# Patient Record
Sex: MALE | Race: WHITE | NOT HISPANIC OR LATINO | Employment: OTHER | URBAN - METROPOLITAN AREA
[De-identification: names, ages, dates, MRNs, and addresses within clinical notes are randomized per-mention and may not be internally consistent; named-entity substitution may affect disease eponyms.]

---

## 2023-03-14 LAB
CHOLESTEROL (MG/DL) IN SER/PLAS: 117 MG/DL (ref 0–199)
CHOLESTEROL IN HDL (MG/DL) IN SER/PLAS: 42.2 MG/DL
CHOLESTEROL/HDL RATIO: 2.8
LDL: 60 MG/DL (ref 0–99)
TRIGLYCERIDE (MG/DL) IN SER/PLAS: 74 MG/DL (ref 0–149)
VLDL: 15 MG/DL (ref 0–40)

## 2023-03-17 LAB
ANION GAP IN SER/PLAS: 15 MMOL/L (ref 10–20)
BASOPHILS (10*3/UL) IN BLOOD BY AUTOMATED COUNT: 0.07 X10E9/L (ref 0–0.1)
BASOPHILS/100 LEUKOCYTES IN BLOOD BY AUTOMATED COUNT: 0.9 % (ref 0–2)
CALCIUM (MG/DL) IN SER/PLAS: 8.9 MG/DL (ref 8.6–10.3)
CARBON DIOXIDE, TOTAL (MMOL/L) IN SER/PLAS: 23 MMOL/L (ref 21–32)
CHLORIDE (MMOL/L) IN SER/PLAS: 106 MMOL/L (ref 98–107)
CREATININE (MG/DL) IN SER/PLAS: 1.17 MG/DL (ref 0.5–1.3)
EOSINOPHILS (10*3/UL) IN BLOOD BY AUTOMATED COUNT: 0.17 X10E9/L (ref 0–0.4)
EOSINOPHILS/100 LEUKOCYTES IN BLOOD BY AUTOMATED COUNT: 2.2 % (ref 0–6)
ERYTHROCYTE DISTRIBUTION WIDTH (RATIO) BY AUTOMATED COUNT: 14.4 % (ref 11.5–14.5)
ERYTHROCYTE MEAN CORPUSCULAR HEMOGLOBIN CONCENTRATION (G/DL) BY AUTOMATED: 28.2 G/DL (ref 32–36)
ERYTHROCYTE MEAN CORPUSCULAR VOLUME (FL) BY AUTOMATED COUNT: 84 FL (ref 80–100)
ERYTHROCYTES (10*6/UL) IN BLOOD BY AUTOMATED COUNT: 2.91 X10E12/L (ref 4.5–5.9)
GFR MALE: 60 ML/MIN/1.73M2
GLUCOSE (MG/DL) IN SER/PLAS: 101 MG/DL (ref 74–99)
HEMATOCRIT (%) IN BLOOD BY AUTOMATED COUNT: 24.5 % (ref 41–52)
HEMOGLOBIN (G/DL) IN BLOOD: 6.9 G/DL (ref 13.5–17.5)
IMMATURE GRANULOCYTES/100 LEUKOCYTES IN BLOOD BY AUTOMATED COUNT: 0.4 % (ref 0–0.9)
LEUKOCYTES (10*3/UL) IN BLOOD BY AUTOMATED COUNT: 7.6 X10E9/L (ref 4.4–11.3)
LYMPHOCYTES (10*3/UL) IN BLOOD BY AUTOMATED COUNT: 1.41 X10E9/L (ref 0.8–3)
LYMPHOCYTES/100 LEUKOCYTES IN BLOOD BY AUTOMATED COUNT: 18.7 % (ref 13–44)
MONOCYTES (10*3/UL) IN BLOOD BY AUTOMATED COUNT: 0.79 X10E9/L (ref 0.05–0.8)
MONOCYTES/100 LEUKOCYTES IN BLOOD BY AUTOMATED COUNT: 10.4 % (ref 2–10)
NEUTROPHILS (10*3/UL) IN BLOOD BY AUTOMATED COUNT: 5.09 X10E9/L (ref 1.6–5.5)
NEUTROPHILS/100 LEUKOCYTES IN BLOOD BY AUTOMATED COUNT: 67.4 % (ref 40–80)
PLATELETS (10*3/UL) IN BLOOD AUTOMATED COUNT: 312 X10E9/L (ref 150–450)
POTASSIUM (MMOL/L) IN SER/PLAS: 4.9 MMOL/L (ref 3.5–5.3)
SODIUM (MMOL/L) IN SER/PLAS: 139 MMOL/L (ref 136–145)
UREA NITROGEN (MG/DL) IN SER/PLAS: 30 MG/DL (ref 6–23)

## 2023-04-18 ENCOUNTER — TELEPHONE (OUTPATIENT)
Dept: PRIMARY CARE | Facility: CLINIC | Age: 88
End: 2023-04-18
Payer: MEDICARE

## 2023-04-18 NOTE — TELEPHONE ENCOUNTER
Eliud from [facility] called stating the they received an order from for home care for pt. He states that the pt is not home bound. He wants to confirm that you knew about this this is a nonadmit

## 2023-04-25 DIAGNOSIS — I10 PRIMARY HYPERTENSION: Primary | ICD-10-CM

## 2023-04-25 RX ORDER — ENALAPRIL MALEATE 2.5 MG/1
TABLET ORAL
Qty: 90 TABLET | Refills: 0 | Status: SHIPPED | OUTPATIENT
Start: 2023-04-25 | End: 2023-05-01 | Stop reason: SDUPTHER

## 2023-05-01 ENCOUNTER — OFFICE VISIT (OUTPATIENT)
Dept: PRIMARY CARE | Facility: CLINIC | Age: 88
End: 2023-05-01
Payer: MEDICARE

## 2023-05-01 VITALS
TEMPERATURE: 97.7 F | OXYGEN SATURATION: 97 % | DIASTOLIC BLOOD PRESSURE: 65 MMHG | BODY MASS INDEX: 25.46 KG/M2 | HEART RATE: 77 BPM | SYSTOLIC BLOOD PRESSURE: 155 MMHG | WEIGHT: 168 LBS | HEIGHT: 68 IN

## 2023-05-01 DIAGNOSIS — E44.0 MODERATE PROTEIN-CALORIE MALNUTRITION (MULTI): Primary | ICD-10-CM

## 2023-05-01 DIAGNOSIS — I10 PRIMARY HYPERTENSION: ICD-10-CM

## 2023-05-01 DIAGNOSIS — R53.81 DEBILITY: ICD-10-CM

## 2023-05-01 DIAGNOSIS — I25.10 CORONARY ARTERY DISEASE INVOLVING NATIVE CORONARY ARTERY OF NATIVE HEART WITHOUT ANGINA PECTORIS: Chronic | ICD-10-CM

## 2023-05-01 PROBLEM — M19.012 ARTHRITIS OF SHOULDER REGION, LEFT: Status: ACTIVE | Noted: 2023-05-01

## 2023-05-01 PROBLEM — M86.171 ACUTE OSTEOMYELITIS OF METATARSAL BONE OF RIGHT FOOT (MULTI): Status: ACTIVE | Noted: 2022-08-16

## 2023-05-01 PROBLEM — R07.9 CHEST PAIN: Status: ACTIVE | Noted: 2023-05-01

## 2023-05-01 PROBLEM — Z95.1 S/P CABG X 3: Status: ACTIVE | Noted: 2023-05-01

## 2023-05-01 PROBLEM — I70.261 ATHEROSCLEROSIS OF NATIVE ARTERY OF RIGHT LOWER EXTREMITY WITH GANGRENE (MULTI): Status: ACTIVE | Noted: 2023-05-01

## 2023-05-01 PROBLEM — I70.229 CRITICAL LOWER LIMB ISCHEMIA (MULTI): Status: ACTIVE | Noted: 2023-05-01

## 2023-05-01 PROBLEM — R94.31 ABNORMAL EKG: Status: ACTIVE | Noted: 2023-05-01

## 2023-05-01 PROBLEM — M25.561 BILATERAL KNEE PAIN: Status: ACTIVE | Noted: 2023-05-01

## 2023-05-01 PROBLEM — L97.515: Status: ACTIVE | Noted: 2023-05-01

## 2023-05-01 PROBLEM — M25.562 BILATERAL KNEE PAIN: Status: ACTIVE | Noted: 2023-05-01

## 2023-05-01 PROBLEM — R30.0 DYSURIA: Status: ACTIVE | Noted: 2023-05-01

## 2023-05-01 PROBLEM — M25.519 SHOULDER PAIN: Status: ACTIVE | Noted: 2023-05-01

## 2023-05-01 PROBLEM — S91.301A OPEN WOUND OF RIGHT FOOT: Status: ACTIVE | Noted: 2023-01-17

## 2023-05-01 PROBLEM — R53.83 FATIGUE: Status: ACTIVE | Noted: 2023-05-01

## 2023-05-01 PROBLEM — R01.1 MURMUR: Status: ACTIVE | Noted: 2023-05-01

## 2023-05-01 PROBLEM — S90.424A BLISTER OF TOE OF RIGHT FOOT WITH INFECTION: Status: ACTIVE | Noted: 2023-04-04

## 2023-05-01 PROBLEM — H90.3 BILATERAL SENSORINEURAL HEARING LOSS: Status: ACTIVE | Noted: 2023-05-01

## 2023-05-01 PROBLEM — R39.9 UTI SYMPTOMS: Status: ACTIVE | Noted: 2023-05-01

## 2023-05-01 PROBLEM — N40.0 BPH (BENIGN PROSTATIC HYPERPLASIA): Status: ACTIVE | Noted: 2023-05-01

## 2023-05-01 PROBLEM — R06.02 SOB (SHORTNESS OF BREATH): Status: ACTIVE | Noted: 2023-05-01

## 2023-05-01 PROBLEM — E11.621: Status: ACTIVE | Noted: 2023-05-01

## 2023-05-01 PROBLEM — S46.011A STRAIN OF RIGHT ROTATOR CUFF CAPSULE: Status: ACTIVE | Noted: 2023-05-01

## 2023-05-01 PROBLEM — N32.89 BLADDER MASS: Status: ACTIVE | Noted: 2023-05-01

## 2023-05-01 PROBLEM — N52.9 ERECTILE DYSFUNCTION: Status: ACTIVE | Noted: 2023-05-01

## 2023-05-01 PROBLEM — R43.0 LOSS OF PERCEPTION FOR SMELL: Status: ACTIVE | Noted: 2023-05-01

## 2023-05-01 PROBLEM — I35.0 AORTIC STENOSIS: Status: ACTIVE | Noted: 2023-05-01

## 2023-05-01 PROBLEM — H93.13 TINNITUS, BILATERAL: Status: ACTIVE | Noted: 2023-05-01

## 2023-05-01 PROBLEM — M75.42 IMPINGEMENT SYNDROME OF LEFT SHOULDER: Status: ACTIVE | Noted: 2023-05-01

## 2023-05-01 PROBLEM — K21.9 GERD WITHOUT ESOPHAGITIS: Status: ACTIVE | Noted: 2023-05-01

## 2023-05-01 PROBLEM — R48.1 LOSS OF PERCEPTION FOR TASTE: Status: ACTIVE | Noted: 2023-05-01

## 2023-05-01 PROBLEM — I73.9 PVD (PERIPHERAL VASCULAR DISEASE) (CMS-HCC): Status: ACTIVE | Noted: 2023-05-01

## 2023-05-01 PROBLEM — T83.410A: Status: ACTIVE | Noted: 2023-05-01

## 2023-05-01 PROBLEM — N39.0 URINARY TRACT INFECTION: Status: ACTIVE | Noted: 2023-05-01

## 2023-05-01 PROBLEM — Z96.0 S/P INSERTION OF PENILE IMPLANT: Status: ACTIVE | Noted: 2023-05-01

## 2023-05-01 PROBLEM — L08.9 BLISTER OF TOE OF RIGHT FOOT WITH INFECTION: Status: ACTIVE | Noted: 2023-04-04

## 2023-05-01 PROBLEM — R01.1 SYSTOLIC EJECTION MURMUR: Status: ACTIVE | Noted: 2023-05-01

## 2023-05-01 PROBLEM — E11.59 CONTROLLED DIABETES MELLITUS WITH CIRCULATORY COMPLICATION (MULTI): Status: ACTIVE | Noted: 2023-05-01

## 2023-05-01 PROBLEM — N31.9 NEUROGENIC BLADDER: Status: ACTIVE | Noted: 2023-05-01

## 2023-05-01 PROBLEM — M62.81 MUSCLE WEAKNESS: Status: ACTIVE | Noted: 2023-05-01

## 2023-05-01 PROBLEM — I65.23 ASYMPTOMATIC BILATERAL CAROTID ARTERY STENOSIS: Status: ACTIVE | Noted: 2023-05-01

## 2023-05-01 PROBLEM — U07.1 COVID-19: Status: ACTIVE | Noted: 2023-05-01

## 2023-05-01 PROCEDURE — 3078F DIAST BP <80 MM HG: CPT | Performed by: INTERNAL MEDICINE

## 2023-05-01 PROCEDURE — 1036F TOBACCO NON-USER: CPT | Performed by: INTERNAL MEDICINE

## 2023-05-01 PROCEDURE — 99213 OFFICE O/P EST LOW 20 MIN: CPT | Performed by: INTERNAL MEDICINE

## 2023-05-01 PROCEDURE — 3077F SYST BP >= 140 MM HG: CPT | Performed by: INTERNAL MEDICINE

## 2023-05-01 RX ORDER — GLUCOSAMINE/D3/BOSWELLIA SERRA 1500MG-400
TABLET ORAL
Status: ON HOLD | COMMUNITY
Start: 2023-02-03 | End: 2024-03-21 | Stop reason: ALTCHOICE

## 2023-05-01 RX ORDER — METOPROLOL TARTRATE 25 MG/1
12.5 TABLET, FILM COATED ORAL 2 TIMES DAILY
Qty: 45 TABLET | Refills: 3 | Status: SHIPPED | OUTPATIENT
Start: 2023-05-01 | End: 2023-05-02

## 2023-05-01 RX ORDER — LATANOPROST 50 UG/ML
SOLUTION/ DROPS OPHTHALMIC
Status: ON HOLD | COMMUNITY

## 2023-05-01 RX ORDER — CLOPIDOGREL BISULFATE 75 MG/1
75 TABLET ORAL DAILY
Qty: 90 TABLET | Refills: 1 | Status: SHIPPED | OUTPATIENT
Start: 2023-05-01 | End: 2023-10-26 | Stop reason: SDUPTHER

## 2023-05-01 RX ORDER — FERROUS SULFATE 325(65) MG
TABLET ORAL
Status: ON HOLD | COMMUNITY
Start: 2023-02-03 | End: 2024-03-21 | Stop reason: ALTCHOICE

## 2023-05-01 RX ORDER — GLUCOSAMINE/CHONDR SU A SOD 167-133 MG
1 CAPSULE ORAL DAILY
Status: ON HOLD | COMMUNITY
Start: 2022-07-12

## 2023-05-01 RX ORDER — PEN NEEDLE, DIABETIC 32GX 5/32"
NEEDLE, DISPOSABLE MISCELLANEOUS
COMMUNITY
Start: 2022-08-26 | End: 2024-03-04 | Stop reason: SDUPTHER

## 2023-05-01 RX ORDER — NAPROXEN SODIUM 220 MG/1
1 TABLET, FILM COATED ORAL DAILY
COMMUNITY
Start: 2022-03-08 | End: 2023-11-20 | Stop reason: WASHOUT

## 2023-05-01 RX ORDER — POTASSIUM CHLORIDE 20 MEQ/1
1 TABLET, EXTENDED RELEASE ORAL DAILY
COMMUNITY
Start: 2022-11-18 | End: 2023-05-01 | Stop reason: ALTCHOICE

## 2023-05-01 RX ORDER — UBIDECARENONE 75 MG
1 CAPSULE ORAL DAILY
COMMUNITY
End: 2023-12-12 | Stop reason: SDUPTHER

## 2023-05-01 RX ORDER — METOPROLOL TARTRATE 25 MG/1
1 TABLET, FILM COATED ORAL 2 TIMES DAILY
COMMUNITY
Start: 2022-03-08 | End: 2023-05-01 | Stop reason: SDUPTHER

## 2023-05-01 RX ORDER — CLOPIDOGREL BISULFATE 75 MG/1
1 TABLET ORAL DAILY
COMMUNITY
Start: 2022-03-08 | End: 2023-05-01 | Stop reason: SDUPTHER

## 2023-05-01 RX ORDER — NEPAFENAC 1 MG/ML
SUSPENSION/ DROPS OPHTHALMIC
COMMUNITY
Start: 2012-02-17 | End: 2024-06-05 | Stop reason: ALTCHOICE

## 2023-05-01 RX ORDER — INSULIN GLARGINE 100 [IU]/ML
INJECTION, SOLUTION SUBCUTANEOUS
COMMUNITY
Start: 2015-12-09 | End: 2023-12-14

## 2023-05-01 RX ORDER — TAMSULOSIN HYDROCHLORIDE 0.4 MG/1
0.4 CAPSULE ORAL
Status: ON HOLD | COMMUNITY
Start: 2022-07-12

## 2023-05-01 RX ORDER — IBUPROFEN 100 MG/5ML
1 SUSPENSION, ORAL (FINAL DOSE FORM) ORAL DAILY
Status: ON HOLD | COMMUNITY
Start: 2022-03-08

## 2023-05-01 RX ORDER — ENALAPRIL MALEATE 2.5 MG/1
2.5 TABLET ORAL DAILY
Qty: 90 TABLET | Refills: 3 | Status: SHIPPED | OUTPATIENT
Start: 2023-05-01 | End: 2023-11-20 | Stop reason: WASHOUT

## 2023-05-01 RX ORDER — MULTIVITAMIN
1 TABLET ORAL DAILY
Status: ON HOLD | COMMUNITY
Start: 2023-02-03

## 2023-05-01 RX ORDER — FUROSEMIDE 40 MG/1
1 TABLET ORAL DAILY
COMMUNITY
Start: 2022-07-12 | End: 2023-05-01 | Stop reason: ALTCHOICE

## 2023-05-02 RX ORDER — METOPROLOL TARTRATE 25 MG/1
TABLET, FILM COATED ORAL
Qty: 90 TABLET | Refills: 3 | Status: SHIPPED | OUTPATIENT
Start: 2023-05-02 | End: 2023-10-10 | Stop reason: SDUPTHER

## 2023-05-02 NOTE — PROGRESS NOTES
"The patient is here for:   Chief Complaint   Patient presents with    6 month fuv        I have reviewed existing histories, notes, past medical history, surgical history, social history, family history, med list, allergy list, and immunization, and updated if applicable.  PCP: Olesya Daniel MD    HPI:      Last month pt had right big toe distal amputation for ulcer and infection. Wound is healing well per pt.  He is ambulating, which has not been a while.   No sob cp.   Had a good check up by his cardiologist two months ago.  Lipid is controlled  Bp okay  normal renal function  Dm controlled       Physical exam:  /65   Pulse 77   Temp 36.5 °C (97.7 °F)   Ht 1.715 m (5' 7.5\")   Wt 76.2 kg (168 lb)   SpO2 97%   BMI 25.92 kg/m²    NAD   Neck exam showed no mass, lymphadenopathy, or thyroid enlargement, and no carotid bruit.  Heart RR  Lungs clear  No Abdominal pain  No leg edema.    Assessments/orders:   1. Moderate protein-calorie malnutrition (CMS/HCC)       2. Primary hypertension     - enalapril (Vasotec) 2.5 mg tablet; Take 1 tablet (2.5 mg) by mouth once daily.  Dispense: 90 tablet; Refill: 3    3. Coronary artery disease involving native coronary artery of native heart without angina pectoris     - clopidogrel (Plavix) 75 mg tablet; Take 1 tablet (75 mg) by mouth once daily.  Dispense: 90 tablet; Refill: 1    4. Debility     - Disability Placard      Plan/discussion and follow up:     3 months and prn           "

## 2023-06-08 ENCOUNTER — TELEPHONE (OUTPATIENT)
Dept: PRIMARY CARE | Facility: CLINIC | Age: 88
End: 2023-06-08
Payer: MEDICARE

## 2023-06-08 NOTE — TELEPHONE ENCOUNTER
KENNETH RECEIVED A REFERRAL FOR PT AND WANTED TO  CONFIRM THAT YOU WILL BE FOLLOWING PT FOR HIS CARE FOR HIS HOME HEALTHCARE

## 2023-07-03 ENCOUNTER — NURSING HOME VISIT (OUTPATIENT)
Dept: POST ACUTE CARE | Facility: EXTERNAL LOCATION | Age: 88
End: 2023-07-03
Payer: MEDICARE

## 2023-07-03 DIAGNOSIS — I50.89 OTHER HEART FAILURE (MULTI): ICD-10-CM

## 2023-07-03 DIAGNOSIS — Z79.4 TYPE 2 DIABETES MELLITUS WITH FOOT ULCER, WITH LONG-TERM CURRENT USE OF INSULIN (MULTI): ICD-10-CM

## 2023-07-03 DIAGNOSIS — E11.621 TYPE 2 DIABETES MELLITUS WITH FOOT ULCER, WITH LONG-TERM CURRENT USE OF INSULIN (MULTI): ICD-10-CM

## 2023-07-03 DIAGNOSIS — R53.1 WEAKNESS: Primary | ICD-10-CM

## 2023-07-03 DIAGNOSIS — L97.509 TYPE 2 DIABETES MELLITUS WITH FOOT ULCER, WITH LONG-TERM CURRENT USE OF INSULIN (MULTI): ICD-10-CM

## 2023-07-03 DIAGNOSIS — M86.171 ACUTE OSTEOMYELITIS OF METATARSAL BONE OF RIGHT FOOT (MULTI): ICD-10-CM

## 2023-07-03 DIAGNOSIS — I10 HYPERTENSION, ESSENTIAL: ICD-10-CM

## 2023-07-03 PROCEDURE — 99309 SBSQ NF CARE MODERATE MDM 30: CPT | Performed by: NURSE PRACTITIONER

## 2023-07-03 NOTE — LETTER
Patient: Rene Ballard  : 1934    Encounter Date: 2023    PROGRESS NOTE    Subjective  Chief complaint: Rene Ballard is a 88 y.o. male who is an acute skilled patient being seen and evaluated for weakness    HPI:  7/3/2023 Patient at SNF for therapy d/t weakness after recent hospitalization for right wrist dorsal compartment tenosynovitis and RLE 4th and 5th toe OM s/p TMA.  He was being followed by another provider and has requested to change to Dr Bailey's service.  Patient has been working with therapy.  He remains on IV atb.  He denies n/v/f/c.      Objective  Vital signs: 171/77, 97.8, 69, 18, 97%,     Physical Exam  Constitutional:       General: He is not in acute distress.  Eyes:      Extraocular Movements: Extraocular movements intact.   Cardiovascular:      Rate and Rhythm: Normal rate and regular rhythm.      Heart sounds: Murmur heard.   Pulmonary:      Effort: Pulmonary effort is normal.      Breath sounds: Normal breath sounds.   Abdominal:      General: Bowel sounds are normal.      Palpations: Abdomen is soft.   Musculoskeletal:      Cervical back: Neck supple.      Right lower leg: No edema.      Left lower leg: No edema.      Comments: Generalized weakness  Right wrist splint   Neurological:      Mental Status: He is alert.   Psychiatric:         Mood and Affect: Mood normal.         Behavior: Behavior is cooperative.         Assessment/Plan  Problem List Items Addressed This Visit       Acute osteomyelitis of metatarsal bone of right foot (CMS/HCC)     S/p TMA  IV atb thru 2023  Fu with surgeon and ID         Hypertension, essential     BP fluctuates  Continue antihypertensives  Monitor BP         Other heart failure (CMS/HCC)     BB  Monitor weight  Low Na diet         Type 2 diabetes mellitus with foot ulcer, with long-term current use of insulin (CMS/HCC)     Continue insulin  Monitor accuchecks         Weakness - Primary     Continue with therapy           Medications, treatments, and labs reviewed  Continue medications and treatments as listed in EMR    RENEE Reyes      Electronically Signed By: RENEE Reyes   7/4/23  7:55 PM

## 2023-07-04 DIAGNOSIS — M86.171 ACUTE OSTEOMYELITIS OF METATARSAL BONE OF RIGHT FOOT (MULTI): Primary | ICD-10-CM

## 2023-07-04 PROBLEM — Z96.0 S/P INSERTION OF PENILE IMPLANT: Status: RESOLVED | Noted: 2023-05-01 | Resolved: 2023-07-04

## 2023-07-04 PROBLEM — R48.1 LOSS OF PERCEPTION FOR TASTE: Status: RESOLVED | Noted: 2023-05-01 | Resolved: 2023-07-04

## 2023-07-04 PROBLEM — R06.02 SOB (SHORTNESS OF BREATH): Status: RESOLVED | Noted: 2023-05-01 | Resolved: 2023-07-04

## 2023-07-04 PROBLEM — R53.1 WEAKNESS: Status: ACTIVE | Noted: 2023-05-01

## 2023-07-04 PROBLEM — N52.9 ERECTILE DYSFUNCTION: Status: RESOLVED | Noted: 2023-05-01 | Resolved: 2023-07-04

## 2023-07-04 PROBLEM — E11.621 TYPE 2 DIABETES MELLITUS WITH FOOT ULCER, WITH LONG-TERM CURRENT USE OF INSULIN (MULTI): Status: ACTIVE | Noted: 2023-07-04

## 2023-07-04 PROBLEM — M25.519 SHOULDER PAIN: Status: RESOLVED | Noted: 2023-05-01 | Resolved: 2023-07-04

## 2023-07-04 PROBLEM — Z79.4 TYPE 2 DIABETES MELLITUS WITH FOOT ULCER, WITH LONG-TERM CURRENT USE OF INSULIN (MULTI): Status: ACTIVE | Noted: 2023-07-04

## 2023-07-04 PROBLEM — M25.562 BILATERAL KNEE PAIN: Status: RESOLVED | Noted: 2023-05-01 | Resolved: 2023-07-04

## 2023-07-04 PROBLEM — L97.509 TYPE 2 DIABETES MELLITUS WITH FOOT ULCER, WITH LONG-TERM CURRENT USE OF INSULIN (MULTI): Status: ACTIVE | Noted: 2023-07-04

## 2023-07-04 PROBLEM — U07.1 COVID-19: Status: RESOLVED | Noted: 2023-05-01 | Resolved: 2023-07-04

## 2023-07-04 PROBLEM — I50.89 OTHER HEART FAILURE (MULTI): Status: ACTIVE | Noted: 2023-07-04

## 2023-07-04 PROBLEM — M25.561 BILATERAL KNEE PAIN: Status: RESOLVED | Noted: 2023-05-01 | Resolved: 2023-07-04

## 2023-07-04 PROBLEM — Z95.1 S/P CABG X 3: Status: RESOLVED | Noted: 2023-05-01 | Resolved: 2023-07-04

## 2023-07-04 PROBLEM — R94.31 ABNORMAL EKG: Status: RESOLVED | Noted: 2023-05-01 | Resolved: 2023-07-04

## 2023-07-04 PROBLEM — R30.0 DYSURIA: Status: RESOLVED | Noted: 2023-05-01 | Resolved: 2023-07-04

## 2023-07-04 PROBLEM — R01.1 MURMUR: Status: RESOLVED | Noted: 2023-05-01 | Resolved: 2023-07-04

## 2023-07-04 PROBLEM — R43.0 LOSS OF PERCEPTION FOR SMELL: Status: RESOLVED | Noted: 2023-05-01 | Resolved: 2023-07-04

## 2023-07-04 PROBLEM — R07.9 CHEST PAIN: Status: RESOLVED | Noted: 2023-05-01 | Resolved: 2023-07-04

## 2023-07-04 PROBLEM — N39.0 URINARY TRACT INFECTION: Status: RESOLVED | Noted: 2023-05-01 | Resolved: 2023-07-04

## 2023-07-04 PROBLEM — R39.9 UTI SYMPTOMS: Status: RESOLVED | Noted: 2023-05-01 | Resolved: 2023-07-04

## 2023-07-04 RX ORDER — OXYCODONE HYDROCHLORIDE 5 MG/1
5 TABLET ORAL EVERY 4 HOURS PRN
Qty: 120 TABLET | Refills: 0 | Status: SHIPPED | OUTPATIENT
Start: 2023-07-04 | End: 2023-08-03

## 2023-07-04 NOTE — PROGRESS NOTES
PROGRESS NOTE    Subjective   Chief complaint: Rene Balladr is a 88 y.o. male who is an acute skilled patient being seen and evaluated for weakness    HPI:  7/3/2023 Patient at SNF for therapy d/t weakness after recent hospitalization for right wrist dorsal compartment tenosynovitis and RLE 4th and 5th toe OM s/p TMA.  He was being followed by another provider and has requested to change to Dr Bailey's service.  Patient has been working with therapy.  He remains on IV atb.  He denies n/v/f/c.      Objective   Vital signs: 171/77, 97.8, 69, 18, 97%,     Physical Exam  Constitutional:       General: He is not in acute distress.  Eyes:      Extraocular Movements: Extraocular movements intact.   Cardiovascular:      Rate and Rhythm: Normal rate and regular rhythm.      Heart sounds: Murmur heard.   Pulmonary:      Effort: Pulmonary effort is normal.      Breath sounds: Normal breath sounds.   Abdominal:      General: Bowel sounds are normal.      Palpations: Abdomen is soft.   Musculoskeletal:      Cervical back: Neck supple.      Right lower leg: No edema.      Left lower leg: No edema.      Comments: Generalized weakness  Right wrist splint   Neurological:      Mental Status: He is alert.   Psychiatric:         Mood and Affect: Mood normal.         Behavior: Behavior is cooperative.         Assessment/Plan   Problem List Items Addressed This Visit       Acute osteomyelitis of metatarsal bone of right foot (CMS/HCC)     S/p TMA  IV atb thru 7/28/2023  Fu with surgeon and ID         Hypertension, essential     BP fluctuates  Continue antihypertensives  Monitor BP         Other heart failure (CMS/HCC)     BB  Monitor weight  Low Na diet         Type 2 diabetes mellitus with foot ulcer, with long-term current use of insulin (CMS/HCC)     Continue insulin  Monitor accuchecks         Weakness - Primary     Continue with therapy          Medications, treatments, and labs reviewed  Continue medications and treatments  as listed in EMR    Suzette Davidson, APRN-CNP

## 2023-07-05 ENCOUNTER — NURSING HOME VISIT (OUTPATIENT)
Dept: POST ACUTE CARE | Facility: EXTERNAL LOCATION | Age: 88
End: 2023-07-05
Payer: MEDICARE

## 2023-07-05 DIAGNOSIS — M86.171 ACUTE OSTEOMYELITIS OF METATARSAL BONE OF RIGHT FOOT (MULTI): Primary | ICD-10-CM

## 2023-07-05 DIAGNOSIS — E08.51: ICD-10-CM

## 2023-07-05 DIAGNOSIS — R53.1 WEAKNESS: ICD-10-CM

## 2023-07-05 DIAGNOSIS — I25.10 CORONARY ARTERY DISEASE INVOLVING NATIVE CORONARY ARTERY OF NATIVE HEART WITHOUT ANGINA PECTORIS: Chronic | ICD-10-CM

## 2023-07-05 DIAGNOSIS — N40.0 BENIGN PROSTATIC HYPERPLASIA WITHOUT LOWER URINARY TRACT SYMPTOMS: ICD-10-CM

## 2023-07-05 DIAGNOSIS — M17.0 PRIMARY OSTEOARTHRITIS OF BOTH KNEES: ICD-10-CM

## 2023-07-05 DIAGNOSIS — I35.0 NONRHEUMATIC AORTIC VALVE STENOSIS: ICD-10-CM

## 2023-07-05 DIAGNOSIS — I73.9 PVD (PERIPHERAL VASCULAR DISEASE) (CMS-HCC): ICD-10-CM

## 2023-07-05 DIAGNOSIS — I25.10 ARTERIOSCLEROSIS OF CORONARY ARTERY: Chronic | ICD-10-CM

## 2023-07-05 PROCEDURE — 99305 1ST NF CARE MODERATE MDM 35: CPT | Performed by: INTERNAL MEDICINE

## 2023-07-05 NOTE — LETTER
Patient: Rene Ballard  : 1934    Encounter Date: 2023    HISTORY & PHYSICAL    Subjective  Chief complaint: Rene Ballard is a 88 y.o. male who is a acute skilled care patient being seen and evaluated for multiple medical problems.  Patient presents for weakness.    HPI:  Patient at SNF for therapy d/t weakness after recent hospitalization for right wrist dorsal compartment tenosynovitis and RLE 4th and 5th toe OM s/p TMA.  He was being followed by another provider and has requested to change to Dr Bailey's service.  Patient has been working with therapy.  He remains on IV atb.  He denies n/v/f/c.      Past Medical History:   Diagnosis Date   • Abnormal EKG 2023   • COVID-19 2023   • Peripheral vascular disease, unspecified (CMS/HCC) 2022    PVD (peripheral vascular disease)   • Personal history of malignant neoplasm of bladder     History of carcinoma of bladder   • Personal history of other diseases of the musculoskeletal system and connective tissue     History of arthritis   • Personal history of other endocrine, nutritional and metabolic disease     History of diabetes mellitus   • S/P CABG x 3 2023   • S/P insertion of penile implant 2023   • Type 2 diabetes mellitus with other circulatory complications (CMS/[facility]) 2022    Controlled diabetes mellitus with circulatory complication       Past Surgical History:   Procedure Laterality Date   • CARDIAC SURGERY     • IR  BLADDER ASPIRATION  2017    IR  BLADDER ASPIRATION [place] [facility] [place]   • OTHER SURGICAL HISTORY  2017    Surgery Penile Prosthetic Device       Family History   Problem Relation Name Age of Onset   • Tuberculosis Maternal Grandmother         Social History     Socioeconomic History   • Marital status:      Spouse name: Not on file   • Number of children: Not on file   • Years of education: Not on file   • Highest education level: Not on file   Occupational  History   • Not on file   Tobacco Use   • Smoking status: Never   • Smokeless tobacco: Never   Substance and Sexual Activity   • Alcohol use: Not Currently   • Drug use: Never   • Sexual activity: Not on file   Other Topics Concern   • Not on file   Social History Narrative   • Not on file     Social Determinants of Health     Financial Resource Strain: Not on file   Food Insecurity: Not on file   Transportation Needs: Not on file   Physical Activity: Not on file   Stress: Not on file   Social Connections: Not on file   Intimate Partner Violence: Not on file   Housing Stability: Not on file       Vital signs: 156/87, 97.5, 75, 18, 132.0, 94%    Objective  Physical Exam  Vitals reviewed.   Constitutional:       Appearance: Normal appearance.   HENT:      Head: Normocephalic and atraumatic.   Cardiovascular:      Rate and Rhythm: Normal rate and regular rhythm.   Pulmonary:      Effort: Pulmonary effort is normal.      Breath sounds: Normal breath sounds.   Abdominal:      General: Bowel sounds are normal.      Palpations: Abdomen is soft.   Musculoskeletal:      Cervical back: Neck supple.   Skin:     General: Skin is warm and dry.   Neurological:      General: No focal deficit present.      Mental Status: He is alert.   Psychiatric:         Mood and Affect: Mood normal.         Behavior: Behavior is cooperative.         Assessment/Plan  Problem List Items Addressed This Visit          Cardiac and Vasculature    Arteriosclerosis of coronary artery (Chronic)    CAD (coronary artery disease), native coronary artery (Chronic)    Aortic stenosis    PVD (peripheral vascular disease) (CMS/[facility])       Endocrine/Metabolic    Controlled diabetes mellitus with circulatory complication (CMS/[facility])       Genitourinary and Reproductive    BPH (benign prostatic hyperplasia)       Infectious Diseases    Acute osteomyelitis of metatarsal bone of right foot (CMS/[facility]) - Primary       Musculoskeletal and Injuries    Osteoarthritis of both  knees       Symptoms and Signs    Weakness     Medications, treatments, and labs reviewed  Continue medications and treatments as listed in PCC    Scribe Attestation  I, Rosa M Abdi   attest that this documentation has been prepared under the direction and in the presence of Joao Bailey MD.    Provider Attestation - Scribe documentation  All medical record entries made by the Scribe were at my direction and personally dictated by me. I have reviewed the chart and agree that the record accurately reflects my personal performance of the history, physical exam, discussion and plan.    Joao Bailey MD          Electronically Signed By: Joao Bailey MD   7/5/23  6:18 PM

## 2023-07-05 NOTE — PROGRESS NOTES
HISTORY & PHYSICAL    Subjective   Chief complaint: Rene Ballard is a 88 y.o. male who is a acute skilled care patient being seen and evaluated for multiple medical problems.  Patient presents for weakness.    HPI:  Patient at SNF for therapy d/t weakness after recent hospitalization for right wrist dorsal compartment tenosynovitis and RLE 4th and 5th toe OM s/p TMA.  He was being followed by another provider and has requested to change to Dr Bailey's service.  Patient has been working with therapy.  He remains on IV atb.  He denies n/v/f/c.      Past Medical History:   Diagnosis Date    Abnormal EKG 05/01/2023    COVID-19 05/01/2023    Peripheral vascular disease, unspecified (CMS/[facility]) 11/28/2022    PVD (peripheral vascular disease)    Personal history of malignant neoplasm of bladder     History of carcinoma of bladder    Personal history of other diseases of the musculoskeletal system and connective tissue     History of arthritis    Personal history of other endocrine, nutritional and metabolic disease     History of diabetes mellitus    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    Type 2 diabetes mellitus with other circulatory complications (CMS/[facility]) 11/28/2022    Controlled diabetes mellitus with circulatory complication       Past Surgical History:   Procedure Laterality Date    CARDIAC SURGERY      IR  BLADDER ASPIRATION  06/23/2017    IR  BLADDER ASPIRATION 6/23/2017 [facility] [place] INPATIENT LEGACY    OTHER SURGICAL HISTORY  06/19/2017    Surgery Penile Prosthetic Device       Family History   Problem Relation Name Age of Onset    Tuberculosis Maternal Grandmother         Social History     Socioeconomic History    Marital status:      Spouse name: Not on file    Number of children: Not on file    Years of education: Not on file    Highest education level: Not on file   Occupational History    Not on file   Tobacco Use    Smoking status: Never    Smokeless tobacco: Never    Substance and Sexual Activity    Alcohol use: Not Currently    Drug use: Never    Sexual activity: Not on file   Other Topics Concern    Not on file   Social History Narrative    Not on file     Social Determinants of Health     Financial Resource Strain: Not on file   Food Insecurity: Not on file   Transportation Needs: Not on file   Physical Activity: Not on file   Stress: Not on file   Social Connections: Not on file   Intimate Partner Violence: Not on file   Housing Stability: Not on file       Vital signs: 156/87, 97.5, 75, 18, 132.0, 94%    Objective   Physical Exam  Vitals reviewed.   Constitutional:       Appearance: Normal appearance.   HENT:      Head: Normocephalic and atraumatic.   Cardiovascular:      Rate and Rhythm: Normal rate and regular rhythm.   Pulmonary:      Effort: Pulmonary effort is normal.      Breath sounds: Normal breath sounds.   Abdominal:      General: Bowel sounds are normal.      Palpations: Abdomen is soft.   Musculoskeletal:      Cervical back: Neck supple.   Skin:     General: Skin is warm and dry.   Neurological:      General: No focal deficit present.      Mental Status: He is alert.   Psychiatric:         Mood and Affect: Mood normal.         Behavior: Behavior is cooperative.         Assessment/Plan   Problem List Items Addressed This Visit          Cardiac and Vasculature    Arteriosclerosis of coronary artery (Chronic)    CAD (coronary artery disease), native coronary artery (Chronic)    Aortic stenosis    PVD (peripheral vascular disease) (CMS/[facility])       Endocrine/Metabolic    Controlled diabetes mellitus with circulatory complication (CMS/[facility])       Genitourinary and Reproductive    BPH (benign prostatic hyperplasia)       Infectious Diseases    Acute osteomyelitis of metatarsal bone of right foot (CMS/[facility]) - Primary       Musculoskeletal and Injuries    Osteoarthritis of both knees       Symptoms and Signs    Weakness     Medications, treatments, and labs  reviewed  Continue medications and treatments as listed in PCC    Scribe Attestation  I, Rosa M Abdi   attest that this documentation has been prepared under the direction and in the presence of Joao Bailey MD.    Provider Attestation - Scribe documentation  All medical record entries made by the Scribe were at my direction and personally dictated by me. I have reviewed the chart and agree that the record accurately reflects my personal performance of the history, physical exam, discussion and plan.    Joao Bailey MD

## 2023-07-07 ENCOUNTER — NURSING HOME VISIT (OUTPATIENT)
Dept: POST ACUTE CARE | Facility: EXTERNAL LOCATION | Age: 88
End: 2023-07-07
Payer: MEDICARE

## 2023-07-07 DIAGNOSIS — I10 HYPERTENSION, ESSENTIAL: ICD-10-CM

## 2023-07-07 DIAGNOSIS — R53.1 WEAKNESS: ICD-10-CM

## 2023-07-07 PROCEDURE — 99308 SBSQ NF CARE LOW MDM 20: CPT | Performed by: INTERNAL MEDICINE

## 2023-07-07 NOTE — LETTER
Patient: Rene Ballard  : 1934    Encounter Date: 2023    PROGRESS NOTE    Subjective  Chief complaint: Rene Ballard is a 88 y.o. male who is an acute skilled patient being seen and evaluated for weakness    HPI:  7/3/2023 Patient at SNF for therapy d/t weakness after recent hospitalization for right wrist dorsal compartment tenosynovitis and RLE 4th and 5th toe OM s/p TMA.  He was being followed by another provider and has requested to change to Dr Bailey's service.  Patient has been working with therapy.  He remains on IV atb.  He denies n/v/f/c.    23   Patient working with therapy.  Requires assistance with transfers ADLs and mobility.  No new issues or concerns.  No acute distress.      Objective  Vital signs: 164/75, 97%,     Physical Exam  Constitutional:       General: He is not in acute distress.  Eyes:      Extraocular Movements: Extraocular movements intact.   Cardiovascular:      Rate and Rhythm: Normal rate and regular rhythm.      Heart sounds: Murmur heard.   Pulmonary:      Effort: Pulmonary effort is normal.      Breath sounds: Normal breath sounds.   Abdominal:      General: Bowel sounds are normal.      Palpations: Abdomen is soft.   Musculoskeletal:      Cervical back: Neck supple.      Right lower leg: No edema.      Left lower leg: No edema.      Comments: Generalized weakness  Right wrist splint   Neurological:      Mental Status: He is alert.   Psychiatric:         Mood and Affect: Mood normal.         Behavior: Behavior is cooperative.         Assessment/Plan  Problem List Items Addressed This Visit          Cardiac and Vasculature    Hypertension, essential     BP fluctuates  Continue antihypertensives  Monitor BP            Symptoms and Signs    Weakness     Continue with therapy        Medications, treatments, and labs reviewed  Continue medications and treatments as listed in EMR    Joao Bailey MD   Scribe Attestation  By signing my name below, I, Claribel Chatman,  Scribe   attest that this documentation has been prepared under the direction and in the presence of Joao Bailey MD.    Provider Attestation - Scribe documentation  All medical record entries made by the Scribe were at my direction and personally dictated by me. I have reviewed the chart and agree that the record accurately reflects my personal performance of the history, physical exam, discussion and plan.    1. Hypertension, essential        2. Weakness              Electronically Signed By: Joao Bailey MD   7/8/23  1:47 PM

## 2023-07-07 NOTE — PROGRESS NOTES
PROGRESS NOTE    Subjective   Chief complaint: Rene Ballard is a 88 y.o. male who is an acute skilled patient being seen and evaluated for weakness    HPI:  7/3/2023 Patient at SNF for therapy d/t weakness after recent hospitalization for right wrist dorsal compartment tenosynovitis and RLE 4th and 5th toe OM s/p TMA.  He was being followed by another provider and has requested to change to Dr Bailey's service.  Patient has been working with therapy.  He remains on IV atb.  He denies n/v/f/c.    7/7/23   Patient working with therapy.  Requires assistance with transfers ADLs and mobility.  No new issues or concerns.  No acute distress.      Objective   Vital signs: 164/75, 97%,     Physical Exam  Constitutional:       General: He is not in acute distress.  Eyes:      Extraocular Movements: Extraocular movements intact.   Cardiovascular:      Rate and Rhythm: Normal rate and regular rhythm.      Heart sounds: Murmur heard.   Pulmonary:      Effort: Pulmonary effort is normal.      Breath sounds: Normal breath sounds.   Abdominal:      General: Bowel sounds are normal.      Palpations: Abdomen is soft.   Musculoskeletal:      Cervical back: Neck supple.      Right lower leg: No edema.      Left lower leg: No edema.      Comments: Generalized weakness  Right wrist splint   Neurological:      Mental Status: He is alert.   Psychiatric:         Mood and Affect: Mood normal.         Behavior: Behavior is cooperative.         Assessment/Plan   Problem List Items Addressed This Visit          Cardiac and Vasculature    Hypertension, essential     BP fluctuates  Continue antihypertensives  Monitor BP            Symptoms and Signs    Weakness     Continue with therapy        Medications, treatments, and labs reviewed  Continue medications and treatments as listed in EMR    Joao Bailey MD   Scribe Attestation  By signing my name below, I, Claribel Chatman, Scribe   attest that this documentation has been prepared under the  direction and in the presence of Joao Bailey MD.    Provider Attestation - Scribe documentation  All medical record entries made by the Scribe were at my direction and personally dictated by me. I have reviewed the chart and agree that the record accurately reflects my personal performance of the history, physical exam, discussion and plan.    1. Hypertension, essential        2. Weakness

## 2023-07-10 ENCOUNTER — NURSING HOME VISIT (OUTPATIENT)
Dept: POST ACUTE CARE | Facility: EXTERNAL LOCATION | Age: 88
End: 2023-07-10
Payer: MEDICARE

## 2023-07-10 DIAGNOSIS — I50.89 OTHER HEART FAILURE (MULTI): ICD-10-CM

## 2023-07-10 DIAGNOSIS — M79.641 RIGHT HAND PAIN: ICD-10-CM

## 2023-07-10 DIAGNOSIS — R53.1 WEAKNESS: Primary | ICD-10-CM

## 2023-07-10 DIAGNOSIS — L97.509 TYPE 2 DIABETES MELLITUS WITH FOOT ULCER, WITH LONG-TERM CURRENT USE OF INSULIN (MULTI): ICD-10-CM

## 2023-07-10 DIAGNOSIS — E11.621 TYPE 2 DIABETES MELLITUS WITH FOOT ULCER, WITH LONG-TERM CURRENT USE OF INSULIN (MULTI): ICD-10-CM

## 2023-07-10 DIAGNOSIS — Z79.4 TYPE 2 DIABETES MELLITUS WITH FOOT ULCER, WITH LONG-TERM CURRENT USE OF INSULIN (MULTI): ICD-10-CM

## 2023-07-10 DIAGNOSIS — I10 HYPERTENSION, ESSENTIAL: ICD-10-CM

## 2023-07-10 DIAGNOSIS — R01.1 SYSTOLIC EJECTION MURMUR: ICD-10-CM

## 2023-07-10 PROCEDURE — 99309 SBSQ NF CARE MODERATE MDM 30: CPT | Performed by: NURSE PRACTITIONER

## 2023-07-10 NOTE — LETTER
Patient: Rene Ballard  : 1934    Encounter Date: 07/10/2023    PROGRESS NOTE    Subjective  Chief complaint: Rene Ballard is a 88 y.o. male who is an acute skilled patient being seen and evaluated for weakness    HPI:  7/3/2023 Patient at SNF for therapy d/t weakness after recent hospitalization for right wrist dorsal compartment tenosynovitis and RLE 4th and 5th toe OM s/p TMA.  He was being followed by another provider and has requested to change to Dr Bailey's service.  Patient has been working with therapy.  He remains on IV atb.  He denies n/v/f/c.    23   Patient working with therapy.  Requires assistance with transfers ADLs and mobility.  No new issues or concerns.  No acute distress.    7/10/2023 Patient presents for follow-up.  Patient has been participating in therapy and continues to work toward goals.   Working on strengthening, transfers, and ADLs.  Denies constitutional symptoms.  Had cast removed from the right upper extremity last week and has tenderness to the middle finger not of new onset.  No new concerns otherwise.      Objective  Vital signs: 124/57, 18, 97.1, 65, 96%, blood sugar 314    Physical Exam  Constitutional:       General: He is not in acute distress.  Eyes:      Extraocular Movements: Extraocular movements intact.   Cardiovascular:      Rate and Rhythm: Normal rate and regular rhythm.      Heart sounds: Murmur heard.   Pulmonary:      Effort: Pulmonary effort is normal.      Breath sounds: Normal breath sounds.   Abdominal:      General: Bowel sounds are normal.      Palpations: Abdomen is soft.   Musculoskeletal:      Cervical back: Neck supple.      Right lower leg: No edema.      Left lower leg: No edema.      Comments: Generalized weakness  Right hand reddened, middle finger tender to palpation  Dressing right foot   Neurological:      Mental Status: He is alert.   Psychiatric:         Mood and Affect: Mood normal.         Behavior: Behavior is cooperative.          Assessment/Plan  Problem List Items Addressed This Visit       Hypertension, essential     BP at goal  Continue antihypertensives  Monitor BP         Other heart failure (CMS/[facility])     BB  Monitor weight  Low Na diet         Right hand pain     Start Biofreeze  Continue oxycodone and Tylenol as needed         Systolic ejection murmur    Type 2 diabetes mellitus with foot ulcer, with long-term current use of insulin (CMS/[facility])     Blood sugars usually mid 100s to mid 300s  Continue insulin  Monitor accuchecks         Weakness - Primary     Continue with therapy          Medications, treatments, and labs reviewed  Continue medications and treatments as listed in EMR    RENEE Reyes      Electronically Signed By: RENEE Reyes   7/10/23  6:55 PM

## 2023-07-10 NOTE — PROGRESS NOTES
PROGRESS NOTE    Subjective   Chief complaint: Rene Ballard is a 88 y.o. male who is an acute skilled patient being seen and evaluated for weakness    HPI:  7/3/2023 Patient at [facility] for therapy d/t weakness after recent hospitalization for right wrist dorsal compartment tenosynovitis and RLE 4th and 5th toe OM s/p TMA.  He was being followed by another provider and has requested to change to Dr Bailey's service.  Patient has been working with therapy.  He remains on IV atb.  He denies n/v/f/c.    7/7/23   Patient working with therapy.  Requires assistance with transfers ADLs and mobility.  No new issues or concerns.  No acute distress.    7/10/2023 Patient presents for follow-up.  Patient has been participating in therapy and continues to work toward goals.   Working on strengthening, transfers, and ADLs.  Denies constitutional symptoms.  Had cast removed from the right upper extremity last week and has tenderness to the middle finger not of new onset.  No new concerns otherwise.      Objective   Vital signs: 124/57, 18, 97.1, 65, 96%, blood sugar 314    Physical Exam  Constitutional:       General: He is not in acute distress.  Eyes:      Extraocular Movements: Extraocular movements intact.   Cardiovascular:      Rate and Rhythm: Normal rate and regular rhythm.      Heart sounds: Murmur heard.   Pulmonary:      Effort: Pulmonary effort is normal.      Breath sounds: Normal breath sounds.   Abdominal:      General: Bowel sounds are normal.      Palpations: Abdomen is soft.   Musculoskeletal:      Cervical back: Neck supple.      Right lower leg: No edema.      Left lower leg: No edema.      Comments: Generalized weakness  Right hand reddened, middle finger tender to palpation  Dressing right foot   Neurological:      Mental Status: He is alert.   Psychiatric:         Mood and Affect: Mood normal.         Behavior: Behavior is cooperative.         Assessment/Plan   Problem List Items Addressed This Visit        Hypertension, essential     BP at goal  Continue antihypertensives  Monitor BP         Other heart failure (CMS/[facility])     BB  Monitor weight  Low Na diet         Right hand pain     Start Biofreeze  Continue oxycodone and Tylenol as needed         Systolic ejection murmur    Type 2 diabetes mellitus with foot ulcer, with long-term current use of insulin (CMS/[facility])     Blood sugars usually mid 100s to mid 300s  Continue insulin  Monitor accuchecks         Weakness - Primary     Continue with therapy          Medications, treatments, and labs reviewed  Continue medications and treatments as listed in EMR    Suzette Davidson, APRN-CNP

## 2023-07-12 ENCOUNTER — NURSING HOME VISIT (OUTPATIENT)
Dept: POST ACUTE CARE | Facility: EXTERNAL LOCATION | Age: 88
End: 2023-07-12
Payer: MEDICARE

## 2023-07-12 DIAGNOSIS — I10 HYPERTENSION, ESSENTIAL: ICD-10-CM

## 2023-07-12 DIAGNOSIS — R53.1 WEAKNESS: ICD-10-CM

## 2023-07-12 PROCEDURE — 99308 SBSQ NF CARE LOW MDM 20: CPT | Performed by: INTERNAL MEDICINE

## 2023-07-12 NOTE — LETTER
Patient: Rene Ballard  : 1934    Encounter Date: 2023    PROGRESS NOTE    Subjective  Chief complaint: Rene Ballard is a 88 y.o. male who is an acute skilled patient being seen and evaluated for weakness    HPI:  7/3/2023 Patient at [facility] for therapy d/t weakness after recent hospitalization for right wrist dorsal compartment tenosynovitis and RLE 4th and 5th toe OM s/p TMA.  He was being followed by another provider and has requested to change to Dr Bailey's service.  Patient has been working with therapy.  He remains on IV atb.  He denies n/v/f/c.    23   Patient working with therapy.  Requires assistance with transfers ADLs and mobility.  No new issues or concerns.  No acute distress.    7/10/2023 Patient presents for follow-up.  Patient has been participating in therapy and continues to work toward goals.   Working on strengthening, transfers, and ADLs.  Denies constitutional symptoms.  Had cast removed from the right upper extremity last week and has tenderness to the middle finger not of new onset.  No new concerns otherwise.    23 Patient working in therapy due to weakness. Working on strengthening, balance and transfers. No new concerns at this time. No acute distress. Denies chest pain or headache.       Objective  Vital signs: 126/59,  96%,     Physical Exam  Constitutional:       General: He is not in acute distress.  Eyes:      Extraocular Movements: Extraocular movements intact.   Cardiovascular:      Rate and Rhythm: Normal rate and regular rhythm.      Heart sounds: Murmur heard.   Pulmonary:      Effort: Pulmonary effort is normal.      Breath sounds: Normal breath sounds.   Abdominal:      General: Bowel sounds are normal.      Palpations: Abdomen is soft.   Musculoskeletal:      Cervical back: Neck supple.      Right lower leg: No edema.      Left lower leg: No edema.      Comments: Generalized weakness  Right hand reddened, middle finger tender to palpation  Dressing right  foot   Neurological:      Mental Status: He is alert.   Psychiatric:         Mood and Affect: Mood normal.         Behavior: Behavior is cooperative.         Assessment/Plan  Problem List Items Addressed This Visit          Cardiac and Vasculature    Hypertension, essential     BP at goal  Continue antihypertensives  Monitor BP            Symptoms and Signs    Weakness     Continue with therapy        Medications, treatments, and labs reviewed  Continue medications and treatments as listed in EMR    Joao Bailey MD  Scribe Attestation  By signing my name below, I, Farhana Singhibe   attest that this documentation has been prepared under the direction and in the presence of Joao Bailey MD.    Provider Attestation - Scribe documentation  All medical record entries made by the Scribe were at my direction and personally dictated by me. I have reviewed the chart and agree that the record accurately reflects my personal performance of the history, physical exam, discussion and plan.  1. Hypertension, essential        2. Weakness              Electronically Signed By: Joao Bailey MD   7/12/23  7:56 PM

## 2023-07-12 NOTE — PROGRESS NOTES
PROGRESS NOTE    Subjective   Chief complaint: Rene Ballard is a 88 y.o. male who is an acute skilled patient being seen and evaluated for weakness    HPI:  7/3/2023 Patient at SNF for therapy d/t weakness after recent hospitalization for right wrist dorsal compartment tenosynovitis and RLE 4th and 5th toe OM s/p TMA.  He was being followed by another provider and has requested to change to Dr Bailey's service.  Patient has been working with therapy.  He remains on IV atb.  He denies n/v/f/c.    7/7/23   Patient working with therapy.  Requires assistance with transfers ADLs and mobility.  No new issues or concerns.  No acute distress.    7/10/2023 Patient presents for follow-up.  Patient has been participating in therapy and continues to work toward goals.   Working on strengthening, transfers, and ADLs.  Denies constitutional symptoms.  Had cast removed from the right upper extremity last week and has tenderness to the middle finger not of new onset.  No new concerns otherwise.    7/2/23 Patient working in therapy due to weakness. Working on strengthening, balance and transfers. No new concerns at this time. No acute distress. Denies chest pain or headache.       Objective   Vital signs: 126/59,  96%,     Physical Exam  Constitutional:       General: He is not in acute distress.  Eyes:      Extraocular Movements: Extraocular movements intact.   Cardiovascular:      Rate and Rhythm: Normal rate and regular rhythm.      Heart sounds: Murmur heard.   Pulmonary:      Effort: Pulmonary effort is normal.      Breath sounds: Normal breath sounds.   Abdominal:      General: Bowel sounds are normal.      Palpations: Abdomen is soft.   Musculoskeletal:      Cervical back: Neck supple.      Right lower leg: No edema.      Left lower leg: No edema.      Comments: Generalized weakness  Right hand reddened, middle finger tender to palpation  Dressing right foot   Neurological:      Mental Status: He is alert.   Psychiatric:          Mood and Affect: Mood normal.         Behavior: Behavior is cooperative.         Assessment/Plan   Problem List Items Addressed This Visit          Cardiac and Vasculature    Hypertension, essential     BP at goal  Continue antihypertensives  Monitor BP            Symptoms and Signs    Weakness     Continue with therapy        Medications, treatments, and labs reviewed  Continue medications and treatments as listed in EMR    Joao Bailey MD  Scribe Attestation  By signing my name below, IClaribel Scribe   attest that this documentation has been prepared under the direction and in the presence of Joao Bailey MD.    Provider Attestation - Scribe documentation  All medical record entries made by the Scribe were at my direction and personally dictated by me. I have reviewed the chart and agree that the record accurately reflects my personal performance of the history, physical exam, discussion and plan.  1. Hypertension, essential        2. Weakness

## 2023-07-13 ENCOUNTER — NURSING HOME VISIT (OUTPATIENT)
Dept: POST ACUTE CARE | Facility: EXTERNAL LOCATION | Age: 88
End: 2023-07-13
Payer: MEDICARE

## 2023-07-13 DIAGNOSIS — I10 HYPERTENSION, ESSENTIAL: ICD-10-CM

## 2023-07-13 DIAGNOSIS — R53.1 WEAKNESS: Primary | ICD-10-CM

## 2023-07-13 DIAGNOSIS — I50.89 OTHER HEART FAILURE (MULTI): ICD-10-CM

## 2023-07-13 DIAGNOSIS — E11.621 TYPE 2 DIABETES MELLITUS WITH FOOT ULCER, WITH LONG-TERM CURRENT USE OF INSULIN (MULTI): ICD-10-CM

## 2023-07-13 DIAGNOSIS — Z79.4 TYPE 2 DIABETES MELLITUS WITH FOOT ULCER, WITH LONG-TERM CURRENT USE OF INSULIN (MULTI): ICD-10-CM

## 2023-07-13 DIAGNOSIS — L97.509 TYPE 2 DIABETES MELLITUS WITH FOOT ULCER, WITH LONG-TERM CURRENT USE OF INSULIN (MULTI): ICD-10-CM

## 2023-07-13 PROCEDURE — 99309 SBSQ NF CARE MODERATE MDM 30: CPT | Performed by: NURSE PRACTITIONER

## 2023-07-13 NOTE — PROGRESS NOTES
PROGRESS NOTE    Subjective   Chief complaint: Rene Ballard is a 88 y.o. male who is an acute skilled patient being seen and evaluated for weakness    HPI:  7/3/2023 Patient at SNF for therapy d/t weakness after recent hospitalization for right wrist dorsal compartment tenosynovitis and RLE 4th and 5th toe OM s/p TMA.  He was being followed by another provider and has requested to change to Dr Bailey's service.  Patient has been working with therapy.  He remains on IV atb.  He denies n/v/f/c.    7/7/23   Patient working with therapy.  Requires assistance with transfers ADLs and mobility.  No new issues or concerns.  No acute distress.    7/10/2023 Patient presents for follow-up.  Patient has been participating in therapy and continues to work toward goals.   Working on strengthening, transfers, and ADLs.  Denies constitutional symptoms.  Had cast removed from the right upper extremity last week and has tenderness to the middle finger not of new onset.  No new concerns otherwise.    7/2/23 Patient working in therapy due to weakness. Working on strengthening, balance and transfers. No new concerns at this time. No acute distress. Denies chest pain or headache.     7/13/2023 Patient continues working in therapy.  Patient states thatpain in left hand/wrist is improving.  He has no new concerns today.  Remains on IV atb.  Denies n/v/f/c.      Objective   Vital signs: 138/52, 97.1,     Physical Exam  Constitutional:       General: He is not in acute distress.  Eyes:      Extraocular Movements: Extraocular movements intact.   Cardiovascular:      Rate and Rhythm: Normal rate and regular rhythm.      Heart sounds: Murmur heard.   Pulmonary:      Effort: Pulmonary effort is normal.      Breath sounds: Normal breath sounds.   Abdominal:      General: Bowel sounds are normal.      Palpations: Abdomen is soft.   Musculoskeletal:      Cervical back: Neck supple.      Right lower leg: No edema.      Left lower leg: No  edema.      Comments: Generalized weakness  Right hand and wrist reddened, +swelling  Dressing right foot   Neurological:      Mental Status: He is alert.   Psychiatric:         Mood and Affect: Mood normal.         Behavior: Behavior is cooperative.         Assessment/Plan   Problem List Items Addressed This Visit       Hypertension, essential     BP at goal  Continue antihypertensives  Monitor BP         Other heart failure (CMS/[facility])     Stable  BB  Monitor weight  Low Na diet         Type 2 diabetes mellitus with foot ulcer, with long-term current use of insulin (CMS/[facility])     Blood sugars usually mid 100s to mid 300s  Continue insulin  Monitor accuchecks         Weakness - Primary     Continue with therapy          Medications, treatments, and labs reviewed  Continue medications and treatments as listed in EMR    Suzette Davidson, APRN-CNP

## 2023-07-13 NOTE — LETTER
Patient: Rene Ballard  : 1934    Encounter Date: 2023    PROGRESS NOTE    Subjective  Chief complaint: Rene Ballard is a 88 y.o. male who is an acute skilled patient being seen and evaluated for weakness    HPI:  7/3/2023 Patient at [facility] for therapy d/t weakness after recent hospitalization for right wrist dorsal compartment tenosynovitis and RLE 4th and 5th toe OM s/p TMA.  He was being followed by another provider and has requested to change to Dr Bailey's service.  Patient has been working with therapy.  He remains on IV atb.  He denies n/v/f/c.    23   Patient working with therapy.  Requires assistance with transfers ADLs and mobility.  No new issues or concerns.  No acute distress.    7/10/2023 Patient presents for follow-up.  Patient has been participating in therapy and continues to work toward goals.   Working on strengthening, transfers, and ADLs.  Denies constitutional symptoms.  Had cast removed from the right upper extremity last week and has tenderness to the middle finger not of new onset.  No new concerns otherwise.    23 Patient working in therapy due to weakness. Working on strengthening, balance and transfers. No new concerns at this time. No acute distress. Denies chest pain or headache.     2023 Patient continues working in therapy.  Patient states thatpain in left hand/wrist is improving.  He has no new concerns today.  Remains on IV atb.  Denies n/v/f/c.      Objective  Vital signs: 138/52, 97.1,     Physical Exam  Constitutional:       General: He is not in acute distress.  Eyes:      Extraocular Movements: Extraocular movements intact.   Cardiovascular:      Rate and Rhythm: Normal rate and regular rhythm.      Heart sounds: Murmur heard.   Pulmonary:      Effort: Pulmonary effort is normal.      Breath sounds: Normal breath sounds.   Abdominal:      General: Bowel sounds are normal.      Palpations: Abdomen is soft.   Musculoskeletal:      Cervical back:  Neck supple.      Right lower leg: No edema.      Left lower leg: No edema.      Comments: Generalized weakness  Right hand and wrist reddened, +swelling  Dressing right foot   Neurological:      Mental Status: He is alert.   Psychiatric:         Mood and Affect: Mood normal.         Behavior: Behavior is cooperative.         Assessment/Plan  Problem List Items Addressed This Visit       Hypertension, essential     BP at goal  Continue antihypertensives  Monitor BP         Other heart failure (CMS/[facility])     Stable  BB  Monitor weight  Low Na diet         Type 2 diabetes mellitus with foot ulcer, with long-term current use of insulin (CMS/[facility])     Blood sugars usually mid 100s to mid 300s  Continue insulin  Monitor accuchecks         Weakness - Primary     Continue with therapy          Medications, treatments, and labs reviewed  Continue medications and treatments as listed in EMR    RENEE Reyes      Electronically Signed By: RENEE Reyes   7/15/23  1:33 PM

## 2023-07-14 ENCOUNTER — NURSING HOME VISIT (OUTPATIENT)
Dept: POST ACUTE CARE | Facility: EXTERNAL LOCATION | Age: 88
End: 2023-07-14
Payer: MEDICARE

## 2023-07-14 DIAGNOSIS — R53.1 WEAKNESS: ICD-10-CM

## 2023-07-14 DIAGNOSIS — I50.89 OTHER HEART FAILURE (MULTI): ICD-10-CM

## 2023-07-14 DIAGNOSIS — M86.171 ACUTE OSTEOMYELITIS OF METATARSAL BONE OF RIGHT FOOT (MULTI): ICD-10-CM

## 2023-07-14 PROCEDURE — 99308 SBSQ NF CARE LOW MDM 20: CPT | Performed by: INTERNAL MEDICINE

## 2023-07-14 NOTE — LETTER
Patient: Rene Ballard  : 1934    Encounter Date: 2023    PROGRESS NOTE    Subjective  Chief complaint: Rene Ballard is a 88 y.o. male who is an acute skilled patient being seen and evaluated for weakness    HPI:  7/3/2023 Patient at [facility] for therapy d/t weakness after recent hospitalization for right wrist dorsal compartment tenosynovitis and RLE 4th and 5th toe OM s/p TMA.  He was being followed by another provider and has requested to change to Dr Bailey's service.  Patient has been working with therapy.  He remains on IV atb.  He denies n/v/f/c.    23   Patient working with therapy.  Requires assistance with transfers ADLs and mobility.  No new issues or concerns.  No acute distress.    7/10/2023 Patient presents for follow-up.  Patient has been participating in therapy and continues to work toward goals.   Working on strengthening, transfers, and ADLs.  Denies constitutional symptoms.  Had cast removed from the right upper extremity last week and has tenderness to the middle finger not of new onset.  No new concerns otherwise.    23 Patient working in therapy due to weakness. Working on strengthening, balance and transfers. No new concerns at this time. No acute distress. Denies chest pain or headache.     2023 Patient continues working in therapy.  Patient states thatpain in left hand/wrist is improving.  He has no new concerns today.  Remains on IV atb.  Denies n/v/f/c.    23 Patient working in therapy due to weakness and debility. Requires assistance for transfers, ADL;s and mobility. Patient continues on IV ATB for OM, no adverse effects from ATB therapy noted. Denies SOB or orthopnea. No acute distress.       Objective  Vital signs: 124/67, 98%    Physical Exam  Constitutional:       General: He is not in acute distress.  Eyes:      Extraocular Movements: Extraocular movements intact.   Cardiovascular:      Rate and Rhythm: Normal rate and regular rhythm.      Heart  sounds: Murmur heard.   Pulmonary:      Effort: Pulmonary effort is normal.      Breath sounds: Normal breath sounds.   Abdominal:      General: Bowel sounds are normal.      Palpations: Abdomen is soft.   Musculoskeletal:      Cervical back: Neck supple.      Right lower leg: No edema.      Left lower leg: No edema.      Comments: Generalized weakness  Right hand and wrist reddened, +swelling  Dressing right foot   Neurological:      Mental Status: He is alert.   Psychiatric:         Mood and Affect: Mood normal.         Behavior: Behavior is cooperative.         Assessment/Plan  Problem List Items Addressed This Visit          Cardiac and Vasculature    Other heart failure (CMS/HCC)     BB  Monitor weight  Low Na diet            Infectious Diseases    Acute osteomyelitis of metatarsal bone of right foot (CMS/HCC)     S/p TMA  IV atb thru 7/28/2023  Fu with surgeon and ID            Symptoms and Signs    Weakness     Continue with therapy          Medications, treatments, and labs reviewed  Continue medications and treatments as listed in EMR    Joao Bailey MD  1. Weakness        2. Other heart failure (CMS/HCC)        3. Acute osteomyelitis of metatarsal bone of right foot (CMS/HCC)          Scribe Attestation  By signing my name below, IClaribel, Farhanaibe   attest that this documentation has been prepared under the direction and in the presence of Joao Bailey MD.    Provider Attestation - Scribe documentation  All medical record entries made by the Scribe were at my direction and personally dictated by me. I have reviewed the chart and agree that the record accurately reflects my personal performance of the history, physical exam, discussion and plan.      Electronically Signed By: Joao Bailey MD   7/16/23  9:32 AM

## 2023-07-14 NOTE — PROGRESS NOTES
PROGRESS NOTE    Subjective   Chief complaint: Rene Ballard is a 88 y.o. male who is an acute skilled patient being seen and evaluated for weakness    HPI:  7/3/2023 Patient at SNF for therapy d/t weakness after recent hospitalization for right wrist dorsal compartment tenosynovitis and RLE 4th and 5th toe OM s/p TMA.  He was being followed by another provider and has requested to change to Dr Bailey's service.  Patient has been working with therapy.  He remains on IV atb.  He denies n/v/f/c.    7/7/23   Patient working with therapy.  Requires assistance with transfers ADLs and mobility.  No new issues or concerns.  No acute distress.    7/10/2023 Patient presents for follow-up.  Patient has been participating in therapy and continues to work toward goals.   Working on strengthening, transfers, and ADLs.  Denies constitutional symptoms.  Had cast removed from the right upper extremity last week and has tenderness to the middle finger not of new onset.  No new concerns otherwise.    7/2/23 Patient working in therapy due to weakness. Working on strengthening, balance and transfers. No new concerns at this time. No acute distress. Denies chest pain or headache.     7/13/2023 Patient continues working in therapy.  Patient states thatpain in left hand/wrist is improving.  He has no new concerns today.  Remains on IV atb.  Denies n/v/f/c.    7/14/23 Patient working in therapy due to weakness and debility. Requires assistance for transfers, ADL;s and mobility. Patient continues on IV ATB for OM, no adverse effects from ATB therapy noted. Denies SOB or orthopnea. No acute distress.       Objective   Vital signs: 124/67, 98%    Physical Exam  Constitutional:       General: He is not in acute distress.  Eyes:      Extraocular Movements: Extraocular movements intact.   Cardiovascular:      Rate and Rhythm: Normal rate and regular rhythm.      Heart sounds: Murmur heard.   Pulmonary:      Effort: Pulmonary effort is normal.       Breath sounds: Normal breath sounds.   Abdominal:      General: Bowel sounds are normal.      Palpations: Abdomen is soft.   Musculoskeletal:      Cervical back: Neck supple.      Right lower leg: No edema.      Left lower leg: No edema.      Comments: Generalized weakness  Right hand and wrist reddened, +swelling  Dressing right foot   Neurological:      Mental Status: He is alert.   Psychiatric:         Mood and Affect: Mood normal.         Behavior: Behavior is cooperative.         Assessment/Plan   Problem List Items Addressed This Visit          Cardiac and Vasculature    Other heart failure (CMS/HCC)     BB  Monitor weight  Low Na diet            Infectious Diseases    Acute osteomyelitis of metatarsal bone of right foot (CMS/HCC)     S/p TMA  IV atb thru 7/28/2023  Fu with surgeon and ID            Symptoms and Signs    Weakness     Continue with therapy          Medications, treatments, and labs reviewed  Continue medications and treatments as listed in EMR    Joao Bailey MD  1. Weakness        2. Other heart failure (CMS/HCC)        3. Acute osteomyelitis of metatarsal bone of right foot (CMS/HCC)          Scribe Attestation  By signing my name below, IClaribel Scribe   attest that this documentation has been prepared under the direction and in the presence of Joao Bailey MD.    Provider Attestation - Scribe documentation  All medical record entries made by the Scribe were at my direction and personally dictated by me. I have reviewed the chart and agree that the record accurately reflects my personal performance of the history, physical exam, discussion and plan.

## 2023-07-17 ENCOUNTER — NURSING HOME VISIT (OUTPATIENT)
Dept: POST ACUTE CARE | Facility: EXTERNAL LOCATION | Age: 88
End: 2023-07-17
Payer: MEDICARE

## 2023-07-17 DIAGNOSIS — I10 HYPERTENSION, ESSENTIAL: ICD-10-CM

## 2023-07-17 DIAGNOSIS — M25.512 CHRONIC LEFT SHOULDER PAIN: ICD-10-CM

## 2023-07-17 DIAGNOSIS — R53.1 WEAKNESS: Primary | ICD-10-CM

## 2023-07-17 DIAGNOSIS — M00.9: ICD-10-CM

## 2023-07-17 DIAGNOSIS — G89.29 CHRONIC LEFT SHOULDER PAIN: ICD-10-CM

## 2023-07-17 DIAGNOSIS — Z79.4 TYPE 2 DIABETES MELLITUS WITH FOOT ULCER, WITH LONG-TERM CURRENT USE OF INSULIN (MULTI): ICD-10-CM

## 2023-07-17 DIAGNOSIS — L97.509 TYPE 2 DIABETES MELLITUS WITH FOOT ULCER, WITH LONG-TERM CURRENT USE OF INSULIN (MULTI): ICD-10-CM

## 2023-07-17 DIAGNOSIS — M86.171 ACUTE OSTEOMYELITIS OF METATARSAL BONE OF RIGHT FOOT (MULTI): ICD-10-CM

## 2023-07-17 DIAGNOSIS — I50.89 OTHER HEART FAILURE (MULTI): ICD-10-CM

## 2023-07-17 DIAGNOSIS — E11.621 TYPE 2 DIABETES MELLITUS WITH FOOT ULCER, WITH LONG-TERM CURRENT USE OF INSULIN (MULTI): ICD-10-CM

## 2023-07-17 PROCEDURE — 99309 SBSQ NF CARE MODERATE MDM 30: CPT | Performed by: NURSE PRACTITIONER

## 2023-07-17 NOTE — LETTER
Patient: Rene Ballard  : 1934    Encounter Date: 2023    PROGRESS NOTE    Subjective  Chief complaint: Rene Ballard is a 88 y.o. male who is an acute skilled patient being seen and evaluated for weakness    HPI:  7/3/2023 Patient at [facility] for therapy d/t weakness after recent hospitalization for right wrist dorsal compartment tenosynovitis and RLE 4th and 5th toe OM s/p TMA.  He was being followed by another provider and has requested to change to Dr Bailey's service.  Patient has been working with therapy.  He remains on IV atb.  He denies n/v/f/c.    23   Patient working with therapy.  Requires assistance with transfers ADLs and mobility.  No new issues or concerns.  No acute distress.    7/10/2023 Patient presents for follow-up.  Patient has been participating in therapy and continues to work toward goals.   Working on strengthening, transfers, and ADLs.  Denies constitutional symptoms.  Had cast removed from the right upper extremity last week and has tenderness to the middle finger not of new onset.  No new concerns otherwise.    23 Patient working in therapy due to weakness. Working on strengthening, balance and transfers. No new concerns at this time. No acute distress. Denies chest pain or headache.     2023 Patient continues working in therapy.  Patient states thatpain in left hand/wrist is improving.  He has no new concerns today.  Remains on IV atb.  Denies n/v/f/c.    23 Patient working in therapy due to weakness and debility. Requires assistance for transfers, ADL;s and mobility. Patient continues on IV ATB for OM, no adverse effects from ATB therapy noted. Denies SOB or orthopnea. No acute distress.     2023 Patient is working with therapy and presents for follow-up.  Patient is working on strengthening, balance, transfers, and ADLs.  Uneventful night.  No new concerns per nursing staff.  Patient remains on IV antibiotics for right wrist infection.  He states that  pain in the wrist is improving although it is noted to be more reddened today.  Patient also has complaint of left shoulder pain which is not of new onset.  He states that he has gotten kenalog injections in the shoulder in the past which was helpful, it has been >3 mo, and would like to have another injection.   Denies nausea vomiting fever chills.       Objective  Vital signs: 124/65, 18, 98.1, 66, 95%, blood sugar 202    Physical Exam  Constitutional:       General: He is not in acute distress.  Eyes:      Extraocular Movements: Extraocular movements intact.   Cardiovascular:      Rate and Rhythm: Normal rate and regular rhythm.      Heart sounds: Murmur heard.   Pulmonary:      Effort: Pulmonary effort is normal.      Breath sounds: Normal breath sounds.   Abdominal:      General: Bowel sounds are normal.      Palpations: Abdomen is soft.   Musculoskeletal:      Cervical back: Neck supple.      Right lower leg: No edema.      Left lower leg: No edema.      Comments: Generalized weakness  Right hand and wrist reddened, +swelling  Left shoulder no swelling no discoloration good range of motion tender to palpation   Neurological:      Mental Status: He is alert.   Psychiatric:         Mood and Affect: Mood normal.         Behavior: Behavior is cooperative.         Assessment/Plan  Problem List Items Addressed This Visit       Acute osteomyelitis of metatarsal bone of right foot (CMS/HCC)     S/p TMA  IV atb thru 7/28/2023  Fu with surgeon and ID         Chronic left shoulder pain     We will order Kenalog to be given when arrives from pharmacy         Hypertension, essential     BP at goal  Continue antihypertensives  Monitor BP         Joint infection of right wrist (CMS/HCC)     Patient with increased redness to the wrist  He denies increased pain  Continue IV antibiotics  Nurse to notify infectious disease  Continue to monitor closely           Other heart failure (CMS/HCC)     Stable  BB  Monitor weight  Low Na  diet         Type 2 diabetes mellitus with foot ulcer, with long-term current use of insulin (CMS/[facility])     Blood sugars usually mid 100s to mid 300s  Continue insulin  Monitor accuchecks         Weakness - Primary     Continue with therapy          Medications, treatments, and labs reviewed  Continue medications and treatments as listed in EMR    RENEE Reyes      Electronically Signed By: RENEE Reyes   7/19/23  4:54 PM

## 2023-07-18 NOTE — PROGRESS NOTES
PROGRESS NOTE    Subjective   Chief complaint: Rene Ballard is a 88 y.o. male who is an acute skilled patient being seen and evaluated for weakness    HPI:  7/3/2023 [facility] d/t weakness after recent hospitalization for right wrist dorsal compartment tenosynovitis and RLE 4th and 5th toe OM s/p TMA.  He was being followed by another provider and has requested to change to Dr Bailey's service.  Patient has been working with therapy.  He remains on IV atb.  He denies n/v/f/c.    7/7/23   Patient working with therapy.  Requires assistance with transfers ADLs and mobility.  No new issues or concerns.  No acute distress.    7/10/2023 Patient presents for follow-up.  Patient has been participating in therapy and continues to work toward goals.   Working on strengthening, transfers, and ADLs.  Denies constitutional symptoms.  Had cast removed from the right upper extremity last week and has tenderness to the middle finger not of new onset.  No new concerns otherwise.    7/2/23 Patient working in therapy due to weakness. Working on strengthening, balance and transfers. No new concerns at this time. No acute distress. Denies chest pain or headache.     7/13/2023 Patient continues working in therapy.  Patient states thatpain in left hand/wrist is improving.  He has no new concerns today.  Remains on IV atb.  Denies n/v/f/c.    7/14/23 Patient working in therapy due to weakness and debility. Requires assistance for transfers, ADL;s and mobility. Patient continues on IV ATB for OM, no adverse effects from ATB therapy noted. Denies SOB or orthopnea. No acute distress.     7/17/2023 Patient is working with therapy and presents for follow-up.  Patient is working on strengthening, balance, transfers, and ADLs.  Uneventful night.  No new concerns per nursing staff.  Patient remains on IV antibiotics for right wrist infection.  He states that pain in the wrist is improving although it is noted to be more reddened  today.  Patient also has complaint of left shoulder pain which is not of new onset.  He states that he has gotten kenalog injections in the shoulder in the past which was helpful, it has been >3 mo, and would like to have another injection.   Denies nausea vomiting fever chills.       Objective   Vital signs: 124/65, 18, 98.1, 66, 95%, blood sugar 202    Physical Exam  Constitutional:       General: He is not in acute distress.  Eyes:      Extraocular Movements: Extraocular movements intact.   Cardiovascular:      Rate and Rhythm: Normal rate and regular rhythm.      Heart sounds: Murmur heard.   Pulmonary:      Effort: Pulmonary effort is normal.      Breath sounds: Normal breath sounds.   Abdominal:      General: Bowel sounds are normal.      Palpations: Abdomen is soft.   Musculoskeletal:      Cervical back: Neck supple.      Right lower leg: No edema.      Left lower leg: No edema.      Comments: Generalized weakness  Right hand and wrist reddened, +swelling  Left shoulder no swelling no discoloration good range of motion tender to palpation   Neurological:      Mental Status: He is alert.   Psychiatric:         Mood and Affect: Mood normal.         Behavior: Behavior is cooperative.         Assessment/Plan   Problem List Items Addressed This Visit       Acute osteomyelitis of metatarsal bone of right foot (CMS/HCC)     S/p TMA  IV atb thru 7/28/2023  Fu with surgeon and ID         Chronic left shoulder pain     We will order Kenalog to be given when arrives from pharmacy         Hypertension, essential     BP at goal  Continue antihypertensives  Monitor BP         Joint infection of right wrist (CMS/HCC)     Patient with increased redness to the wrist  He denies increased pain  Continue IV antibiotics  Nurse to notify infectious disease  Continue to monitor closely           Other heart failure (CMS/HCC)     Stable  BB  Monitor weight  Low Na diet         Type 2 diabetes mellitus with foot ulcer, with long-term  current use of insulin (CMS/[facility])     Blood sugars usually mid 100s to mid 300s  Continue insulin  Monitor accuchecks         Weakness - Primary     Continue with therapy          Medications, treatments, and labs reviewed  Continue medications and treatments as listed in EMR    KAILEY Reyes-CNP

## 2023-07-19 ENCOUNTER — NURSING HOME VISIT (OUTPATIENT)
Dept: POST ACUTE CARE | Facility: EXTERNAL LOCATION | Age: 88
End: 2023-07-19
Payer: MEDICARE

## 2023-07-19 DIAGNOSIS — R53.1 WEAKNESS: ICD-10-CM

## 2023-07-19 DIAGNOSIS — N40.0 BENIGN PROSTATIC HYPERPLASIA, UNSPECIFIED WHETHER LOWER URINARY TRACT SYMPTOMS PRESENT: ICD-10-CM

## 2023-07-19 DIAGNOSIS — M86.171 ACUTE OSTEOMYELITIS OF METATARSAL BONE OF RIGHT FOOT (MULTI): ICD-10-CM

## 2023-07-19 DIAGNOSIS — M10.9 ACUTE GOUT OF RIGHT WRIST, UNSPECIFIED CAUSE: ICD-10-CM

## 2023-07-19 DIAGNOSIS — K21.9 GERD WITHOUT ESOPHAGITIS: ICD-10-CM

## 2023-07-19 PROBLEM — M25.512 CHRONIC LEFT SHOULDER PAIN: Status: ACTIVE | Noted: 2023-07-19

## 2023-07-19 PROBLEM — M00.9: Status: ACTIVE | Noted: 2023-07-19

## 2023-07-19 PROBLEM — G89.29 CHRONIC LEFT SHOULDER PAIN: Status: ACTIVE | Noted: 2023-07-19

## 2023-07-19 PROCEDURE — 99309 SBSQ NF CARE MODERATE MDM 30: CPT | Performed by: INTERNAL MEDICINE

## 2023-07-19 NOTE — ASSESSMENT & PLAN NOTE
Patient with increased redness to the wrist  He denies increased pain  Continue IV antibiotics  Nurse to notify infectious disease  Continue to monitor closely

## 2023-07-19 NOTE — PROGRESS NOTES
PROGRESS NOTE    Subjective   Chief complaint: Rene Ballard is a 88 y.o. male who is a long term care patient being seen and evaluated for monthly general medical care and follow-up.    Right Wrist pain and swelling    HPI:   patient presents for general medical care and f/u.  Patient seen and examined at bedside.  No issues per nursing.  Patient has no acute complaints.   patient continues to work in therapy.  Requires assistance for transfers ADLs and mobility.  BPH stable, denies difficulty voiding, urinary frequency, and weak stream.  GERD controlled.  Denies heartburn, regurgitation, epigastric discomfort, sour taste, and cough.   Patient with Hx of gout.  Had a recent flare to right wrist which is now resolved.  patient continues on IV antibiotics for osteomyelitis.  No adverse effects noted by nursing. No acute distress.      Objective   Vital signs:  126/72, 98%    Physical Exam  Constitutional:       General: He is not in acute distress.  Eyes:      Extraocular Movements: Extraocular movements intact.   Cardiovascular:      Rate and Rhythm: Normal rate and regular rhythm.   Pulmonary:      Effort: Pulmonary effort is normal.      Breath sounds: Normal breath sounds.   Abdominal:      General: Bowel sounds are normal.      Palpations: Abdomen is soft.   Musculoskeletal:      Cervical back: Neck supple.      Right lower leg: No edema.      Left lower leg: No edema.      Comments:  right wrist -  no redness,  tenderness or edema noted      Neurological:      Mental Status: He is alert.   Psychiatric:         Mood and Affect: Mood normal.         Behavior: Behavior is cooperative.         Assessment/Plan   Problem List Items Addressed This Visit          Gastrointestinal and Abdominal    GERD without esophagitis      controlled  Continue current meds            Genitourinary and Reproductive    BPH (benign prostatic hyperplasia)     Stable            Infectious Diseases    Acute osteomyelitis of metatarsal  bone of right foot (CMS/HCC)     S/p TMA  IV atb thru 7/28/2023  Fu with surgeon and ID            Musculoskeletal and Injuries    Gout       Recent flare resolved            Symptoms and Signs    Weakness     Continue with therapy          Medications, treatments, and labs reviewed  Continue medications and treatments as listed in [facility]    Scribe Attestation  By signing my name below, I, Rosa M Singh   attest that this documentation has been prepared under the direction and in the presence of Joao Bailey MD.    Provider Attestation - Scribe documentation  All medical record entries made by the Scribe were at my direction and personally dictated by me. I have reviewed the chart and agree that the record accurately reflects my personal performance of the history, physical exam, discussion and plan.    1. Acute osteomyelitis of metatarsal bone of right foot (CMS/HCC)        2. Benign prostatic hyperplasia, unspecified whether lower urinary tract symptoms present        3. GERD without esophagitis        4. Weakness        5. Acute gout of right wrist, unspecified cause

## 2023-07-19 NOTE — LETTER
Patient: Rene Ballard  : 1934    Encounter Date: 2023    PROGRESS NOTE    Subjective  Chief complaint: Rene Ballard is a 88 y.o. male who is a long term care patient being seen and evaluated for monthly general medical care and follow-up.    Right Wrist pain and swelling    HPI:   patient presents for general medical care and f/u.  Patient seen and examined at bedside.  No issues per nursing.  Patient has no acute complaints.   patient continues to work in therapy.  Requires assistance for transfers ADLs and mobility.  BPH stable, denies difficulty voiding, urinary frequency, and weak stream.  GERD controlled.  Denies heartburn, regurgitation, epigastric discomfort, sour taste, and cough.   Patient with Hx of gout.  Had a recent flare to right wrist which is now resolved.  patient continues on IV antibiotics for osteomyelitis.  No adverse effects noted by nursing. No acute distress.      Objective  Vital signs:  126/72, 98%    Physical Exam  Constitutional:       General: He is not in acute distress.  Eyes:      Extraocular Movements: Extraocular movements intact.   Cardiovascular:      Rate and Rhythm: Normal rate and regular rhythm.   Pulmonary:      Effort: Pulmonary effort is normal.      Breath sounds: Normal breath sounds.   Abdominal:      General: Bowel sounds are normal.      Palpations: Abdomen is soft.   Musculoskeletal:      Cervical back: Neck supple.      Right lower leg: No edema.      Left lower leg: No edema.      Comments:  right wrist -  no redness,  tenderness or edema noted      Neurological:      Mental Status: He is alert.   Psychiatric:         Mood and Affect: Mood normal.         Behavior: Behavior is cooperative.         Assessment/Plan  Problem List Items Addressed This Visit          Gastrointestinal and Abdominal    GERD without esophagitis      controlled  Continue current meds            Genitourinary and Reproductive    BPH (benign prostatic hyperplasia)      Stable            Infectious Diseases    Acute osteomyelitis of metatarsal bone of right foot (CMS/HCC)     S/p TMA  IV atb thru 7/28/2023  Fu with surgeon and ID            Musculoskeletal and Injuries    Gout       Recent flare resolved            Symptoms and Signs    Weakness     Continue with therapy          Medications, treatments, and labs reviewed  Continue medications and treatments as listed in [facility]    Scribe Attestation  By signing my name below, IClaribel Scribe   attest that this documentation has been prepared under the direction and in the presence of Joao Bailey MD.    Provider Attestation - Scribe documentation  All medical record entries made by the Scribe were at my direction and personally dictated by me. I have reviewed the chart and agree that the record accurately reflects my personal performance of the history, physical exam, discussion and plan.    1. Acute osteomyelitis of metatarsal bone of right foot (CMS/HCC)        2. Benign prostatic hyperplasia, unspecified whether lower urinary tract symptoms present        3. GERD without esophagitis        4. Weakness        5. Acute gout of right wrist, unspecified cause               Electronically Signed By: Joao Bailey MD   7/19/23  5:04 PM

## 2023-07-20 ENCOUNTER — NURSING HOME VISIT (OUTPATIENT)
Dept: POST ACUTE CARE | Facility: EXTERNAL LOCATION | Age: 88
End: 2023-07-20
Payer: MEDICARE

## 2023-07-20 DIAGNOSIS — R53.1 WEAKNESS: Primary | ICD-10-CM

## 2023-07-20 DIAGNOSIS — R78.81 BACTEREMIA: ICD-10-CM

## 2023-07-20 DIAGNOSIS — M00.9: ICD-10-CM

## 2023-07-20 DIAGNOSIS — I10 HYPERTENSION, ESSENTIAL: ICD-10-CM

## 2023-07-20 DIAGNOSIS — M86.171 ACUTE OSTEOMYELITIS OF METATARSAL BONE OF RIGHT FOOT (MULTI): ICD-10-CM

## 2023-07-20 DIAGNOSIS — I50.89 OTHER HEART FAILURE (MULTI): ICD-10-CM

## 2023-07-20 PROCEDURE — 99309 SBSQ NF CARE MODERATE MDM 30: CPT | Performed by: NURSE PRACTITIONER

## 2023-07-20 NOTE — LETTER
Patient: Rene Ballard  : 1934    Encounter Date: 2023    PROGRESS NOTE    Subjective  Chief complaint: Rene Ballard is a 88 y.o. male who is an acute skilled patient being seen and evaluated for weakness    HPI:  7/3/2023 Patient at [facility] for therapy d/t weakness after recent hospitalization for right wrist dorsal compartment tenosynovitis and RLE 4th and 5th toe OM s/p TMA.  He was being followed by another provider and has requested to change to Dr Bailey's service.  Patient has been working with therapy.  He remains on IV atb.  He denies n/v/f/c.    23   Patient working with therapy.  Requires assistance with transfers ADLs and mobility.  No new issues or concerns.  No acute distress.    7/10/2023 Patient presents for follow-up.  Patient has been participating in therapy and continues to work toward goals.   Working on strengthening, transfers, and ADLs.  Denies constitutional symptoms.  Had cast removed from the right upper extremity last week and has tenderness to the middle finger not of new onset.  No new concerns otherwise.    23 Patient working in therapy due to weakness. Working on strengthening, balance and transfers. No new concerns at this time. No acute distress. Denies chest pain or headache.     2023 Patient continues working in therapy.  Patient states thatpain in left hand/wrist is improving.  He has no new concerns today.  Remains on IV atb.  Denies n/v/f/c.    23 Patient working in therapy due to weakness and debility. Requires assistance for transfers, ADL;s and mobility. Patient continues on IV ATB for OM, no adverse effects from ATB therapy noted. Denies SOB or orthopnea. No acute distress.     2023 Patient is working with therapy and presents for follow-up.  Patient is working on strengthening, balance, transfers, and ADLs.  Uneventful night.  No new concerns per nursing staff.  Patient remains on IV antibiotics for right wrist infection.  He states that  pain in the wrist is improving although it is noted to be more reddened today.  Patient also has complaint of left shoulder pain which is not of new onset.  He states that he has gotten kenalog injections in the shoulder in the past which was helpful, it has been >3 mo, and would like to have another injection.   Denies nausea vomiting fever chills.     7/19/2023 patient presents for general medical care and f/u.  Patient seen and examined at bedside.  No issues per nursing.  Patient has no acute complaints.   patient continues to work in therapy.  Requires assistance for transfers ADLs and mobility.  BPH stable, denies difficulty voiding, urinary frequency, and weak stream.  GERD controlled.  Denies heartburn, regurgitation, epigastric discomfort, sour taste, and cough.   Patient with Hx of gout.  Had a recent flare to right wrist which is now resolved.  patient continues on IV antibiotics for osteomyelitis.  No adverse effects noted by nursing. No acute distress.    7/20/2023 Patient presents for fu therapy.  No new concerns at this time.  Patient is actively participating and continues working toward goals.  Denies n/v/f/c.  Nursing staff voices no new concerns.         Objective  Vital signs: 137/74,     Physical Exam  Constitutional:       General: He is not in acute distress.  Eyes:      Extraocular Movements: Extraocular movements intact.   Cardiovascular:      Rate and Rhythm: Normal rate and regular rhythm.      Heart sounds: Murmur heard.   Pulmonary:      Effort: Pulmonary effort is normal.      Breath sounds: Normal breath sounds.   Abdominal:      General: Bowel sounds are normal.      Palpations: Abdomen is soft.   Musculoskeletal:      Cervical back: Neck supple.      Right lower leg: Edema present.      Left lower leg: No edema.      Comments: Generalized weakness  Right hand and wrist pink, no swelling   Skin:     Comments: Dressing right foot clean dry and intact   Neurological:      Mental  Status: He is alert.   Psychiatric:         Mood and Affect: Mood normal.         Behavior: Behavior is cooperative.         Assessment/Plan  Problem List Items Addressed This Visit       Acute osteomyelitis of metatarsal bone of right foot (CMS/HCC)     S/p TMA  IV atb thru 7/28/2023  Fu with surgeon and ID         Bacteremia     Patient continues on IV antibiotics and is planning to discharge home tomorrow  Unfortunately I will not be able to give Kenalog injection  and left shoulderas he is still being treated for bacteremia and will not complete his treatments prior to discharge         Hypertension, essential     BP at goal  Continue antihypertensives  Monitor BP         Joint infection of right wrist (CMS/HCC)     Redness to the wrist  is much better today  He denies increased pain  Continue IV antibiotics  Continue to monitor closely           Other heart failure (CMS/HCC)     Stable  BB  Monitor weight  Low Na diet         Weakness - Primary     Continue with therapy          Medications, treatments, and labs reviewed  Continue medications and treatments as listed in EMR    RENEE Reyes      Electronically Signed By: RENEE Reyes   7/29/23  6:44 PM

## 2023-07-20 NOTE — PROGRESS NOTES
PROGRESS NOTE    Subjective   Chief complaint: Rene Ballard is a 88 y.o. male who is an acute skilled patient being seen and evaluated for weakness    HPI:  7/3/2023 Patient at SNF for therapy d/t weakness after recent hospitalization for right wrist dorsal compartment tenosynovitis and RLE 4th and 5th toe OM s/p TMA.  He was being followed by another provider and has requested to change to Dr Bailey's service.  Patient has been working with therapy.  He remains on IV atb.  He denies n/v/f/c.    7/7/23   Patient working with therapy.  Requires assistance with transfers ADLs and mobility.  No new issues or concerns.  No acute distress.    7/10/2023 Patient presents for follow-up.  Patient has been participating in therapy and continues to work toward goals.   Working on strengthening, transfers, and ADLs.  Denies constitutional symptoms.  Had cast removed from the right upper extremity last week and has tenderness to the middle finger not of new onset.  No new concerns otherwise.    7/2/23 Patient working in therapy due to weakness. Working on strengthening, balance and transfers. No new concerns at this time. No acute distress. Denies chest pain or headache.     7/13/2023 Patient continues working in therapy.  Patient states thatpain in left hand/wrist is improving.  He has no new concerns today.  Remains on IV atb.  Denies n/v/f/c.    7/14/23 Patient working in therapy due to weakness and debility. Requires assistance for transfers, ADL;s and mobility. Patient continues on IV ATB for OM, no adverse effects from ATB therapy noted. Denies SOB or orthopnea. No acute distress.     7/17/2023 Patient is working with therapy and presents for follow-up.  Patient is working on strengthening, balance, transfers, and ADLs.  Uneventful night.  No new concerns per nursing staff.  Patient remains on IV antibiotics for right wrist infection.  He states that pain in the wrist is improving although it is noted to be more reddened  today.  Patient also has complaint of left shoulder pain which is not of new onset.  He states that he has gotten kenalog injections in the shoulder in the past which was helpful, it has been >3 mo, and would like to have another injection.   Denies nausea vomiting fever chills.     7/19/2023 patient presents for general medical care and f/u.  Patient seen and examined at bedside.  No issues per nursing.  Patient has no acute complaints.   patient continues to work in therapy.  Requires assistance for transfers ADLs and mobility.  BPH stable, denies difficulty voiding, urinary frequency, and weak stream.  GERD controlled.  Denies heartburn, regurgitation, epigastric discomfort, sour taste, and cough.   Patient with Hx of gout.  Had a recent flare to right wrist which is now resolved.  patient continues on IV antibiotics for osteomyelitis.  No adverse effects noted by nursing. No acute distress.    7/20/2023 Patient presents for fu therapy.  No new concerns at this time.  Patient is actively participating and continues working toward goals.  Denies n/v/f/c.  Nursing staff voices no new concerns.         Objective   Vital signs: 137/74,     Physical Exam  Constitutional:       General: He is not in acute distress.  Eyes:      Extraocular Movements: Extraocular movements intact.   Cardiovascular:      Rate and Rhythm: Normal rate and regular rhythm.      Heart sounds: Murmur heard.   Pulmonary:      Effort: Pulmonary effort is normal.      Breath sounds: Normal breath sounds.   Abdominal:      General: Bowel sounds are normal.      Palpations: Abdomen is soft.   Musculoskeletal:      Cervical back: Neck supple.      Right lower leg: Edema present.      Left lower leg: No edema.      Comments: Generalized weakness  Right hand and wrist pink, no swelling   Skin:     Comments: Dressing right foot clean dry and intact   Neurological:      Mental Status: He is alert.   Psychiatric:         Mood and Affect: Mood  normal.         Behavior: Behavior is cooperative.         Assessment/Plan   Problem List Items Addressed This Visit       Acute osteomyelitis of metatarsal bone of right foot (CMS/HCC)     S/p TMA  IV atb thru 7/28/2023  Fu with surgeon and ID         Bacteremia     Patient continues on IV antibiotics and is planning to discharge home tomorrow  Unfortunately I will not be able to give Kenalog injection  and left shoulderas he is still being treated for bacteremia and will not complete his treatments prior to discharge         Hypertension, essential     BP at goal  Continue antihypertensives  Monitor BP         Joint infection of right wrist (CMS/HCC)     Redness to the wrist  is much better today  He denies increased pain  Continue IV antibiotics  Continue to monitor closely           Other heart failure (CMS/HCC)     Stable  BB  Monitor weight  Low Na diet         Weakness - Primary     Continue with therapy          Medications, treatments, and labs reviewed  Continue medications and treatments as listed in EMR    KAILEY Reyes-CNP

## 2023-07-20 NOTE — ASSESSMENT & PLAN NOTE
Redness to the wrist  is much better today  He denies increased pain  Continue IV antibiotics  Continue to monitor closely

## 2023-07-20 NOTE — ASSESSMENT & PLAN NOTE
Patient continues on IV antibiotics and is planning to discharge home tomorrow  Unfortunately I will not be able to give Kenalog injection  and left shoulderas he is still being treated for bacteremia and will not complete his treatments prior to discharge

## 2023-07-21 ENCOUNTER — NURSING HOME VISIT (OUTPATIENT)
Dept: POST ACUTE CARE | Facility: EXTERNAL LOCATION | Age: 88
End: 2023-07-21
Payer: MEDICARE

## 2023-07-21 DIAGNOSIS — E11.621 TYPE 2 DIABETES MELLITUS WITH FOOT ULCER, WITH LONG-TERM CURRENT USE OF INSULIN (MULTI): ICD-10-CM

## 2023-07-21 DIAGNOSIS — L97.509 TYPE 2 DIABETES MELLITUS WITH FOOT ULCER, WITH LONG-TERM CURRENT USE OF INSULIN (MULTI): ICD-10-CM

## 2023-07-21 DIAGNOSIS — I10 HYPERTENSION, ESSENTIAL: ICD-10-CM

## 2023-07-21 DIAGNOSIS — R53.1 WEAKNESS: ICD-10-CM

## 2023-07-21 DIAGNOSIS — Z79.4 TYPE 2 DIABETES MELLITUS WITH FOOT ULCER, WITH LONG-TERM CURRENT USE OF INSULIN (MULTI): ICD-10-CM

## 2023-07-21 DIAGNOSIS — M86.171 ACUTE OSTEOMYELITIS OF METATARSAL BONE OF RIGHT FOOT (MULTI): Primary | ICD-10-CM

## 2023-07-21 PROCEDURE — 99316 NF DSCHRG MGMT 30 MIN+: CPT | Performed by: INTERNAL MEDICINE

## 2023-07-21 NOTE — LETTER
Patient: Rene Ballard  : 1934    Encounter Date: 2023    PROGRESS NOTE    Subjective  Chief complaint: Rene Ballard is a 88 y.o. male who is an acute skilled patient being seen and evaluated for weakness and DC home    HPI:  7/3/2023 Patient at SNF for therapy d/t weakness after recent hospitalization for right wrist dorsal compartment tenosynovitis and RLE 4th and 5th toe OM s/p TMA.  He was being followed by another provider and has requested to change to Dr Bailey's service.  Patient has been working with therapy.  He remains on IV atb.  He denies n/v/f/c.    23   Patient working with therapy.  Requires assistance with transfers ADLs and mobility.  No new issues or concerns.  No acute distress.    7/10/2023 Patient presents for follow-up.  Patient has been participating in therapy and continues to work toward goals.   Working on strengthening, transfers, and ADLs.  Denies constitutional symptoms.  Had cast removed from the right upper extremity last week and has tenderness to the middle finger not of new onset.  No new concerns otherwise.    23 Patient working in therapy due to weakness. Working on strengthening, balance and transfers. No new concerns at this time. No acute distress. Denies chest pain or headache.     2023 Patient continues working in therapy.  Patient states thatpain in left hand/wrist is improving.  He has no new concerns today.  Remains on IV atb.  Denies n/v/f/c.    23 Patient working in therapy due to weakness and debility. Requires assistance for transfers, ADL;s and mobility. Patient continues on IV ATB for OM, no adverse effects from ATB therapy noted. Denies SOB or orthopnea. No acute distress.     2023 Patient is working with therapy and presents for follow-up.  Patient is working on strengthening, balance, transfers, and ADLs.  Uneventful night.  No new concerns per nursing staff.  Patient remains on IV antibiotics for right wrist infection.  He  states that pain in the wrist is improving although it is noted to be more reddened today.  Patient also has complaint of left shoulder pain which is not of new onset.  He states that he has gotten kenalog injections in the shoulder in the past which was helpful, it has been >3 mo, and would like to have another injection.   Denies nausea vomiting fever chills.     7/19/2023 patient presents for general medical care and f/u.  Patient seen and examined at bedside.  No issues per nursing.  Patient has no acute complaints.   patient continues to work in therapy.  Requires assistance for transfers ADLs and mobility.  BPH stable, denies difficulty voiding, urinary frequency, and weak stream.  GERD controlled.  Denies heartburn, regurgitation, epigastric discomfort, sour taste, and cough.   Patient with Hx of gout.  Had a recent flare to right wrist which is now resolved.  patient continues on IV antibiotics for osteomyelitis.  No adverse effects noted by nursing. No acute distress.    7/20/2023 Patient presents for fu therapy.  No new concerns at this time.  Patient is actively participating and continues working toward goals.  Denies n/v/f/c.  Nursing staff voices no new concerns.       7/21/23   patient has been working in therapy.  Planning to discharge home.  No acute concerns or questions.  Patient will continue IV antibiotics through 7/28.  Denies increased thirst or urinary frequency.  Denies chest pain or headache.  No acute distress.      Discharge time spent>30min of which >50% was spent counselin angella coordinating care  Course -  medical care and therapy   Prognosis-fair   plan-DC home  Objective  Vital signs:  132/76, 98%    Physical Exam  Constitutional:       General: He is not in acute distress.  Eyes:      Extraocular Movements: Extraocular movements intact.   Cardiovascular:      Rate and Rhythm: Normal rate and regular rhythm.      Heart sounds: Murmur heard.   Pulmonary:      Effort: Pulmonary effort is  normal.      Breath sounds: Normal breath sounds.   Abdominal:      General: Bowel sounds are normal.      Palpations: Abdomen is soft.   Musculoskeletal:      Cervical back: Neck supple.      Right lower leg: No edema.      Left lower leg: No edema.      Comments: Generalized weakness  Right hand and wrist pink, no swelling   Skin:     Comments: Dressing right foot clean dry and intact   Neurological:      Mental Status: He is alert.   Psychiatric:         Mood and Affect: Mood normal.         Behavior: Behavior is cooperative.         Assessment/Plan  Problem List Items Addressed This Visit    None  Medications, treatments, and labs reviewed  Continue medications and treatments as listed in EMR    Joao Bailey MD  1. Acute osteomyelitis of metatarsal bone of right foot (CMS/[facility])        2. Hypertension, essential        3. Type 2 diabetes mellitus with foot ulcer, with long-term current use of insulin (CMS/[facility])        4. Weakness          Scribe Attestation  By signing my name below, I, Farhana Singhibrakesh   attest that this documentation has been prepared under the direction and in the presence of Joao Bailey MD.    Provider Attestation - Scribe documentation  All medical record entries made by the Scribe were at my direction and personally dictated by me. I have reviewed the chart and agree that the record accurately reflects my personal performance of the history, physical exam, discussion and plan.      Electronically Signed By: Joao Bailey MD   7/21/23  5:09 PM

## 2023-07-21 NOTE — PROGRESS NOTES
PROGRESS NOTE    Subjective   Chief complaint: Rene Ballard is a 88 y.o. male who is an acute skilled patient being seen and evaluated for weakness and DC home    HPI:  7/3/2023 Patient at SNF for therapy d/t weakness after recent hospitalization for right wrist dorsal compartment tenosynovitis and RLE 4th and 5th toe OM s/p TMA.  He was being followed by another provider and has requested to change to Dr Bailey's service.  Patient has been working with therapy.  He remains on IV atb.  He denies n/v/f/c.    7/7/23   Patient working with therapy.  Requires assistance with transfers ADLs and mobility.  No new issues or concerns.  No acute distress.    7/10/2023 Patient presents for follow-up.  Patient has been participating in therapy and continues to work toward goals.   Working on strengthening, transfers, and ADLs.  Denies constitutional symptoms.  Had cast removed from the right upper extremity last week and has tenderness to the middle finger not of new onset.  No new concerns otherwise.    7/2/23 Patient working in therapy due to weakness. Working on strengthening, balance and transfers. No new concerns at this time. No acute distress. Denies chest pain or headache.     7/13/2023 Patient continues working in therapy.  Patient states thatpain in left hand/wrist is improving.  He has no new concerns today.  Remains on IV atb.  Denies n/v/f/c.    7/14/23 Patient working in therapy due to weakness and debility. Requires assistance for transfers, ADL;s and mobility. Patient continues on IV ATB for OM, no adverse effects from ATB therapy noted. Denies SOB or orthopnea. No acute distress.     7/17/2023 Patient is working with therapy and presents for follow-up.  Patient is working on strengthening, balance, transfers, and ADLs.  Uneventful night.  No new concerns per nursing staff.  Patient remains on IV antibiotics for right wrist infection.  He states that pain in the wrist is improving although it is noted to be  more reddened today.  Patient also has complaint of left shoulder pain which is not of new onset.  He states that he has gotten kenalog injections in the shoulder in the past which was helpful, it has been >3 mo, and would like to have another injection.   Denies nausea vomiting fever chills.     7/19/2023 patient presents for general medical care and f/u.  Patient seen and examined at bedside.  No issues per nursing.  Patient has no acute complaints.   patient continues to work in therapy.  Requires assistance for transfers ADLs and mobility.  BPH stable, denies difficulty voiding, urinary frequency, and weak stream.  GERD controlled.  Denies heartburn, regurgitation, epigastric discomfort, sour taste, and cough.   Patient with Hx of gout.  Had a recent flare to right wrist which is now resolved.  patient continues on IV antibiotics for osteomyelitis.  No adverse effects noted by nursing. No acute distress.    7/20/2023 Patient presents for fu therapy.  No new concerns at this time.  Patient is actively participating and continues working toward goals.  Denies n/v/f/c.  Nursing staff voices no new concerns.       7/21/23   patient has been working in therapy.  Planning to discharge home.  No acute concerns or questions.  Patient will continue IV antibiotics through 7/28.  Denies increased thirst or urinary frequency.  Denies chest pain or headache.  No acute distress.      Discharge time spent>30min of which >50% was spent dwain perales coordinating care  Course -  medical care and therapy   Prognosis-fair   plan-DC home  Objective   Vital signs:  132/76, 98%    Physical Exam  Constitutional:       General: He is not in acute distress.  Eyes:      Extraocular Movements: Extraocular movements intact.   Cardiovascular:      Rate and Rhythm: Normal rate and regular rhythm.      Heart sounds: Murmur heard.   Pulmonary:      Effort: Pulmonary effort is normal.      Breath sounds: Normal breath sounds.   Abdominal:       General: Bowel sounds are normal.      Palpations: Abdomen is soft.   Musculoskeletal:      Cervical back: Neck supple.      Right lower leg: No edema.      Left lower leg: No edema.      Comments: Generalized weakness  Right hand and wrist pink, no swelling   Skin:     Comments: Dressing right foot clean dry and intact   Neurological:      Mental Status: He is alert.   Psychiatric:         Mood and Affect: Mood normal.         Behavior: Behavior is cooperative.         Assessment/Plan   Problem List Items Addressed This Visit    None  Medications, treatments, and labs reviewed  Continue medications and treatments as listed in EMR    Joao Bailey MD  1. Acute osteomyelitis of metatarsal bone of right foot (CMS/HCC)        2. Hypertension, essential        3. Type 2 diabetes mellitus with foot ulcer, with long-term current use of insulin (CMS/[facility])        4. Weakness          Scribe Attestation  By signing my name below, IClaribel Scribe   attest that this documentation has been prepared under the direction and in the presence of Joao Bailey MD.    Provider Attestation - Scribe documentation  All medical record entries made by the Scribe were at my direction and personally dictated by me. I have reviewed the chart and agree that the record accurately reflects my personal performance of the history, physical exam, discussion and plan.

## 2023-07-24 ENCOUNTER — DOCUMENTATION (OUTPATIENT)
Dept: CARE COORDINATION | Facility: CLINIC | Age: 88
End: 2023-07-24
Payer: MEDICARE

## 2023-07-24 ENCOUNTER — PATIENT OUTREACH (OUTPATIENT)
Dept: CARE COORDINATION | Facility: CLINIC | Age: 88
End: 2023-07-24
Payer: MEDICARE

## 2023-07-24 RX ORDER — VANCOMYCIN HYDROCHLORIDE 1.5 G/30ML
1.5 INJECTION, POWDER, LYOPHILIZED, FOR SOLUTION INTRAVENOUS DAILY
COMMUNITY
End: 2023-07-31 | Stop reason: SDUPTHER

## 2023-07-24 NOTE — PROGRESS NOTES
Discharge Facility: [facility]  Discharge Diagnosis: acute osteomyelitis of metatarsal bone of right foot, right wrist septic arthritis  Admission Date: 6/21/23  Discharge Date: 7/21/23  ([facility] admission 6/16/23-6/20/23)    PCP Appointment Date: 8/2/23  Specialist Appointment Date: ID 7/25/23  Hospital Encounter and Summary: not available at this time (pick one)  See discharge assessment below for further details    Engagement  Call Start Time: 1600 (7/24/2023  4:00 PM)    Medications  Medications reviewed with patient/caregiver?: Yes (7/24/2023  4:00 PM)  Medication Comments: patient in car with no discharge medication list with him. patient discharged from SNF. patient reports needing clarification on medications since SNF discharge. encouraged patient to bring medications/list to PCP appt for further reconcile. Patient is on IV Vancomycin at home - Use 1.5 gram  intravenously one  time a day for Septic  joint until 07/28/ 2023 (7/24/2023  4:00 PM)  Follow Up Tasks: Medication reconciliation issues (7/24/2023  4:00 PM)    Appointments  Does the patient have a primary care provider?: Yes (7/24/2023  4:00 PM)  Care Management Interventions: Verified appointment date/time/provider (7/24/2023  4:00 PM)  Care Management Interventions: Advised patient to keep appointment (7/24/2023  4:00 PM)    Self Management  What is the home health agency?: patient is unsure of agency name and is not home to look at paperwork (7/24/2023  4:00 PM)  Has home health visited the patient within 72 hours of discharge?: Yes (IV teaching visits completed) (7/24/2023  4:00 PM)    Patient Teaching  What is the patient's perception of their health status since discharge?: Improving (7/24/2023  4:00 PM)  Is the patient/caregiver able to teach back the hierarchy of who to call/visit for symptoms/problems? PCP, Specialist, Home Health nurse, Urgent Care, ED, 911: Yes (7/24/2023  4:00 PM)

## 2023-07-25 ENCOUNTER — TELEPHONE (OUTPATIENT)
Dept: PRIMARY CARE | Facility: CLINIC | Age: 88
End: 2023-07-25
Payer: MEDICARE

## 2023-07-31 PROBLEM — L97.509 ULCER OF FOOT (MULTI): Status: ACTIVE | Noted: 2023-06-12

## 2023-07-31 PROBLEM — M00.9: Status: ACTIVE | Noted: 2023-07-31

## 2023-07-31 PROBLEM — M25.512 LEFT SHOULDER PAIN: Status: ACTIVE | Noted: 2023-07-31

## 2023-07-31 PROBLEM — M25.512 BILATERAL SHOULDER PAIN: Status: ACTIVE | Noted: 2023-07-31

## 2023-07-31 PROBLEM — N40.0 BENIGN PROSTATIC HYPERPLASIA WITHOUT LOWER URINARY TRACT SYMPTOMS: Status: ACTIVE | Noted: 2023-01-01

## 2023-07-31 PROBLEM — R50.9 FEVER: Status: ACTIVE | Noted: 2023-07-31

## 2023-07-31 PROBLEM — D64.9 ANEMIA: Status: ACTIVE | Noted: 2023-07-31

## 2023-07-31 PROBLEM — C67.9 MALIGNANT NEOPLASM OF BLADDER (MULTI): Status: ACTIVE | Noted: 2023-01-01

## 2023-07-31 PROBLEM — S60.519A: Status: ACTIVE | Noted: 2023-07-31

## 2023-07-31 PROBLEM — L97.509 TYPE 2 DIABETES MELLITUS WITH FOOT ULCER (MULTI): Status: ACTIVE | Noted: 2023-01-01

## 2023-07-31 PROBLEM — E78.5 HYPERLIPIDEMIA, UNSPECIFIED: Status: ACTIVE | Noted: 2023-01-01

## 2023-07-31 PROBLEM — M25.511 BILATERAL SHOULDER PAIN: Status: ACTIVE | Noted: 2023-07-31

## 2023-07-31 PROBLEM — E11.40 TYPE 2 DIABETES MELLITUS WITH DIABETIC NEUROPATHY (MULTI): Status: ACTIVE | Noted: 2023-01-01

## 2023-07-31 PROBLEM — E87.1 HYPO-OSMOLALITY AND HYPONATREMIA: Status: ACTIVE | Noted: 2023-01-01

## 2023-07-31 PROBLEM — D50.9 IRON DEFICIENCY ANEMIA: Status: ACTIVE | Noted: 2023-01-01

## 2023-07-31 PROBLEM — M86.9 OSTEOMYELITIS (MULTI): Status: ACTIVE | Noted: 2023-07-31

## 2023-07-31 PROBLEM — K21.9 GASTROESOPHAGEAL REFLUX DISEASE WITHOUT ESOPHAGITIS: Status: ACTIVE | Noted: 2023-01-01

## 2023-07-31 PROBLEM — L08.9: Status: ACTIVE | Noted: 2023-07-31

## 2023-07-31 PROBLEM — E11.52 TYPE 2 DIABETES MELLITUS WITH DIABETIC PERIPHERAL ANGIOPATHY AND GANGRENE, WITH LONG-TERM CURRENT USE OF INSULIN (MULTI): Status: ACTIVE | Noted: 2023-01-01

## 2023-07-31 PROBLEM — U07.1 COVID-19: Status: ACTIVE | Noted: 2023-01-01

## 2023-07-31 PROBLEM — M00.231: Status: ACTIVE | Noted: 2023-07-31

## 2023-07-31 PROBLEM — I38 ENDOCARDITIS: Status: ACTIVE | Noted: 2023-06-13

## 2023-07-31 PROBLEM — Z79.4 TYPE 2 DIABETES MELLITUS WITH DIABETIC PERIPHERAL ANGIOPATHY AND GANGRENE, WITH LONG-TERM CURRENT USE OF INSULIN (MULTI): Status: ACTIVE | Noted: 2023-01-01

## 2023-07-31 PROBLEM — E11.621 TYPE 2 DIABETES MELLITUS WITH FOOT ULCER (MULTI): Status: ACTIVE | Noted: 2023-01-01

## 2023-07-31 PROBLEM — M25.539 WRIST PAIN: Status: ACTIVE | Noted: 2023-07-31

## 2023-07-31 PROBLEM — Z79.891 LONG TERM (CURRENT) USE OF OPIATE ANALGESIC: Status: ACTIVE | Noted: 2023-01-01

## 2023-07-31 PROBLEM — Z91.81 HISTORY OF FALLING: Status: ACTIVE | Noted: 2023-01-01

## 2023-07-31 PROBLEM — L97.519: Status: ACTIVE | Noted: 2023-01-01

## 2023-07-31 PROBLEM — I11.9 HYPERTENSIVE HEART DISEASE WITHOUT HEART FAILURE: Status: ACTIVE | Noted: 2023-01-01

## 2023-07-31 PROBLEM — I25.10 ATHSCL HEART DISEASE OF NATIVE CORONARY ARTERY W/O ANG PCTRS: Status: ACTIVE | Noted: 2023-01-01

## 2023-07-31 PROBLEM — Z95.1 PRESENCE OF AORTOCORONARY BYPASS GRAFT: Status: ACTIVE | Noted: 2023-01-01

## 2023-07-31 RX ORDER — VANCOMYCIN HYDROCHLORIDE
1.5 EVERY 24 HOURS
COMMUNITY
End: 2023-12-12 | Stop reason: ALTCHOICE

## 2023-07-31 RX ORDER — VITAMIN E 268 MG
400 CAPSULE ORAL
Status: ON HOLD | COMMUNITY

## 2023-07-31 RX ORDER — IBUPROFEN 100 MG/5ML
SUSPENSION, ORAL (FINAL DOSE FORM) ORAL
COMMUNITY
Start: 2023-01-29 | End: 2023-12-12 | Stop reason: SDUPTHER

## 2023-07-31 RX ORDER — MULTIVIT WITH IRON,MINERALS
TABLET,CHEWABLE ORAL
COMMUNITY
End: 2023-12-12 | Stop reason: SDUPTHER

## 2023-07-31 RX ORDER — INSULIN LISPRO 100 [IU]/ML
INJECTION, SOLUTION INTRAVENOUS; SUBCUTANEOUS
COMMUNITY
Start: 2023-06-20 | End: 2023-12-12 | Stop reason: SDUPTHER

## 2023-07-31 RX ORDER — CHOLECALCIFEROL (VITAMIN D3) 125 MCG
5000 CAPSULE ORAL
Status: ON HOLD | COMMUNITY

## 2023-07-31 RX ORDER — INSULIN LISPRO 100 [IU]/ML
INJECTION, SOLUTION INTRAVENOUS; SUBCUTANEOUS
COMMUNITY
Start: 2023-07-21 | End: 2024-04-25 | Stop reason: ALTCHOICE

## 2023-07-31 RX ORDER — FUROSEMIDE 40 MG/1
1 TABLET ORAL DAILY
COMMUNITY
Start: 2023-02-03 | End: 2023-12-12 | Stop reason: SDUPTHER

## 2023-07-31 RX ORDER — UBIDECARENONE 75 MG
CAPSULE ORAL
Status: ON HOLD | COMMUNITY
Start: 2023-01-29

## 2023-07-31 RX ORDER — TALC
POWDER (GRAM) TOPICAL
Status: ON HOLD | COMMUNITY
Start: 2023-02-03 | End: 2024-03-21 | Stop reason: ALTCHOICE

## 2023-07-31 RX ORDER — ACETAMINOPHEN 325 MG/1
325 TABLET ORAL EVERY 4 HOURS PRN
COMMUNITY
Start: 2022-07-12 | End: 2024-04-10 | Stop reason: SDUPTHER

## 2023-07-31 RX ORDER — PANTOPRAZOLE SODIUM 40 MG/1
40 TABLET, DELAYED RELEASE ORAL DAILY
COMMUNITY
End: 2023-11-20 | Stop reason: WASHOUT

## 2023-07-31 RX ORDER — VANCOMYCIN HYDROCHLORIDE 10 G/1
INJECTION, POWDER, LYOPHILIZED, FOR SOLUTION INTRAVENOUS
COMMUNITY
Start: 2023-07-21 | End: 2023-12-12 | Stop reason: ALTCHOICE

## 2023-07-31 RX ORDER — OMEPRAZOLE 20 MG/1
CAPSULE, DELAYED RELEASE ORAL
COMMUNITY
Start: 2015-07-09 | End: 2023-09-06 | Stop reason: SDUPTHER

## 2023-07-31 RX ORDER — DOXYCYCLINE 100 MG/1
1 TABLET ORAL 2 TIMES DAILY
COMMUNITY
Start: 2023-01-29 | End: 2023-09-06 | Stop reason: SDUPTHER

## 2023-07-31 RX ORDER — INSULIN GLARGINE 100 [IU]/ML
INJECTION, SOLUTION SUBCUTANEOUS
Status: ON HOLD | COMMUNITY
Start: 2023-02-02 | End: 2024-03-21 | Stop reason: SDUPTHER

## 2023-07-31 RX ORDER — ALUMINUM HYDROXIDE, MAGNESIUM HYDROXIDE, AND SIMETHICONE 1200; 120; 1200 MG/30ML; MG/30ML; MG/30ML
30 SUSPENSION ORAL EVERY 4 HOURS PRN
Status: ON HOLD | COMMUNITY

## 2023-08-02 ENCOUNTER — OFFICE VISIT (OUTPATIENT)
Dept: PRIMARY CARE | Facility: CLINIC | Age: 88
End: 2023-08-02
Payer: MEDICARE

## 2023-08-02 VITALS
SYSTOLIC BLOOD PRESSURE: 157 MMHG | TEMPERATURE: 98.3 F | BODY MASS INDEX: 24.54 KG/M2 | DIASTOLIC BLOOD PRESSURE: 64 MMHG | HEART RATE: 60 BPM | WEIGHT: 159 LBS | OXYGEN SATURATION: 98 %

## 2023-08-02 DIAGNOSIS — E11.621 TYPE 2 DIABETES MELLITUS WITH FOOT ULCER, WITH LONG-TERM CURRENT USE OF INSULIN (MULTI): ICD-10-CM

## 2023-08-02 DIAGNOSIS — M86.29 SUBACUTE OSTEOMYELITIS OF MULTIPLE SITES (MULTI): Primary | ICD-10-CM

## 2023-08-02 DIAGNOSIS — Z09 HOSPITAL DISCHARGE FOLLOW-UP: ICD-10-CM

## 2023-08-02 DIAGNOSIS — Z79.4 TYPE 2 DIABETES MELLITUS WITH FOOT ULCER, WITH LONG-TERM CURRENT USE OF INSULIN (MULTI): ICD-10-CM

## 2023-08-02 DIAGNOSIS — L97.509 TYPE 2 DIABETES MELLITUS WITH FOOT ULCER, WITH LONG-TERM CURRENT USE OF INSULIN (MULTI): ICD-10-CM

## 2023-08-02 PROBLEM — A41.9 SEPSIS, UNSPECIFIED ORGANISM (MULTI): Status: ACTIVE | Noted: 2023-06-14

## 2023-08-02 PROBLEM — A49.02 MRSA INFECTION: Status: ACTIVE | Noted: 2023-06-27

## 2023-08-02 PROCEDURE — 1159F MED LIST DOCD IN RCRD: CPT | Performed by: INTERNAL MEDICINE

## 2023-08-02 PROCEDURE — 99214 OFFICE O/P EST MOD 30 MIN: CPT | Performed by: INTERNAL MEDICINE

## 2023-08-02 PROCEDURE — 3078F DIAST BP <80 MM HG: CPT | Performed by: INTERNAL MEDICINE

## 2023-08-02 PROCEDURE — 1036F TOBACCO NON-USER: CPT | Performed by: INTERNAL MEDICINE

## 2023-08-02 PROCEDURE — 3077F SYST BP >= 140 MM HG: CPT | Performed by: INTERNAL MEDICINE

## 2023-08-02 PROCEDURE — 1126F AMNT PAIN NOTED NONE PRSNT: CPT | Performed by: INTERNAL MEDICINE

## 2023-08-02 RX ORDER — BLOOD SUGAR DIAGNOSTIC
STRIP MISCELLANEOUS
Qty: 100 STRIP | Refills: 3 | Status: SHIPPED | OUTPATIENT
Start: 2023-08-02 | End: 2023-11-20 | Stop reason: WASHOUT

## 2023-08-02 NOTE — PROGRESS NOTES
PCP: Joao Bailey MD   I have reviewed existing histories, notes, past medical history, surgical history, social history, family history, med list, allergy list, and immunization, and updated.    HPI:   Below is hospital DC summary. Reviewed in detail. Following DC from hospital, pt transferred to SNF, there for one month, under care of Dr. Bailey, for IV antibiotic and physical therapy.  He came home a few days ago, overall feels better. right wrist still some swelling and erythema but better than it was. No Fever and chills. Has had Follow up with ortho and ID. Off iv antibiotic.    Discharge Summary:   Admission Date: .16-Jun-2023 23:44:00   Discharge Date: 20-Jun-2023   Attending Physician at Discharge: Mary Yañez   Admission Reason: Wrist pain   Final Discharge Diagnoses: Osteomyelitis   Procedures: Date: 17-Jun-2023 11:16:00   Condition at Discharge: Satisfactory   Disposition at Discharge: Skilled Nursing Facility (SNF)   Vital Signs:   Pt reports feeling ok today. His ROM in his right hand is improving. He denies   SOB, CP, abdominal pain. He is eager for DC.         T   P  R  BP   MAP  SpO2   Value  36.8  63  14  112/51   71  96%   Date/Time 6/20 15:56 6/20 15:56 6/20 15:56 6/20 15:56  6/20 15:56 6/20 15:56   Range  (36.4C - 37.2C )  (61 - 71 )  (13 - 18 )  (99 - 132 )/ (49 - 67 )  (71 -   80 )  (92% - 97% )   Highest temp of 37.2 C was recorded at 6/19 19:27       Date:            Weight/Scale Type:  Height:   16-Jun-2023 23:50  70  kg         170.1  cm   Physical Exam:   Constitutional: resting comfortably in bed, no acute distress   Eyes: clear sclera, making eye contact   Respiratory/Thorax:  no acute respiratory distress   Gastrointestinal: Nondistended, soft, non-tender   Musculoskeletal: right wrist splint in place, exposed digits are edematous, pt   is able to actively move RUE digits, no BLE edema appreciated   Neurological: alert, answering questions appropriately, speech is clear and    fluent   Psychological: Appropriate mood and behavior   Skin: Warm and dry, right foot dressing is clean and intact   Hospital Course:   MAYELIN OLSON is an 88 year old  male with a past medical history   significant for CAD s/p CABG in 2022, HTN, HLD, IDDM, R foot drop, R digit   amputations, PVD, and HFpEF (EF 60-65% 06/13/2023), R 4th toe amputation   11/2022, treatment for R foot OM ending 12/2022, recent R foot and R wrist   infection (staph and strep, respectively) treated with ceftriaxone now   presenting with worsening right wrist swelling. Patient was recently evaluated   on 06/08 for R foot and R wrist pain and was admitted at [facility] from 06/07 -   06/14 for GAS bacteremia. He underwent an I&D on 06/09 with cultures growing   Group A Streptococcus. He also underwent TMA and 5th ray resection on 06/12   with MRI showing septic arthritis in 4th and 5H TMJ and OM of 5th styloid   process. Post operatively it was felt healthy bone was obtained and cultures   were +ve for MRSA. Patient was seen by ID and subsequently started on 3 weeks   of Rocephin. It us unclear if ABX were given at his facility. Patient then   presented to [facility] on 06/16 for worsening right wrist pain and see by   Orthopedics.       Right wrist fourth dorsal compartment tenosynovitis   RLE 4th and 5th toe OM s/p TMA 06/12   -Ortho consulted, s/p excisional debridement to R wrist septic arthritis.   Tenosynovectomy to R wrist fourth dorsal compartment tenosynovitis 06/17   -ID consulted, recommend continuing with Vancomycin through 7/28/23. Pt to   follow up with Ori ID - appointment made for 6/27/23       #CAD s/p CABG 2022   #HTN   #HFrEF   #HLD   -EF 60-65% 06/13/2023   -Continue home medications       #T2DM   -Continue Lantus 12 Unit(s) QHS and mild ssi TIDWM. Start Humalog 5 Unit(s)   TIDWM       Discussed wtih RN   Discussed with TCC   Discussed with pt   Discussed with ID       Time spent in coordination of care 45  minutes   Pt discharged to SNF in satisfactory condition       Mary Yañez DO   [facility] Hospitalist       Immunizations:    Immunizations:   23-Feb-2021    SARS-CoV-2 (COVID-19): Immunizations, 23-Feb-2021 26-Jan-2021    SARS-CoV-2 (COVID-19): Immunizations, 26-Jan-2021 14-Jun-2017    PCV- Pneumococcal conjugate vaccine: Immunizations, 14-Jun-2017           Discharge Information:       and Continuing Care:   Lab Results - Pending:   Culture, Fungus + Fungus Stain Drawn at 17-Jun-2023 11:36:00   Culture, Fungus + Fungus Stain Drawn at 17-Jun-2023 11:35:00   Culture, Miscellaneous, includes Gram Stain Drawn at 17-Jun-2023 11:31:00   Culture, Blood Drawn at 17-Jun-2023 00:36:00   Culture, Blood Drawn at 17-Jun-2023 00:35:00   Radiology Results - Pending: None   Discharge Instructions:   Activity:           Weight-bearing Instructions: no weight-bearing right arm.       Nutrition/Diet:           diabetic/carbohydrate counted           Diabetic/Carbohydrate  Counted:   75gram/Carb meal, 45gram/Carb snack   (2000-2200cals)       Labs:           Lab Test(s):   Basic Metabolic Panel,  CBC with dif,  CRP,  ESR,  Vanc   trough           Date To Be Drawn:   Obtain weekly starting 6/26/23. CBC with diff,   Bun/Cr, ESR, CRP, and vanc trough.           Fax Results To:   Fax all results to  [phone] attention Dr. Valenzuela       Tests:           Test Name(s):   CHECK VANC TROUGH on 6/23/23 prior to dose of   vancomycin           Fax Results to:   Fax all results to  [phone] attention Dr. Valenzuela          Please follow-up with your primary care physician within one week to discuss   this hospital stay.       Please call your primary care physician or return to the emergency department   for new or worsening symptoms.           Rehab Services:           Occupational Therapy Orders:   Eval and Treat ([facility])   NWMADISON REN           Physical Therapy Orders:   Eval and Treat ([facility])      NWB RUE       Care Recommendation:           I recommend that INPATIENT care is required at::   Skilled           Estimated Stay:   30 - 180 days       Follow Up Appointments:   Follow-Up Appointment 01:           Physician/Dept/Service:   Dr. Duncan Yoon, Orthopedic Surgery           Reason for Referral:   HOSPITAL FOLLOW UP           Call to Schedule in:   Office requests to call you directly to schedule   appointment           Location:   [place] [facility]           Phone Number:   [phone]       Follow-Up Appointment 02:           Physician/Dept/Service:   Dr. Valenzuela, Infectious Disease           Scheduled Date/Time:   27-Jun-2023 10:00           Location:   ID Consultant= [facility], [address] [place] [address],   [address], [place] [address]           Phone Number:   [phone]       Discharge Medications: Home Medication   ferrous sulfate 325 mg (65 mg elemental iron) oral tablet - 1 tab(s) orally 3   times a week (Mon, Wed, Fri)       aspirin 81 mg oral tablet, chewable - 1 tab(s) orally once a day   clopidogrel 75 mg oral tablet - 1 tab(s) orally once a day   Multiple Vitamins with Minerals oral tablet - 1 tab(s) orally once a day   ascorbic acid 1000 mg oral tablet - 1 tab(s) orally once a day   niacin 500 mg oral tablet - 1 tab(s) orally once a day   Metoprolol Tartrate 25 mg oral tablet - 0.5 tab(s) orally 2 times a day   omeprazole 20 mg oral delayed release capsule - 1 cap(s) orally once a day   Vitamin B-12 500 mcg oral tablet - 1 tab(s) orally once a day   Osteo Bi-Flex 250 mg-200 mg oral tablet - 1 tab(s) orally once a day   cholecalciferol 125 mcg (5000 intl units) oral tablet - 1 tab(s) orally once a   day   vitamin E 400 intl units oral capsule - 1 cap(s) orally once a day   insulin glargine 100 units/mL subcutaneous solution - 12 unit(s) subcutaneous   once a day (at bedtime)   enalapril 2.5 mg oral tablet - 1 tab(s) orally once a day   HOLD SBP < 100 mmHg   insulin lispro 100  units/mL injectable solution - 5 unit(s) injectable 3 times   a day (before meals)   insulin lispro 100 units/mL injectable solution - Hypoglycemia Protocol 0   unit(s) if Blood glucose is between 0 - 70   0 unit(s) if Blood glucose is between 71 - 150   2 unit(s) if Blood glucose is between 151 - 200   4 unit(s) if Blood glucose is between 201 - 250   6 unit(s) if Blood glucose is between 251 - 300   8 unit(s) if Blood glucose is between 301 - 350   10 unit(s) if Blood glucose is between 351 - 400   Notify Provider if Blood glucose greater than 400   vancomycin 1.5 g/250 mL-NaCl 0.9% intravenous solution - 1.5 gram(s)   intravenous once a day through 7/28/23        PRN Medication   aluminum hydroxide/magnesium hydroxide/simethicone 200 mg-200 mg-20 mg/5 mL   oral suspension - 30 milliliter(s) orally every 4 hours, As Needed   acetaminophen 325 mg oral tablet - 2 tab(s) orally every 4 hours, As needed,   Pain - Mild (1-3) or elevated temperature   oxyCODONE 5 mg oral tablet - 1 tab(s) orally every 4 hours, As Needed       DNR Status:   · Code Status Code Status order at time of discharge: Full Code           Lab Results   Component Value Date    WBC 9.6 06/19/2023    HGB 8.4 (L) 06/19/2023    HCT 27.3 (L) 06/19/2023     (H) 06/19/2023    CHOL 117 03/14/2023    TRIG 74 03/14/2023    HDL 42.2 03/14/2023    LDLDIRECT 146 (H) 11/08/2021    ALT 27 06/19/2023    AST 13 06/19/2023     06/19/2023    K 4.2 06/19/2023     06/19/2023    CREATININE 0.74 06/19/2023    BUN 9 06/19/2023    CO2 28 06/19/2023    TSH 1.17 02/24/2022    INR 1.1 06/17/2023    HGBA1C CANCELED 06/19/2023     Physical exam:  /64   Pulse 60   Temp 36.8 °C (98.3 °F)   Wt 72.1 kg (159 lb)   SpO2 98%   BMI 24.54 kg/m²    NAD.  Heart RRR  Lungs clear  right wrist surgical wound no signs of infection  Wrist dorsal overall there is some erythema. No draing. No open wouds  No leg edema    Assessments/orders:   1. Subacute  osteomyelitis of multiple sites (CMS/[facility])        2. Hospital discharge follow-up        3. Type 2 diabetes mellitus with foot ulcer, with long-term current use of insulin (CMS/[facility])  OneTouch Ultra Test strip      Follow up in November as scheduled.

## 2023-08-18 LAB
GRAM STAIN: ABNORMAL
TISSUE/WOUND CULTURE/SMEAR: ABNORMAL
TISSUE/WOUND CULTURE/SMEAR: ABNORMAL

## 2023-08-23 ENCOUNTER — PATIENT OUTREACH (OUTPATIENT)
Dept: CARE COORDINATION | Facility: CLINIC | Age: 88
End: 2023-08-23
Payer: MEDICARE

## 2023-08-23 NOTE — PROGRESS NOTES
Call regarding appt. with PCP on (8/2/23) after hospitalization.  At time of outreach call the patient feels as if their condition has improved since last visit.  Reviewed the PCP appointment with the pt and addressed any questions or concerns.  Patient has finished IV antibiotic regimen but remains active with home care for therapy.

## 2023-08-28 ENCOUNTER — TELEPHONE (OUTPATIENT)
Dept: PRIMARY CARE | Facility: CLINIC | Age: 88
End: 2023-08-28
Payer: MEDICARE

## 2023-08-28 NOTE — TELEPHONE ENCOUNTER
Vivien grace PT states that he is going to delay PT  for patient due to power outage. Her will resume next week

## 2023-09-06 ENCOUNTER — TELEPHONE (OUTPATIENT)
Dept: PRIMARY CARE | Facility: CLINIC | Age: 88
End: 2023-09-06
Payer: MEDICARE

## 2023-09-06 DIAGNOSIS — K21.9 GERD WITHOUT ESOPHAGITIS: Primary | ICD-10-CM

## 2023-09-06 RX ORDER — DOXYCYCLINE 100 MG/1
100 CAPSULE ORAL 2 TIMES DAILY
COMMUNITY
Start: 2023-08-22 | End: 2023-08-29

## 2023-09-06 RX ORDER — OMEPRAZOLE 20 MG/1
20 CAPSULE, DELAYED RELEASE ORAL
Qty: 90 CAPSULE | Refills: 3 | Status: SHIPPED | OUTPATIENT
Start: 2023-09-06 | End: 2023-11-20 | Stop reason: WASHOUT

## 2023-09-06 NOTE — TELEPHONE ENCOUNTER
PT WAS discharge AND HE WOULD LIKE TO KNOW IF HE SHOULD STILL BE TAKING OMEPRAZOLE. I ASKED IF HE WAS HAVING ANY STOMACH ISSUES AND HE STATED NO

## 2023-09-22 ENCOUNTER — PATIENT OUTREACH (OUTPATIENT)
Dept: CARE COORDINATION | Facility: CLINIC | Age: 88
End: 2023-09-22
Payer: MEDICARE

## 2023-10-03 ENCOUNTER — OFFICE VISIT (OUTPATIENT)
Dept: WOUND CARE | Facility: CLINIC | Age: 88
End: 2023-10-03
Payer: MEDICARE

## 2023-10-03 PROCEDURE — 15271 SKIN SUB GRAFT TRNK/ARM/LEG: CPT

## 2023-10-03 PROCEDURE — 11042 DBRDMT SUBQ TIS 1ST 20SQCM/<: CPT

## 2023-10-10 ENCOUNTER — OFFICE VISIT (OUTPATIENT)
Dept: WOUND CARE | Facility: CLINIC | Age: 88
End: 2023-10-10
Payer: MEDICARE

## 2023-10-10 DIAGNOSIS — I10 PRIMARY HYPERTENSION: ICD-10-CM

## 2023-10-10 PROCEDURE — 11042 DBRDMT SUBQ TIS 1ST 20SQCM/<: CPT

## 2023-10-10 PROCEDURE — 15275 SKIN SUB GRAFT FACE/NK/HF/G: CPT

## 2023-10-11 RX ORDER — METOPROLOL TARTRATE 25 MG/1
12.5 TABLET, FILM COATED ORAL 2 TIMES DAILY
Qty: 90 TABLET | Refills: 3 | Status: ON HOLD | OUTPATIENT
Start: 2023-10-11

## 2023-10-16 RX ORDER — INSULIN GLARGINE 100 [IU]/ML
INJECTION, SOLUTION SUBCUTANEOUS
Qty: 10 ML | OUTPATIENT
Start: 2023-10-16

## 2023-10-17 ENCOUNTER — OFFICE VISIT (OUTPATIENT)
Dept: WOUND CARE | Facility: CLINIC | Age: 88
End: 2023-10-17
Payer: MEDICARE

## 2023-10-17 PROCEDURE — 11042 DBRDMT SUBQ TIS 1ST 20SQCM/<: CPT

## 2023-10-18 ENCOUNTER — PATIENT OUTREACH (OUTPATIENT)
Dept: CARE COORDINATION | Facility: CLINIC | Age: 88
End: 2023-10-18
Payer: MEDICARE

## 2023-10-18 NOTE — PROGRESS NOTES
Call placed regarding 90 day post discharge follow up call.  At time of outreach call the patient feels as if their condition has improved since last outreach.  Questions or concerns regarding recovery period addressed at this time.  Reviewed any PCP or specialists progress notes/labs/radiology reports if applicable and addressed any questions or concerns.   Home therapy has completed, patient reports continuing to do exercises on his own. He continues to follow with podiatrist who he states told him he is healing up well at yesterday's appt.    Patient has met target of no readmission for (90) days post (SNF) discharge and is graduated from Transitional Care Management program at this time.

## 2023-10-24 ENCOUNTER — OFFICE VISIT (OUTPATIENT)
Dept: WOUND CARE | Facility: CLINIC | Age: 88
End: 2023-10-24
Payer: MEDICARE

## 2023-10-24 PROCEDURE — 11042 DBRDMT SUBQ TIS 1ST 20SQCM/<: CPT

## 2023-10-24 PROCEDURE — 15275 SKIN SUB GRAFT FACE/NK/HF/G: CPT

## 2023-10-26 DIAGNOSIS — I25.10 CORONARY ARTERY DISEASE INVOLVING NATIVE CORONARY ARTERY OF NATIVE HEART WITHOUT ANGINA PECTORIS: Chronic | ICD-10-CM

## 2023-10-26 RX ORDER — CLOPIDOGREL BISULFATE 75 MG/1
75 TABLET ORAL DAILY
Qty: 90 TABLET | Refills: 1 | Status: SHIPPED | OUTPATIENT
Start: 2023-10-26 | End: 2024-05-06

## 2023-10-31 ENCOUNTER — OFFICE VISIT (OUTPATIENT)
Dept: WOUND CARE | Facility: CLINIC | Age: 88
End: 2023-10-31
Payer: MEDICARE

## 2023-10-31 PROCEDURE — 11042 DBRDMT SUBQ TIS 1ST 20SQCM/<: CPT

## 2023-11-06 ENCOUNTER — OFFICE VISIT (OUTPATIENT)
Dept: PRIMARY CARE | Facility: CLINIC | Age: 88
End: 2023-11-06
Payer: MEDICARE

## 2023-11-06 VITALS
BODY MASS INDEX: 25.11 KG/M2 | HEIGHT: 67 IN | DIASTOLIC BLOOD PRESSURE: 53 MMHG | HEART RATE: 60 BPM | TEMPERATURE: 98.2 F | WEIGHT: 160 LBS | SYSTOLIC BLOOD PRESSURE: 116 MMHG | OXYGEN SATURATION: 95 %

## 2023-11-06 DIAGNOSIS — Z00.00 MEDICARE ANNUAL WELLNESS VISIT, SUBSEQUENT: Primary | ICD-10-CM

## 2023-11-06 DIAGNOSIS — Z23 NEED FOR PNEUMOCOCCAL 20-VALENT CONJUGATE VACCINATION: ICD-10-CM

## 2023-11-06 DIAGNOSIS — I73.9 PVD (PERIPHERAL VASCULAR DISEASE) (CMS-HCC): ICD-10-CM

## 2023-11-06 DIAGNOSIS — I25.10 CORONARY ARTERY DISEASE INVOLVING NATIVE CORONARY ARTERY OF NATIVE HEART, UNSPECIFIED WHETHER ANGINA PRESENT: Chronic | ICD-10-CM

## 2023-11-06 DIAGNOSIS — Z79.4 TYPE 2 DIABETES MELLITUS WITH FOOT ULCER, WITH LONG-TERM CURRENT USE OF INSULIN (MULTI): ICD-10-CM

## 2023-11-06 DIAGNOSIS — E78.5 HYPERLIPIDEMIA, UNSPECIFIED HYPERLIPIDEMIA TYPE: Chronic | ICD-10-CM

## 2023-11-06 DIAGNOSIS — E11.621 TYPE 2 DIABETES MELLITUS WITH FOOT ULCER, WITH LONG-TERM CURRENT USE OF INSULIN (MULTI): ICD-10-CM

## 2023-11-06 DIAGNOSIS — L97.509 TYPE 2 DIABETES MELLITUS WITH FOOT ULCER, WITH LONG-TERM CURRENT USE OF INSULIN (MULTI): ICD-10-CM

## 2023-11-06 PROCEDURE — 3078F DIAST BP <80 MM HG: CPT | Performed by: INTERNAL MEDICINE

## 2023-11-06 PROCEDURE — 3074F SYST BP LT 130 MM HG: CPT | Performed by: INTERNAL MEDICINE

## 2023-11-06 PROCEDURE — 1170F FXNL STATUS ASSESSED: CPT | Performed by: INTERNAL MEDICINE

## 2023-11-06 PROCEDURE — G0009 ADMIN PNEUMOCOCCAL VACCINE: HCPCS | Performed by: INTERNAL MEDICINE

## 2023-11-06 PROCEDURE — 1036F TOBACCO NON-USER: CPT | Performed by: INTERNAL MEDICINE

## 2023-11-06 PROCEDURE — G0439 PPPS, SUBSEQ VISIT: HCPCS | Performed by: INTERNAL MEDICINE

## 2023-11-06 PROCEDURE — 90677 PCV20 VACCINE IM: CPT | Performed by: INTERNAL MEDICINE

## 2023-11-06 PROCEDURE — 1126F AMNT PAIN NOTED NONE PRSNT: CPT | Performed by: INTERNAL MEDICINE

## 2023-11-06 PROCEDURE — 1159F MED LIST DOCD IN RCRD: CPT | Performed by: INTERNAL MEDICINE

## 2023-11-06 ASSESSMENT — ACTIVITIES OF DAILY LIVING (ADL)
TAKING_MEDICATION: INDEPENDENT
DRESSING: INDEPENDENT
BATHING: INDEPENDENT
DOING_HOUSEWORK: INDEPENDENT
MANAGING_FINANCES: INDEPENDENT
GROCERY_SHOPPING: INDEPENDENT

## 2023-11-06 ASSESSMENT — PATIENT HEALTH QUESTIONNAIRE - PHQ9
1. LITTLE INTEREST OR PLEASURE IN DOING THINGS: NOT AT ALL
2. FEELING DOWN, DEPRESSED OR HOPELESS: NOT AT ALL
SUM OF ALL RESPONSES TO PHQ9 QUESTIONS 1 AND 2: 0

## 2023-11-06 NOTE — PROGRESS NOTES
SUBJECTIVE:   Rene Ballard is a 88 y.o. male presenting for his annual physical/wellness.  PCP: Olesya Daniel MD    [facility] Independent Questionnaire:  In the past year, patient experienced:     One or more falls in the last year: No     Depression Screen as of today    Over the past 2 weeks, how often have you been bothered by any of the following problems?   Little interest or pleasure in doing things Not at all   Feeling down, depressed, or hopeless Not at all   Patient Health Questionnaire-2 Score 0   Over the past 2 weeks, how often have you been bothered by any of the following problems?     C-SSRS from encounters over the past 365 days    No data recorded  Anxiety Screening from encounters over the past 365 days    No data recorded  Alcohol Screening from encounters over the past 365 days    No data recorded  Drug Screening from encounters over the past 365 days    No data recorded  General Health - Medicare Wellness as of today    Patient Self Assessment of Health Status   Patient Self Assessment Good     Nutrition and Exercise as of today    Nutrition and Exercise   Current Diet Unhealthy Diet, Well Balanced Diet   Adequate Fluid Intake No   Caffeine Yes   Exercise Frequency Infrequently     Functional Ability/Level of Safety as of today    Functional Ability/Level of Safety   Cognitive Impairment Observed No cognitive impairment observed   Cognitive Impairment Reported No cognitive impairment reported by patient or family     Home Safety Risk Factors as of today    Home Safety Risk Factors None     ADLs/IADLs - Medicare as of today    ADL Screening   Hearing - Right Ear Functional   Hearing - Left Ear Functional   Bathing Independent   Dressing Independent   Walks in Home Independent   IADL's   Managing Finances Independent   Grocery Shopping Independent   Taking Medication Independent   Doing Housework Independent     Medicare wellness. Doing well. No concerns today.    Colon screening:  "na  Prostate screening: No results found for: \"PSA\"  Vaccines: due for RSV. And prevnar 20.  Diet:   healthy.  Exercises:  some exercises at home.   Checks bs before breakfast.  In good range.  Glargine he lowered by two units (was 25u, now to 23-22, because glucose in 50's occasionally.    Lab Results   Component Value Date    HGBA1C CANCELED 06/19/2023     Lab Results   Component Value Date    CREATININE 0.74 06/19/2023     Lab Results   Component Value Date    CHOL 117 03/14/2023    CHOL 156 04/12/2022    CHOL 166 02/24/2022     Lab Results   Component Value Date    HDL 42.2 03/14/2023    HDL 38.1 (A) 04/12/2022    HDL 42.3 11/08/2021     No results found for: \"LDLCALC\"  Lab Results   Component Value Date    TRIG 74 03/14/2023    TRIG 228 (H) 04/12/2022    TRIG 131 09/29/2020       OBJECTIVE:   The patient appears well, alert, oriented x 3, in no distress.   /53   Pulse 60   Temp 36.8 °C (98.2 °F)   Ht 1.708 m (5' 7.25\")   Wt 72.6 kg (160 lb)   SpO2 95%   BMI 24.87 kg/m²   ENT normal.  Neck supple. No adenopathy or thyromegaly. SHO. Lungs are clear, good air entry, no wheezes, rhonchi or rales. S1 and S2 normal, no murmurs, regular rate and rhythm. Abdomen is soft without tenderness, guarding, mass or organomegaly. Extremities show no edema, normal peripheral pulses. Neurological is normal without focal findings.      1. Medicare annual wellness visit, subsequent        2. PVD (peripheral vascular disease) (CMS/[facility])        3. Coronary artery disease involving native coronary artery of native heart, unspecified whether angina present        4. Type 2 diabetes mellitus with foot ulcer, with long-term current use of insulin (CMS/[facility])  Hemoglobin A1C, Basic Metabolic Panel      5. Hyperlipidemia, unspecified hyperlipidemia type            Prevnar 20  Advised pt to get RSV vaccine at drug store  "

## 2023-11-07 ENCOUNTER — OFFICE VISIT (OUTPATIENT)
Dept: WOUND CARE | Facility: CLINIC | Age: 88
End: 2023-11-07
Payer: MEDICARE

## 2023-11-07 ENCOUNTER — LAB (OUTPATIENT)
Dept: LAB | Facility: LAB | Age: 88
End: 2023-11-07
Payer: MEDICARE

## 2023-11-07 DIAGNOSIS — E11.621 TYPE 2 DIABETES MELLITUS WITH FOOT ULCER, WITH LONG-TERM CURRENT USE OF INSULIN (MULTI): ICD-10-CM

## 2023-11-07 DIAGNOSIS — L97.509 TYPE 2 DIABETES MELLITUS WITH FOOT ULCER, WITH LONG-TERM CURRENT USE OF INSULIN (MULTI): ICD-10-CM

## 2023-11-07 DIAGNOSIS — Z79.4 TYPE 2 DIABETES MELLITUS WITH FOOT ULCER, WITH LONG-TERM CURRENT USE OF INSULIN (MULTI): ICD-10-CM

## 2023-11-07 LAB
ANION GAP SERPL CALC-SCNC: 13 MMOL/L (ref 10–20)
BUN SERPL-MCNC: 24 MG/DL (ref 6–23)
CALCIUM SERPL-MCNC: 9.6 MG/DL (ref 8.6–10.6)
CHLORIDE SERPL-SCNC: 102 MMOL/L (ref 98–107)
CO2 SERPL-SCNC: 27 MMOL/L (ref 21–32)
CREAT SERPL-MCNC: 1.13 MG/DL (ref 0.5–1.3)
GFR SERPL CREATININE-BSD FRML MDRD: 63 ML/MIN/1.73M*2
GLUCOSE SERPL-MCNC: 184 MG/DL (ref 74–99)
POTASSIUM SERPL-SCNC: 4.6 MMOL/L (ref 3.5–5.3)
SODIUM SERPL-SCNC: 137 MMOL/L (ref 136–145)

## 2023-11-07 PROCEDURE — 80048 BASIC METABOLIC PNL TOTAL CA: CPT

## 2023-11-07 PROCEDURE — 36415 COLL VENOUS BLD VENIPUNCTURE: CPT

## 2023-11-07 PROCEDURE — 11042 DBRDMT SUBQ TIS 1ST 20SQCM/<: CPT

## 2023-11-07 PROCEDURE — 15275 SKIN SUB GRAFT FACE/NK/HF/G: CPT

## 2023-11-07 PROCEDURE — 83036 HEMOGLOBIN GLYCOSYLATED A1C: CPT

## 2023-11-08 LAB
EST. AVERAGE GLUCOSE BLD GHB EST-MCNC: 171 MG/DL
HBA1C MFR BLD: 7.6 %

## 2023-11-14 ENCOUNTER — OFFICE VISIT (OUTPATIENT)
Dept: WOUND CARE | Facility: CLINIC | Age: 88
End: 2023-11-14
Payer: MEDICARE

## 2023-11-14 PROCEDURE — 15275 SKIN SUB GRAFT FACE/NK/HF/G: CPT

## 2023-11-14 PROCEDURE — 11042 DBRDMT SUBQ TIS 1ST 20SQCM/<: CPT

## 2023-11-17 NOTE — PROGRESS NOTES
"Chief Complaint:   >6 month follow up CAD     History Of Present Illness:    89-year-old male with history of insulin-dependent diabetes, CAD with CABG x 3 off pump 3/2/2022 (LIMA-LAD, SVG-OM, SVG-PDA), GERD, mild systolic dysfunction EF 40-45%, moderate L carotid stenosis 50-69%, and right leg PAD/CLI status post right foot I&D and partial fifth metatarsal resection and multiple intervention most recent 1/26/2023 using PTA-DCB to popliteal and PTA to AT     First met Mr. Ballard 2/28/2022 when he presented from PCP office with progressive dyspnea. \"My arms and legs would just turn to butter.\" He was found to have elevated troponin. Subsequent catheterization showing three-vessel disease. Due to extensive nature and high functional ability we felt bypass was best option for him.     interval events:  Hospitalization 6/2023 with bacteremia. Tx with 3wks IV abx. Then had tenosynovitis with R Wrist septic arthritis later that month with orthopedic surgery.  Says 2 spots have been taking forever to heal. No chestpain or dyspnea      ECHO 6/23:  1. Poorly visualized anatomical structures due to suboptimal image quality.  2. Left ventricular systolic function is normal with a 60-65% estimated ejection fraction.  3. Abnormal septal motion consistent with post-operative status.  4. There is mildly reduced right ventricular systolic function.  5. The left atrium is moderate to severely dilated.  6. The right atrium is moderately dilated.  7. RVSP within normal limits.  8. Mild aortic valve stenosis.  9. The aortic valve appears tricuspid with restriction.  10. There is plaque visualized in the ascending aorta.  11. Spectral Doppler shows a pseudonormal pattern of left ventricular diastolic filling.     Last Recorded Vitals:  Vitals:    11/20/23 1427   BP: 112/59   BP Location: Left arm   Pulse: 62   SpO2: 96%   Weight: 73.4 kg (161 lb 14.4 oz)       Past Medical History:  He has a past medical history of Abnormal EKG " (05/01/2023), COVID-19 (05/01/2023), Peripheral vascular disease, unspecified (CMS/HCC) (11/28/2022), Personal history of malignant neoplasm of bladder, Personal history of other diseases of the musculoskeletal system and connective tissue, Personal history of other endocrine, nutritional and metabolic disease, S/P CABG x 3 (05/01/2023), S/P insertion of penile implant (05/01/2023), and Type 2 diabetes mellitus with other circulatory complications (CMS/[facility]) (11/28/2022).    Past Surgical History:  He has a past surgical history that includes Other surgical history (06/19/2017); IR aspiration of bladder by needle insertion of suprapubic catheter (06/23/2017); Cardiac surgery; and Wrist surgery (Right).      Social History:  He reports that he has never smoked. He has never used smokeless tobacco. He reports that he does not currently use alcohol. He reports that he does not use drugs.    Family History:  Family History   Problem Relation Name Age of Onset    No Known Problems Mother      Heart disease Father      Tuberculosis Maternal Grandmother          Allergies:  Glimepiride, Lisinopril, Metformin, Pioglitazone, and Statins-hmg-coa reductase inhibitors    Outpatient Medications:  Current Outpatient Medications   Medication Instructions    acetaminophen (Tylenol) 325 mg tablet oral, Every 4 hours PRN    alpha tocopherol (VITAMIN E) 400 Units, oral, Daily RT    alum-mag hydroxide-simeth (Mylanta) 200-200-20 mg/5 mL oral suspension 30 mL, oral, Every 4 hours PRN    ascorbic acid (Vitamin C) 1,000 mg tablet 1 tablet, oral, Daily    ascorbic acid (Vitamin C) 1,000 mg tablet 1 tablet DAILY (route: oral)    aspirin 81 mg chewable tablet 1 tablet, oral, Daily    cholecalciferol (VITAMIN D-3) 5,000 Units, oral, Daily RT    clopidogrel (PLAVIX) 75 mg, oral, Daily    cyanocobalamin (Vitamin B-12) 500 mcg tablet 1 tablet, oral, Daily    cyanocobalamin (Vitamin B-12) 500 mcg tablet 1 tablet DAILY (route: oral)    enalapril  "(VASOTEC) 2.5 mg, oral, Daily    ferrous sulfate 325 (65 Fe) MG tablet oral    furosemide (Lasix) 40 mg tablet 1 tablet, oral, Daily    glucosamine-chondroitin 250-200 mg tablet oral    glucosamine-D3-Boswellia serr (Osteo Bi-Flex, 5-Loxin,) 1,500-400-100 mg-unit-mg tablet oral    HumaLOG KwikPen Insulin 100 unit/mL injection INJECT 5 UNITS SUBCUTANEOUSLY BEFORE MEALS AND PER SLIDING SCALE. 151-200  2 UNITS  201-250  4 UNITS  251-300  6 UNITS  301-350  8 UNITS  35    insulin glargine (Basaglar KwikPen U-100 Insulin) 100 unit/mL (3 mL) pen 25 unit BEDTIME (route: subcutaneous)    insulin lispro (HumaLOG) 100 unit/mL injection 5 unit(s) injectable 3 times a day (before meals)    Lantus U-100 Insulin 100 unit/mL injection subcutaneous    latanoprost (Xalatan) 0.005 % ophthalmic solution instill 1 drop into each eye every evening    melatonin 3 mg tablet oral    metoprolol tartrate (LOPRESSOR) 12.5 mg, oral, 2 times daily    multivitamin tablet 1 tablet, oral, Daily    nepafenac (Nevanac) 0.1 % ophthalmic suspension INSTILL 1 DROP IN RIGHT EYE TWICE DAILY (PLEASE REORDER 2 BUSINESS DAYS IN ADVANCE)    niacin 500 mg tablet 1 tablet, oral, Daily    omeprazole (PRILOSEC) 20 mg, oral, Daily before breakfast    OneTouch Ultra Test strip Take glucose once a day    pantoprazole (PROTONIX) 40 mg, oral, Daily    tamsulosin (FLOMAX) 0.4 mg    UltiCare Pen Needle 31 gauge x 1/4\" needle USE 1 PEN NEEDLE SUBCUTANEOUSLY 3 TIMES DAILY BEFORE MEALS    vancomycin (Vancocin) 10 gram recon soln     vancomycin/0.9 % sod chloride (vancomycin in 0.9 % sodium chl) 1.5 gram/250 mL solution 1.5 g, intravenous, Every 24 hours       Physical Exam:  GENERAL: alert, cooperative, pleasant, in no acute distress  SKIN: warm, dry, no rash.  NECK: no JVD, no DAMIAN  CARDIAC: Regular rate and rhythm with 2/6 systolic murmur  CHEST: Normal respiratory efforts, lungs clear to auscultation bilaterally.  ABDOMEN: soft, nontender, nondistended  EXTREMITIES: no " edema  NEURO: Alert and oriented x 3.  Grossly normal.  Moves all 4 extremities.       Last Labs:  CBC -  Lab Results   Component Value Date    WBC 9.6 06/19/2023    HGB 8.4 (L) 06/19/2023    HCT 27.3 (L) 06/19/2023    MCV 89 06/19/2023     (H) 06/19/2023       CMP -  Lab Results   Component Value Date    CALCIUM 9.6 11/07/2023    PHOS 2.8 06/16/2023    PROT 5.8 (L) 06/19/2023    ALBUMIN 2.4 (L) 06/19/2023    AST 13 06/19/2023    ALT 27 06/19/2023    ALKPHOS 154 (H) 06/19/2023    BILITOT 0.3 06/19/2023       LIPID PANEL -   Lab Results   Component Value Date    CHOL 117 03/14/2023    TRIG 74 03/14/2023    HDL 42.2 03/14/2023    CHHDL 2.8 03/14/2023    LDLF 60 03/14/2023    VLDL 15 03/14/2023    NHDL 118 04/12/2022       RENAL FUNCTION PANEL -   Lab Results   Component Value Date    GLUCOSE 184 (H) 11/07/2023     11/07/2023    K 4.6 11/07/2023     11/07/2023    CO2 27 11/07/2023    ANIONGAP 13 11/07/2023    BUN 24 (H) 11/07/2023    CREATININE 1.13 11/07/2023    GFRMALE 87 06/19/2023    CALCIUM 9.6 11/07/2023    PHOS 2.8 06/16/2023    ALBUMIN 2.4 (L) 06/19/2023        Lab Results   Component Value Date     (H) 06/07/2023    HGBA1C 7.6 (H) 11/07/2023           Assessment/Plan   89-year-old male with history of insulin-dependent diabetes, CAD with CABG x 3 off pump 3/2/2022 (LIMA-LAD, SVG-OM, SVG-PDA), GERD, mild systolic dysfunction EF 40-45%, moderate L carotid stenosis 50-69%, and right leg PAD/CLI status post right foot I&D and partial fifth metatarsal resection and multiple intervention most recent 1/26/2023 using PTA-DCB to popliteal and PTA to AT     1. CAD-stable without symptoms. continue Plavix. Won't take aspirin any longer. Refuses statin and PCSK9       2. Mild aortic stenosis. - repeat in 2-3 years.     3. PAD with recurrent - toe/wound infections. Says healing better now an following with podiatry.     Follow up 1yr.         Julian Blanton, DO

## 2023-11-20 ENCOUNTER — OFFICE VISIT (OUTPATIENT)
Dept: CARDIOLOGY | Facility: CLINIC | Age: 88
End: 2023-11-20
Payer: MEDICARE

## 2023-11-20 VITALS
SYSTOLIC BLOOD PRESSURE: 112 MMHG | WEIGHT: 161.9 LBS | OXYGEN SATURATION: 96 % | BODY MASS INDEX: 25.17 KG/M2 | HEART RATE: 62 BPM | DIASTOLIC BLOOD PRESSURE: 59 MMHG

## 2023-11-20 DIAGNOSIS — I25.10 CORONARY ARTERY DISEASE INVOLVING NATIVE CORONARY ARTERY OF NATIVE HEART WITHOUT ANGINA PECTORIS: Chronic | ICD-10-CM

## 2023-11-20 DIAGNOSIS — I73.9 PVD (PERIPHERAL VASCULAR DISEASE) (CMS-HCC): ICD-10-CM

## 2023-11-20 DIAGNOSIS — Z95.1 S/P CABG X 3: Primary | ICD-10-CM

## 2023-11-20 DIAGNOSIS — I35.0 NONRHEUMATIC AORTIC VALVE STENOSIS: ICD-10-CM

## 2023-11-20 PROCEDURE — 1126F AMNT PAIN NOTED NONE PRSNT: CPT | Performed by: INTERNAL MEDICINE

## 2023-11-20 PROCEDURE — 99214 OFFICE O/P EST MOD 30 MIN: CPT | Performed by: INTERNAL MEDICINE

## 2023-11-20 PROCEDURE — 3074F SYST BP LT 130 MM HG: CPT | Performed by: INTERNAL MEDICINE

## 2023-11-20 PROCEDURE — 1036F TOBACCO NON-USER: CPT | Performed by: INTERNAL MEDICINE

## 2023-11-20 PROCEDURE — 99214 OFFICE O/P EST MOD 30 MIN: CPT | Mod: PO | Performed by: INTERNAL MEDICINE

## 2023-11-20 PROCEDURE — 1159F MED LIST DOCD IN RCRD: CPT | Performed by: INTERNAL MEDICINE

## 2023-11-20 PROCEDURE — 3078F DIAST BP <80 MM HG: CPT | Performed by: INTERNAL MEDICINE

## 2023-11-21 ENCOUNTER — OFFICE VISIT (OUTPATIENT)
Dept: WOUND CARE | Facility: CLINIC | Age: 88
End: 2023-11-21
Payer: MEDICARE

## 2023-11-21 PROCEDURE — 11042 DBRDMT SUBQ TIS 1ST 20SQCM/<: CPT | Mod: 59

## 2023-11-21 PROCEDURE — 15275 SKIN SUB GRAFT FACE/NK/HF/G: CPT

## 2023-11-28 ENCOUNTER — OFFICE VISIT (OUTPATIENT)
Dept: WOUND CARE | Facility: CLINIC | Age: 88
End: 2023-11-28
Payer: MEDICARE

## 2023-11-28 PROCEDURE — 15275 SKIN SUB GRAFT FACE/NK/HF/G: CPT

## 2023-11-28 PROCEDURE — 11042 DBRDMT SUBQ TIS 1ST 20SQCM/<: CPT

## 2023-12-05 ENCOUNTER — OFFICE VISIT (OUTPATIENT)
Dept: WOUND CARE | Facility: CLINIC | Age: 88
End: 2023-12-05
Payer: MEDICARE

## 2023-12-05 PROCEDURE — 11042 DBRDMT SUBQ TIS 1ST 20SQCM/<: CPT

## 2023-12-05 PROCEDURE — 15275 SKIN SUB GRAFT FACE/NK/HF/G: CPT

## 2023-12-11 ENCOUNTER — APPOINTMENT (OUTPATIENT)
Dept: WOUND CARE | Facility: CLINIC | Age: 88
End: 2023-12-11
Payer: MEDICARE

## 2023-12-12 ENCOUNTER — OFFICE VISIT (OUTPATIENT)
Dept: WOUND CARE | Facility: CLINIC | Age: 88
End: 2023-12-12
Payer: MEDICARE

## 2023-12-12 DIAGNOSIS — I10 HYPERTENSION, ESSENTIAL: ICD-10-CM

## 2023-12-12 DIAGNOSIS — K21.9 GERD WITHOUT ESOPHAGITIS: ICD-10-CM

## 2023-12-12 DIAGNOSIS — Z79.4 TYPE 2 DIABETES MELLITUS WITH FOOT ULCER, WITH LONG-TERM CURRENT USE OF INSULIN (MULTI): Primary | ICD-10-CM

## 2023-12-12 DIAGNOSIS — L97.509 TYPE 2 DIABETES MELLITUS WITH FOOT ULCER, WITH LONG-TERM CURRENT USE OF INSULIN (MULTI): Primary | ICD-10-CM

## 2023-12-12 DIAGNOSIS — E11.3299 TYPE 2 DIABETES MELLITUS WITH MILD NONPROLIFERATIVE RETINOPATHY WITHOUT MACULAR EDEMA, WITH LONG-TERM CURRENT USE OF INSULIN, UNSPECIFIED LATERALITY (MULTI): ICD-10-CM

## 2023-12-12 DIAGNOSIS — E11.621 TYPE 2 DIABETES MELLITUS WITH FOOT ULCER, WITH LONG-TERM CURRENT USE OF INSULIN (MULTI): Primary | ICD-10-CM

## 2023-12-12 DIAGNOSIS — Z79.4 TYPE 2 DIABETES MELLITUS WITH MILD NONPROLIFERATIVE RETINOPATHY WITHOUT MACULAR EDEMA, WITH LONG-TERM CURRENT USE OF INSULIN, UNSPECIFIED LATERALITY (MULTI): ICD-10-CM

## 2023-12-12 PROCEDURE — 15275 SKIN SUB GRAFT FACE/NK/HF/G: CPT

## 2023-12-12 PROCEDURE — 11042 DBRDMT SUBQ TIS 1ST 20SQCM/<: CPT | Mod: 59

## 2023-12-14 RX ORDER — INSULIN GLARGINE 100 [IU]/ML
INJECTION, SOLUTION SUBCUTANEOUS
Qty: 20 ML | Refills: 0 | Status: SHIPPED | OUTPATIENT
Start: 2023-12-14 | End: 2024-04-10 | Stop reason: SDUPTHER

## 2023-12-14 RX ORDER — ENALAPRIL MALEATE 2.5 MG/1
2.5 TABLET ORAL DAILY
Qty: 90 TABLET | Refills: 0 | Status: SHIPPED | OUTPATIENT
Start: 2023-12-14 | End: 2024-03-04 | Stop reason: SDUPTHER

## 2023-12-14 RX ORDER — OMEPRAZOLE 20 MG/1
20 CAPSULE, DELAYED RELEASE ORAL DAILY
Qty: 90 CAPSULE | Refills: 0 | Status: SHIPPED | OUTPATIENT
Start: 2023-12-14 | End: 2024-03-04 | Stop reason: SDUPTHER

## 2023-12-18 ENCOUNTER — APPOINTMENT (OUTPATIENT)
Dept: WOUND CARE | Facility: CLINIC | Age: 88
End: 2023-12-18
Payer: MEDICARE

## 2023-12-19 ENCOUNTER — OFFICE VISIT (OUTPATIENT)
Dept: WOUND CARE | Facility: CLINIC | Age: 88
End: 2023-12-19
Payer: MEDICARE

## 2023-12-19 PROCEDURE — 11042 DBRDMT SUBQ TIS 1ST 20SQCM/<: CPT | Mod: 59

## 2023-12-19 PROCEDURE — 15275 SKIN SUB GRAFT FACE/NK/HF/G: CPT

## 2023-12-26 ENCOUNTER — APPOINTMENT (OUTPATIENT)
Dept: WOUND CARE | Facility: CLINIC | Age: 88
End: 2023-12-26
Payer: MEDICARE

## 2024-01-02 ENCOUNTER — OFFICE VISIT (OUTPATIENT)
Dept: WOUND CARE | Facility: CLINIC | Age: 89
End: 2024-01-02
Payer: MEDICARE

## 2024-01-02 PROCEDURE — 11042 DBRDMT SUBQ TIS 1ST 20SQCM/<: CPT

## 2024-01-09 ENCOUNTER — OFFICE VISIT (OUTPATIENT)
Dept: WOUND CARE | Facility: CLINIC | Age: 89
End: 2024-01-09
Payer: MEDICARE

## 2024-01-09 PROCEDURE — 11042 DBRDMT SUBQ TIS 1ST 20SQCM/<: CPT

## 2024-01-16 ENCOUNTER — OFFICE VISIT (OUTPATIENT)
Dept: WOUND CARE | Facility: CLINIC | Age: 89
End: 2024-01-16
Payer: MEDICARE

## 2024-01-16 PROCEDURE — 11042 DBRDMT SUBQ TIS 1ST 20SQCM/<: CPT

## 2024-01-23 ENCOUNTER — OFFICE VISIT (OUTPATIENT)
Dept: WOUND CARE | Facility: CLINIC | Age: 89
End: 2024-01-23
Payer: MEDICARE

## 2024-01-23 PROCEDURE — 11042 DBRDMT SUBQ TIS 1ST 20SQCM/<: CPT

## 2024-01-30 ENCOUNTER — OFFICE VISIT (OUTPATIENT)
Dept: WOUND CARE | Facility: CLINIC | Age: 89
End: 2024-01-30
Payer: MEDICARE

## 2024-01-30 PROCEDURE — 15275 SKIN SUB GRAFT FACE/NK/HF/G: CPT

## 2024-01-30 PROCEDURE — 11042 DBRDMT SUBQ TIS 1ST 20SQCM/<: CPT | Mod: 59

## 2024-02-06 ENCOUNTER — OFFICE VISIT (OUTPATIENT)
Dept: WOUND CARE | Facility: CLINIC | Age: 89
End: 2024-02-06
Payer: MEDICARE

## 2024-02-06 PROCEDURE — 11042 DBRDMT SUBQ TIS 1ST 20SQCM/<: CPT

## 2024-02-13 ENCOUNTER — OFFICE VISIT (OUTPATIENT)
Dept: WOUND CARE | Facility: CLINIC | Age: 89
End: 2024-02-13
Payer: MEDICARE

## 2024-02-13 PROCEDURE — 15275 SKIN SUB GRAFT FACE/NK/HF/G: CPT

## 2024-02-13 PROCEDURE — 11042 DBRDMT SUBQ TIS 1ST 20SQCM/<: CPT

## 2024-02-20 ENCOUNTER — OFFICE VISIT (OUTPATIENT)
Dept: WOUND CARE | Facility: CLINIC | Age: 89
End: 2024-02-20
Payer: MEDICARE

## 2024-02-20 PROCEDURE — 11042 DBRDMT SUBQ TIS 1ST 20SQCM/<: CPT

## 2024-02-27 ENCOUNTER — OFFICE VISIT (OUTPATIENT)
Dept: WOUND CARE | Facility: CLINIC | Age: 89
End: 2024-02-27
Payer: MEDICARE

## 2024-02-27 PROCEDURE — 11042 DBRDMT SUBQ TIS 1ST 20SQCM/<: CPT

## 2024-02-27 PROCEDURE — 15275 SKIN SUB GRAFT FACE/NK/HF/G: CPT

## 2024-03-04 DIAGNOSIS — E11.59 CONTROLLED TYPE 2 DIABETES MELLITUS WITH OTHER CIRCULATORY COMPLICATION, WITH LONG-TERM CURRENT USE OF INSULIN (MULTI): Primary | ICD-10-CM

## 2024-03-04 DIAGNOSIS — K21.9 GERD WITHOUT ESOPHAGITIS: ICD-10-CM

## 2024-03-04 DIAGNOSIS — Z79.4 CONTROLLED TYPE 2 DIABETES MELLITUS WITH OTHER CIRCULATORY COMPLICATION, WITH LONG-TERM CURRENT USE OF INSULIN (MULTI): Primary | ICD-10-CM

## 2024-03-04 DIAGNOSIS — I10 HYPERTENSION, ESSENTIAL: ICD-10-CM

## 2024-03-04 PROBLEM — Z20.822 SUSPECTED SEVERE ACUTE RESPIRATORY SYNDROME CORONAVIRUS 2 (SARS-COV-2) INFECTION: Status: ACTIVE | Noted: 2024-03-04

## 2024-03-04 PROBLEM — Z86.39 HISTORY OF DIABETES MELLITUS: Status: ACTIVE | Noted: 2024-03-04

## 2024-03-04 PROBLEM — M75.40 IMPINGEMENT SYNDROME OF SHOULDER REGION: Status: ACTIVE | Noted: 2024-03-04

## 2024-03-04 PROBLEM — J01.90 ACUTE SINUSITIS: Status: ACTIVE | Noted: 2024-03-04

## 2024-03-04 PROBLEM — I77.1 STRICTURE OF ARTERY (CMS-HCC): Status: ACTIVE | Noted: 2022-07-08

## 2024-03-04 PROBLEM — M62.81 MUSCLE WEAKNESS: Status: ACTIVE | Noted: 2023-05-01

## 2024-03-04 PROBLEM — I21.4 ACUTE NON Q WAVE MYOCARDIAL INFARCTION (MULTI): Status: ACTIVE | Noted: 2024-03-04

## 2024-03-04 PROBLEM — H11.30 SUBCONJUNCTIVAL HEMORRHAGE: Status: ACTIVE | Noted: 2024-03-04

## 2024-03-04 NOTE — TELEPHONE ENCOUNTER
Pt states he needs a refill on enalapril, omeprazole, ulticare pen needles and test strips. Thank you

## 2024-03-05 ENCOUNTER — OFFICE VISIT (OUTPATIENT)
Dept: WOUND CARE | Facility: CLINIC | Age: 89
End: 2024-03-05
Payer: MEDICARE

## 2024-03-05 PROCEDURE — 11042 DBRDMT SUBQ TIS 1ST 20SQCM/<: CPT

## 2024-03-06 RX ORDER — BLOOD SUGAR DIAGNOSTIC
STRIP MISCELLANEOUS
Qty: 400 EACH | Refills: 3 | Status: ON HOLD | OUTPATIENT
Start: 2024-03-06 | End: 2024-03-25 | Stop reason: SDUPTHER

## 2024-03-06 RX ORDER — ENALAPRIL MALEATE 2.5 MG/1
2.5 TABLET ORAL DAILY
Qty: 90 TABLET | Refills: 1 | Status: ON HOLD | OUTPATIENT
Start: 2024-03-06

## 2024-03-06 RX ORDER — OMEPRAZOLE 20 MG/1
20 CAPSULE, DELAYED RELEASE ORAL DAILY
Qty: 90 CAPSULE | Refills: 1 | Status: ON HOLD | OUTPATIENT
Start: 2024-03-06

## 2024-03-06 RX ORDER — PEN NEEDLE, DIABETIC 29 G X1/2"
NEEDLE, DISPOSABLE MISCELLANEOUS
Qty: 100 EACH | Refills: 1 | Status: SHIPPED | OUTPATIENT
Start: 2024-03-06 | End: 2024-04-10 | Stop reason: SDUPTHER

## 2024-03-06 NOTE — TELEPHONE ENCOUNTER
Pt states he hasn't ordered them in ages bc his wife had a bunch left over- they are the one touch test strips. Thank you

## 2024-03-12 ENCOUNTER — OFFICE VISIT (OUTPATIENT)
Dept: WOUND CARE | Facility: CLINIC | Age: 89
End: 2024-03-12
Payer: MEDICARE

## 2024-03-12 PROCEDURE — 11042 DBRDMT SUBQ TIS 1ST 20SQCM/<: CPT

## 2024-03-19 ENCOUNTER — APPOINTMENT (OUTPATIENT)
Dept: RADIOLOGY | Facility: HOSPITAL | Age: 89
DRG: 564 | End: 2024-03-19
Payer: MEDICARE

## 2024-03-19 ENCOUNTER — CLINICAL SUPPORT (OUTPATIENT)
Dept: WOUND CARE | Facility: CLINIC | Age: 89
End: 2024-03-19
Payer: MEDICARE

## 2024-03-19 ENCOUNTER — HOSPITAL ENCOUNTER (INPATIENT)
Facility: HOSPITAL | Age: 89
LOS: 6 days | Discharge: NURSING, REHAB OR LONG-TERM CARE | DRG: 564 | End: 2024-03-25
Attending: INTERNAL MEDICINE | Admitting: FAMILY MEDICINE
Payer: MEDICARE

## 2024-03-19 ENCOUNTER — APPOINTMENT (OUTPATIENT)
Dept: CARDIOLOGY | Facility: HOSPITAL | Age: 89
DRG: 564 | End: 2024-03-19
Payer: MEDICARE

## 2024-03-19 ENCOUNTER — APPOINTMENT (OUTPATIENT)
Dept: LAB | Facility: LAB | Age: 89
End: 2024-03-19
Payer: MEDICARE

## 2024-03-19 DIAGNOSIS — L97.513 ULCER OF TOE OF RIGHT FOOT, WITH NECROSIS OF MUSCLE (MULTI): ICD-10-CM

## 2024-03-19 DIAGNOSIS — B95.61 BACTEREMIA DUE TO STAPHYLOCOCCUS AUREUS: ICD-10-CM

## 2024-03-19 DIAGNOSIS — M00.9: ICD-10-CM

## 2024-03-19 DIAGNOSIS — M86.9 OSTEOMYELITIS OF RIGHT FOOT, UNSPECIFIED TYPE (MULTI): ICD-10-CM

## 2024-03-19 DIAGNOSIS — L97.513 ULCER OF TOE OF RIGHT FOOT, WITH NECROSIS OF MUSCLE (MULTI): Primary | ICD-10-CM

## 2024-03-19 DIAGNOSIS — L97.512 ULCER OF TOE OF RIGHT FOOT, WITH FAT LAYER EXPOSED (MULTI): ICD-10-CM

## 2024-03-19 DIAGNOSIS — K59.00 CONSTIPATION, UNSPECIFIED CONSTIPATION TYPE: ICD-10-CM

## 2024-03-19 DIAGNOSIS — R78.81 BACTEREMIA DUE TO STAPHYLOCOCCUS AUREUS: ICD-10-CM

## 2024-03-19 DIAGNOSIS — E11.621 DIABETIC ULCER OF RIGHT MIDFOOT ASSOCIATED WITH TYPE 2 DIABETES MELLITUS, WITH FAT LAYER EXPOSED (MULTI): Primary | ICD-10-CM

## 2024-03-19 DIAGNOSIS — M25.512 ACUTE PAIN OF BOTH SHOULDERS: ICD-10-CM

## 2024-03-19 DIAGNOSIS — E44.0 MODERATE PROTEIN-CALORIE MALNUTRITION (MULTI): ICD-10-CM

## 2024-03-19 DIAGNOSIS — Z01.89 ENCOUNTER FOR OTHER SPECIFIED SPECIAL EXAMINATIONS: ICD-10-CM

## 2024-03-19 DIAGNOSIS — L03.90 ACUTE CELLULITIS: Primary | ICD-10-CM

## 2024-03-19 DIAGNOSIS — L97.518 NON-PRESSURE CHRONIC ULCER OF OTHER PART OF RIGHT FOOT WITH OTHER SPECIFIED SEVERITY (MULTI): ICD-10-CM

## 2024-03-19 DIAGNOSIS — M86.271 SUBACUTE OSTEOMYELITIS OF RIGHT FOOT (MULTI): ICD-10-CM

## 2024-03-19 DIAGNOSIS — M25.511 ACUTE PAIN OF BOTH SHOULDERS: ICD-10-CM

## 2024-03-19 DIAGNOSIS — M86.9 OSTEOMYELITIS OF TOE OF RIGHT FOOT (MULTI): Primary | ICD-10-CM

## 2024-03-19 DIAGNOSIS — L97.412 DIABETIC ULCER OF RIGHT MIDFOOT ASSOCIATED WITH TYPE 2 DIABETES MELLITUS, WITH FAT LAYER EXPOSED (MULTI): Primary | ICD-10-CM

## 2024-03-19 LAB
ALBUMIN SERPL BCP-MCNC: 3.1 G/DL (ref 3.4–5)
ALP SERPL-CCNC: 278 U/L (ref 33–136)
ALT SERPL W P-5'-P-CCNC: 26 U/L (ref 10–52)
AMORPH CRY #/AREA UR COMP ASSIST: ABNORMAL /HPF
ANION GAP SERPL CALC-SCNC: 14 MMOL/L (ref 10–20)
APPEARANCE UR: ABNORMAL
AST SERPL W P-5'-P-CCNC: 27 U/L (ref 9–39)
BASOPHILS # BLD AUTO: 0.03 X10*3/UL (ref 0–0.1)
BASOPHILS NFR BLD AUTO: 0.4 %
BILIRUB SERPL-MCNC: 0.7 MG/DL (ref 0–1.2)
BILIRUB UR STRIP.AUTO-MCNC: NEGATIVE MG/DL
BUN SERPL-MCNC: 24 MG/DL (ref 6–23)
CALCIUM SERPL-MCNC: 8.6 MG/DL (ref 8.6–10.3)
CHLORIDE SERPL-SCNC: 101 MMOL/L (ref 98–107)
CO2 SERPL-SCNC: 20 MMOL/L (ref 21–32)
COLOR UR: YELLOW
CREAT SERPL-MCNC: 1 MG/DL (ref 0.5–1.3)
CRP SERPL-MCNC: 8.72 MG/DL
EGFRCR SERPLBLD CKD-EPI 2021: 72 ML/MIN/1.73M*2
EOSINOPHIL # BLD AUTO: 0.01 X10*3/UL (ref 0–0.4)
EOSINOPHIL NFR BLD AUTO: 0.1 %
ERYTHROCYTE [DISTWIDTH] IN BLOOD BY AUTOMATED COUNT: 13.4 % (ref 11.5–14.5)
ERYTHROCYTE [SEDIMENTATION RATE] IN BLOOD BY WESTERGREN METHOD: 47 MM/H (ref 0–20)
FLUAV RNA RESP QL NAA+PROBE: NOT DETECTED
FLUBV RNA RESP QL NAA+PROBE: NOT DETECTED
GLUCOSE SERPL-MCNC: 359 MG/DL (ref 74–99)
GLUCOSE UR STRIP.AUTO-MCNC: ABNORMAL MG/DL
HCT VFR BLD AUTO: 37.3 % (ref 41–52)
HGB BLD-MCNC: 12.2 G/DL (ref 13.5–17.5)
IMM GRANULOCYTES # BLD AUTO: 0.02 X10*3/UL (ref 0–0.5)
IMM GRANULOCYTES NFR BLD AUTO: 0.2 % (ref 0–0.9)
KETONES UR STRIP.AUTO-MCNC: ABNORMAL MG/DL
LEUKOCYTE ESTERASE UR QL STRIP.AUTO: ABNORMAL
LYMPHOCYTES # BLD AUTO: 0.96 X10*3/UL (ref 0.8–3)
LYMPHOCYTES NFR BLD AUTO: 11.4 %
MAGNESIUM SERPL-MCNC: 1.9 MG/DL (ref 1.6–2.4)
MCH RBC QN AUTO: 32 PG (ref 26–34)
MCHC RBC AUTO-ENTMCNC: 32.7 G/DL (ref 32–36)
MCV RBC AUTO: 98 FL (ref 80–100)
MONOCYTES # BLD AUTO: 1.01 X10*3/UL (ref 0.05–0.8)
MONOCYTES NFR BLD AUTO: 12 %
MUCOUS THREADS #/AREA URNS AUTO: ABNORMAL /LPF
NEUTROPHILS # BLD AUTO: 6.38 X10*3/UL (ref 1.6–5.5)
NEUTROPHILS NFR BLD AUTO: 75.9 %
NITRITE UR QL STRIP.AUTO: NEGATIVE
NRBC BLD-RTO: 0 /100 WBCS (ref 0–0)
PH UR STRIP.AUTO: 5 [PH]
PLATELET # BLD AUTO: 195 X10*3/UL (ref 150–450)
POTASSIUM SERPL-SCNC: 4.5 MMOL/L (ref 3.5–5.3)
PROT SERPL-MCNC: 6.6 G/DL (ref 6.4–8.2)
PROT UR STRIP.AUTO-MCNC: ABNORMAL MG/DL
RBC # BLD AUTO: 3.81 X10*6/UL (ref 4.5–5.9)
RBC # UR STRIP.AUTO: NEGATIVE /UL
RBC #/AREA URNS AUTO: ABNORMAL /HPF
RSV RNA RESP QL NAA+PROBE: NOT DETECTED
SARS-COV-2 RNA RESP QL NAA+PROBE: NOT DETECTED
SODIUM SERPL-SCNC: 130 MMOL/L (ref 136–145)
SP GR UR STRIP.AUTO: 1.02
TSH SERPL-ACNC: 1.67 MIU/L (ref 0.44–3.98)
UROBILINOGEN UR STRIP.AUTO-MCNC: <2 MG/DL
WBC # BLD AUTO: 8.4 X10*3/UL (ref 4.4–11.3)
WBC #/AREA URNS AUTO: ABNORMAL /HPF
YEAST BUDDING #/AREA UR COMP ASSIST: PRESENT /HPF

## 2024-03-19 PROCEDURE — 87205 SMEAR GRAM STAIN: CPT

## 2024-03-19 PROCEDURE — 73630 X-RAY EXAM OF FOOT: CPT | Mod: RIGHT SIDE | Performed by: RADIOLOGY

## 2024-03-19 PROCEDURE — 87040 BLOOD CULTURE FOR BACTERIA: CPT | Mod: AHULAB | Performed by: INTERNAL MEDICINE

## 2024-03-19 PROCEDURE — 2500000004 HC RX 250 GENERAL PHARMACY W/ HCPCS (ALT 636 FOR OP/ED): Performed by: INTERNAL MEDICINE

## 2024-03-19 PROCEDURE — 87075 CULTR BACTERIA EXCEPT BLOOD: CPT

## 2024-03-19 PROCEDURE — 81001 URINALYSIS AUTO W/SCOPE: CPT | Performed by: INTERNAL MEDICINE

## 2024-03-19 PROCEDURE — 87186 SC STD MICRODIL/AGAR DIL: CPT

## 2024-03-19 PROCEDURE — 85652 RBC SED RATE AUTOMATED: CPT | Performed by: INTERNAL MEDICINE

## 2024-03-19 PROCEDURE — 96365 THER/PROPH/DIAG IV INF INIT: CPT

## 2024-03-19 PROCEDURE — 71045 X-RAY EXAM CHEST 1 VIEW: CPT | Mod: FOREIGN READ | Performed by: RADIOLOGY

## 2024-03-19 PROCEDURE — 1210000001 HC SEMI-PRIVATE ROOM DAILY

## 2024-03-19 PROCEDURE — 73030 X-RAY EXAM OF SHOULDER: CPT | Mod: LEFT SIDE | Performed by: RADIOLOGY

## 2024-03-19 PROCEDURE — 84443 ASSAY THYROID STIM HORMONE: CPT | Performed by: INTERNAL MEDICINE

## 2024-03-19 PROCEDURE — 93005 ELECTROCARDIOGRAM TRACING: CPT

## 2024-03-19 PROCEDURE — 71045 X-RAY EXAM CHEST 1 VIEW: CPT

## 2024-03-19 PROCEDURE — 73030 X-RAY EXAM OF SHOULDER: CPT | Mod: LT

## 2024-03-19 PROCEDURE — 96367 TX/PROPH/DG ADDL SEQ IV INF: CPT

## 2024-03-19 PROCEDURE — 72125 CT NECK SPINE W/O DYE: CPT

## 2024-03-19 PROCEDURE — 70450 CT HEAD/BRAIN W/O DYE: CPT | Performed by: RADIOLOGY

## 2024-03-19 PROCEDURE — 36415 COLL VENOUS BLD VENIPUNCTURE: CPT | Performed by: INTERNAL MEDICINE

## 2024-03-19 PROCEDURE — 86140 C-REACTIVE PROTEIN: CPT | Performed by: INTERNAL MEDICINE

## 2024-03-19 PROCEDURE — 85025 COMPLETE CBC W/AUTO DIFF WBC: CPT | Performed by: INTERNAL MEDICINE

## 2024-03-19 PROCEDURE — 73700 CT LOWER EXTREMITY W/O DYE: CPT | Mod: RIGHT SIDE | Performed by: RADIOLOGY

## 2024-03-19 PROCEDURE — 72125 CT NECK SPINE W/O DYE: CPT | Performed by: RADIOLOGY

## 2024-03-19 PROCEDURE — 83735 ASSAY OF MAGNESIUM: CPT | Performed by: INTERNAL MEDICINE

## 2024-03-19 PROCEDURE — 73630 X-RAY EXAM OF FOOT: CPT | Mod: RT

## 2024-03-19 PROCEDURE — 87070 CULTURE OTHR SPECIMN AEROBIC: CPT

## 2024-03-19 PROCEDURE — 87637 SARSCOV2&INF A&B&RSV AMP PRB: CPT | Performed by: INTERNAL MEDICINE

## 2024-03-19 PROCEDURE — 99285 EMERGENCY DEPT VISIT HI MDM: CPT | Mod: 25

## 2024-03-19 PROCEDURE — 70450 CT HEAD/BRAIN W/O DYE: CPT

## 2024-03-19 PROCEDURE — 87086 URINE CULTURE/COLONY COUNT: CPT | Mod: AHULAB | Performed by: INTERNAL MEDICINE

## 2024-03-19 PROCEDURE — 80053 COMPREHEN METABOLIC PANEL: CPT | Performed by: INTERNAL MEDICINE

## 2024-03-19 PROCEDURE — 73700 CT LOWER EXTREMITY W/O DYE: CPT | Mod: RT

## 2024-03-19 PROCEDURE — 83036 HEMOGLOBIN GLYCOSYLATED A1C: CPT | Mod: AHULAB | Performed by: NURSE PRACTITIONER

## 2024-03-19 RX ORDER — DOXYCYCLINE 100 MG/1
100 CAPSULE ORAL 2 TIMES DAILY
Qty: 14 CAPSULE | Refills: 0 | Status: SHIPPED | OUTPATIENT
Start: 2024-03-19 | End: 2024-03-25 | Stop reason: HOSPADM

## 2024-03-19 RX ORDER — DOXYCYCLINE 100 MG/1
100 CAPSULE ORAL EVERY 12 HOURS SCHEDULED
Status: DISCONTINUED | OUTPATIENT
Start: 2024-03-19 | End: 2024-03-21 | Stop reason: SDUPTHER

## 2024-03-19 RX ORDER — VANCOMYCIN HYDROCHLORIDE 1 G/200ML
1000 INJECTION, SOLUTION INTRAVENOUS ONCE
Status: COMPLETED | OUTPATIENT
Start: 2024-03-19 | End: 2024-03-19

## 2024-03-19 RX ADMIN — PIPERACILLIN SODIUM AND TAZOBACTAM SODIUM 3.38 G: 3; .375 INJECTION, SOLUTION INTRAVENOUS at 19:07

## 2024-03-19 RX ADMIN — VANCOMYCIN HYDROCHLORIDE 1000 MG: 1 INJECTION, SOLUTION INTRAVENOUS at 20:32

## 2024-03-19 ASSESSMENT — COLUMBIA-SUICIDE SEVERITY RATING SCALE - C-SSRS
1. IN THE PAST MONTH, HAVE YOU WISHED YOU WERE DEAD OR WISHED YOU COULD GO TO SLEEP AND NOT WAKE UP?: NO
2. HAVE YOU ACTUALLY HAD ANY THOUGHTS OF KILLING YOURSELF?: NO
6. HAVE YOU EVER DONE ANYTHING, STARTED TO DO ANYTHING, OR PREPARED TO DO ANYTHING TO END YOUR LIFE?: NO

## 2024-03-19 NOTE — ED PROVIDER NOTES
HPI     CC: Fall (Fell off toilet, has mutliple sites with pain, shoulder arm, knees)     HPI: Rene Ballard is a 89 y.o. male with a history of DM, CAD, GERD, HFrEF, carotid stenosis, PAD/CLI s/p R partial fifth TMA and revascularization procedures, presents with falls.  Patient states that he has bad knees and shoulders, for the past 3 to 4 days he is felt very unsteady on his feet due to this.  His left shoulder has been stiff with difficulty moving it.  He has also been just generally not feeling well, very tired, and not eating well.  The other day he laid down on the floor and could not get up.  Yesterday he had a fall and then today he fell between the toilet and the wall when he was trying to get off the toilet.  He attributes these falls to losing his balance.  He did hit his head and has a superficial laceration to the right eyebrow.  He was unable to pull himself to stand.  He lives with his wife.  He does have a visiting nurse who addresses his right foot wound.  Wife is concerned about his recurrent falls and generally not feeling well.  Of note, he went to his podiatrist today who saw his right foot and was concerned that the incision site of his TMA was infected.  They were planning to do skin graft but instead sent him home on antibiotics which he has yet to .  Patient does state that it chronically drains, had not noted it to be infected.  Denies fevers or chills, chest pain, shortness of breath or other symptoms.    ROS: 10-point review of systems was performed and is otherwise negative except as noted in HPI.    Limitations to history: N/A    Independent Historians: Wife at bedside     External Records Reviewed: Outpatient notes in EMR    Past Medical History: Noncontributory except per HPI     Past Surgical History: Noncontributory except per HPI     Family History: Reviewed and noncontributory     Social History:  Denies tobacco. Denies ETOH. Denies illicit drugs.    Social  Determinants Affecting Care: N/A    Allergies   Allergen Reactions    Glimepiride Unknown     Unknown    Lisinopril Unknown     Side effects, hot tingling in hands and feets, hand pain.    Metformin Unknown     Myalgia    Pioglitazone Unknown     Myalgia    Statins-Hmg-Coa Reductase Inhibitors Unknown       Home Meds:   Current Outpatient Medications   Medication Instructions    acetaminophen (Tylenol) 325 mg tablet oral, Every 4 hours PRN    alpha tocopherol (VITAMIN E) 400 Units, oral, Daily RT    alum-mag hydroxide-simeth (Mylanta) 200-200-20 mg/5 mL oral suspension 30 mL, oral, Every 4 hours PRN    ascorbic acid (Vitamin C) 1,000 mg tablet 1 tablet, oral, Daily    blood sugar diagnostic (OneTouch Ultra Test) strip Check glucose 4 x per day    cholecalciferol (VITAMIN D-3) 5,000 Units, oral, Daily RT    clopidogrel (PLAVIX) 75 mg, oral, Daily    cyanocobalamin (Vitamin B-12) 500 mcg tablet 1 tablet DAILY (route: oral)    doxycycline (VIBRAMYCIN) 100 mg, oral, 2 times daily, Take with at least 8 ounces (large glass) of water, do not lie down for 30 minutes after    enalapril (VASOTEC) 2.5 mg, oral, Daily    ferrous sulfate 325 (65 Fe) MG tablet oral    glucosamine-D3-Boswellia serr (Osteo Bi-Flex, 5-Loxin,) 1,500-400-100 mg-unit-mg tablet oral    HumaLOG KwikPen Insulin 100 unit/mL injection INJECT 5 UNITS SUBCUTANEOUSLY BEFORE MEALS AND PER SLIDING SCALE. 151-200  2 UNITS  201-250  4 UNITS  251-300  6 UNITS  301-350  8 UNITS  35    insulin glargine (Basaglar KwikPen U-100 Insulin) 100 unit/mL (3 mL) pen 25 unit BEDTIME (route: subcutaneous)    Lantus U-100 Insulin 100 unit/mL injection INJECT 25 UNITS SUBCUTANEOUSLY EVERY NIGHT.  VIAL GOOD FOR 28 DAYS ONLY ONCE IN USE.  DISCARD THE REMAINING MEDICATION.    latanoprost (Xalatan) 0.005 % ophthalmic solution instill 1 drop into each eye every evening    melatonin 3 mg tablet oral    metoprolol tartrate (LOPRESSOR) 12.5 mg, oral, 2 times daily    multivitamin tablet  "1 tablet, oral, Daily    nepafenac (Nevanac) 0.1 % ophthalmic suspension INSTILL 1 DROP IN RIGHT EYE TWICE DAILY (PLEASE REORDER 2 BUSINESS DAYS IN ADVANCE)    niacin 500 mg tablet 1 tablet, oral, Daily    omeprazole (PRILOSEC) 20 mg, oral, Daily    pen needle, diabetic (UltiCare Pen Needle) 31 gauge x 1/4\" needle USE 1 PEN NEEDLE SUBCUTANEOUSLY 3 TIMES DAILY BEFORE MEALS    tamsulosin (FLOMAX) 0.4 mg        Physical Exam     ED Triage Vitals [03/19/24 1701]   Temperature Heart Rate Respirations BP   37 °C (98.6 °F) 72 16 147/76      Pulse Ox Temp src Heart Rate Source Patient Position   98 % -- -- --      BP Location FiO2 (%)     Left arm --         Heart Rate:  [72]   Temperature:  [37 °C (98.6 °F)]   Respirations:  [16]   BP: (147)/(76)   Height:  [177.8 cm (5' 10\")]   Weight:  [72.6 kg (160 lb)]   Pulse Ox:  [98 %]      Physical Exam  Vitals and nursing note reviewed.     CONSTITUTIONAL: Well appearing, well nourished, in no acute distress.   HENMT: Head with 1 cm superficial laceration to the right eyebrow, hemostatic. No facial or scalp TTP. Airway patent. Nasal mucosa clear. Mouth with normal mucosa, clear oropharynx. Uvula midline. TMs clear bilaterally with no hemotympanum. Neck supple.    EYES: Clear bilaterally. No periorbital TTP.  Pupils equally round and reactive to light, extraocular movements grossly intact.    CARDIOVASCULAR: Normal rate, regular rhythm.  Heart sounds S1, S2.  No murmurs, rubs or gallops. Normal pulses. Capillary refill <2 sec.   RESPIRATORY: No increased work of breathing. Breath sounds clear and equal bilaterally.  GASTROINTESTINAL: Abdomen soft, non-distended, non-tender. No rebound, no guarding. Bowel sounds normal in all 4 quadrants. No palpable masses.   GENITOURINARY:  No CVA tenderness.  MUSCULOSKELETAL: R fifth partial TME wound dehiscence with yellowish red drainage, mild surrounding erythema and warmth. Dopplerable PT/DP pulse. TTP and stiffness L shoulder, no effusion, " no warmth. No midline C/T/L TTP or stepoffs. No other muscle or joint deformity or TTP. No edema.  NEUROLOGICAL: GCS 15. Alert and oriented, no asymmetry, moving all extremities equally.  SKIN: Warm, dry and intact. No rash. No cardona sign, raccoon eyes or other notable skin lesions except as noted above.   PSYCHIATRIC: Normal mood and affect.  HEME/LYMPH: No adenopathy or splenomegaly.    Diagnostic Results      ECG: ECG read and interpreted by me.  Sinus rhythm with first-degree AV block, rate 65.  Bifascicular block.  No significant ST or T wave derangements.  Unchanged from prior.    Labs Reviewed   CBC WITH AUTO DIFFERENTIAL - Abnormal       Result Value    WBC 8.4      nRBC 0.0      RBC 3.81 (*)     Hemoglobin 12.2 (*)     Hematocrit 37.3 (*)     MCV 98      MCH 32.0      MCHC 32.7      RDW 13.4      Platelets 195      Neutrophils % 75.9      Immature Granulocytes %, Automated 0.2      Lymphocytes % 11.4      Monocytes % 12.0      Eosinophils % 0.1      Basophils % 0.4      Neutrophils Absolute 6.38 (*)     Immature Granulocytes Absolute, Automated 0.02      Lymphocytes Absolute 0.96      Monocytes Absolute 1.01 (*)     Eosinophils Absolute 0.01      Basophils Absolute 0.03     COMPREHENSIVE METABOLIC PANEL - Abnormal    Glucose 359 (*)     Sodium 130 (*)     Potassium 4.5      Chloride 101      Bicarbonate 20 (*)     Anion Gap 14      Urea Nitrogen 24 (*)     Creatinine 1.00      eGFR 72      Calcium 8.6      Albumin 3.1 (*)     Alkaline Phosphatase 278 (*)     Total Protein 6.6      AST 27      Bilirubin, Total 0.7      ALT 26     SEDIMENTATION RATE, AUTOMATED - Abnormal    Sedimentation Rate 47 (*)    C-REACTIVE PROTEIN - Abnormal    C-Reactive Protein 8.72 (*)    URINALYSIS WITH REFLEX CULTURE AND MICROSCOPIC - Abnormal    Color, Urine Yellow      Appearance, Urine Hazy (*)     Specific Gravity, Urine 1.021      pH, Urine 5.0      Protein, Urine 30 (1+) (*)     Glucose, Urine >=500 (3+) (*)     Blood,  Urine NEGATIVE      Ketones, Urine 5 (TRACE) (*)     Bilirubin, Urine NEGATIVE      Urobilinogen, Urine <2.0      Nitrite, Urine NEGATIVE      Leukocyte Esterase, Urine TRACE (*)    MICROSCOPIC ONLY, URINE - Abnormal    WBC, Urine 11-20 (*)     RBC, Urine NONE      Budding Yeast, Urine PRESENT (*)     Mucus, Urine 1+      Amorphous Crystals, Urine 1+     SARS-COV-2 AND INFLUENZA A/B PCR - Normal    Flu A Result Not Detected      Flu B Result Not Detected      Coronavirus 2019, PCR Not Detected      Narrative:     This assay has received FDA Emergency Use Authorization (EUA) and  is only authorized for the duration of time that circumstances exist to justify the authorization of the emergency use of in vitro diagnostic tests for the detection of SARS-CoV-2 virus and/or diagnosis of COVID-19 infection under section 564(b)(1) of the Act, 21 U.S.C. 360bbb-3(b)(1). Testing for SARS-CoV-2 is only recommended for patients who meet current clinical and/or epidemiological criteria as defined by federal, state, or local public health directives. This assay is an in vitro diagnostic nucleic acid amplification test for the qualitative detection of SARS-CoV-2, Influenza A, and Influenza B from nasopharyngeal specimens and has been validated for use at [facility]. Negative results do not preclude COVID-19 infections or Influenza A/B infections, and should not be used as the sole basis for diagnosis, treatment, or other management decisions. If Influenza A/B and RSV PCR results are negative, testing for Parainfluenza virus, Adenovirus and Metapneumovirus is routinely performed for [facility] pediatric oncology and intensive care inpatients, and is available on other patients by placing an add-on request.    RSV PCR - Normal    RSV PCR Not Detected      Narrative:     This assay is an FDA-cleared, in vitro diagnostic nucleic acid amplification test for the detection of RSV from nasopharyngeal specimens, and has  been validated for use at [facility]. Negative results do not preclude RSV infections, and should not be used as the sole basis for diagnosis, treatment, or other management decisions. If Influenza A/B and RSV PCR results are negative, testing for Parainfluenza virus, Adenovirus and Metapneumovirus is routinely performed for pediatric oncology and intensive care inpatients at [facility], and is available on other patients by placing an add-on request.       TSH WITH REFLEX TO FREE T4 IF ABNORMAL - Normal    Thyroid Stimulating Hormone 1.67      Narrative:     TSH testing is performed using different testing methodology at [facility] than at [facility]. Direct result comparisons should only be made within the same method.     MAGNESIUM - Normal    Magnesium 1.90     BLOOD CULTURE   BLOOD CULTURE   URINE CULTURE   URINALYSIS WITH REFLEX CULTURE AND MICROSCOPIC    Narrative:     The following orders were created for panel order Urinalysis with Reflex Culture and Microscopic.  Procedure                               Abnormality         Status                     ---------                               -----------         ------                     Urinalysis with Reflex C...[[id number]]  Abnormal            Final result               Extra Urine Gray Tube[[id number]]                                                         Please view results for these tests on the individual orders.         XR shoulder left 2+ views   Final Result   There is degenerative change in the acromioclavicular joint and   narrowing of the subacromion space suggesting some rotator cuff   thinning.  No fracture or dislocation is appreciated..   Signed by Samuel Brown MD      XR foot right 3+ views   Final Result   Transmetatarsal amputation of the forefoot with resection the fifth   digit.  Lateral forefoot ulceration with evidence of acute   osteomyelitis in the fourth metatarsal bone with periosteal  reaction   and soft tissue swelling.  Small focus of soft tissue gas/fistulous   tract.  Recommend repeat MRI or CT for further evaluation.   Signed by Easton Baron DO      XR chest 1 view   Final Result   No acute cardiopulmonary disease.   Chronic changes.   Signed by Easton Baron DO                    No data recorded                Procedure  Procedures    ED Course & MDM   Assessment/Plan:   Rene Ballard is a 89 y.o. male with a history of DM, CAD, GERD, HFrEF, carotid stenosis, PAD/CLI s/p R partial fifth TMA and revascularization procedures, presents with falls, malaise, generalized weakness, and concern for infection of his R fifth TMA site.  He is hemodynamically stable and well-appearing.  He did hit his head, has a superficial laceration to the right eyebrow that does not appear to need suturing.  Workup was initiated with ECG, broad infectious and metabolic labs, appropriate trauma scans and x-ray of the foot.  Disposition pending above workup and reevaluation.  Patient is unsure if he thinks that he needs rehab at this time, wife is concerned that he may benefit from hospital admission for observation at least. See below for details of ED course and ultimate disposition.    Medications - No data to display     ED Course as of 03/20/24 0013   Tue Mar 19, 2024   1903 Chest x-ray shows no acute cardiopulmonary process. [CG]   1903 X-ray of the foot shows lateral forefoot ulceration with evidence of acute osteomyelitis in the fourth metatarsal bone with periosteal reaction and soft tissue swelling with small focus of soft tissue gas/fistulous tract. [CG]   1903 Patient was started on vancomycin and Zosyn, ordered for CT of the foot. [CG]   1903   Labs are notable for CBC without leukocytosis 8.4, mild anemia 12.2, normal platelets, CMP with hyponatremia 130, mildly low bicarb 20, elevated BUN 24 with normal creatinine 1.0, elevated alkaline phosphatase 278 otherwise normal LFTs, elevated CRP  8.72, elevated ESR 47, normal magnesium, normal TSH. [CG]   2000 Patient handed off to Dr. Yip at the end of my shift pending rest of trauma scans and hospital admission.  [CG]      ED Course User Index  [CG] Mary Gonzales MD         Diagnoses as of 03/20/24 0013   Osteomyelitis of toe of right foot (CMS/HCC)       Disposition:   Handoff - Dr. Yip. Details of clinical presentation, medical decision-making, pending evaluation and disposition were discussed with oncoming physician. Please see their transition of care note for details of further ED course.     ED Prescriptions    None         Mary Gonzales MD  EM/IM/Peds    This note was dictated by speech recognition. Minor errors in transcription may be present.     Mary Gonzales MD  03/20/24 0014

## 2024-03-20 ENCOUNTER — APPOINTMENT (OUTPATIENT)
Dept: RADIOLOGY | Facility: HOSPITAL | Age: 89
DRG: 564 | End: 2024-03-20
Payer: MEDICARE

## 2024-03-20 LAB
ANION GAP SERPL CALC-SCNC: 12 MMOL/L (ref 10–20)
ATRIAL RATE: 65 BPM
BUN SERPL-MCNC: 19 MG/DL (ref 6–23)
CALCIUM SERPL-MCNC: 8.1 MG/DL (ref 8.6–10.3)
CHLORIDE SERPL-SCNC: 102 MMOL/L (ref 98–107)
CO2 SERPL-SCNC: 24 MMOL/L (ref 21–32)
CREAT SERPL-MCNC: 0.92 MG/DL (ref 0.5–1.3)
EGFRCR SERPLBLD CKD-EPI 2021: 80 ML/MIN/1.73M*2
ERYTHROCYTE [DISTWIDTH] IN BLOOD BY AUTOMATED COUNT: 13.3 % (ref 11.5–14.5)
EST. AVERAGE GLUCOSE BLD GHB EST-MCNC: 163 MG/DL
GLUCOSE BLD MANUAL STRIP-MCNC: 184 MG/DL (ref 74–99)
GLUCOSE BLD MANUAL STRIP-MCNC: 205 MG/DL (ref 74–99)
GLUCOSE BLD MANUAL STRIP-MCNC: 227 MG/DL (ref 74–99)
GLUCOSE BLD MANUAL STRIP-MCNC: 243 MG/DL (ref 74–99)
GLUCOSE SERPL-MCNC: 183 MG/DL (ref 74–99)
HBA1C MFR BLD: 7.3 %
HCT VFR BLD AUTO: 37.2 % (ref 41–52)
HGB BLD-MCNC: 12.4 G/DL (ref 13.5–17.5)
MCH RBC QN AUTO: 31.6 PG (ref 26–34)
MCHC RBC AUTO-ENTMCNC: 33.3 G/DL (ref 32–36)
MCV RBC AUTO: 95 FL (ref 80–100)
NRBC BLD-RTO: 0 /100 WBCS (ref 0–0)
P AXIS: 69 DEGREES
P OFFSET: 130 MS
P ONSET: 82 MS
PLATELET # BLD AUTO: 182 X10*3/UL (ref 150–450)
POTASSIUM SERPL-SCNC: 3.9 MMOL/L (ref 3.5–5.3)
PR INTERVAL: 218 MS
Q ONSET: 191 MS
QRS COUNT: 10 BEATS
QRS DURATION: 146 MS
QT INTERVAL: 438 MS
QTC CALCULATION(BAZETT): 455 MS
QTC FREDERICIA: 449 MS
R AXIS: -45 DEGREES
RBC # BLD AUTO: 3.93 X10*6/UL (ref 4.5–5.9)
SODIUM SERPL-SCNC: 134 MMOL/L (ref 136–145)
T AXIS: 7 DEGREES
T OFFSET: 410 MS
VENTRICULAR RATE: 65 BPM
WBC # BLD AUTO: 7.3 X10*3/UL (ref 4.4–11.3)

## 2024-03-20 PROCEDURE — 2500000001 HC RX 250 WO HCPCS SELF ADMINISTERED DRUGS (ALT 637 FOR MEDICARE OP): Performed by: FAMILY MEDICINE

## 2024-03-20 PROCEDURE — 2500000004 HC RX 250 GENERAL PHARMACY W/ HCPCS (ALT 636 FOR OP/ED): Performed by: INTERNAL MEDICINE

## 2024-03-20 PROCEDURE — 2500000001 HC RX 250 WO HCPCS SELF ADMINISTERED DRUGS (ALT 637 FOR MEDICARE OP): Performed by: NURSE PRACTITIONER

## 2024-03-20 PROCEDURE — 2500000004 HC RX 250 GENERAL PHARMACY W/ HCPCS (ALT 636 FOR OP/ED): Performed by: FAMILY MEDICINE

## 2024-03-20 PROCEDURE — 2500000002 HC RX 250 W HCPCS SELF ADMINISTERED DRUGS (ALT 637 FOR MEDICARE OP, ALT 636 FOR OP/ED): Performed by: FAMILY MEDICINE

## 2024-03-20 PROCEDURE — 1100000001 HC PRIVATE ROOM DAILY

## 2024-03-20 PROCEDURE — 36415 COLL VENOUS BLD VENIPUNCTURE: CPT | Performed by: NURSE PRACTITIONER

## 2024-03-20 PROCEDURE — 82947 ASSAY GLUCOSE BLOOD QUANT: CPT

## 2024-03-20 PROCEDURE — 73564 X-RAY EXAM KNEE 4 OR MORE: CPT | Mod: LT

## 2024-03-20 PROCEDURE — 85027 COMPLETE CBC AUTOMATED: CPT | Performed by: NURSE PRACTITIONER

## 2024-03-20 PROCEDURE — 2500000002 HC RX 250 W HCPCS SELF ADMINISTERED DRUGS (ALT 637 FOR MEDICARE OP, ALT 636 FOR OP/ED): Performed by: NURSE PRACTITIONER

## 2024-03-20 PROCEDURE — 73564 X-RAY EXAM KNEE 4 OR MORE: CPT | Mod: LEFT SIDE | Performed by: RADIOLOGY

## 2024-03-20 PROCEDURE — 2500000005 HC RX 250 GENERAL PHARMACY W/O HCPCS: Performed by: NURSE PRACTITIONER

## 2024-03-20 PROCEDURE — 80048 BASIC METABOLIC PNL TOTAL CA: CPT | Performed by: NURSE PRACTITIONER

## 2024-03-20 PROCEDURE — 87186 SC STD MICRODIL/AGAR DIL: CPT | Mod: AHULAB | Performed by: STUDENT IN AN ORGANIZED HEALTH CARE EDUCATION/TRAINING PROGRAM

## 2024-03-20 RX ORDER — ACETAMINOPHEN 160 MG/5ML
650 SOLUTION ORAL EVERY 4 HOURS PRN
Status: CANCELLED | OUTPATIENT
Start: 2024-03-20

## 2024-03-20 RX ORDER — CHOLECALCIFEROL (VITAMIN D3) 25 MCG
5000 TABLET ORAL DAILY
Status: DISCONTINUED | OUTPATIENT
Start: 2024-03-20 | End: 2024-03-25 | Stop reason: HOSPADM

## 2024-03-20 RX ORDER — ACETAMINOPHEN 650 MG/1
650 SUPPOSITORY RECTAL EVERY 4 HOURS PRN
Status: CANCELLED | OUTPATIENT
Start: 2024-03-20

## 2024-03-20 RX ORDER — LIDOCAINE 560 MG/1
2 PATCH PERCUTANEOUS; TOPICAL; TRANSDERMAL DAILY
Status: DISCONTINUED | OUTPATIENT
Start: 2024-03-20 | End: 2024-03-25 | Stop reason: HOSPADM

## 2024-03-20 RX ORDER — ENALAPRIL MALEATE 5 MG/1
2.5 TABLET ORAL DAILY
Status: DISCONTINUED | OUTPATIENT
Start: 2024-03-20 | End: 2024-03-25 | Stop reason: HOSPADM

## 2024-03-20 RX ORDER — MULTIVIT WITH IRON,MINERALS
500 TABLET ORAL DAILY
Status: DISCONTINUED | OUTPATIENT
Start: 2024-03-20 | End: 2024-03-25 | Stop reason: HOSPADM

## 2024-03-20 RX ORDER — LATANOPROST 50 UG/ML
1 SOLUTION/ DROPS OPHTHALMIC DAILY
Status: DISCONTINUED | OUTPATIENT
Start: 2024-03-20 | End: 2024-03-25 | Stop reason: HOSPADM

## 2024-03-20 RX ORDER — DORZOLAMIDE HYDROCHLORIDE AND TIMOLOL MALEATE 20; 5 MG/ML; MG/ML
1 SOLUTION/ DROPS OPHTHALMIC 2 TIMES DAILY
Status: DISCONTINUED | OUTPATIENT
Start: 2024-03-20 | End: 2024-03-25 | Stop reason: HOSPADM

## 2024-03-20 RX ORDER — ACETAMINOPHEN 325 MG/1
650 TABLET ORAL EVERY 4 HOURS PRN
Status: CANCELLED | OUTPATIENT
Start: 2024-03-20

## 2024-03-20 RX ORDER — ONDANSETRON HYDROCHLORIDE 2 MG/ML
4 INJECTION, SOLUTION INTRAVENOUS EVERY 8 HOURS PRN
Status: CANCELLED | OUTPATIENT
Start: 2024-03-20

## 2024-03-20 RX ORDER — TALC
3 POWDER (GRAM) TOPICAL NIGHTLY PRN
Status: DISCONTINUED | OUTPATIENT
Start: 2024-03-20 | End: 2024-03-25 | Stop reason: HOSPADM

## 2024-03-20 RX ORDER — POLYETHYLENE GLYCOL 3350 17 G/17G
17 POWDER, FOR SOLUTION ORAL DAILY
Status: CANCELLED | OUTPATIENT
Start: 2024-03-20

## 2024-03-20 RX ORDER — DEXTROSE 50 % IN WATER (D50W) INTRAVENOUS SYRINGE
25
Status: DISCONTINUED | OUTPATIENT
Start: 2024-03-20 | End: 2024-03-25 | Stop reason: HOSPADM

## 2024-03-20 RX ORDER — CLOPIDOGREL BISULFATE 75 MG/1
75 TABLET ORAL DAILY
Status: DISCONTINUED | OUTPATIENT
Start: 2024-03-20 | End: 2024-03-25 | Stop reason: HOSPADM

## 2024-03-20 RX ORDER — INSULIN LISPRO 100 [IU]/ML
5 INJECTION, SOLUTION INTRAVENOUS; SUBCUTANEOUS
Status: DISCONTINUED | OUTPATIENT
Start: 2024-03-20 | End: 2024-03-20

## 2024-03-20 RX ORDER — UBIDECARENONE 75 MG
500 CAPSULE ORAL DAILY
Status: DISCONTINUED | OUTPATIENT
Start: 2024-03-20 | End: 2024-03-25 | Stop reason: HOSPADM

## 2024-03-20 RX ORDER — ALUMINUM HYDROXIDE, MAGNESIUM HYDROXIDE, AND SIMETHICONE 1200; 120; 1200 MG/30ML; MG/30ML; MG/30ML
30 SUSPENSION ORAL EVERY 4 HOURS PRN
Status: DISCONTINUED | OUTPATIENT
Start: 2024-03-20 | End: 2024-03-25 | Stop reason: HOSPADM

## 2024-03-20 RX ORDER — METOPROLOL TARTRATE 25 MG/1
12.5 TABLET, FILM COATED ORAL 2 TIMES DAILY
Status: DISCONTINUED | OUTPATIENT
Start: 2024-03-20 | End: 2024-03-25 | Stop reason: HOSPADM

## 2024-03-20 RX ORDER — INSULIN LISPRO 100 [IU]/ML
0-10 INJECTION, SOLUTION INTRAVENOUS; SUBCUTANEOUS
Status: DISCONTINUED | OUTPATIENT
Start: 2024-03-20 | End: 2024-03-25 | Stop reason: HOSPADM

## 2024-03-20 RX ORDER — ONDANSETRON 4 MG/1
4 TABLET, FILM COATED ORAL EVERY 8 HOURS PRN
Status: CANCELLED | OUTPATIENT
Start: 2024-03-20

## 2024-03-20 RX ORDER — PANTOPRAZOLE SODIUM 40 MG/1
40 TABLET, DELAYED RELEASE ORAL
Status: DISCONTINUED | OUTPATIENT
Start: 2024-03-20 | End: 2024-03-25 | Stop reason: HOSPADM

## 2024-03-20 RX ORDER — GUAIFENESIN 600 MG/1
600 TABLET, EXTENDED RELEASE ORAL EVERY 12 HOURS PRN
Status: CANCELLED | OUTPATIENT
Start: 2024-03-20

## 2024-03-20 RX ORDER — DEXTROSE 50 % IN WATER (D50W) INTRAVENOUS SYRINGE
12.5
Status: DISCONTINUED | OUTPATIENT
Start: 2024-03-20 | End: 2024-03-25 | Stop reason: HOSPADM

## 2024-03-20 RX ORDER — ACETAMINOPHEN 325 MG/1
975 TABLET ORAL 3 TIMES DAILY
Status: DISCONTINUED | OUTPATIENT
Start: 2024-03-20 | End: 2024-03-25 | Stop reason: HOSPADM

## 2024-03-20 RX ORDER — TAMSULOSIN HYDROCHLORIDE 0.4 MG/1
0.4 CAPSULE ORAL DAILY
Status: DISCONTINUED | OUTPATIENT
Start: 2024-03-20 | End: 2024-03-25 | Stop reason: HOSPADM

## 2024-03-20 RX ORDER — DORZOLAMIDE HYDROCHLORIDE AND TIMOLOL MALEATE 20; 5 MG/ML; MG/ML
1 SOLUTION/ DROPS OPHTHALMIC 2 TIMES DAILY
Status: ON HOLD | COMMUNITY

## 2024-03-20 RX ORDER — INSULIN GLARGINE 100 [IU]/ML
25 INJECTION, SOLUTION SUBCUTANEOUS NIGHTLY
Status: DISCONTINUED | OUTPATIENT
Start: 2024-03-20 | End: 2024-03-25 | Stop reason: HOSPADM

## 2024-03-20 RX ORDER — MULTIVIT-MIN/IRON FUM/FOLIC AC 7.5 MG-4
1 TABLET ORAL DAILY
Status: DISCONTINUED | OUTPATIENT
Start: 2024-03-20 | End: 2024-03-25 | Stop reason: HOSPADM

## 2024-03-20 RX ORDER — ASCORBIC ACID 500 MG
1000 TABLET ORAL DAILY
Status: DISCONTINUED | OUTPATIENT
Start: 2024-03-20 | End: 2024-03-25 | Stop reason: HOSPADM

## 2024-03-20 RX ADMIN — AMPICILLIN SODIUM AND SULBACTAM SODIUM 3 G: 2; 1 INJECTION, POWDER, FOR SOLUTION INTRAMUSCULAR; INTRAVENOUS at 10:17

## 2024-03-20 RX ADMIN — INSULIN GLARGINE 25 UNITS: 100 INJECTION, SOLUTION SUBCUTANEOUS at 21:47

## 2024-03-20 RX ADMIN — CYANOCOBALAMIN TAB 500 MCG 500 MCG: 500 TAB at 10:05

## 2024-03-20 RX ADMIN — ENALAPRIL MALEATE 2.5 MG: 5 TABLET ORAL at 10:05

## 2024-03-20 RX ADMIN — LIDOCAINE 4% 2 PATCH: 40 PATCH TOPICAL at 12:22

## 2024-03-20 RX ADMIN — ACETAMINOPHEN 975 MG: 325 TABLET ORAL at 21:46

## 2024-03-20 RX ADMIN — ACETAMINOPHEN 975 MG: 325 TABLET ORAL at 17:26

## 2024-03-20 RX ADMIN — CLOPIDOGREL 75 MG: 75 TABLET ORAL at 10:05

## 2024-03-20 RX ADMIN — METOPROLOL TARTRATE 12.5 MG: 25 TABLET, FILM COATED ORAL at 10:05

## 2024-03-20 RX ADMIN — MULTIPLE VITAMINS W/ MINERALS TAB 1 TABLET: TAB at 10:05

## 2024-03-20 RX ADMIN — LATANOPROST 1 DROP: 50 SOLUTION/ DROPS OPHTHALMIC at 21:47

## 2024-03-20 RX ADMIN — INSULIN LISPRO 4 UNITS: 100 INJECTION, SOLUTION INTRAVENOUS; SUBCUTANEOUS at 17:26

## 2024-03-20 RX ADMIN — INSULIN LISPRO 5 UNITS: 100 INJECTION, SOLUTION INTRAVENOUS; SUBCUTANEOUS at 10:19

## 2024-03-20 RX ADMIN — AMPICILLIN SODIUM AND SULBACTAM SODIUM 3 G: 2; 1 INJECTION, POWDER, FOR SOLUTION INTRAMUSCULAR; INTRAVENOUS at 15:08

## 2024-03-20 RX ADMIN — Medication 5000 UNITS: at 10:05

## 2024-03-20 RX ADMIN — OXYCODONE HYDROCHLORIDE AND ACETAMINOPHEN 1000 MG: 500 TABLET ORAL at 10:05

## 2024-03-20 RX ADMIN — ACETAMINOPHEN 975 MG: 325 TABLET ORAL at 12:24

## 2024-03-20 RX ADMIN — DORZOLAMIDE HYDROCHLORIDE AND TIMOLOL MALEATE 1 DROP: 22.3; 6.8 SOLUTION/ DROPS OPHTHALMIC at 21:46

## 2024-03-20 RX ADMIN — AMPICILLIN SODIUM AND SULBACTAM SODIUM 3 G: 2; 1 INJECTION, POWDER, FOR SOLUTION INTRAMUSCULAR; INTRAVENOUS at 21:46

## 2024-03-20 RX ADMIN — METOPROLOL TARTRATE 12.5 MG: 25 TABLET, FILM COATED ORAL at 21:47

## 2024-03-20 RX ADMIN — INSULIN LISPRO 4 UNITS: 100 INJECTION, SOLUTION INTRAVENOUS; SUBCUTANEOUS at 12:24

## 2024-03-20 RX ADMIN — PANTOPRAZOLE SODIUM 40 MG: 40 TABLET, DELAYED RELEASE ORAL at 06:25

## 2024-03-20 RX ADMIN — PIPERACILLIN SODIUM AND TAZOBACTAM SODIUM 3.38 G: 3; .375 INJECTION, SOLUTION INTRAVENOUS at 03:00

## 2024-03-20 SDOH — SOCIAL STABILITY: SOCIAL INSECURITY: ARE THERE ANY APPARENT SIGNS OF INJURIES/BEHAVIORS THAT COULD BE RELATED TO ABUSE/NEGLECT?: NO

## 2024-03-20 SDOH — SOCIAL STABILITY: SOCIAL INSECURITY: DO YOU FEEL ANYONE HAS EXPLOITED OR TAKEN ADVANTAGE OF YOU FINANCIALLY OR OF YOUR PERSONAL PROPERTY?: NO

## 2024-03-20 SDOH — SOCIAL STABILITY: SOCIAL INSECURITY: ARE YOU OR HAVE YOU BEEN THREATENED OR ABUSED PHYSICALLY, EMOTIONALLY, OR SEXUALLY BY ANYONE?: NO

## 2024-03-20 SDOH — SOCIAL STABILITY: SOCIAL INSECURITY: DO YOU FEEL UNSAFE GOING BACK TO THE PLACE WHERE YOU ARE LIVING?: NO

## 2024-03-20 SDOH — SOCIAL STABILITY: SOCIAL INSECURITY: ABUSE: ADULT

## 2024-03-20 SDOH — SOCIAL STABILITY: SOCIAL INSECURITY: WERE YOU ABLE TO COMPLETE ALL THE BEHAVIORAL HEALTH SCREENINGS?: YES

## 2024-03-20 SDOH — SOCIAL STABILITY: SOCIAL INSECURITY: HAVE YOU HAD THOUGHTS OF HARMING ANYONE ELSE?: NO

## 2024-03-20 SDOH — SOCIAL STABILITY: SOCIAL INSECURITY: DOES ANYONE TRY TO KEEP YOU FROM HAVING/CONTACTING OTHER FRIENDS OR DOING THINGS OUTSIDE YOUR HOME?: NO

## 2024-03-20 SDOH — SOCIAL STABILITY: SOCIAL INSECURITY: HAS ANYONE EVER THREATENED TO HURT YOUR FAMILY OR YOUR PETS?: NO

## 2024-03-20 ASSESSMENT — COGNITIVE AND FUNCTIONAL STATUS - GENERAL
MOBILITY SCORE: 15
CLIMB 3 TO 5 STEPS WITH RAILING: A LOT
WALKING IN HOSPITAL ROOM: A LOT
HELP NEEDED FOR BATHING: A LOT
PERSONAL GROOMING: A LOT
TURNING FROM BACK TO SIDE WHILE IN FLAT BAD: A LITTLE
PERSONAL GROOMING: A LOT
STANDING UP FROM CHAIR USING ARMS: A LOT
PERSONAL GROOMING: A LOT
DRESSING REGULAR UPPER BODY CLOTHING: A LOT
HELP NEEDED FOR BATHING: A LOT
CLIMB 3 TO 5 STEPS WITH RAILING: A LOT
DRESSING REGULAR LOWER BODY CLOTHING: A LOT
DRESSING REGULAR UPPER BODY CLOTHING: A LOT
STANDING UP FROM CHAIR USING ARMS: A LOT
DRESSING REGULAR LOWER BODY CLOTHING: A LOT
STANDING UP FROM CHAIR USING ARMS: A LOT
WALKING IN HOSPITAL ROOM: A LOT
TURNING FROM BACK TO SIDE WHILE IN FLAT BAD: A LITTLE
MOVING TO AND FROM BED TO CHAIR: A LOT
TURNING FROM BACK TO SIDE WHILE IN FLAT BAD: A LITTLE
TOILETING: A LOT
MOBILITY SCORE: 15
MOBILITY SCORE: 15
MOVING TO AND FROM BED TO CHAIR: A LOT
MOVING TO AND FROM BED TO CHAIR: A LOT
DRESSING REGULAR LOWER BODY CLOTHING: A LOT
CLIMB 3 TO 5 STEPS WITH RAILING: A LOT
WALKING IN HOSPITAL ROOM: A LOT
TOILETING: A LOT
DAILY ACTIVITIY SCORE: 14
DAILY ACTIVITIY SCORE: 14
DRESSING REGULAR UPPER BODY CLOTHING: A LOT
HELP NEEDED FOR BATHING: A LOT
DAILY ACTIVITIY SCORE: 14
TOILETING: A LOT

## 2024-03-20 ASSESSMENT — LIFESTYLE VARIABLES
AUDIT-C TOTAL SCORE: 0
PRESCIPTION_ABUSE_PAST_12_MONTHS: NO
AUDIT-C TOTAL SCORE: 0
HOW OFTEN DO YOU HAVE A DRINK CONTAINING ALCOHOL: NEVER
SKIP TO QUESTIONS 9-10: 1
HOW MANY STANDARD DRINKS CONTAINING ALCOHOL DO YOU HAVE ON A TYPICAL DAY: PATIENT DOES NOT DRINK
SUBSTANCE_ABUSE_PAST_12_MONTHS: NO
HOW OFTEN DO YOU HAVE 6 OR MORE DRINKS ON ONE OCCASION: NEVER

## 2024-03-20 ASSESSMENT — PAIN - FUNCTIONAL ASSESSMENT
PAIN_FUNCTIONAL_ASSESSMENT: 0-10

## 2024-03-20 ASSESSMENT — ACTIVITIES OF DAILY LIVING (ADL)
HEARING - LEFT EAR: FUNCTIONAL
LACK_OF_TRANSPORTATION: NO
WALKS IN HOME: NEEDS ASSISTANCE
DRESSING YOURSELF: INDEPENDENT
BATHING: INDEPENDENT
PATIENT'S MEMORY ADEQUATE TO SAFELY COMPLETE DAILY ACTIVITIES?: YES
HEARING - RIGHT EAR: FUNCTIONAL
JUDGMENT_ADEQUATE_SAFELY_COMPLETE_DAILY_ACTIVITIES: YES
GROOMING: INDEPENDENT
TOILETING: INDEPENDENT
FEEDING YOURSELF: INDEPENDENT
ADEQUATE_TO_COMPLETE_ADL: YES
ASSISTIVE_DEVICE: WALKER

## 2024-03-20 ASSESSMENT — PATIENT HEALTH QUESTIONNAIRE - PHQ9
SUM OF ALL RESPONSES TO PHQ9 QUESTIONS 1 & 2: 0
1. LITTLE INTEREST OR PLEASURE IN DOING THINGS: NOT AT ALL
2. FEELING DOWN, DEPRESSED OR HOPELESS: NOT AT ALL

## 2024-03-20 ASSESSMENT — PAIN SCALES - GENERAL
PAINLEVEL_OUTOF10: 0 - NO PAIN

## 2024-03-20 ASSESSMENT — COLUMBIA-SUICIDE SEVERITY RATING SCALE - C-SSRS
1. IN THE PAST MONTH, HAVE YOU WISHED YOU WERE DEAD OR WISHED YOU COULD GO TO SLEEP AND NOT WAKE UP?: NO
6. HAVE YOU EVER DONE ANYTHING, STARTED TO DO ANYTHING, OR PREPARED TO DO ANYTHING TO END YOUR LIFE?: NO
2. HAVE YOU ACTUALLY HAD ANY THOUGHTS OF KILLING YOURSELF?: NO

## 2024-03-20 NOTE — PROGRESS NOTES
03/20/24 1106   Discharge Planning   Living Arrangements Alone   Support Systems Children   Assistance Needed was independent with walker, was driving but has adult children who will help with driving to appts if needed   Type of Residence Private residence   Home or Post Acute Services Post acute facilities (Rehab/SNF/etc)   Type of Post Acute Facility Services Rehab;Skilled nursing   Patient expects to be discharged to: [facility] vs [facility], referrals placed to both by [facility]   Does the patient need discharge transport arranged? Yes   RoundTrip coordination needed? Yes     Met with patient at bedside  Explained role of TCC  Patient admitted for fall    Patient states if he needed to return home today he would not be able to get around his home, he is aware that he will need pt/ot eval, and he is agreeable to referrals being placed to [facility] and [facility], (he states he has been to [facility] before and would return there for skilled needs)

## 2024-03-20 NOTE — NURSING NOTE
2:46 AM Pt up to U 6th floor at this time. Pt medications ordered at 0050 and 0110 not administered by previous RN.

## 2024-03-20 NOTE — CARE PLAN
Problem: Diabetes  Goal: Achieve decreasing blood glucose levels by end of shift  Outcome: Progressing     Problem: Pain  Goal: My pain/discomfort is manageable  Outcome: Progressing     Problem: Safety  Goal: I will remain free of falls  Outcome: Progressing     Problem: Daily Care  Goal: Daily care needs are met  Outcome: Progressing     Problem: Psychosocial Needs  Goal: Demonstrates ability to cope with hospitalization/illness  Outcome: Progressing

## 2024-03-20 NOTE — PROGRESS NOTES
Pharmacy Medication History Review    Rene Ballard is a 89 y.o. male admitted for Osteomyelitis of toe of right foot (CMS/[facility]). Pharmacy reviewed the patient's [id number] medications and allergies for accuracy.    The list below reflectives the updated PTA list. Please review each medication in order reconciliation for additional clarification and justification.  Prior to Admission Medications   Prescriptions Last Dose Informant     HumaLOG KwikPen Insulin 100 unit/mL injection       Sig: INJECT 5 UNITS SUBCUTANEOUSLY BEFORE MEALS AND PER SLIDING SCALE. 151-200  2 UNITS  201-250  4 UNITS  251-300  6 UNITS  301-350  8 UNITS  35   Lantus U-100 Insulin 100 unit/mL injection       Sig: INJECT 25 UNITS SUBCUTANEOUSLY EVERY NIGHT.  VIAL GOOD FOR 28 DAYS ONLY ONCE IN USE.  DISCARD THE REMAINING MEDICATION.   acetaminophen (Tylenol) 325 mg tablet       Sig: Take by mouth every 4 hours if needed.   alpha tocopherol (Vitamin E) 268 mg (400 unit) capsule       Sig: Take 1 capsule (400 Units) by mouth once daily.   alum-mag hydroxide-simeth (Mylanta) 200-200-20 mg/5 mL oral suspension       Sig: Take 30 mL by mouth every 4 hours if needed.   ascorbic acid (Vitamin C) 1,000 mg tablet       Sig: Take 1 tablet (1,000 mg) by mouth once daily.   blood sugar diagnostic (OneTouch Ultra Test) strip       Sig: Check glucose 4 x per day   cholecalciferol (Vitamin D-3) 125 MCG (5000 UT) capsule       Sig: Take 1 capsule (125 mcg) by mouth once daily.   clopidogrel (Plavix) 75 mg tablet Past Week      Sig: Take 1 tablet (75 mg) by mouth once daily.   cyanocobalamin (Vitamin B-12) 500 mcg tablet       Si tablet DAILY (route: oral)   dorzolamide-timoloL (Cosopt) 22.3-6.8 mg/mL ophthalmic solution Past Week      Sig: Administer 1 drop into the left eye 2 times a day.   doxycycline (Vibramycin) 100 mg capsule       Sig: Take 1 capsule (100 mg) by mouth 2 times a day for 7 days. Take with at least 8 ounces (large glass) of  "water, do not lie down for 30 minutes after   enalapril (Vasotec) 2.5 mg tablet Past Week      Sig: Take 1 tablet (2.5 mg) by mouth once daily.   ferrous sulfate 325 (65 Fe) MG tablet       Sig: Take by mouth.   glucosamine-D3-Boswellia serr (Osteo Bi-Flex, 5-Loxin,) 1,500-400-100 mg-unit-mg tablet       Sig: Take by mouth.   insulin glargine (Basaglar KwikPen U-100 Insulin) 100 unit/mL (3 mL) pen Past Week      Si unit BEDTIME (route: subcutaneous)   latanoprost (Xalatan) 0.005 % ophthalmic solution Past Week      Sig: instill 1 drop into each eye every evening   melatonin 3 mg tablet       Sig: Take by mouth.   metoprolol tartrate (Lopressor) 25 mg tablet Past Week      Sig: Take 0.5 tablets (12.5 mg) by mouth 2 times a day.   multivitamin tablet       Sig: Take 1 tablet by mouth once daily.   nepafenac (Nevanac) 0.1 % ophthalmic suspension       Sig: INSTILL 1 DROP IN RIGHT EYE TWICE DAILY (PLEASE REORDER 2 BUSINESS DAYS IN ADVANCE)   niacin 500 mg tablet       Sig: Take 1 tablet (500 mg) by mouth once daily.   omeprazole (PriLOSEC) 20 mg DR capsule Past Week      Sig: Take 1 capsule (20 mg) by mouth once daily.   pen needle, diabetic (UltiCare Pen Needle) 31 gauge x 1/4\" needle       Sig: USE 1 PEN NEEDLE SUBCUTANEOUSLY 3 TIMES DAILY BEFORE MEALS   tamsulosin (Flomax) 0.4 mg 24 hr capsule       Si capsule (0.4 mg).      Facility-Administered Medications Last Administration Doses Remaining   doxycycline (Monodox) capsule 100 mg None recorded 14         Allergies  Reviewed by Niru Alexander RN on 3/20/2024        Severity Reactions Comments    Glimepiride Not Specified Unknown Unknown    Lisinopril Not Specified Unknown Side effects, hot tingling in hands and feets, hand pain.    Metformin Not Specified Unknown Myalgia    Pioglitazone Not Specified Unknown Myalgia    Statins-hmg-coa Reductase Inhibitors Not Specified Unknown             Below are additional concerns with the patient's PTA list.  [facility] [place] [facility] verified most recent fills. Patient doesn't know what medications he takes and the only other person than does know is having surgery and is unavailable.    Angie Aldrich

## 2024-03-20 NOTE — H&P
History Of Present Illness  Rene Ballard is a 89 y.o. male presenting with Fall (Fell off toilet, has mutliple sites with pain, shoulder arm, knees) .Patient states that he has bad knees and shoulders, for the past 3 to 4 days he is felt very unsteady on his feet due to this. His left shoulder has been stiff with difficulty moving it. He has also been just generally not feeling well, very tired, and not eating well. The other day he laid down on the floor and could not get up. Yesterday he had a fall and then today he fell between the toilet and the wall when he was trying to get off the toilet. He attributes these falls to losing his balance . 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    Surgical History  He has a past surgical history that includes Other surgical history (06/19/2017); IR aspiration of bladder by needle insertion of suprapubic catheter (06/23/2017); Cardiac surgery; and Wrist surgery (Right).     Social History  He reports that he has never smoked. He has never used smokeless tobacco. He reports that he does not currently use alcohol. He reports that he does not use drugs.    Family History  Family History   Problem Relation Name Age of Onset    No Known Problems Mother      Heart disease Father      Tuberculosis Maternal Grandmother           Allergies  Glimepiride, Lisinopril, Metformin, Pioglitazone, and Statins-hmg-coa reductase inhibitors    Review of Systems   As above       Physical Exam     CONSTITUTIONAL: Well appearing, well nourished, in no acute distress.   HENMT: Head with 1 cm superficial laceration to the right eyebrow, hemostatic. No facial or scalp TTP. Airway patent. Nasal mucosa clear. Mouth with normal mucosa, clear oropharynx. Uvula midline. TMs clear bilaterally with no hemotympanum. Neck supple.    EYES: Clear bilaterally. No periorbital TTP.  Pupils equally round and reactive to light, extraocular movements grossly intact.    CARDIOVASCULAR: Normal rate, regular rhythm.  Heart sounds S1, S2.  No murmurs, rubs or gallops. Normal pulses. Capillary refill <2 sec.   RESPIRATORY: No increased work of breathing. Breath sounds clear and equal bilaterally.  GASTROINTESTINAL: Abdomen soft, non-distended, non-tender. No rebound, no guarding. Bowel sounds normal in all 4 quadrants. No palpable masses.   GENITOURINARY:  No CVA tenderness.  MUSCULOSKELETAL: R fifth partial TME wound dehiscence with yellowish red drainage, mild surrounding erythema and warmth. Dopplerable PT/DP pulse. TTP and stiffness L shoulder, no effusion, no warmth. No midline C/T/L TTP or stepoffs. No other muscle or joint deformity or TTP. No edema.  NEUROLOGICAL: GCS 15. Alert and oriented, no asymmetry, moving all extremities equally.  SKIN: Warm, dry and intact. No rash. No cardona sign, raccoon eyes or other notable skin lesions except as noted above.   PSYCHIATRIC: Normal mood and affect.  HEME/LYMPH: No adenopathy or splenomegaly.      Last Recorded Vitals  /58   Pulse 53   Temp 36.8 °C (98.3 °F) (Oral)   Resp 18   Wt 72.6 kg (160 lb)   SpO2 96%     Relevant Results        No current facility-administered medications on file prior to encounter.     Current Outpatient Medications on File Prior to Encounter   Medication Sig Dispense Refill    acetaminophen  (Tylenol) 325 mg tablet Take by mouth every 4 hours if needed.      alpha tocopherol (Vitamin E) 268 mg (400 unit) capsule Take 1 capsule (400 Units) by mouth once daily.      alum-mag hydroxide-simeth (Mylanta) 200-200-20 mg/5 mL oral suspension Take 30 mL by mouth every 4 hours if needed.      ascorbic acid (Vitamin C) 1,000 mg tablet Take 1 tablet (1,000 mg) by mouth once daily.      blood sugar diagnostic (OneTouch Ultra Test) strip Check glucose 4 x per day 400 each 3    cholecalciferol (Vitamin D-3) 125 MCG (5000 UT) capsule Take 1 capsule (125 mcg) by mouth once daily.      clopidogrel (Plavix) 75 mg tablet Take 1 tablet (75 mg) by mouth once daily. 90 tablet 1    cyanocobalamin (Vitamin B-12) 500 mcg tablet 1 tablet DAILY (route: oral)      doxycycline (Vibramycin) 100 mg capsule Take 1 capsule (100 mg) by mouth 2 times a day for 7 days. Take with at least 8 ounces (large glass) of water, do not lie down for 30 minutes after 14 capsule 0    enalapril (Vasotec) 2.5 mg tablet Take 1 tablet (2.5 mg) by mouth once daily. 90 tablet 1    ferrous sulfate 325 (65 Fe) MG tablet Take by mouth.      glucosamine-D3-Boswellia serr (Osteo Bi-Flex, 5-Loxin,) 1,500-400-100 mg-unit-mg tablet Take by mouth.      HumaLOG KwikPen Insulin 100 unit/mL injection INJECT 5 UNITS SUBCUTANEOUSLY BEFORE MEALS AND PER SLIDING SCALE. 151-200  2 UNITS  201-250  4 UNITS  251-300  6 UNITS  301-350  8 UNITS  35      insulin glargine (Basaglar KwikPen U-100 Insulin) 100 unit/mL (3 mL) pen 25 unit BEDTIME (route: subcutaneous)      Lantus U-100 Insulin 100 unit/mL injection INJECT 25 UNITS SUBCUTANEOUSLY EVERY NIGHT.  VIAL GOOD FOR 28 DAYS ONLY ONCE IN USE.  DISCARD THE REMAINING MEDICATION. 20 mL 0    latanoprost (Xalatan) 0.005 % ophthalmic solution instill 1 drop into each eye every evening      melatonin 3 mg tablet Take by mouth.      metoprolol tartrate (Lopressor) 25 mg tablet Take 0.5 tablets (12.5 mg) by mouth 2 times a day. 90 tablet  "3    multivitamin tablet Take 1 tablet by mouth once daily.      nepafenac (Nevanac) 0.1 % ophthalmic suspension INSTILL 1 DROP IN RIGHT EYE TWICE DAILY (PLEASE REORDER 2 BUSINESS DAYS IN ADVANCE)      niacin 500 mg tablet Take 1 tablet (500 mg) by mouth once daily.      omeprazole (PriLOSEC) 20 mg DR capsule Take 1 capsule (20 mg) by mouth once daily. 90 capsule 1    pen needle, diabetic (UltiCare Pen Needle) 31 gauge x 1/4\" needle USE 1 PEN NEEDLE SUBCUTANEOUSLY 3 TIMES DAILY BEFORE MEALS 100 each 1    tamsulosin (Flomax) 0.4 mg 24 hr capsule 1 capsule (0.4 mg).         Results for orders placed or performed during the hospital encounter of 03/19/24 (from the past 24 hour(s))   CBC and Auto Differential   Result Value Ref Range    WBC 8.4 4.4 - 11.3 x10*3/uL    nRBC 0.0 0.0 - 0.0 /100 WBCs    RBC 3.81 (L) 4.50 - 5.90 x10*6/uL    Hemoglobin 12.2 (L) 13.5 - 17.5 g/dL    Hematocrit 37.3 (L) 41.0 - 52.0 %    MCV 98 80 - 100 fL    MCH 32.0 26.0 - 34.0 pg    MCHC 32.7 32.0 - 36.0 g/dL    RDW 13.4 11.5 - 14.5 %    Platelets 195 150 - 450 x10*3/uL    Neutrophils % 75.9 40.0 - 80.0 %    Immature Granulocytes %, Automated 0.2 0.0 - 0.9 %    Lymphocytes % 11.4 13.0 - 44.0 %    Monocytes % 12.0 2.0 - 10.0 %    Eosinophils % 0.1 0.0 - 6.0 %    Basophils % 0.4 0.0 - 2.0 %    Neutrophils Absolute 6.38 (H) 1.60 - 5.50 x10*3/uL    Immature Granulocytes Absolute, Automated 0.02 0.00 - 0.50 x10*3/uL    Lymphocytes Absolute 0.96 0.80 - 3.00 x10*3/uL    Monocytes Absolute 1.01 (H) 0.05 - 0.80 x10*3/uL    Eosinophils Absolute 0.01 0.00 - 0.40 x10*3/uL    Basophils Absolute 0.03 0.00 - 0.10 x10*3/uL   Comprehensive metabolic panel   Result Value Ref Range    Glucose 359 (H) 74 - 99 mg/dL    Sodium 130 (L) 136 - 145 mmol/L    Potassium 4.5 3.5 - 5.3 mmol/L    Chloride 101 98 - 107 mmol/L    Bicarbonate 20 (L) 21 - 32 mmol/L    Anion Gap 14 10 - 20 mmol/L    Urea Nitrogen 24 (H) 6 - 23 mg/dL    Creatinine 1.00 0.50 - 1.30 mg/dL    eGFR 72 " >60 mL/min/1.73m*2    Calcium 8.6 8.6 - 10.3 mg/dL    Albumin 3.1 (L) 3.4 - 5.0 g/dL    Alkaline Phosphatase 278 (H) 33 - 136 U/L    Total Protein 6.6 6.4 - 8.2 g/dL    AST 27 9 - 39 U/L    Bilirubin, Total 0.7 0.0 - 1.2 mg/dL    ALT 26 10 - 52 U/L   Sedimentation rate, automated   Result Value Ref Range    Sedimentation Rate 47 (H) 0 - 20 mm/h   C-reactive protein   Result Value Ref Range    C-Reactive Protein 8.72 (H) <1.00 mg/dL   TSH with reflex to Free T4 if abnormal   Result Value Ref Range    Thyroid Stimulating Hormone 1.67 0.44 - 3.98 mIU/L   Magnesium   Result Value Ref Range    Magnesium 1.90 1.60 - 2.40 mg/dL   Sars-CoV-2 and Influenza A/B PCR   Result Value Ref Range    Flu A Result Not Detected Not Detected    Flu B Result Not Detected Not Detected    Coronavirus 2019, PCR Not Detected Not Detected   RSV PCR   Result Value Ref Range    RSV PCR Not Detected Not Detected   Blood Culture    Specimen: Peripheral Venipuncture; Blood culture   Result Value Ref Range    Blood Culture Loaded on Instrument - Culture in progress    Blood Culture    Specimen: Peripheral Venipuncture; Blood culture   Result Value Ref Range    Blood Culture Loaded on Instrument - Culture in progress    ECG 12 lead   Result Value Ref Range    Ventricular Rate 65 BPM    Atrial Rate 65 BPM    ME Interval 218 ms    QRS Duration 146 ms    QT Interval 438 ms    QTC Calculation(Bazett) 455 ms    P Axis 69 degrees    R Axis -45 degrees    T Axis 7 degrees    QRS Count 10 beats    Q Onset 191 ms    P Onset 82 ms    P Offset 130 ms    T Offset 410 ms    QTC Fredericia 449 ms   Urinalysis with Reflex Culture and Microscopic   Result Value Ref Range    Color, Urine Yellow Straw, Yellow    Appearance, Urine Hazy (N) Clear    Specific Gravity, Urine 1.021 1.005 - 1.035    pH, Urine 5.0 5.0, 5.5, 6.0, 6.5, 7.0, 7.5, 8.0    Protein, Urine 30 (1+) (N) NEGATIVE mg/dL    Glucose, Urine >=500 (3+) (A) NEGATIVE mg/dL    Blood, Urine NEGATIVE NEGATIVE     Ketones, Urine 5 (TRACE) (A) NEGATIVE mg/dL    Bilirubin, Urine NEGATIVE NEGATIVE    Urobilinogen, Urine <2.0 <2.0 mg/dL    Nitrite, Urine NEGATIVE NEGATIVE    Leukocyte Esterase, Urine TRACE (A) NEGATIVE   Microscopic Only, Urine   Result Value Ref Range    WBC, Urine 11-20 (A) 1-5, NONE /HPF    RBC, Urine NONE NONE, 1-2, 3-5 /HPF    Budding Yeast, Urine PRESENT (A) NONE /HPF    Mucus, Urine 1+ Reference range not established. /LPF    Amorphous Crystals, Urine 1+ NONE, 1+, 2+ /HPF   POCT GLUCOSE   Result Value Ref Range    POCT Glucose 184 (H) 74 - 99 mg/dL   CBC   Result Value Ref Range    WBC 7.3 4.4 - 11.3 x10*3/uL    nRBC 0.0 0.0 - 0.0 /100 WBCs    RBC 3.93 (L) 4.50 - 5.90 x10*6/uL    Hemoglobin 12.4 (L) 13.5 - 17.5 g/dL    Hematocrit 37.2 (L) 41.0 - 52.0 %    MCV 95 80 - 100 fL    MCH 31.6 26.0 - 34.0 pg    MCHC 33.3 32.0 - 36.0 g/dL    RDW 13.3 11.5 - 14.5 %    Platelets 182 150 - 450 x10*3/uL   Basic Metabolic Panel   Result Value Ref Range    Glucose 183 (H) 74 - 99 mg/dL    Sodium 134 (L) 136 - 145 mmol/L    Potassium 3.9 3.5 - 5.3 mmol/L    Chloride 102 98 - 107 mmol/L    Bicarbonate 24 21 - 32 mmol/L    Anion Gap 12 10 - 20 mmol/L    Urea Nitrogen 19 6 - 23 mg/dL    Creatinine 0.92 0.50 - 1.30 mg/dL    eGFR 80 >60 mL/min/1.73m*2    Calcium 8.1 (L) 8.6 - 10.3 mg/dL   POCT GLUCOSE   Result Value Ref Range    POCT Glucose 227 (H) 74 - 99 mg/dL           Assessment/Plan   Principal Problem:    Osteomyelitis of toe of right foot (CMS/HCC)  Does have PAD/CLI s/p R partial fifth TMA and revascularization procedures   -consult to ID and podiatry, cont abx and supportive care follow coures    Htn  Hld  HFrEF  -cont home regimen    -Continue current treatment as ordered. Will make adjustments as necessary.    ##Transcribed for Dr. ERASTO Nino##     Shared note for service. Note including A&P is not complete till signed by Attending MD    I have reviewed the above note obtained and documented by the NP/PA and I  personally participated in the key components. I have discussed the case and management of the patient's care. Changes made to the note, and all key components of history and physical/progress note done by me.  dw nursing  Vicente Nino MD

## 2024-03-20 NOTE — CONSULTS
"Inpatient consult to Podiatry  Consult performed by: Ghassan Barton DPM  Consult ordered by: KAILEY Avila-CNP  Reason for consult: Right TMA infection          Reason For Consult  Right foot infected TMA wound    History Of Present Illness  Rene Ballard is a 89 y.o. male presenting with concerns status post falls.      Past Medical History  He has a past medical history of Abnormal EKG (05/01/2023), COVID-19 (05/01/2023), Peripheral vascular disease, unspecified (CMS/HCC) (11/28/2022), Personal history of malignant neoplasm of bladder, Personal history of other diseases of the musculoskeletal system and connective tissue, Personal history of other endocrine, nutritional and metabolic disease, S/P CABG x 3 (05/01/2023), S/P insertion of penile implant (05/01/2023), and Type 2 diabetes mellitus with other circulatory complications (CMS/[facility]) (11/28/2022).    Surgical History  He has a past surgical history that includes Other surgical history (06/19/2017); IR aspiration of bladder by needle insertion of suprapubic catheter (06/23/2017); Cardiac surgery; and Wrist surgery (Right).     Social History  He reports that he has never smoked. He has never used smokeless tobacco. He reports that he does not currently use alcohol. He reports that he does not use drugs.    Family History  Family History   Problem Relation Name Age of Onset    No Known Problems Mother      Heart disease Father      Tuberculosis Maternal Grandmother          Allergies  Glimepiride, Lisinopril, Metformin, Pioglitazone, and Statins-hmg-coa reductase inhibitors    Review of Systems    REVIEW OF SYSTEMS  Negative except where otherwise stated in HPI.       Physical Exam    Last Recorded Vitals  Blood pressure 129/58, pulse 53, temperature 36.8 °C (98.3 °F), temperature source Oral, resp. rate 18, height 1.778 m (5' 10\"), weight 72.6 kg (160 lb), SpO2 96 %.    Vasc: DP and PT pulses are faintly palpable bilateral. CFT is less than 3 " "seconds bilateral.  Skin temperature is warm to cool proximal to distal bilateral. No edema noted. Moderate periwound erythema.    Neuro:  Light touch is absent to the right foot.  Protective sensation is absent.  There is no clonus noted.  Denies any numbness, burning or tingling.     Derm: Nails 1-5 bilateral are intact. Skin is supple with normal texture and turgor noted. Webspaces are clean, dry and intact left foot.  Full-thickness ulceration noted to the right foot plantar aspect subfifth metatarsal.  Fibrotic base.  Purulent drainage noted.  Wound probes to bone.  Moderate periwound erythema noted.  Mild undermining proximally noted.  No tunneling.  Periwound maceration noted.    MSK: Muscle strength is 4/5 for all pedal groups tested.  Decreased ankle joint range of motion right.  No pain to palpation at feet B/L.  Right TMA noted.      Relevant Results  Intake/Output last 3 Shifts:  I/O last 3 completed shifts:  In: 300 (4.1 mL/kg) [IV Piggyback:300]  Out: - (0 mL/kg)   Weight: 72.6 kg     Relevant Results         Lab Results   Component Value Date    WBC 7.3 03/20/2024    HGB 12.4 (L) 03/20/2024    HCT 37.2 (L) 03/20/2024    MCV 95 03/20/2024     03/20/2024       Lab Results   Component Value Date    GLUCOSE 183 (H) 03/20/2024    CALCIUM 8.1 (L) 03/20/2024     (L) 03/20/2024    K 3.9 03/20/2024    CO2 24 03/20/2024     03/20/2024    BUN 19 03/20/2024    CREATININE 0.92 03/20/2024       Lab Results   Component Value Date    HGBA1C 7.6 (H) 11/07/2023      Lab Results   Component Value Date    CRP 8.72 (H) 03/19/2024      Lab Results   Component Value Date    SEDRATE 47 (H) 03/19/2024     No components found for: \"CMP\"       Results from last 7 days   Lab Units 03/20/24  1034 03/19/24  1810   SODIUM mmol/L 134* 130*   POTASSIUM mmol/L 3.9 4.5   CHLORIDE mmol/L 102 101   CO2 mmol/L 24 20*   BUN mg/dL 19 24*   CREATININE mg/dL 0.92 1.00   CALCIUM mg/dL 8.1* 8.6   MAGNESIUM mg/dL  --  1.90 "   BILIRUBIN TOTAL mg/dL  --  0.7   ALT U/L  --  26   AST U/L  --  27     Results from last 7 days   Lab Units 03/19/24  2252   COLOR U  Yellow   APPEARANCE U  Hazy*   PH U  5.0   SPEC GRAV UR  1.021   PROTEIN U mg/dL 30 (1+)*   BLOOD UR  NEGATIVE   NITRITE U  NEGATIVE   WBC UR /HPF 11-20*         IMAGING REVIEW:  XR knee left 4+ views    Result Date: 3/20/2024  Interpreted By:  Mauri Crow, STUDY: XR KNEE LEFT 4+ VIEWS;  3/20/2024 12:13 pm   INDICATION: Signs/Symptoms:left knee pain.   COMPARISON: Previous exam from 05/20/2022   ACCESSION NUMBER(S): [id number]   ORDERING CLINICIAN: CAMELIA SHERIDAN   TECHNIQUE: 4 views  of the  left knee were obtained.   FINDINGS: Medial and lateral meniscal chondrocalcinosis. Advanced patellofemoral joint space loss with spur formation. Large patellar osteophyte at the insertion of the quadriceps tendon. Moderate to advanced medial compartment narrowing with moderate spur formation. Mild lateral compartment spurring. Mild fullness in the suprapatellar bursa. Prominent posterior arterial calcifications. No lytic or blastic destructive bone lesion. No acute fracture or dislocation. No opaque soft tissue foreign body. No periosteal reaction or erosion.       Small effusion. No acute fracture or dislocation.   Meniscal chondrocalcinosis.   Advanced left knee DJD as described.   MACRO: None   Signed by: Mauri Crow 3/20/2024 1:13 PM Dictation workstation:   [id number]    ECG 12 lead    Result Date: 3/20/2024  Sinus rhythm with 1st degree AV block Right bundle branch block Left anterior fascicular block  Bifascicular block  Abnormal ECG When compared with ECG of 17-JUN-2023 02:24, Previous ECG has undetermined rhythm, needs review Borderline criteria for Lateral infarct are no longer Present QT has shortened See ED provider note for full interpretation and clinical correlation Confirmed by Sadie Pierre ([id number]) on 3/20/2024 10:59:27 AM    CT foot right wo IV contrast    Result Date:  3/19/2024  STUDY: CT Extremity; Completed Time:  3/19/2024 8:34 PM. INDICATION: Evaluate for necrotizing soft tissue infection / osteomyelitis. COMPARISON: XR right foot 3/19/2024;  MR right foot 6/10/2023. ACCESSION NUMBER(S): [id number] ORDERING CLINICIAN: ÓSCAR GORDON TECHNIQUE:  Thin section axial images were obtained through the right foot without intravenous contrast.  Orthogonal reconstructed images were obtained and reviewed.  Automated mA/kV exposure control was utilized and patient examination was performed in strict accordance with principles of ALARA. FINDINGS:  There is redemonstration of transmetatarsal amputation of the forefoot with resection of the fifth digit.  There is diffuse osteopenia.  There is ulceration along the lateral margin of the forefoot with induration, edema and soft tissue swelling of the forefoot extending into the ankle compatible with extensive soft tissue infection.  There is cortical erosion and periosteal reaction along the plantar and lateral margin of the fourth metatarsal bone compatible with acute osteomyelitis with small locule of gas along the plantar margin (image 245 series 301).  There is fragmentation and periosteal reaction extending into the base of the fourth metatarsal bone.  There are mild-to-moderate degenerative changes.  Midfoot alignment is maintained.  Talonavicular joint and calcaneocuboid joint alignment is maintained.  Ankle mortise and talar dome are intact. There are vascular calcifications.  There is edema of the intrinsic muscles of the foot.    Redemonstration transmetatarsal amputation of the forefoot with amputation of the fifth digit.  Large area of ulceration along the lateral aspect of the forefoot with acute osteomyelitis of the fourth metatarsal bone predominantly along the lateral and plantar margin with small locule gas, extensive soft tissue swelling and edema compatible with soft tissue infection extending from the forefoot  through the level of the ankle. Signed by Easton Baron,     CT cervical spine wo IV contrast    Result Date: 3/19/2024  Interpreted By:  Rell Jewell, STUDY: CT CERVICAL SPINE WO IV CONTRAST;  3/19/2024 8:32 pm   INDICATION: Signs/Symptoms:Fall with head strike.   COMPARISON: None.   ACCESSION NUMBER(S): [id number]   ORDERING CLINICIAN: ÓSCAR GORDON   TECHNIQUE: Axial noncontrast images of the cervical spine with coronal and sagittal reconstructed images.   FINDINGS: ALIGNMENT: Normal. VERTEBRAE: No acute fracture. There is loss of disc height and anterior osteophyte formation at multiple levels worse at C5-6, C6-7 and C7-T1. Facet and uncovertebral hypertrophic changes cause moderate to severe bilateral neural foraminal narrowing at multiple levels. Degenerative changes at the atlantodental interval. SPINAL CANAL: No critical spinal canal stenosis. Disc spur complex at multiple levels most severe at C6-7 where there is moderate stenosis. PREVERTEBRAL SOFT TISSUES: No prevertebral soft tissue swelling. LUNG APICES: Imaged portion of the lung apices are within normal limits.   OTHER FINDINGS: Atherosclerotic calcification of the carotid vessels.       No acute fracture or traumatic subluxation of the cervical spine.   Multilevel discogenic degenerative changes as noted above.   MACRO: None   Signed by: Rell Jewell 3/19/2024 9:03 PM Dictation workstation:   [id number]    CT head wo IV contrast    Result Date: 3/19/2024  Interpreted By:  Rell Jewell, STUDY: CT HEAD WO IV CONTRAST;  3/19/2024 8:32 pm   INDICATION: Signs/Symptoms:Fall with head strike.   COMPARISON: CT scan of the head 06/07/2023   ACCESSION NUMBER(S): [id number]   ORDERING CLINICIAN: ÓSCAR GORDON   TECHNIQUE: Axial noncontrast CT images of the head.   FINDINGS: BRAIN PARENCHYMA: Gray-white matter interfaces are preserved. No mass, mass effect or midline shift. Moderate deep and periventricular white matter hypodensities are  nonspecific, but favored to represent chronic small vessel ischemic changes.   HEMORRHAGE: No acute intracranial hemorrhage. VENTRICLES and EXTRA-AXIAL SPACES: Moderate volume loss with prominence of the ventricles and sulci. EXTRACRANIAL SOFT TISSUES: Within normal limits. PARANASAL SINUSES/MASTOIDS: The visualized paranasal sinuses and mastoid air cells are aerated. CALVARIUM: No depressed skull fracture. No destructive osseous lesion.   OTHER FINDINGS: Atherosclerotic calcification of the carotid siphons and vertebral arteries..       No acute intracranial abnormality.   Chronic changes as noted above.   MACRO: None   Signed by: Rell Jewell 3/19/2024 8:59 PM Dictation workstation:   [id number]    XR shoulder left 2+ views    Result Date: 3/19/2024  STUDY: Shoulder Radiographs; 3/19/2024, 7:59PM INDICATION: Left shoulder pain. COMPARISON: None Available. ACCESSION NUMBER(S): [id number] ORDERING CLINICIAN: ÓSCAR GORDON TECHNIQUE:  Two view(s) of the left shoulder. FINDINGS:  There is no displaced fracture.  There is degenerative change in the acromioclavicular joint and narrowing of the subacromion space suggesting some rotator cuff thinning..  Alignment otherwise is unremarkable..    There is degenerative change in the acromioclavicular joint and narrowing of the subacromion space suggesting some rotator cuff thinning.  No fracture or dislocation is appreciated.. Signed by Samuel Brown MD    XR chest 1 view    Result Date: 3/19/2024  STUDY: Chest Radiograph;  3/19/28885 at 18:21 hours. INDICATION: Weakness and falls. COMPARISON: None Available ACCESSION NUMBER(S): [id number] ORDERING CLINICIAN: ÓSCAR GORDON TECHNIQUE:  Frontal chest was obtained at 18:21 hours. FINDINGS: CARDIOMEDIASTINAL SILHOUETTE: Patient is status post median sternotomy.  Heart size and mediastinal contours appear stable with prominence of the aortic arch.  There is minimal elevation left hemidiaphragm.  LUNGS: Lungs  demonstrate chronic changes.  There is no pneumothorax or pleural effusion.  ABDOMEN: No remarkable upper abdominal findings.  BONES: No acute osseous changes.    No acute cardiopulmonary disease. Chronic changes. Signed by Easton Baron DO    XR foot right 3+ views    Result Date: 3/19/2024  STUDY: Foot Radiographs; 3/19/2024, 6:22PM INDICATION: Right foot wound. COMPARISON: 6/10/2023 MRI Right Foot. ACCESSION NUMBER(S): [id number] ORDERING CLINICIAN: ÓSCAR GORDON TECHNIQUE:  Three view(s) of the right foot. FINDINGS:  There are postsurgical changes following transmetatarsal amputation of the forefoot and resection of the fifth metatarsal bone.  There is ulceration along the lateral aspect of the forefoot.  There is cortical irregularity along the lateral margin of the fifth metatarsal bone and periosteal reaction suggesting acute osteomyelitis.  There is also small focus of soft tissue gas/fistulous tract.  There is soft tissue swelling extending in the ankle.  Calcaneus is intact.  There are vascular calcifications.      Transmetatarsal amputation of the forefoot with resection the fifth digit.  Lateral forefoot ulceration with evidence of acute osteomyelitis in the fourth metatarsal bone with periosteal reaction and soft tissue swelling.  Small focus of soft tissue gas/fistulous tract.  Recommend repeat MRI or CT for further evaluation. Signed by Easton Baron DO                  Assessment/Plan   Assessment:  -Status post TMA  -Nonpressure chronic ulceration of right foot, infected  -Osteomyelitis, right foot      Plan:  -Patient seen and examined at bedside. All findings discussed with patient.   -ID following. IV abx per ID  -PVRs last obtained 6/23.  Mild disease.  -Wound culture obtained today at bedside.  Pending  -CRP and ESR elevated.   -Xray and CT read above.  Positive for OM of right fourth metatarsal.  -Dressings placed today consisting of Betadine, 4 x 4, Kerlix wrap.  -Plan for  surgical intervention Friday for likely amputation of right fourth metatarsal with debridement of ulceration and possible complex closure.  -Patient is to be n.p.o. at midnight on Thursday night/Friday morning  -Podiatry will continue to follow  -Thank you for the consult    Diet: Per primary; n.p.o. 00:01 Friday  DVT ppx: Per primary  Weightbearing: Nonweightbearing right lower extremity  Wound Care: Betadine, 4 x 4, Kerlix.  Podiatry to change.    Case has been discussed with attending, A&P above reflect tentative plan. Please await final signature from attending physician on service.  Ghassan Barton DPM PGY-3  Please use Secure Chat if any questions      I saw and evaluated the patient. I personally obtained the key and critical portions of the history and physical exam or was physically present for key and critical portions performed by the resident/fellow. I reviewed the resident/fellow's documentation and discussed the patient with the resident/fellow. I agree with the resident/fellow's medical decision making as documented in the note with the exception/addition of the following:    Discussed patient with Dr. Whitley, his primary podiatrist. Dr. Whitley states he has been very nutritionally deficient. He is not a great surgical candidate at this time to heal. At this time, antibiotics and a SNF is likely the best option for the patient. We will continue to follow the patient and update any plan, as needed. Patient understands and all questions answered.    I spent 60 minutes in the professional and overall care of this patient.    Robbie Islas DPM

## 2024-03-20 NOTE — CONSULTS
"INFECTIOUS DISEASE INPATIENT INITIAL CONSULTATION    Referred By: Vicente Nino    Reason For Consult:  osteomyelitis foot    HPI:  This is a 89 y.o. male with PMH of CAD, HFrEF, DM II, PAD s/p right TMA who presented with falls.    Unsteady on feet last 3-4 days. He laid on the floor and could not get up the other day. Fell yesterday and day of admission. His head and lacerated right eyebrow. Saw podiatry as outpatient and there was concern for TMA site infection, ordered abx for him but he did not start yet. Patient has had chronic drainage from the TMA site. No fevers/chills. No chest pain, cough, shortness of breath, n/v/c/d, abd pain, urinary issues.    Afebrile and no leukocytosis. Blood cx pending. Urine cx pending. Wound cx with mixed GP and GNB. CRP elevated. COVID/Flu negative.     Allergies:  Glimepiride, Lisinopril, Metformin, Pioglitazone, and Statins-hmg-coa reductase inhibitors     Vitals (Last 24 Hours):  Heart Rate:  [53-72]   Temp:  [36.8 °C (98.3 °F)-37.1 °C (98.8 °F)]   Resp:  [15-18]   BP: (110-147)/(52-98)   Height:  [177.8 cm (5' 10\")]   Weight:  [72.6 kg (160 lb)]   SpO2:  [95 %-98 %]      PHYSICAL EXAM:  Gen - NAD  Heart - RRR  Lungs - clear bilaterally, no wheezing  Abd - soft, no ttp, BS present  Right Foot - s/p TMA with dressing present, no erythema tracking up the foot  Skin - no rash    MEDS:    Current Facility-Administered Medications:     alum-mag hydroxide-simeth (Mylanta) 200-200-20 mg/5 mL oral suspension 30 mL, 30 mL, oral, q4h PRN, Vicente Nino MD    ascorbic acid (Vitamin C) tablet 1,000 mg, 1,000 mg, oral, Daily, Vicente Nino MD    cholecalciferol (Vitamin D-3) tablet 5,000 Units, 5,000 Units, oral, Daily, Vicente Nino MD    clopidogrel (Plavix) tablet 75 mg, 75 mg, oral, Daily, Vicente Nino MD    cyanocobalamin (Vitamin B-12) tablet 500 mcg, 500 mcg, oral, Daily, Vicente Nino MD    dextrose 50 % injection 12.5 g, 12.5 g, intravenous, q15 min PRN, Vicente Nino, " MD    dextrose 50 % injection 25 g, 25 g, intravenous, q15 min PRN, Vicente Nino MD    enalapril (Vasotec) tablet 2.5 mg, 2.5 mg, oral, Daily, Vicente Nino MD    glucagon (Glucagen) injection 1 mg, 1 mg, intramuscular, q15 min PRN, Vicente Nino MD    insulin glargine (Lantus) injection 25 Units, 25 Units, subcutaneous, Nightly, Vicente Nino MD    insulin lispro (HumaLOG) injection 5 Units, 5 Units, subcutaneous, TID with meals, Vicente Nino MD    latanoprost (Xalatan) 0.005 % ophthalmic solution 1 drop, 1 drop, Both Eyes, Daily, Vicente Nino MD    melatonin tablet 3 mg, 3 mg, oral, Nightly PRN, Vicente Nino MD    metoprolol tartrate (Lopressor) tablet 12.5 mg, 12.5 mg, oral, BID, Vicente Nino MD    multivitamin with minerals 1 tablet, 1 tablet, oral, Daily, Vicente Nino MD    nepafenac (Nevanac) 0.1 % ophthalmic suspension 1 drop, 1 drop, Both Eyes, TID, Vicente Nino MD    niacin tablet 500 mg, 500 mg, oral, Daily, Vicente Nino MD    pantoprazole (ProtoNix) EC tablet 40 mg, 40 mg, oral, Daily before breakfast, Vicente Nino MD, 40 mg at 03/20/24 0625    piperacillin-tazobactam-dextrose (Zosyn) IV 3.375 g, 3.375 g, intravenous, q6h, Vicente Nino MD, Stopped at 03/20/24 0330    tamsulosin (Flomax) 24 hr capsule 0.4 mg, 0.4 mg, oral, Daily, Vicente Nino MD     LABS:  Lab Results   Component Value Date    WBC 8.4 03/19/2024    HGB 12.2 (L) 03/19/2024    HCT 37.3 (L) 03/19/2024    MCV 98 03/19/2024     03/19/2024      Results from last 72 hours   Lab Units 03/19/24  1810   SODIUM mmol/L 130*   POTASSIUM mmol/L 4.5   CHLORIDE mmol/L 101   CO2 mmol/L 20*   BUN mg/dL 24*   CREATININE mg/dL 1.00   GLUCOSE mg/dL 359*   CALCIUM mg/dL 8.6   ANION GAP mmol/L 14   EGFR mL/min/1.73m*2 72     Results from last 72 hours   Lab Units 03/19/24  1810   ALK PHOS U/L 278*   BILIRUBIN TOTAL mg/dL 0.7   PROTEIN TOTAL g/dL 6.6   ALT U/L 26   AST U/L 27   ALBUMIN g/dL 3.1*     Estimated Creatinine Clearance:  51.4 mL/min (by C-G formula based on SCr of 1 mg/dL).  C-Reactive Protein   Date/Time Value Ref Range Status   03/19/2024 06:10 PM 8.72 (H) <1.00 mg/dL Final     CRP   Date/Time Value Ref Range Status   06/16/2023 06:31 PM 12.14 (A) mg/dL Final     Comment:     REF VALUE  < 1.00     06/08/2023 10:59 AM 27.15 (A) mg/dL Final     Comment:     REF VALUE  < 1.00     02/06/2023 12:00 AM 0.18 mg/dL Final     Comment:     REF VALUE  < 1.00       Sedimentation Rate   Date/Time Value Ref Range Status   03/19/2024 06:10 PM 47 (H) 0 - 20 mm/h Final   06/16/2023 06:31  (H) 0 - 20 mm/h Final   06/08/2023 10:59 AM 48 (H) 0 - 20 mm/h Final   02/06/2023 12:00 AM 28 (H) 0 - 20 mm/h Final        IMAGING:  CT Head 3/19  Impression:     No acute intracranial abnormality.    Chronic changes as noted above.     CT C-Spine 3/19  Impression:     No acute fracture or traumatic subluxation of the cervical spine.    Multilevel discogenic degenerative changes as noted above.     CT Right Foot 3/19  Impression:     Redemonstration transmetatarsal amputation of the forefoot with  amputation of the fifth digit.  Large area of ulceration along the  lateral aspect of the forefoot with acute osteomyelitis of the fourth  metatarsal bone predominantly along the lateral and plantar margin  with small locule gas, extensive soft tissue swelling and edema  compatible with soft tissue infection extending from the forefoot  through the level of the ankle.       ASSESSMENT/PLAN:    Right TMA Site Wound Infection  Right 4th Metatarsal OM  DM II - A1C 7.6% 11/2023, increased risk for infection/poor wound healing with diabetes  PAD - last PVR 6/2023 with mild right disease    Changed to IV Unasyn 3g Q6H pending wound culture data. Podiatry consultation pending.    Monitoring for adverse effects of abx such as rash/itching/diarrhea.    Will follow. Thanks!    Berny Valenzuela MD  [facility]  Office #[phone]

## 2024-03-20 NOTE — ED PROVIDER NOTES
The patient's case was signed out to me by the outgoing physician.  I was informed that the patient had evidence of osteomyelitis of the right foot.  As result I spoke with the hospitalist who agreed the patient could be admitted to their service for further evaluation and therapy.  The patient was then admitted to the hospital in the otherwise stable condition.     Darnell Yip MD  03/22/24 0236

## 2024-03-21 ENCOUNTER — APPOINTMENT (OUTPATIENT)
Dept: CARDIOLOGY | Facility: HOSPITAL | Age: 89
DRG: 564 | End: 2024-03-21
Payer: MEDICARE

## 2024-03-21 LAB
ANION GAP SERPL CALC-SCNC: 13 MMOL/L (ref 10–20)
AORTIC VALVE MEAN GRADIENT: 14.6 MMHG
AORTIC VALVE PEAK VELOCITY: 2.48 M/S
AV PEAK GRADIENT: 24.6 MMHG
AVA (PEAK VEL): 1.18 CM2
AVA (VTI): 1.26 CM2
BACTERIA UR CULT: NORMAL
BUN SERPL-MCNC: 17 MG/DL (ref 6–23)
CALCIUM SERPL-MCNC: 8.2 MG/DL (ref 8.6–10.3)
CHLORIDE SERPL-SCNC: 105 MMOL/L (ref 98–107)
CO2 SERPL-SCNC: 23 MMOL/L (ref 21–32)
CREAT SERPL-MCNC: 0.93 MG/DL (ref 0.5–1.3)
EGFRCR SERPLBLD CKD-EPI 2021: 78 ML/MIN/1.73M*2
EJECTION FRACTION APICAL 4 CHAMBER: 55.7
EJECTION FRACTION: 64 %
ERYTHROCYTE [DISTWIDTH] IN BLOOD BY AUTOMATED COUNT: 13.2 % (ref 11.5–14.5)
GLUCOSE BLD MANUAL STRIP-MCNC: 137 MG/DL (ref 74–99)
GLUCOSE BLD MANUAL STRIP-MCNC: 244 MG/DL (ref 74–99)
GLUCOSE BLD MANUAL STRIP-MCNC: 246 MG/DL (ref 74–99)
GLUCOSE BLD MANUAL STRIP-MCNC: 273 MG/DL (ref 74–99)
GLUCOSE SERPL-MCNC: 134 MG/DL (ref 74–99)
HCT VFR BLD AUTO: 39.4 % (ref 41–52)
HGB BLD-MCNC: 12.6 G/DL (ref 13.5–17.5)
LEFT ATRIUM VOLUME AREA LENGTH INDEX BSA: 32.9 ML/M2
LEFT VENTRICLE INTERNAL DIMENSION DIASTOLE: 4.39 CM (ref 3.5–6)
LEFT VENTRICULAR OUTFLOW TRACT DIAMETER: 2.1 CM
MCH RBC QN AUTO: 31 PG (ref 26–34)
MCHC RBC AUTO-ENTMCNC: 32 G/DL (ref 32–36)
MCV RBC AUTO: 97 FL (ref 80–100)
MITRAL VALVE E/A RATIO: 0.47
MITRAL VALVE E/E' RATIO: 4.61
NRBC BLD-RTO: 0 /100 WBCS (ref 0–0)
PLATELET # BLD AUTO: 182 X10*3/UL (ref 150–450)
POTASSIUM SERPL-SCNC: 3.5 MMOL/L (ref 3.5–5.3)
RBC # BLD AUTO: 4.06 X10*6/UL (ref 4.5–5.9)
RIGHT VENTRICLE PEAK SYSTOLIC PRESSURE: 20.3 MMHG
SODIUM SERPL-SCNC: 137 MMOL/L (ref 136–145)
TRICUSPID ANNULAR PLANE SYSTOLIC EXCURSION: 1.3 CM
WBC # BLD AUTO: 4.7 X10*3/UL (ref 4.4–11.3)

## 2024-03-21 PROCEDURE — 2500000002 HC RX 250 W HCPCS SELF ADMINISTERED DRUGS (ALT 637 FOR MEDICARE OP, ALT 636 FOR OP/ED): Mod: MUE | Performed by: FAMILY MEDICINE

## 2024-03-21 PROCEDURE — 97161 PT EVAL LOW COMPLEX 20 MIN: CPT | Mod: GP

## 2024-03-21 PROCEDURE — 2500000005 HC RX 250 GENERAL PHARMACY W/O HCPCS: Performed by: NURSE PRACTITIONER

## 2024-03-21 PROCEDURE — 2500000002 HC RX 250 W HCPCS SELF ADMINISTERED DRUGS (ALT 637 FOR MEDICARE OP, ALT 636 FOR OP/ED): Performed by: NURSE PRACTITIONER

## 2024-03-21 PROCEDURE — 2500000001 HC RX 250 WO HCPCS SELF ADMINISTERED DRUGS (ALT 637 FOR MEDICARE OP): Performed by: FAMILY MEDICINE

## 2024-03-21 PROCEDURE — 85027 COMPLETE CBC AUTOMATED: CPT | Performed by: NURSE PRACTITIONER

## 2024-03-21 PROCEDURE — 87040 BLOOD CULTURE FOR BACTERIA: CPT | Mod: AHULAB | Performed by: INTERNAL MEDICINE

## 2024-03-21 PROCEDURE — 93306 TTE W/DOPPLER COMPLETE: CPT

## 2024-03-21 PROCEDURE — 36415 COLL VENOUS BLD VENIPUNCTURE: CPT | Performed by: INTERNAL MEDICINE

## 2024-03-21 PROCEDURE — 2500000004 HC RX 250 GENERAL PHARMACY W/ HCPCS (ALT 636 FOR OP/ED): Performed by: NURSE PRACTITIONER

## 2024-03-21 PROCEDURE — 99232 SBSQ HOSP IP/OBS MODERATE 35: CPT

## 2024-03-21 PROCEDURE — 36415 COLL VENOUS BLD VENIPUNCTURE: CPT | Performed by: NURSE PRACTITIONER

## 2024-03-21 PROCEDURE — 93306 TTE W/DOPPLER COMPLETE: CPT | Performed by: STUDENT IN AN ORGANIZED HEALTH CARE EDUCATION/TRAINING PROGRAM

## 2024-03-21 PROCEDURE — 2500000001 HC RX 250 WO HCPCS SELF ADMINISTERED DRUGS (ALT 637 FOR MEDICARE OP): Performed by: NURSE PRACTITIONER

## 2024-03-21 PROCEDURE — 97165 OT EVAL LOW COMPLEX 30 MIN: CPT | Mod: GO

## 2024-03-21 PROCEDURE — 1100000001 HC PRIVATE ROOM DAILY

## 2024-03-21 PROCEDURE — 82947 ASSAY GLUCOSE BLOOD QUANT: CPT

## 2024-03-21 PROCEDURE — 82374 ASSAY BLOOD CARBON DIOXIDE: CPT | Performed by: NURSE PRACTITIONER

## 2024-03-21 PROCEDURE — 2500000004 HC RX 250 GENERAL PHARMACY W/ HCPCS (ALT 636 FOR OP/ED): Performed by: INTERNAL MEDICINE

## 2024-03-21 RX ORDER — DOCUSATE SODIUM 100 MG/1
100 CAPSULE, LIQUID FILLED ORAL 2 TIMES DAILY
Status: DISCONTINUED | OUTPATIENT
Start: 2024-03-21 | End: 2024-03-25 | Stop reason: HOSPADM

## 2024-03-21 RX ORDER — ENOXAPARIN SODIUM 100 MG/ML
40 INJECTION SUBCUTANEOUS EVERY 24 HOURS
Status: DISCONTINUED | OUTPATIENT
Start: 2024-03-21 | End: 2024-03-25 | Stop reason: HOSPADM

## 2024-03-21 RX ORDER — VANCOMYCIN HYDROCHLORIDE 750 MG/150ML
750 INJECTION, SOLUTION INTRAVENOUS EVERY 12 HOURS
Status: DISCONTINUED | OUTPATIENT
Start: 2024-03-21 | End: 2024-03-23 | Stop reason: DRUGHIGH

## 2024-03-21 RX ORDER — POLYETHYLENE GLYCOL 3350 17 G/17G
17 POWDER, FOR SOLUTION ORAL DAILY PRN
Status: DISCONTINUED | OUTPATIENT
Start: 2024-03-21 | End: 2024-03-25 | Stop reason: HOSPADM

## 2024-03-21 RX ORDER — VANCOMYCIN HYDROCHLORIDE 1 G/20ML
INJECTION, POWDER, LYOPHILIZED, FOR SOLUTION INTRAVENOUS DAILY PRN
Status: DISCONTINUED | OUTPATIENT
Start: 2024-03-21 | End: 2024-03-23

## 2024-03-21 RX ADMIN — LIDOCAINE 4% 2 PATCH: 40 PATCH TOPICAL at 08:36

## 2024-03-21 RX ADMIN — VANCOMYCIN HYDROCHLORIDE 750 MG: 750 INJECTION, SOLUTION INTRAVENOUS at 22:59

## 2024-03-21 RX ADMIN — ACETAMINOPHEN 975 MG: 325 TABLET ORAL at 08:36

## 2024-03-21 RX ADMIN — INSULIN GLARGINE 25 UNITS: 100 INJECTION, SOLUTION SUBCUTANEOUS at 21:59

## 2024-03-21 RX ADMIN — DORZOLAMIDE HYDROCHLORIDE AND TIMOLOL MALEATE 1 DROP: 22.3; 6.8 SOLUTION/ DROPS OPHTHALMIC at 08:38

## 2024-03-21 RX ADMIN — AMPICILLIN SODIUM AND SULBACTAM SODIUM 3 G: 2; 1 INJECTION, POWDER, FOR SOLUTION INTRAMUSCULAR; INTRAVENOUS at 08:51

## 2024-03-21 RX ADMIN — PANTOPRAZOLE SODIUM 40 MG: 40 TABLET, DELAYED RELEASE ORAL at 06:32

## 2024-03-21 RX ADMIN — DORZOLAMIDE HYDROCHLORIDE AND TIMOLOL MALEATE 1 DROP: 22.3; 6.8 SOLUTION/ DROPS OPHTHALMIC at 21:54

## 2024-03-21 RX ADMIN — AMPICILLIN SODIUM AND SULBACTAM SODIUM 3 G: 2; 1 INJECTION, POWDER, FOR SOLUTION INTRAMUSCULAR; INTRAVENOUS at 14:40

## 2024-03-21 RX ADMIN — ENOXAPARIN SODIUM 40 MG: 40 INJECTION SUBCUTANEOUS at 11:05

## 2024-03-21 RX ADMIN — AMPICILLIN SODIUM AND SULBACTAM SODIUM 3 G: 2; 1 INJECTION, POWDER, FOR SOLUTION INTRAMUSCULAR; INTRAVENOUS at 03:56

## 2024-03-21 RX ADMIN — AMPICILLIN SODIUM AND SULBACTAM SODIUM 3 G: 2; 1 INJECTION, POWDER, FOR SOLUTION INTRAMUSCULAR; INTRAVENOUS at 21:54

## 2024-03-21 RX ADMIN — ENALAPRIL MALEATE 2.5 MG: 5 TABLET ORAL at 08:36

## 2024-03-21 RX ADMIN — LATANOPROST 1 DROP: 50 SOLUTION/ DROPS OPHTHALMIC at 21:54

## 2024-03-21 RX ADMIN — DOCUSATE SODIUM 100 MG: 100 CAPSULE, LIQUID FILLED ORAL at 21:53

## 2024-03-21 RX ADMIN — CLOPIDOGREL 75 MG: 75 TABLET ORAL at 08:36

## 2024-03-21 RX ADMIN — INSULIN LISPRO 4 UNITS: 100 INJECTION, SOLUTION INTRAVENOUS; SUBCUTANEOUS at 16:31

## 2024-03-21 RX ADMIN — INSULIN LISPRO 6 UNITS: 100 INJECTION, SOLUTION INTRAVENOUS; SUBCUTANEOUS at 12:11

## 2024-03-21 RX ADMIN — Medication 500 MG: at 08:36

## 2024-03-21 RX ADMIN — METOPROLOL TARTRATE 12.5 MG: 25 TABLET, FILM COATED ORAL at 08:36

## 2024-03-21 RX ADMIN — OXYCODONE HYDROCHLORIDE AND ACETAMINOPHEN 1000 MG: 500 TABLET ORAL at 08:36

## 2024-03-21 RX ADMIN — CYANOCOBALAMIN TAB 500 MCG 500 MCG: 500 TAB at 08:36

## 2024-03-21 RX ADMIN — VANCOMYCIN HYDROCHLORIDE 750 MG: 750 INJECTION, SOLUTION INTRAVENOUS at 11:06

## 2024-03-21 RX ADMIN — DOCUSATE SODIUM 100 MG: 100 CAPSULE, LIQUID FILLED ORAL at 11:05

## 2024-03-21 RX ADMIN — Medication 5000 UNITS: at 08:36

## 2024-03-21 RX ADMIN — ACETAMINOPHEN 975 MG: 325 TABLET ORAL at 14:39

## 2024-03-21 RX ADMIN — MULTIPLE VITAMINS W/ MINERALS TAB 1 TABLET: TAB at 08:38

## 2024-03-21 RX ADMIN — METOPROLOL TARTRATE 12.5 MG: 25 TABLET, FILM COATED ORAL at 21:53

## 2024-03-21 RX ADMIN — ACETAMINOPHEN 975 MG: 325 TABLET ORAL at 21:53

## 2024-03-21 RX ADMIN — TAMSULOSIN HYDROCHLORIDE 0.4 MG: 0.4 CAPSULE ORAL at 08:38

## 2024-03-21 ASSESSMENT — COGNITIVE AND FUNCTIONAL STATUS - GENERAL
STANDING UP FROM CHAIR USING ARMS: A LOT
MOVING FROM LYING ON BACK TO SITTING ON SIDE OF FLAT BED WITH BEDRAILS: A LITTLE
DAILY ACTIVITIY SCORE: 18
MOBILITY SCORE: 12
HELP NEEDED FOR BATHING: A LOT
WALKING IN HOSPITAL ROOM: TOTAL
MOVING TO AND FROM BED TO CHAIR: A LOT
TURNING FROM BACK TO SIDE WHILE IN FLAT BAD: A LITTLE
DRESSING REGULAR LOWER BODY CLOTHING: A LOT
TOILETING: A LOT
TOILETING: A LOT
WALKING IN HOSPITAL ROOM: TOTAL
DRESSING REGULAR LOWER BODY CLOTHING: A LOT
MOBILITY SCORE: 12
HELP NEEDED FOR BATHING: A LOT
STANDING UP FROM CHAIR USING ARMS: A LOT
MOVING TO AND FROM BED TO CHAIR: A LOT
CLIMB 3 TO 5 STEPS WITH RAILING: TOTAL
TURNING FROM BACK TO SIDE WHILE IN FLAT BAD: A LITTLE
MOVING FROM LYING ON BACK TO SITTING ON SIDE OF FLAT BED WITH BEDRAILS: A LITTLE
DAILY ACTIVITIY SCORE: 18
CLIMB 3 TO 5 STEPS WITH RAILING: TOTAL

## 2024-03-21 ASSESSMENT — PAIN - FUNCTIONAL ASSESSMENT
PAIN_FUNCTIONAL_ASSESSMENT: 0-10

## 2024-03-21 ASSESSMENT — PAIN SCALES - GENERAL
PAINLEVEL_OUTOF10: 0 - NO PAIN
PAINLEVEL_OUTOF10: 2

## 2024-03-21 ASSESSMENT — ACTIVITIES OF DAILY LIVING (ADL)
BATHING_ASSISTANCE: MODERATE
ADL_ASSISTANCE: INDEPENDENT
ADL_ASSISTANCE: INDEPENDENT

## 2024-03-21 ASSESSMENT — PAIN SCALES - WONG BAKER: WONGBAKER_NUMERICALRESPONSE: NO HURT

## 2024-03-21 NOTE — PROGRESS NOTES
"  INFECTIOUS DISEASE DAILY PROGRESS NOTE    SUBJECTIVE:    No overnight events. No new complaints. Afebrile. No rash/itching/diarrhea. Blood cx positive now - explained to patient.    OBJECTIVE:  VITALS (Last 24 Hours)  BP (!) 117/46 (BP Location: Right arm, Patient Position: Lying)   Pulse 58   Temp 37 °C (98.6 °F) (Temporal)   Resp 18   Ht 1.778 m (5' 10\")   Wt 72.6 kg (160 lb)   SpO2 97%   BMI 22.96 kg/m²     PHYSICAL EXAM:  Gen - NAD  Abd - soft, no ttp, BS present  Right Foot - s/p TMA with dressing present, no erythema tracking up the foot  Skin - no rash    ABX: IV Unasyn    LABS:  Lab Results   Component Value Date    WBC 4.7 03/21/2024    HGB 12.6 (L) 03/21/2024    HCT 39.4 (L) 03/21/2024    MCV 97 03/21/2024     03/21/2024     Lab Results   Component Value Date    GLUCOSE 183 (H) 03/20/2024    CALCIUM 8.1 (L) 03/20/2024     (L) 03/20/2024    K 3.9 03/20/2024    CO2 24 03/20/2024     03/20/2024    BUN 19 03/20/2024    CREATININE 0.92 03/20/2024     Results from last 72 hours   Lab Units 03/19/24  1810   ALK PHOS U/L 278*   BILIRUBIN TOTAL mg/dL 0.7   PROTEIN TOTAL g/dL 6.6   ALT U/L 26   AST U/L 27   ALBUMIN g/dL 3.1*     Estimated Creatinine Clearance: 55.9 mL/min (by C-G formula based on SCr of 0.92 mg/dL).      ASSESSMENT/PLAN:     Bacteremia due to Staph aureus  Right TMA Site Wound Infection - Staph aureus in wound cx  Right 4th Metatarsal OM  DM II - A1C 7.6% 11/2023, increased risk for infection/poor wound healing with diabetes  PAD - last PVR 6/2023 with mild right disease     IV Unasyn 3g Q6H. Adding IV Vancomycin given Staph in blood.     Repeat blood cx x2 today and ordered TTE for endocarditis evaluation. Plans for 4th toe amputation.     Monitoring for adverse effects of abx such as rash/itching/diarrhea.     Will follow. Thanks!    Berny Valenzuela MD  [facility]  Office #[phone]      "

## 2024-03-21 NOTE — PROGRESS NOTES
Physical Therapy    Physical Therapy Evaluation    Patient Name: Rene Ballard  MRN: [id number]  Today's Date: 3/21/2024   Time Calculation  Start Time: 1009  Stop Time: 1028  Time Calculation (min): 19 min    Assessment/Plan   PT Assessment  PT Assessment Results: Decreased strength, Decreased range of motion, Decreased endurance, Impaired balance, Decreased mobility, Orthopedic restrictions, Pain  Rehab Prognosis: Good  End of Session Communication: Bedside nurse  Assessment Comment: PT Evaluation Completed. The patient presented with decreased mobility, balance, endurance, safety awareness, and increased pain and fatigue. These impairments are negatively impacting his ability to perform at baseline functional level, in home environment. The patient is at risk of falls & injury. This patient would benefit from skilled therapy intervention to address limitations and progress towards the PT goals.  Anticipate high frequency PT needs at discharge  End of Session Patient Position: Up in chair, Alarm on  IP OR SWING BED PT PLAN  Inpatient or Swing Bed: Inpatient  PT Plan  Treatment/Interventions: Bed mobility, Transfer training, Gait training, Balance training, Strengthening, Endurance training, Range of motion, Therapeutic exercise, Therapeutic activity, Home exercise program  PT Plan: Skilled PT  PT Frequency: 4 times per week  PT Discharge Recommendations: High intensity level of continued care  PT Recommended Transfer Status: Assist x2  PT - OK to Discharge: Yes (PT POC established.)      Subjective   General Visit Information:  General  Reason for Referral: Frequent falls, R foot 4th metatarsal osteomyelitis  Referred By: Vicente Nino MD  Past Medical History Relevant to Rehab: CAD, HFrEF, DM II, PAD s/p right TMA  Co-Treatment: OT  Co-Treatment Reason: To increase patient safety, transfer ability, and participation  Prior to Session Communication: Bedside nurse  Patient Position Received: Bed, 3 rail up, Alarm  on  General Comment: Pt pleasant and agreeable to eval.  Home Living:  Home Living  Type of Home: House  Lives With: Alone  Home Adaptive Equipment: Walker rolling or standard, Wheelchair-manual, Cane (knee scooter)  Home Layout: One level  Home Access:  (Threshold entrance)  Prior Level of Function:  Prior Function Per Pt/Caregiver Report  Level of [place]: Independent with ADLs and functional transfers, Independent with homemaking with ambulation  Receives Help From:  (daughter, however per pt she just had knee surgery.)  ADL Assistance: Independent  Homemaking Assistance: Independent (Hires help for cleaning)  Ambulatory Assistance: Independent (RW used for household distances, cane for community distances)  Prior Function Comments: 3 falls prior to arrival.  Precautions:  Precautions  LE Weight Bearing Status: Right Non-Weight Bearing  Medical Precautions: Fall precautions  Vital Signs:       Objective   Pain:  Pain Assessment  Pain Assessment: 0-10  Pain Score: 2  Pain Location:  (L knee and shoulder)  Cognition:  Cognition  Overall Cognitive Status: Within Functional Limits  Orientation Level: Oriented X4    General Assessments:  Activity Tolerance  Endurance: Tolerates 10 - 20 min exercise with multiple rests    Sensation  Light Touch:  (Lacking sensation in the R foot)    Postural Control  Postural Control: Within Functional Limits (in sitting)    Static Sitting Balance  Static Sitting-Balance Support: Feet supported, Bilateral upper extremity supported  Static Sitting-Level of Assistance: Close supervision  Dynamic Sitting Balance  Dynamic Sitting-Balance Support: Feet supported, Bilateral upper extremity supported  Dynamic Sitting-Comments: Close supervision    Static Standing Balance  Static Standing-Balance Support: Bilateral upper extremity supported  Static Standing-Level of Assistance: Minimum assistance  Dynamic Standing Balance  Dynamic Standing-Balance Support: Bilateral upper extremity  supported  Dynamic Standing-Comments: Mod Ax2  Functional Assessments:  Bed Mobility  Bed Mobility: Yes  Bed Mobility 1  Bed Mobility 1: Supine to sitting  Level of Assistance 1: Close supervision  Bed Mobility Comments 1: Increased time and effort to complete.    Transfers  Transfer: Yes  Transfer 1  Technique 1: Sit to stand, Stand to sit  Transfer Device 1: Walker  Transfer Level of Assistance 1: Moderate assistance, +2  Trials/Comments 1: Cues for hand placement, to scoot to the EOB and to kick R LE forward to prevent weightbearing.  Transfers 2  Technique 2: Stand pivot  Transfer Device 2: Walker  Transfer Level of Assistance 2: Moderate assistance, +2  Trials/Comments 2: Pt unable to perform while maintaining NWB RLE. Cues for sequencing with assist to maneuver walker. Pt frequently shifting weight onto R LE. Recommend scooting from bed <> chair going forward.    Ambulation/Gait Training  Ambulation/Gait Training Performed: No (Unable to perform at this time.)  Extremity/Trunk Assessments:  RUE   RUE : Within Functional Limits  LUE   LUE: Within Functional Limits  RLE   RLE :  (DF 1/5 due to chronic drop foot, otherwise strength >3/5, ROM WFL.)  LLE   LLE : Within Functional Limits  Outcome Measures:  [facility] Basic Mobility  Turning from your back to your side while in a flat bed without using bedrails: A little  Moving from lying on your back to sitting on the side of a flat bed without using bedrails: A little  Moving to and from bed to chair (including a wheelchair): A lot  Standing up from a chair using your arms (e.g. wheelchair or bedside chair): A lot  To walk in hospital room: Total  Climbing 3-5 steps with railing: Total  Basic Mobility - Total Score: 12    Encounter Problems       Encounter Problems (Active)       Balance       Pt will tolerate 10+ mins dynamic standing balance activities with min A or less.        Start:  03/21/24    Expected End:  04/04/24               Mobility       STG - Patient  will ambulate 20 ft with min A and a RW while maintaining WB precautions.        Start:  03/21/24    Expected End:  04/04/24               PT Transfers       STG - Patient will perform bed mobility independently.        Start:  03/21/24    Expected End:  04/04/24            STG - Patient will transfer sit to and from stand with min A and a RW.        Start:  03/21/24    Expected End:  04/04/24               Safety       LTG - Patient will adhere to WB precautions during ADL's and transfers independently.        Start:  03/21/24    Expected End:  04/04/24                   Education Documentation  Precautions, taught by Nora Morton, PT at 3/21/2024 11:30 AM.  Learner: Patient  Readiness: Acceptance  Method: Explanation  Response: Verbalizes Understanding    Body Mechanics, taught by Nora Morton, PT at 3/21/2024 11:30 AM.  Learner: Patient  Readiness: Acceptance  Method: Explanation  Response: Verbalizes Understanding    Mobility Training, taught by Nora Morton, PT at 3/21/2024 11:30 AM.  Learner: Patient  Readiness: Acceptance  Method: Explanation  Response: Verbalizes Understanding    Education Comments  No comments found.

## 2024-03-21 NOTE — PROGRESS NOTES
Occupational Therapy    Evaluation    Patient Name: Rene Ballard  MRN: [id number]  Today's Date: 3/21/2024  Time Calculation  Start Time: 1010  Stop Time: 1029  Time Calculation (min): 19 min    Assessment  IP OT Assessment  OT Assessment: Pt seen for OT eval Pt demonstrates with decreased B UE strength, assist with mobility and ADLS  Prognosis: Good  Barriers to Discharge: None  Evaluation/Treatment Tolerance: Patient tolerated treatment well  Medical Staff Made Aware: Yes  End of Session Communication: Bedside nurse  End of Session Patient Position: Bed, 3 rail up, Alarm on  Plan:  Treatment Interventions: ADL retraining, Functional transfer training, UE strengthening/ROM  OT Frequency: 3 times per week  OT - OK to Discharge: Yes    Subjective   Current Problem:  1. Osteomyelitis of toe of right foot (CMS/HCC)        2. Osteomyelitis of right foot, unspecified type (CMS/HCC)  CANCELED: Case Request Operating Room: Amputation Foot    CANCELED: Case Request Operating Room: Amputation Foot      3. Bacteremia due to Staphylococcus aureus  Transthoracic Echo Complete    Transthoracic Echo Complete        General:  General  Reason for Referral: Frequent falls, R foot 4th metatarsal osteomyelitis  Referred By: Vicente Nino MD  Past Medical History Relevant to Rehab: DM, CAD, GERD Carotid stenosis, PVD, arthritis, OA B Knees bladder CA, R foot digit amputations, TMA R foot  Co-Treatment: PT  Co-Treatment Reason: co eval with PT to maximize pt mobility, safety and participation  Prior to Session Communication: Bedside nurse  Patient Position Received: Up in chair, Alarm on  Preferred Learning Style: auditory, visual, verbal, written  General Comment: pt agreeabe to OT eval. pleasant and cooperative  Precautions:  LE Weight Bearing Status: Right Non-Weight Bearing  Medical Precautions: Fall precautions  Precautions Comment: Pt with difficulty maintaining NWB status  Vital Signs:     Pain:  Pain Assessment  Pain  Assessment: 0-10  Pain Score: 0 - No pain    Objective   Cognition:  Overall Cognitive Status: Within Functional Limits  Orientation Level: Oriented X4  Following Commands: Follows all commands and directions without difficulty  Attention: Within Functional Limits  Memory: Within Funtional Limits  Insight: Within function limits  Impulsive: Within functional limits           Home Living:  Type of Home: House  Lives With: Alone  Home Adaptive Equipment: Walker rolling or standard, Cane  Home Layout: One level  Home Access: No concerns (1 small step to enter)  Bathroom Shower/Tub: Walk-in shower  Bathroom Toilet: Standard  Bathroom Equipment: Grab bars in shower, Built-in shower seat  Home Living Comments: Pt reports no difficulties at home. # falls prior to admit   Prior Function:  Level of [place]: Independent with ADLs and functional transfers, Independent with homemaking with ambulation  Receives Help From: Family  ADL Assistance: Independent  Homemaking Assistance: Independent  Ambulatory Assistance: Independent  Hand Dominance: Right  IADL History:  Homemaking Responsibilities: Yes  Meal Prep Responsibility: Primary  Laundry Responsibility: Primary  Cleaning Responsibility:  (has cleaning lady)  Bill Paying/Finance Responsibility: Primary  IADL Comments: independent  ADL:  Eating Assistance: Independent  Grooming Assistance: Independent  Bathing Assistance: Moderate  Bathing Deficit: Left lower leg including foot, Right lower leg including foot, Buttocks  UE Dressing Assistance: Independent  LE Dressing Deficit: Thread RLE into pants, Thread LLE into pants, Thread RLE into underwear, Thread LLE into underwear, Pull up over hips  Activity Tolerance:  Endurance: Endurance does not limit participation in activity  Bed Mobility/Transfers: Bed Mobility  Bed Mobility: Yes  Bed Mobility 1  Bed Mobility 1: Supine to sitting  Level of Assistance 1: Close supervision  Bed Mobility Comments 1: bed rail increased  effort anfd time    Transfers  Transfer: Yes  Transfer 1  Technique 1: Sit to stand, Stand to sit  Transfer Device 1: Walker  Transfer Level of Assistance 1: Moderate assistance, +2, Minimal verbal cues, Minimal tactile cues  Trials/Comments 1: cues for hand placement, R foot forward and of ground to prevent weight bearing  Transfers 2  Technique 2: Stand pivot  Transfer Device 2: Walker  Transfer Level of Assistance 2: Moderate assistance, +2, Moderate verbal cues, Moderate tactile cues  Trials/Comments 2: Pt unable to maintain NWB R LE. Pt reports difficulty to hop on L LE due to L knee buckling     Sitting Balance:  Static Sitting Balance  Static Sitting-Balance Support: Feet supported  Static Sitting-Level of Assistance: Independent  Dynamic Sitting Balance  Dynamic Sitting-Balance Support: Feet supported  Dynamic Sitting-Balance: Reaching for objects, Reaching across midline  Dynamic Sitting-Comments: independent  Modalities:     IADL's:   Homemaking Responsibilities: Yes  Meal Prep Responsibility: Primary  Laundry Responsibility: Primary  Cleaning Responsibility:  (has cleaning lady)  Bill Paying/Finance Responsibility: Primary  IADL Comments: independent  Vision: Vision - Basic Assessment  Current Vision: No visual deficits  Sensation:  Light Touch: No apparent deficits (reports decreased sensation R foot)  Sensation Comment: intact  Strength:  Strength Comments: B UE 4/5    Coordination:  Movements are Fluid and Coordinated: Yes  Finger to Nose: Intact  Rapid Alternating Movements: Intact  Coordination Comment: intact   Hand Function:  Hand Function  Gross Grasp: Functional  Coordination: Functional  Extremities: RUE   RUE : Within Functional Limits and LUE   LUE: Within Functional Limits    Outcome Measures: [facility] Daily Activity  Putting on and taking off regular lower body clothing: A lot  Bathing (including washing, rinsing, drying): A lot  Putting on and taking off regular upper body clothing:  None  Toileting, which includes using toilet, bedpan or urinal: A lot  Taking care of personal grooming such as brushing teeth: None  Eating Meals: None  Daily Activity - Total Score: 18      Education Documentation  Handouts, taught by Isela Freeman OT at 3/21/2024 11:40 AM.  Learner: Patient  Readiness: Acceptance  Method: Explanation, Demonstration  Response: Verbalizes Understanding, Needs Reinforcement    Precautions, taught by Isela Fereman OT at 3/21/2024 11:40 AM.  Learner: Patient  Readiness: Acceptance  Method: Explanation, Demonstration  Response: Verbalizes Understanding, Needs Reinforcement    Home Exercise Program, taught by Isela Freeman OT at 3/21/2024 11:40 AM.  Learner: Patient  Readiness: Acceptance  Method: Explanation, Demonstration  Response: Verbalizes Understanding, Needs Reinforcement    ADL Training, taught by Isela Freeman OT at 3/21/2024 11:40 AM.  Learner: Patient  Readiness: Acceptance  Method: Explanation, Demonstration  Response: Verbalizes Understanding, Needs Reinforcement    Education Comments  No comments found.      Goals:   Encounter Problems       Encounter Problems (Active)       ADLs       Patient will perform LB bathing 100% with modified independent level of assistance and adaptive equipment prn . (Progressing)       Start:  03/21/24    Expected End:  04/04/24            Patient with complete lower body dressing with modified independent level of assistance donning and doffing all LE clothes  with PRN adaptive equipment while supported sitting (Progressing)       Start:  03/21/24    Expected End:  04/04/24               COGNITION/SAFETY       Patient will recall and adhere to weight bearing and /or ROM restrictions with all ADL and functional mobility in order to promote healing and safety with functional tasks (Progressing)       Start:  03/21/24    Expected End:  04/04/24               EXERCISE/STRENGTHENING       Patient will complete BUE  exercises for 3 sets and 10 reps in order to improve strength and activity for ADL performance.  (Progressing)       Start:  03/21/24    Expected End:  04/04/24               TRANSFERS       Patient will perform bed mobility modified independent level of assistance and bed rails in order to improve safety and independence with mobility (Progressing)       Start:  03/21/24    Expected End:  04/04/24            Patient will complete functional transfer to chair  with front wheeled walker with minimal assist  level of assistance. (Progressing)       Start:  03/21/24    Expected End:  04/04/24

## 2024-03-21 NOTE — PROGRESS NOTES
Rene Ballard is a 89 y.o. male on day 2 of admission presenting with Osteomyelitis of toe of right foot (CMS/HCC).      Subjective   Patient seen at bedside with brother present. Denies any chest pain, palpitations, SOB, HA, fever, chills, sweats. Patient states his last BM was Monday, and he has no urge to go and no stomach pain or cramping. States he feels bloated.        Objective     Last Recorded Vitals  BP (!) 106/48 (BP Location: Right arm, Patient Position: Lying)   Pulse 54   Temp 36.9 °C (98.4 °F) (Temporal)   Resp 16   Wt 72.6 kg (160 lb)   SpO2 98%   Intake/Output last 3 Shifts:    Intake/Output Summary (Last 24 hours) at 3/21/2024 1302  Last data filed at 3/21/2024 0700  Gross per 24 hour   Intake --   Output 1000 ml   Net -1000 ml       Admission Weight  Weight: 72.6 kg (160 lb) (03/19/24 1701)    Daily Weight  03/20/24 : 72.6 kg (160 lb)    Image Results      Physical Exam  Constitutional:       Appearance: Normal appearance.   Eyes:      Extraocular Movements: Extraocular movements intact.   Cardiovascular:      Rate and Rhythm: Normal rate and regular rhythm.   Pulmonary:      Effort: Pulmonary effort is normal.      Breath sounds: Normal breath sounds.   Abdominal:      General: Bowel sounds are normal. There is no distension.      Palpations: Abdomen is soft.      Tenderness: There is no abdominal tenderness. There is no guarding.   Musculoskeletal:      Left shoulder: Decreased range of motion. Decreased strength (Strength 4/5).      Comments: Dressing on right foot is clean and dry. No redness or swelling visible surrounding dressing. Unable to check capillary refill due to dressing. Dressing was not removed due to podiatry following.    Skin:     General: Skin is warm and dry.   Neurological:      General: No focal deficit present.      Mental Status: He is alert.   Psychiatric:         Mood and Affect: Mood is anxious (understandably upset/anxious reguarding possibility of  amputation).         Relevant Results           Scheduled medications  acetaminophen, 975 mg, oral, TID  ampicillin-sulbactam, 3 g, intravenous, q6h  ascorbic acid, 1,000 mg, oral, Daily  cholecalciferol, 5,000 Units, oral, Daily  clopidogrel, 75 mg, oral, Daily  cyanocobalamin, 500 mcg, oral, Daily  docusate sodium, 100 mg, oral, BID  dorzolamide-timoloL, 1 drop, Left Eye, BID  enalapril, 2.5 mg, oral, Daily  enoxaparin, 40 mg, subcutaneous, q24h  insulin glargine, 25 Units, subcutaneous, Nightly  insulin lispro, 0-10 Units, subcutaneous, TID with meals  latanoprost, 1 drop, Both Eyes, Daily  lidocaine, 2 patch, transdermal, Daily  metoprolol tartrate, 12.5 mg, oral, BID  multivitamin with minerals, 1 tablet, oral, Daily  nepafenac, 1 drop, Both Eyes, TID  niacin, 500 mg, oral, Daily  pantoprazole, 40 mg, oral, Daily before breakfast  tamsulosin, 0.4 mg, oral, Daily  vancomycin, 750 mg, intravenous, q12h      Continuous medications     PRN medications  PRN medications: alum-mag hydroxide-simeth, dextrose, dextrose, glucagon, melatonin, polyethylene glycol, vancomycin  Results for orders placed or performed during the hospital encounter of 03/19/24 (from the past 24 hour(s))   POCT GLUCOSE   Result Value Ref Range    POCT Glucose 243 (H) 74 - 99 mg/dL   Basic Metabolic Panel   Result Value Ref Range    Glucose 134 (H) 74 - 99 mg/dL    Sodium 137 136 - 145 mmol/L    Potassium 3.5 3.5 - 5.3 mmol/L    Chloride 105 98 - 107 mmol/L    Bicarbonate 23 21 - 32 mmol/L    Anion Gap 13 10 - 20 mmol/L    Urea Nitrogen 17 6 - 23 mg/dL    Creatinine 0.93 0.50 - 1.30 mg/dL    eGFR 78 >60 mL/min/1.73m*2    Calcium 8.2 (L) 8.6 - 10.3 mg/dL   CBC   Result Value Ref Range    WBC 4.7 4.4 - 11.3 x10*3/uL    nRBC 0.0 0.0 - 0.0 /100 WBCs    RBC 4.06 (L) 4.50 - 5.90 x10*6/uL    Hemoglobin 12.6 (L) 13.5 - 17.5 g/dL    Hematocrit 39.4 (L) 41.0 - 52.0 %    MCV 97 80 - 100 fL    MCH 31.0 26.0 - 34.0 pg    MCHC 32.0 32.0 - 36.0 g/dL    RDW 13.2  11.5 - 14.5 %    Platelets 182 150 - 450 x10*3/uL   POCT GLUCOSE   Result Value Ref Range    POCT Glucose 137 (H) 74 - 99 mg/dL   Blood Culture    Specimen: Peripheral Venipuncture; Blood culture   Result Value Ref Range    Blood Culture Loaded on Instrument - Culture in progress    Blood Culture    Specimen: Peripheral Venipuncture; Blood culture   Result Value Ref Range    Blood Culture Loaded on Instrument - Culture in progress    POCT GLUCOSE   Result Value Ref Range    POCT Glucose 273 (H) 74 - 99 mg/dL   Transthoracic Echo Complete   Result Value Ref Range    AV pk devi 2.48 m/s    AV mn grad 14.6 mmHg    LVOT diam 2.10 cm    LV biplane EF 64 %    MV avg E/e' ratio 4.61     MV E/A ratio 0.47     LA vol index A/L 32.9 ml/m2    Tricuspid annular plane systolic excursion 1.3 cm    LVIDd 4.39 cm    RVSP 20.3 mmHg    Aortic Valve Area by Continuity of VTI 1.26 cm2    Aortic Valve Area by Continuity of Peak Velocity 1.18 cm2    AV pk grad 24.6 mmHg    LV A4C EF 55.7    POCT GLUCOSE   Result Value Ref Range    POCT Glucose 244 (H) 74 - 99 mg/dL     Transthoracic Echo Complete    Result Date: 3/21/2024   [facility], [address] [place] [address]              Tel [phone] and Fax [phone] TRANSTHORACIC ECHOCARDIOGRAM REPORT  Patient Name:      MAYELIN OLSON     [place] Physician:    [id number] Darien Padron MD Study Date:        3/21/2024            Ordering Provider:    [id number] LOUISA CARDOZO MRN/PID:           [id number]             Fellow: Accession#:        [id number]         Nurse: Date of Birth/Age: 11/8/1934 / 89 years Sonographer:          Consuelo Mosquera RDCS Gender:            M                    Additional Staff: Height:            178.00 cm            Admit Date:           3/21/2024 Weight:            73.00 kg             Admission Status:     Inpatient -                                                               Routine  BSA / BMI:         1.90 m2 / 23.04      Encounter#:           [id number]                    kg/m2                                         Department Location:  [facility] Non                                                               Invasive Blood Pressure: 106 /48 mmHg Study Type:    TRANSTHORACIC ECHO (TTE) COMPLETE Diagnosis/ICD: Encounter for other specified special examinations-Z01.89 Indication:    bacteremia due to staphylcoccus aureus CPT Code:      Echo Complete w Full Doppler-[id number] Patient History: Diabetes:          Yes Pertinent History: CAD, HTN and Hyperlipidemia. Study Detail: The following Echo studies were performed: 2D, M-Mode, Doppler and               color flow.  PHYSICIAN INTERPRETATION: Left Ventricle: The left ventricular systolic function is normal, with an estimated ejection fraction of 60-65%. Estimated left ventricular mass is normal. There are no regional wall motion abnormalities. The left ventricular cavity size is normal. The left ventricular septal wall thickness is normal. There is normal left ventricular posterior wall thickness. Spectral Doppler shows an impaired relaxation pattern of left ventricular diastolic filling. Left Atrium: The left atrium is mildly dilated. Right Ventricle: The right ventricle is normal in size. There is mildly reduced right ventricular systolic function. TAPSE: 13.0 mm. Right Atrium: The right atrium is upper limits of normal in size. Aortic Valve: The aortic valve appears structurally normal. The aortic valve appears tricuspid with restriction. There is moderate aortic valve cusp calcification. There is evidence of mild to moderate aortic valve stenosis. The aortic valve dimensionless index is 0.37. There is no evidence of aortic valve regurgitation. The peak instantaneous gradient of the aortic valve is 24.6 mmHg. The mean gradient of the aortic valve is 14.6 mmHg. Mitral Valve: The mitral valve is mildly thickened. There is mild to moderate  mitral annular calcification. There is no evidence of mitral valve regurgitation. Tricuspid Valve: The tricuspid valve is structurally normal. There is mild tricuspid regurgitation. Pulmonic Valve: The pulmonic valve is not well visualized. There is no indication of pulmonic valve regurgitation. Pericardium: There is no pericardial effusion noted. Aorta: The aortic root is normal. Systemic Veins: The inferior vena cava appears to be of normal size. There is IVC inspiratory collapse greater than 50%. In comparison to the previous echocardiogram(s): Compared with study from 6/13/2023, no significant change.  CONCLUSIONS:  1. Left ventricular systolic function is normal with a 60-65% estimated ejection fraction.  2. Spectral Doppler shows an impaired relaxation pattern of left ventricular diastolic filling.  3. There is mildly reduced right ventricular systolic function.  4. Mild to moderate aortic valve stenosis.  5. The aortic valve appears tricuspid with restriction.  6. There is moderate aortic valve cusp calcification.  7. No definite valvular vegetations were visualized. QUANTITATIVE DATA SUMMARY: 2D MEASUREMENTS:                           Normal Ranges: LAs:           4.10 cm    (2.7-4.0cm) IVSd:          1.13 cm    (0.6-1.1cm) LVPWd:         1.05 cm    (0.6-1.1cm) LVIDd:         4.39 cm    (3.9-5.9cm) LVIDs:         3.21 cm LV Mass Index: 113.4 g/m2 LV % FS        26.9 % LA VOLUME:                               Normal Ranges: LA Vol A4C:        71.6 ml    (22+/-6mL/m2) LA Vol A2C:        54.0 ml LA Vol BP:         62.7 ml LA Vol Index A4C:  37.6 ml/m2 LA Vol Index A2C:  28.4 ml/m2 LA Vol Index BP:   32.9 ml/m2 LA Area A4C:       22.3 cm2 LA Area A2C:       19.2 cm2 LA Major Axis A4C: 5.9 cm LA Major Axis A2C: 5.8 cm LA Volume Index:   33.2 ml/m2 LA Vol A4C:        66.4 ml LA Vol A2C:        51.2 ml RA VOLUME BY A/L METHOD:                       Normal Ranges: RA Area A4C: 20.6 cm2 M-MODE MEASUREMENTS:                   Normal Ranges: Ao Root: 2.80 cm (2.0-3.7cm) LAs:     4.59 cm (2.7-4.0cm) AORTA MEASUREMENTS:                      Normal Ranges: Ao Sinus, d: 3.00 cm (2.1-3.5cm) Ao STJ, d:   3.10 cm (1.7-3.4cm) Asc Ao, d:   3.10 cm (2.1-3.4cm) LV SYSTOLIC FUNCTION BY 2D PLANIMETRY (MOD):                     Normal Ranges: EF-A4C View: 55.7 % (>=55%) EF-A2C View: 69.3 % EF-Biplane:  64.4 % LV DIASTOLIC FUNCTION:                              Normal Ranges: MV Peak E:        0.41 m/s   (0.7-1.2 m/s) MV Peak A:        0.89 m/s   (0.42-0.7 m/s) E/A Ratio:        0.47       (1.0-2.2) MV e'             0.09 m/s   (>8.0) MV lateral e'     0.09 m/s MV medial e'      0.03 m/s E/e' Ratio:       4.61       (<8.0) PulmV Sys Simon:    62.19 cm/s PulmV Odonnell Simon:   60.35 cm/s PulmV S/D Simon:    1.03 PulmV A Revs Simon: 21.90 cm/s PulmV A Revs Dur: 98.95 msec MITRAL VALVE:                 Normal Ranges: MV DT: 186 msec (150-240msec) AORTIC VALVE:                                    Normal Ranges: AoV Vmax:                2.48 m/s  (<=1.7m/s) AoV Peak P.6 mmHg (<20mmHg) AoV Mean P.6 mmHg (1.7-11.5mmHg) LVOT Max Simon:            0.85 m/s  (<=1.1m/s) AoV VTI:                 58.33 cm  (18-25cm) LVOT VTI:                21.37 cm LVOT Diameter:           2.10 cm   (1.8-2.4cm) AoV Area, VTI:           1.26 cm2  (2.5-5.5cm2) AoV Area,Vmax:           1.18 cm2  (2.5-4.5cm2) AoV Area, planim:        1.09 cm2  (2.5-4.5cm2) AoV Dimensionless Index: 0.37  RIGHT VENTRICLE: RV Basal 3.50 cm RV Mid   2.60 cm RV Major 6.7 cm TAPSE:   13.0 mm TRICUSPID VALVE/RVSP:                             Normal Ranges: Peak TR Velocity: 2.08 m/s Est. RA Pressure: 3 mmHg RV Syst Pressure: 20.3 mmHg (< 30mmHg) IVC Diam:         1.85 cm PULMONIC VALVE:                         Normal Ranges: PV Accel Time: 93 msec  (>120ms) PV Max Simon:    1.2 m/s  (0.6-0.9m/s) PV Max P.4 mmHg Pulmonary Veins: PulmV A Revs Dur: 98.95 msec PulmV A Revs Simon:  21.90 cm/s PulmV Odonnell Simon:   60.35 cm/s PulmV S/D Simon:    1.03 PulmV Sys Simon:    62.19 cm/s  [id number] Darien Padron MD Electronically signed on 3/21/2024 at 6:18:04 PM  ** Final **     XR knee left 4+ views    Result Date: 3/20/2024  Interpreted By:  Mauri Crow, STUDY: XR KNEE LEFT 4+ VIEWS;  3/20/2024 12:13 pm   INDICATION: Signs/Symptoms:left knee pain.   COMPARISON: Previous exam from 05/20/2022   ACCESSION NUMBER(S): [id number]   ORDERING CLINICIAN: CAMELIA SHERIDAN   TECHNIQUE: 4 views  of the  left knee were obtained.   FINDINGS: Medial and lateral meniscal chondrocalcinosis. Advanced patellofemoral joint space loss with spur formation. Large patellar osteophyte at the insertion of the quadriceps tendon. Moderate to advanced medial compartment narrowing with moderate spur formation. Mild lateral compartment spurring. Mild fullness in the suprapatellar bursa. Prominent posterior arterial calcifications. No lytic or blastic destructive bone lesion. No acute fracture or dislocation. No opaque soft tissue foreign body. No periosteal reaction or erosion.       Small effusion. No acute fracture or dislocation.   Meniscal chondrocalcinosis.   Advanced left knee DJD as described.   MACRO: None   Signed by: Mauri Crow 3/20/2024 1:13 PM Dictation workstation:   [id number]    ECG 12 lead    Result Date: 3/20/2024  Sinus rhythm with 1st degree AV block Right bundle branch block Left anterior fascicular block  Bifascicular block  Abnormal ECG When compared with ECG of 17-JUN-2023 02:24, Previous ECG has undetermined rhythm, needs review Borderline criteria for Lateral infarct are no longer Present QT has shortened See ED provider note for full interpretation and clinical correlation Confirmed by Sadie Pierre ([id number]) on 3/20/2024 10:59:27 AM    CT foot right wo IV contrast    Result Date: 3/19/2024  STUDY: CT Extremity; Completed Time:  3/19/2024 8:34 PM. INDICATION: Evaluate for necrotizing soft tissue infection /  osteomyelitis. COMPARISON: XR right foot 3/19/2024;  MR right foot 6/10/2023. ACCESSION NUMBER(S): [id number] ORDERING CLINICIAN: ÓSCAR GORDON TECHNIQUE:  Thin section axial images were obtained through the right foot without intravenous contrast.  Orthogonal reconstructed images were obtained and reviewed.  Automated mA/kV exposure control was utilized and patient examination was performed in strict accordance with principles of ALARA. FINDINGS:  There is redemonstration of transmetatarsal amputation of the forefoot with resection of the fifth digit.  There is diffuse osteopenia.  There is ulceration along the lateral margin of the forefoot with induration, edema and soft tissue swelling of the forefoot extending into the ankle compatible with extensive soft tissue infection.  There is cortical erosion and periosteal reaction along the plantar and lateral margin of the fourth metatarsal bone compatible with acute osteomyelitis with small locule of gas along the plantar margin (image 245 series 301).  There is fragmentation and periosteal reaction extending into the base of the fourth metatarsal bone.  There are mild-to-moderate degenerative changes.  Midfoot alignment is maintained.  Talonavicular joint and calcaneocuboid joint alignment is maintained.  Ankle mortise and talar dome are intact. There are vascular calcifications.  There is edema of the intrinsic muscles of the foot.    Redemonstration transmetatarsal amputation of the forefoot with amputation of the fifth digit.  Large area of ulceration along the lateral aspect of the forefoot with acute osteomyelitis of the fourth metatarsal bone predominantly along the lateral and plantar margin with small locule gas, extensive soft tissue swelling and edema compatible with soft tissue infection extending from the forefoot through the level of the ankle. Signed by Easton Baron DO    CT cervical spine wo IV contrast    Result Date:  3/19/2024  Interpreted By:  Rell Jewell, STUDY: CT CERVICAL SPINE WO IV CONTRAST;  3/19/2024 8:32 pm   INDICATION: Signs/Symptoms:Fall with head strike.   COMPARISON: None.   ACCESSION NUMBER(S): [id number]   ORDERING CLINICIAN: ÓSCAR GORDON   TECHNIQUE: Axial noncontrast images of the cervical spine with coronal and sagittal reconstructed images.   FINDINGS: ALIGNMENT: Normal. VERTEBRAE: No acute fracture. There is loss of disc height and anterior osteophyte formation at multiple levels worse at C5-6, C6-7 and C7-T1. Facet and uncovertebral hypertrophic changes cause moderate to severe bilateral neural foraminal narrowing at multiple levels. Degenerative changes at the atlantodental interval. SPINAL CANAL: No critical spinal canal stenosis. Disc spur complex at multiple levels most severe at C6-7 where there is moderate stenosis. PREVERTEBRAL SOFT TISSUES: No prevertebral soft tissue swelling. LUNG APICES: Imaged portion of the lung apices are within normal limits.   OTHER FINDINGS: Atherosclerotic calcification of the carotid vessels.       No acute fracture or traumatic subluxation of the cervical spine.   Multilevel discogenic degenerative changes as noted above.   MACRO: None   Signed by: Rell Jewell 3/19/2024 9:03 PM Dictation workstation:   [id number]    CT head wo IV contrast    Result Date: 3/19/2024  Interpreted By:  Rell Jewell, STUDY: CT HEAD WO IV CONTRAST;  3/19/2024 8:32 pm   INDICATION: Signs/Symptoms:Fall with head strike.   COMPARISON: CT scan of the head 06/07/2023   ACCESSION NUMBER(S): [id number]   ORDERING CLINICIAN: ÓSCAR GORDON   TECHNIQUE: Axial noncontrast CT images of the head.   FINDINGS: BRAIN PARENCHYMA: Gray-white matter interfaces are preserved. No mass, mass effect or midline shift. Moderate deep and periventricular white matter hypodensities are nonspecific, but favored to represent chronic small vessel ischemic changes.   HEMORRHAGE: No acute intracranial  hemorrhage. VENTRICLES and EXTRA-AXIAL SPACES: Moderate volume loss with prominence of the ventricles and sulci. EXTRACRANIAL SOFT TISSUES: Within normal limits. PARANASAL SINUSES/MASTOIDS: The visualized paranasal sinuses and mastoid air cells are aerated. CALVARIUM: No depressed skull fracture. No destructive osseous lesion.   OTHER FINDINGS: Atherosclerotic calcification of the carotid siphons and vertebral arteries..       No acute intracranial abnormality.   Chronic changes as noted above.   MACRO: None   Signed by: Rell Jewell 3/19/2024 8:59 PM Dictation workstation:   [id number]    XR shoulder left 2+ views    Result Date: 3/19/2024  STUDY: Shoulder Radiographs; 3/19/2024, 7:59PM INDICATION: Left shoulder pain. COMPARISON: None Available. ACCESSION NUMBER(S): [id number] ORDERING CLINICIAN: ÓSCAR GORDON TECHNIQUE:  Two view(s) of the left shoulder. FINDINGS:  There is no displaced fracture.  There is degenerative change in the acromioclavicular joint and narrowing of the subacromion space suggesting some rotator cuff thinning..  Alignment otherwise is unremarkable..    There is degenerative change in the acromioclavicular joint and narrowing of the subacromion space suggesting some rotator cuff thinning.  No fracture or dislocation is appreciated.. Signed by Samuel Brown MD    XR chest 1 view    Result Date: 3/19/2024  STUDY: Chest Radiograph;  3/19/67642 at 18:21 hours. INDICATION: Weakness and falls. COMPARISON: None Available ACCESSION NUMBER(S): [id number] ORDERING CLINICIAN: ÓSCAR GORDON TECHNIQUE:  Frontal chest was obtained at 18:21 hours. FINDINGS: CARDIOMEDIASTINAL SILHOUETTE: Patient is status post median sternotomy.  Heart size and mediastinal contours appear stable with prominence of the aortic arch.  There is minimal elevation left hemidiaphragm.  LUNGS: Lungs demonstrate chronic changes.  There is no pneumothorax or pleural effusion.  ABDOMEN: No remarkable upper abdominal  findings.  BONES: No acute osseous changes.    No acute cardiopulmonary disease. Chronic changes. Signed by Easton Baron DO    XR foot right 3+ views    Result Date: 3/19/2024  STUDY: Foot Radiographs; 3/19/2024, 6:22PM INDICATION: Right foot wound. COMPARISON: 6/10/2023 MRI Right Foot. ACCESSION NUMBER(S): [id number] ORDERING CLINICIAN: ÓSCAR GORDON TECHNIQUE:  Three view(s) of the right foot. FINDINGS:  There are postsurgical changes following transmetatarsal amputation of the forefoot and resection of the fifth metatarsal bone.  There is ulceration along the lateral aspect of the forefoot.  There is cortical irregularity along the lateral margin of the fifth metatarsal bone and periosteal reaction suggesting acute osteomyelitis.  There is also small focus of soft tissue gas/fistulous tract.  There is soft tissue swelling extending in the ankle.  Calcaneus is intact.  There are vascular calcifications.      Transmetatarsal amputation of the forefoot with resection the fifth digit.  Lateral forefoot ulceration with evidence of acute osteomyelitis in the fourth metatarsal bone with periosteal reaction and soft tissue swelling.  Small focus of soft tissue gas/fistulous tract.  Recommend repeat MRI or CT for further evaluation. Signed by Easton Baron DO       Assessment/Plan        Rene Ballard is a 89 y.o. male presenting post-Fall (Fell off toilet, has mutliple sites with pain, shoulder arm, knees). Patient has history of insulin-dependent diabetes, CAD with CABG x 3 off pump 3/2/2022 (LIMA-LAD, SVG-OM, SVG-PDA), GERD, mild systolic dysfunction EF 40-45%, moderate L carotid stenosis 50-69%, and right leg PAD   Patient states that he has bad knees and shoulders, for the 3 to 4 days leading up to his fall he felt very unsteady on his feet. His left shoulder has been stiff with difficulty moving it. He also had been generally not feeling well, very tired, and not eating well prior to the fall.  Prior to coming to the ER he had a fall and then on the 19th he fell between the toilet and the wall when he was trying to get off the toilet. He attributes these falls to losing his balance. In ER work up noted there was concern for infection of his R fifth TMA site , pt started on antibiotics and admitted for further care     Osteomyelitis of toe of right foot (CMS/HCC)  Right leg PAD   Bacteremia   -Podiatry following: No plan for surgical intervention. Case request and NPO order cancelled. Now opting for conservative management over surgical management due to nutrition status. Dr. Ribeiro plans to utilize hyperbaric chamber at wound center at [place] to help with healing.  -Weightbearing: Nonweightbearing right lower extremity per podiatry.  -Wound Care: Betadine, 4 x 4, Kerlix.  Podiatry to change.  -ID following. Continue IV abx per ID  -Blood cultures: Gram positive cocci clusters. Repeat blood cultures taken today  -On IV abx (zosyn and vancomycin)  -ECHO was preformed and (-) for vegetation   -Wound culture obtained today at bedside.  +4 Gram positive and negative bacteria  -Prior PVR on 6/12/23 showed evidence of mild arterial occlusive disease in the right lower extremity at rest     Left shoulder pain, s/p fall   Knee pain, s/p fall   -reviewed imaging result, XRAYS negative for fx, showed degenerative disease in AC joint and effusion in knee   -Tylenol prn and lidocaine patch for pain    DM  -insulin glargine injection  -sliding scale insulin   -hypoglycemia protocol in place   -contributes to poor wound healing  -Last A1c on 3/19/24 was 7.3, but POCT glucose levels in hospital are uncontrolled (range from 130s-270s), continue to monitor blood glucose and consider change in dose of lantus     CAD with CABG  mild systolic dysfunction EF 40-45%  moderate L carotid stenosis 50-69%  -appears euvolemic on exam as no rails, edema, JVD   -stable  -continue plavix    GERD  -stable  -continue  pantoprazole    VTE prophylaxis: Lovenox    Constipation  -Colace has been ordered    Discharge planning  -PT/OT consulted, PT recommends high intensity   -considering SNF vs [facility]           Malnutrition Diagnosis Status: New  Malnutrition Diagnosis: Severe malnutrition related to chronic disease or condition  As Evidenced by: reported intake <75% estimated needs for > 1month, severe muscle & subcutaneous fat depletion.  I agree with the dietitian's malnutrition diagnosis.         Lacey Andrea PA-C

## 2024-03-21 NOTE — CONSULTS
"Vancomycin Dosing by Pharmacy- INITIAL    Rene Ballard is a 89 y.o. year old male who Pharmacy has been consulted for vancomycin dosing for other Bacteremia . Based on the patient's indication and renal status this patient will be dosed based on a goal AUC of 500-600.     Renal function is currently stable.    Visit Vitals  /61 (BP Location: Right arm, Patient Position: Lying)   Pulse 56   Temp 36.9 °C (98.4 °F) (Temporal)   Resp 18        Lab Results   Component Value Date    CREATININE 0.93 03/21/2024    CREATININE 0.92 03/20/2024    CREATININE 1.00 03/19/2024    CREATININE 1.13 11/07/2023        Patient weight is No results found for: \"PTWEIGHT\"    No results found for: \"CULTURE\"     I/O last 3 completed shifts:  In: 300 (4.1 mL/kg) [IV Piggyback:300]  Out: 650 (9 mL/kg) [Urine:650 (0.2 mL/kg/hr)]  Weight: 72.6 kg   [unfilled]    No results found for: \"PATIENTTEMP\"       Assessment/Plan     Patient will not be given a loading dose.  Will initiate vancomycin maintenance,  750 mg every 12 hours.    This dosing regimen is predicted by [employer] to result in the following pharmacokinetic parameters:  Regimen: 750 mg IV every 12 hours.  Start time: 08:32 on 03/20/2024  Exposure target: AUC24 (range)400-600 mg/L.hr   AUC24,ss: 580 mg/L.hr  Probability of AUC24 > 400: 87 %  Ctrough,ss: 19.9 mg/L  Probability of Ctrough,ss > 20: 49 %  Probability of nephrotoxicity ([facility] PIA 2009): 17 %    Follow-up level will be ordered on 3/22/2024 at 1400 unless clinically indicated sooner.  Will continue to monitor renal function daily while on vancomycin and order serum creatinine at least every 48 hours if not already ordered.  Follow for continued vancomycin needs, clinical response, and signs/symptoms of toxicity.       Michael Plummer, PharmD       "

## 2024-03-21 NOTE — PROGRESS NOTES
"Rene Ballard is a 89 y.o. male on day 2 of admission presenting with Osteomyelitis of toe of right foot (CMS/HCC).    Subjective   Pt seen at bedside with brother present  No overnight events.  Has been elevating foot on pillow.  Pt denies any constitutional symptoms.   No other pedal complaints at this time.     Physical Exam    Objective     REVIEW OF SYSTEMS  A 10+ point ROS was completed and otherwise negative except as noted above and per HPI.     Vasc: DP and PT pulses are faintly palpable bilateral. CFT is less than 3 seconds bilateral.  Skin temperature is warm to cool proximal to distal bilateral. No edema noted. Moderate periwound erythema.     Neuro:  Light touch is absent to the right foot.  Protective sensation is absent.  There is no clonus noted.  Denies any numbness, burning or tingling.      Derm: Nails 1-5 bilateral are intact. Skin is supple with normal texture and turgor noted. Webspaces are clean, dry and intact left foot.  Full-thickness ulceration noted to the right foot plantar aspect subfifth metatarsal.  Fibrotic base.  Purulent drainage noted.  Wound probes to bone.  Moderate periwound erythema noted.  Mild undermining proximally noted.  No tunneling.  Periwound maceration noted.     MSK: Muscle strength is 4/5 for all pedal groups tested.  Decreased ankle joint range of motion right.  No pain to palpation at feet B/L.  Right TMA noted.    Last Recorded Vitals  Blood pressure (!) 106/48, pulse 54, temperature 36.9 °C (98.4 °F), temperature source Temporal, resp. rate 16, height 1.778 m (5' 10\"), weight 72.6 kg (160 lb), SpO2 98 %.    Intake/Output last 3 Shifts:  I/O last 3 completed shifts:  In: 300 (4.1 mL/kg) [IV Piggyback:300]  Out: 650 (9 mL/kg) [Urine:650 (0.2 mL/kg/hr)]  Weight: 72.6 kg     Relevant Results  XR knee left 4+ views    Result Date: 3/20/2024  Interpreted By:  Mauri Crow, STUDY: XR KNEE LEFT 4+ VIEWS;  3/20/2024 12:13 pm   INDICATION: Signs/Symptoms:left knee " pain.   COMPARISON: Previous exam from 05/20/2022   ACCESSION NUMBER(S): [id number]   ORDERING CLINICIAN: CAMELIA SHERIDAN   TECHNIQUE: 4 views  of the  left knee were obtained.   FINDINGS: Medial and lateral meniscal chondrocalcinosis. Advanced patellofemoral joint space loss with spur formation. Large patellar osteophyte at the insertion of the quadriceps tendon. Moderate to advanced medial compartment narrowing with moderate spur formation. Mild lateral compartment spurring. Mild fullness in the suprapatellar bursa. Prominent posterior arterial calcifications. No lytic or blastic destructive bone lesion. No acute fracture or dislocation. No opaque soft tissue foreign body. No periosteal reaction or erosion.       Small effusion. No acute fracture or dislocation.   Meniscal chondrocalcinosis.   Advanced left knee DJD as described.   MACRO: None   Signed by: Mauri Crow 3/20/2024 1:13 PM Dictation workstation:   [id number]      CT foot right wo IV contrast    Result Date: 3/19/2024  STUDY: CT Extremity; Completed Time:  3/19/2024 8:34 PM. INDICATION: Evaluate for necrotizing soft tissue infection / osteomyelitis. COMPARISON: XR right foot 3/19/2024;  MR right foot 6/10/2023. ACCESSION NUMBER(S): [id number] ORDERING CLINICIAN: ÓSCAR GORDON TECHNIQUE:  Thin section axial images were obtained through the right foot without intravenous contrast.  Orthogonal reconstructed images were obtained and reviewed.  Automated mA/kV exposure control was utilized and patient examination was performed in strict accordance with principles of ALARA. FINDINGS:  There is redemonstration of transmetatarsal amputation of the forefoot with resection of the fifth digit.  There is diffuse osteopenia.  There is ulceration along the lateral margin of the forefoot with induration, edema and soft tissue swelling of the forefoot extending into the ankle compatible with extensive soft tissue infection.  There is cortical erosion and  periosteal reaction along the plantar and lateral margin of the fourth metatarsal bone compatible with acute osteomyelitis with small locule of gas along the plantar margin (image 245 series 301).  There is fragmentation and periosteal reaction extending into the base of the fourth metatarsal bone.  There are mild-to-moderate degenerative changes.  Midfoot alignment is maintained.  Talonavicular joint and calcaneocuboid joint alignment is maintained.  Ankle mortise and talar dome are intact. There are vascular calcifications.  There is edema of the intrinsic muscles of the foot.    Redemonstration transmetatarsal amputation of the forefoot with amputation of the fifth digit.  Large area of ulceration along the lateral aspect of the forefoot with acute osteomyelitis of the fourth metatarsal bone predominantly along the lateral and plantar margin with small locule gas, extensive soft tissue swelling and edema compatible with soft tissue infection extending from the forefoot through the level of the ankle. Signed by Easton Baron, DO    CT cervical spine wo IV contrast    Result Date: 3/19/2024  Interpreted By:  Rell Jewell, STUDY: CT CERVICAL SPINE WO IV CONTRAST;  3/19/2024 8:32 pm   INDICATION: Signs/Symptoms:Fall with head strike.   COMPARISON: None.   ACCESSION NUMBER(S): [id number]   ORDERING CLINICIAN: ÓSCAR GORDON   TECHNIQUE: Axial noncontrast images of the cervical spine with coronal and sagittal reconstructed images.   FINDINGS: ALIGNMENT: Normal. VERTEBRAE: No acute fracture. There is loss of disc height and anterior osteophyte formation at multiple levels worse at C5-6, C6-7 and C7-T1. Facet and uncovertebral hypertrophic changes cause moderate to severe bilateral neural foraminal narrowing at multiple levels. Degenerative changes at the atlantodental interval. SPINAL CANAL: No critical spinal canal stenosis. Disc spur complex at multiple levels most severe at C6-7 where there is moderate  stenosis. PREVERTEBRAL SOFT TISSUES: No prevertebral soft tissue swelling. LUNG APICES: Imaged portion of the lung apices are within normal limits.   OTHER FINDINGS: Atherosclerotic calcification of the carotid vessels.       No acute fracture or traumatic subluxation of the cervical spine.   Multilevel discogenic degenerative changes as noted above.   MACRO: None   Signed by: Rell Jewell 3/19/2024 9:03 PM Dictation workstation:   [id number]    CT head wo IV contrast    Result Date: 3/19/2024  Interpreted By:  Rell Jewell, STUDY: CT HEAD WO IV CONTRAST;  3/19/2024 8:32 pm   INDICATION: Signs/Symptoms:Fall with head strike.   COMPARISON: CT scan of the head 06/07/2023   ACCESSION NUMBER(S): [id number]   ORDERING CLINICIAN: ÓSCAR GORDON   TECHNIQUE: Axial noncontrast CT images of the head.   FINDINGS: BRAIN PARENCHYMA: Gray-white matter interfaces are preserved. No mass, mass effect or midline shift. Moderate deep and periventricular white matter hypodensities are nonspecific, but favored to represent chronic small vessel ischemic changes.   HEMORRHAGE: No acute intracranial hemorrhage. VENTRICLES and EXTRA-AXIAL SPACES: Moderate volume loss with prominence of the ventricles and sulci. EXTRACRANIAL SOFT TISSUES: Within normal limits. PARANASAL SINUSES/MASTOIDS: The visualized paranasal sinuses and mastoid air cells are aerated. CALVARIUM: No depressed skull fracture. No destructive osseous lesion.   OTHER FINDINGS: Atherosclerotic calcification of the carotid siphons and vertebral arteries..       No acute intracranial abnormality.   Chronic changes as noted above.   MACRO: None   Signed by: Rell Jewell 3/19/2024 8:59 PM Dictation workstation:   [id number]    XR shoulder left 2+ views    Result Date: 3/19/2024  STUDY: Shoulder Radiographs; 3/19/2024, 7:59PM INDICATION: Left shoulder pain. COMPARISON: None Available. ACCESSION NUMBER(S): [id number] ORDERING CLINICIAN: ÓSCAR GORDON TECHNIQUE:   Two view(s) of the left shoulder. FINDINGS:  There is no displaced fracture.  There is degenerative change in the acromioclavicular joint and narrowing of the subacromion space suggesting some rotator cuff thinning..  Alignment otherwise is unremarkable..    There is degenerative change in the acromioclavicular joint and narrowing of the subacromion space suggesting some rotator cuff thinning.  No fracture or dislocation is appreciated.. Signed by Samuel Brown MD    XR chest 1 view    Result Date: 3/19/2024  STUDY: Chest Radiograph;  3/19/19321 at 18:21 hours. INDICATION: Weakness and falls. COMPARISON: None Available ACCESSION NUMBER(S): [id number] ORDERING CLINICIAN: ÓSCAR GORDON TECHNIQUE:  Frontal chest was obtained at 18:21 hours. FINDINGS: CARDIOMEDIASTINAL SILHOUETTE: Patient is status post median sternotomy.  Heart size and mediastinal contours appear stable with prominence of the aortic arch.  There is minimal elevation left hemidiaphragm.  LUNGS: Lungs demonstrate chronic changes.  There is no pneumothorax or pleural effusion.  ABDOMEN: No remarkable upper abdominal findings.  BONES: No acute osseous changes.    No acute cardiopulmonary disease. Chronic changes. Signed by Easton Baron DO    XR foot right 3+ views    Result Date: 3/19/2024  STUDY: Foot Radiographs; 3/19/2024, 6:22PM INDICATION: Right foot wound. COMPARISON: 6/10/2023 MRI Right Foot. ACCESSION NUMBER(S): [id number] ORDERING CLINICIAN: ÓSCAR GORDON TECHNIQUE:  Three view(s) of the right foot. FINDINGS:  There are postsurgical changes following transmetatarsal amputation of the forefoot and resection of the fifth metatarsal bone.  There is ulceration along the lateral aspect of the forefoot.  There is cortical irregularity along the lateral margin of the fifth metatarsal bone and periosteal reaction suggesting acute osteomyelitis.  There is also small focus of soft tissue gas/fistulous tract.  There is soft tissue  swelling extending in the ankle.  Calcaneus is intact.  There are vascular calcifications.      Transmetatarsal amputation of the forefoot with resection the fifth digit.  Lateral forefoot ulceration with evidence of acute osteomyelitis in the fourth metatarsal bone with periosteal reaction and soft tissue swelling.  Small focus of soft tissue gas/fistulous tract.  Recommend repeat MRI or CT for further evaluation. Signed by Easton Baron, DO      Assessment/Plan   Principal Problem:    Osteomyelitis of toe of right foot (CMS/HCC)  Active Problems:    Osteomyelitis (CMS/HCC)    -Patient seen and examined at bedside. All findings discussed with patient.   -ID following. IV abx per ID  -PVRs last obtained 6/23.  Mild disease.  -Blood cultures: Gram positive cocci clusters.  -Wound culture obtained today at bedside.  +4 Gram positive and negative bacteria    -CRP and ESR elevated. No leukocytosis.  -Xray and CT read above.  Positive for OM of right fourth metatarsal.  -Dressings placed today consisting of Betadine, 4 x 4, Kerlix wrap.  -NO surgical intervention planned.   -Case request and NPO order canceled   -Encouraged patient to eat high protein meals  -Patient would likely benefit from rehab and re-nutrition  -Patient and brother have been informed and are agreeable; patient has preferred rehab facility   -Betadine wet to dry dressings ordered placed  -Podiatry will continue to follow  -Thank you for the consult     Diet: Per primary  DVT ppx: Per primary  Weightbearing: Nonweightbearing right lower extremity  Wound Care: Betadine, 4 x 4, Kerlix.  Podiatry to change.     Case has been discussed with attending, A&P above reflect tentative plan. Please await final signature from attending physician on service.    Jaspal Carlos, ALANNAH PGY-1  Please use Secure Chat if any questions    I saw and evaluated the patient. I personally obtained the key and critical portions of the history and physical exam or was  physically present for key and critical portions performed by the resident/fellow. I reviewed the resident/fellow's documentation and discussed the patient with the resident/fellow. I agree with the resident/fellow's medical decision making as documented in the note with the exception/addition of the following:    Dr. Whitley will see the patient tomorrow as well and discuss the plan, as he is more familiar with the patient's history. I do not see the need for any acute surgical intervention at this time. I discussed in detail with the patient and his brother and all questions answered to patient satisfaction.    I spent 30 minutes in the professional and overall care of this patient.    Robbie Islas DPM

## 2024-03-21 NOTE — PROGRESS NOTES
03/21/24 1431   Discharge Planning   Patient expects to be discharged to: [facility], will need FOC, pod has cancelled surgery at this time?  Will need updated care plan for foot wound

## 2024-03-22 LAB
ANION GAP SERPL CALC-SCNC: 12 MMOL/L (ref 10–20)
BACTERIA SPEC CULT: ABNORMAL
BACTERIA SPEC CULT: ABNORMAL
BUN SERPL-MCNC: 17 MG/DL (ref 6–23)
CALCIUM SERPL-MCNC: 7.7 MG/DL (ref 8.6–10.3)
CHLORIDE SERPL-SCNC: 105 MMOL/L (ref 98–107)
CO2 SERPL-SCNC: 23 MMOL/L (ref 21–32)
CREAT SERPL-MCNC: 1.01 MG/DL (ref 0.5–1.3)
EGFRCR SERPLBLD CKD-EPI 2021: 71 ML/MIN/1.73M*2
ERYTHROCYTE [DISTWIDTH] IN BLOOD BY AUTOMATED COUNT: 13.1 % (ref 11.5–14.5)
GLUCOSE BLD MANUAL STRIP-MCNC: 179 MG/DL (ref 74–99)
GLUCOSE BLD MANUAL STRIP-MCNC: 189 MG/DL (ref 74–99)
GLUCOSE BLD MANUAL STRIP-MCNC: 202 MG/DL (ref 74–99)
GLUCOSE SERPL-MCNC: 190 MG/DL (ref 74–99)
GRAM STN SPEC: ABNORMAL
GRAM STN SPEC: ABNORMAL
HCT VFR BLD AUTO: 33.9 % (ref 41–52)
HGB BLD-MCNC: 11.1 G/DL (ref 13.5–17.5)
MCH RBC QN AUTO: 31.1 PG (ref 26–34)
MCHC RBC AUTO-ENTMCNC: 32.7 G/DL (ref 32–36)
MCV RBC AUTO: 95 FL (ref 80–100)
NRBC BLD-RTO: 0 /100 WBCS (ref 0–0)
PLATELET # BLD AUTO: 174 X10*3/UL (ref 150–450)
POTASSIUM SERPL-SCNC: 3.5 MMOL/L (ref 3.5–5.3)
RBC # BLD AUTO: 3.57 X10*6/UL (ref 4.5–5.9)
SODIUM SERPL-SCNC: 136 MMOL/L (ref 136–145)
VANCOMYCIN TROUGH SERPL-MCNC: 29.1 UG/ML (ref 5–20)
WBC # BLD AUTO: 4.8 X10*3/UL (ref 4.4–11.3)

## 2024-03-22 PROCEDURE — 2500000001 HC RX 250 WO HCPCS SELF ADMINISTERED DRUGS (ALT 637 FOR MEDICARE OP): Performed by: FAMILY MEDICINE

## 2024-03-22 PROCEDURE — 97530 THERAPEUTIC ACTIVITIES: CPT | Mod: GP

## 2024-03-22 PROCEDURE — 80048 BASIC METABOLIC PNL TOTAL CA: CPT | Performed by: NURSE PRACTITIONER

## 2024-03-22 PROCEDURE — 80202 ASSAY OF VANCOMYCIN: CPT | Performed by: INTERNAL MEDICINE

## 2024-03-22 PROCEDURE — 82947 ASSAY GLUCOSE BLOOD QUANT: CPT

## 2024-03-22 PROCEDURE — 1100000001 HC PRIVATE ROOM DAILY

## 2024-03-22 PROCEDURE — 2500000004 HC RX 250 GENERAL PHARMACY W/ HCPCS (ALT 636 FOR OP/ED): Performed by: NURSE PRACTITIONER

## 2024-03-22 PROCEDURE — 2500000004 HC RX 250 GENERAL PHARMACY W/ HCPCS (ALT 636 FOR OP/ED): Performed by: INTERNAL MEDICINE

## 2024-03-22 PROCEDURE — 36415 COLL VENOUS BLD VENIPUNCTURE: CPT | Performed by: NURSE PRACTITIONER

## 2024-03-22 PROCEDURE — 2500000002 HC RX 250 W HCPCS SELF ADMINISTERED DRUGS (ALT 637 FOR MEDICARE OP, ALT 636 FOR OP/ED): Performed by: NURSE PRACTITIONER

## 2024-03-22 PROCEDURE — 85027 COMPLETE CBC AUTOMATED: CPT | Performed by: NURSE PRACTITIONER

## 2024-03-22 PROCEDURE — 97110 THERAPEUTIC EXERCISES: CPT | Mod: GP

## 2024-03-22 PROCEDURE — 2500000001 HC RX 250 WO HCPCS SELF ADMINISTERED DRUGS (ALT 637 FOR MEDICARE OP): Performed by: NURSE PRACTITIONER

## 2024-03-22 PROCEDURE — 2500000002 HC RX 250 W HCPCS SELF ADMINISTERED DRUGS (ALT 637 FOR MEDICARE OP, ALT 636 FOR OP/ED): Mod: MUE | Performed by: FAMILY MEDICINE

## 2024-03-22 PROCEDURE — 99233 SBSQ HOSP IP/OBS HIGH 50: CPT

## 2024-03-22 PROCEDURE — 2500000005 HC RX 250 GENERAL PHARMACY W/O HCPCS: Performed by: NURSE PRACTITIONER

## 2024-03-22 RX ADMIN — CYANOCOBALAMIN TAB 500 MCG 500 MCG: 500 TAB at 09:09

## 2024-03-22 RX ADMIN — AMPICILLIN SODIUM AND SULBACTAM SODIUM 3 G: 2; 1 INJECTION, POWDER, FOR SOLUTION INTRAMUSCULAR; INTRAVENOUS at 04:08

## 2024-03-22 RX ADMIN — METOPROLOL TARTRATE 12.5 MG: 25 TABLET, FILM COATED ORAL at 20:24

## 2024-03-22 RX ADMIN — LATANOPROST 1 DROP: 50 SOLUTION/ DROPS OPHTHALMIC at 20:25

## 2024-03-22 RX ADMIN — AMPICILLIN SODIUM AND SULBACTAM SODIUM 3 G: 2; 1 INJECTION, POWDER, FOR SOLUTION INTRAMUSCULAR; INTRAVENOUS at 14:55

## 2024-03-22 RX ADMIN — VANCOMYCIN HYDROCHLORIDE 750 MG: 750 INJECTION, SOLUTION INTRAVENOUS at 21:45

## 2024-03-22 RX ADMIN — DORZOLAMIDE HYDROCHLORIDE AND TIMOLOL MALEATE 1 DROP: 22.3; 6.8 SOLUTION/ DROPS OPHTHALMIC at 09:16

## 2024-03-22 RX ADMIN — INSULIN GLARGINE 25 UNITS: 100 INJECTION, SOLUTION SUBCUTANEOUS at 20:28

## 2024-03-22 RX ADMIN — OXYCODONE HYDROCHLORIDE AND ACETAMINOPHEN 1000 MG: 500 TABLET ORAL at 09:09

## 2024-03-22 RX ADMIN — LIDOCAINE 4% 2 PATCH: 40 PATCH TOPICAL at 09:08

## 2024-03-22 RX ADMIN — ACETAMINOPHEN 975 MG: 325 TABLET ORAL at 14:51

## 2024-03-22 RX ADMIN — ENOXAPARIN SODIUM 40 MG: 40 INJECTION SUBCUTANEOUS at 09:22

## 2024-03-22 RX ADMIN — ENALAPRIL MALEATE 2.5 MG: 5 TABLET ORAL at 09:09

## 2024-03-22 RX ADMIN — PANTOPRAZOLE SODIUM 40 MG: 40 TABLET, DELAYED RELEASE ORAL at 06:12

## 2024-03-22 RX ADMIN — INSULIN LISPRO 2 UNITS: 100 INJECTION, SOLUTION INTRAVENOUS; SUBCUTANEOUS at 12:00

## 2024-03-22 RX ADMIN — CLOPIDOGREL 75 MG: 75 TABLET ORAL at 09:08

## 2024-03-22 RX ADMIN — DORZOLAMIDE HYDROCHLORIDE AND TIMOLOL MALEATE 1 DROP: 22.3; 6.8 SOLUTION/ DROPS OPHTHALMIC at 20:25

## 2024-03-22 RX ADMIN — AMPICILLIN SODIUM AND SULBACTAM SODIUM 3 G: 2; 1 INJECTION, POWDER, FOR SOLUTION INTRAMUSCULAR; INTRAVENOUS at 09:09

## 2024-03-22 RX ADMIN — DOCUSATE SODIUM 100 MG: 100 CAPSULE, LIQUID FILLED ORAL at 09:09

## 2024-03-22 RX ADMIN — ACETAMINOPHEN 975 MG: 325 TABLET ORAL at 20:23

## 2024-03-22 RX ADMIN — INSULIN LISPRO 2 UNITS: 100 INJECTION, SOLUTION INTRAVENOUS; SUBCUTANEOUS at 08:00

## 2024-03-22 RX ADMIN — INSULIN LISPRO 2 UNITS: 100 INJECTION, SOLUTION INTRAVENOUS; SUBCUTANEOUS at 16:47

## 2024-03-22 RX ADMIN — TAMSULOSIN HYDROCHLORIDE 0.4 MG: 0.4 CAPSULE ORAL at 09:09

## 2024-03-22 RX ADMIN — ACETAMINOPHEN 975 MG: 325 TABLET ORAL at 09:08

## 2024-03-22 RX ADMIN — METOPROLOL TARTRATE 12.5 MG: 25 TABLET, FILM COATED ORAL at 09:09

## 2024-03-22 RX ADMIN — MULTIPLE VITAMINS W/ MINERALS TAB 1 TABLET: TAB at 09:09

## 2024-03-22 RX ADMIN — VANCOMYCIN HYDROCHLORIDE 750 MG: 750 INJECTION, SOLUTION INTRAVENOUS at 11:48

## 2024-03-22 RX ADMIN — AMPICILLIN SODIUM AND SULBACTAM SODIUM 3 G: 2; 1 INJECTION, POWDER, FOR SOLUTION INTRAMUSCULAR; INTRAVENOUS at 20:25

## 2024-03-22 RX ADMIN — Medication 500 MG: at 09:08

## 2024-03-22 RX ADMIN — DOCUSATE SODIUM 100 MG: 100 CAPSULE, LIQUID FILLED ORAL at 20:24

## 2024-03-22 ASSESSMENT — COGNITIVE AND FUNCTIONAL STATUS - GENERAL
STANDING UP FROM CHAIR USING ARMS: A LOT
HELP NEEDED FOR BATHING: A LOT
TOILETING: A LITTLE
CLIMB 3 TO 5 STEPS WITH RAILING: TOTAL
DRESSING REGULAR LOWER BODY CLOTHING: A LOT
STANDING UP FROM CHAIR USING ARMS: A LOT
MOVING TO AND FROM BED TO CHAIR: A LOT
TURNING FROM BACK TO SIDE WHILE IN FLAT BAD: A LITTLE
MOVING TO AND FROM BED TO CHAIR: A LOT
DAILY ACTIVITIY SCORE: 19
MOVING FROM LYING ON BACK TO SITTING ON SIDE OF FLAT BED WITH BEDRAILS: A LITTLE
WALKING IN HOSPITAL ROOM: TOTAL
CLIMB 3 TO 5 STEPS WITH RAILING: TOTAL
MOBILITY SCORE: 13
TURNING FROM BACK TO SIDE WHILE IN FLAT BAD: A LITTLE
MOBILITY SCORE: 12
WALKING IN HOSPITAL ROOM: TOTAL

## 2024-03-22 ASSESSMENT — PAIN SCALES - GENERAL
PAINLEVEL_OUTOF10: 0 - NO PAIN

## 2024-03-22 ASSESSMENT — PAIN - FUNCTIONAL ASSESSMENT
PAIN_FUNCTIONAL_ASSESSMENT: 0-10

## 2024-03-22 ASSESSMENT — PAIN SCALES - WONG BAKER: WONGBAKER_NUMERICALRESPONSE: NO HURT

## 2024-03-22 NOTE — PROGRESS NOTES
Physical Therapy    Physical Therapy Treatment    Patient Name: Rene Ballard  MRN: [id number]  Today's Date: 3/22/2024  Time Calculation  Start Time: 1359  Stop Time: 1422  Time Calculation (min): 23 min       Assessment/Plan   PT Assessment  End of Session Communication: Bedside nurse  Assessment Comment: PT treatment completed. Pt continues to show progression with transfers and increasing strength through functional activity. Pt would continue to benefit from high intensity level therapy on discharge.  End of Session Patient Position: Bed, 3 rail up, Alarm on     PT Plan  Treatment/Interventions: Bed mobility, Transfer training, Gait training, Balance training, Strengthening, Endurance training, Range of motion, Therapeutic exercise, Therapeutic activity, Home exercise program  PT Plan: Skilled PT  PT Frequency: 4 times per week  PT Discharge Recommendations: High intensity level of continued care  PT Recommended Transfer Status: Assist x2  PT - OK to Discharge: Yes (PT POC established.)      General Visit Information:   PT  Visit  PT Received On: 03/22/24  General  Reason for Referral: Frequent falls, R foot 4th metatarsal osteomyelitis  Referred By: Vicente Nino MD  Past Medical History Relevant to Rehab: DM, CAD, GERD Carotid stenosis, PVD, arthritis, OA B Knees bladder CA, R foot digit amputations, TMA R foot  Prior to Session Communication: Bedside nurse  Patient Position Received: Bed, 3 rail up, Alarm on  General Comment: Pt pleasant and agreeable to treatment.    Subjective   Precautions:  Precautions  LE Weight Bearing Status: Right Non-Weight Bearing  Medical Precautions: Fall precautions         Objective   Pain:  Pain Assessment  Pain Assessment: 0-10  Pain Score: 0 - No pain    Activity Tolerance:  Activity Tolerance  Endurance: Tolerates 10 - 20 min exercise with multiple rests (Fatigued after session.)  Treatments:  Therapeutic Exercise  Therapeutic Exercise Performed: Yes  Therapeutic Exercise  Activity 1: In supine: x10 SLR margarette with cues for correct for to improve quad/hip flexor strength/endurance. Pt holding breath during reps. Cues to correct.  Therapeutic Exercise Activity 2: In standing: x15 marches on the R LE to improve R hip flexor strength. x10 L heel raises to improve plantarflexor strength, UEs used to compensate due to weakness. x10 L sided mini-squats to improve both quad and UE strength. Breaks given between each set of exercises. Cues for proper form and technique.         Bed Mobility  Bed Mobility: Yes  Bed Mobility 1  Bed Mobility 1: Supine to sitting, Sitting to supine  Level of Assistance 1: Close supervision  Bed Mobility Comments 1: Increased time and effort to complete.    Ambulation/Gait Training  Ambulation/Gait Training Performed: No (Attempt to perform hop to gait however pt continues to be unable to complete. Practice scooting the L foot.)  Transfers  Transfer: Yes  Transfer 1  Technique 1: Sit to stand, Stand to sit  Transfer Device 1: Walker  Transfer Level of Assistance 1: Moderate assistance  Trials/Comments 1: Cues to scoot to the EOB, hand/foot placement, and anterior weight shifting. Cues to maintain R NWB. Decreased eccentric control when sitting. x4 reps performed.    Outcome Measures:  [facility] Basic Mobility  Turning from your back to your side while in a flat bed without using bedrails: A little  Moving from lying on your back to sitting on the side of a flat bed without using bedrails: A little  Moving to and from bed to chair (including a wheelchair): A lot  Standing up from a chair using your arms (e.g. wheelchair or bedside chair): A lot  To walk in hospital room: Total  Climbing 3-5 steps with railing: Total  Basic Mobility - Total Score: 12    Education Documentation  Precautions, taught by Nora Morton, PT at 3/22/2024  2:43 PM.  Learner: Patient  Readiness: Acceptance  Method: Explanation  Response: Verbalizes Understanding    Body Mechanics, taught by  Nora Morton, PT at 3/22/2024  2:43 PM.  Learner: Patient  Readiness: Acceptance  Method: Explanation  Response: Verbalizes Understanding    Mobility Training, taught by Nora Morton PT at 3/22/2024  2:43 PM.  Learner: Patient  Readiness: Acceptance  Method: Explanation  Response: Verbalizes Understanding    Education Comments  No comments found.        OP EDUCATION:       Encounter Problems       Encounter Problems (Active)       Balance       Pt will tolerate 10+ mins dynamic standing balance activities with min A or less.  (Progressing)       Start:  03/21/24    Expected End:  04/04/24               Mobility       STG - Patient will ambulate 20 ft with min A and a RW while maintaining WB precautions.  (Progressing)       Start:  03/21/24    Expected End:  04/04/24               PT Transfers       STG - Patient will perform bed mobility independently.  (Progressing)       Start:  03/21/24    Expected End:  04/04/24            STG - Patient will transfer sit to and from stand with min A and a RW.  (Progressing)       Start:  03/21/24    Expected End:  04/04/24               Safety       LTG - Patient will adhere to WB precautions during ADL's and transfers independently.  (Progressing)       Start:  03/21/24    Expected End:  04/04/24

## 2024-03-22 NOTE — PROGRESS NOTES
"  INFECTIOUS DISEASE DAILY PROGRESS NOTE    SUBJECTIVE:    No overnight events. No new complaints. Afebrile. No rash/itching/diarrhea. No surgery planned at this time as removal of 4th met would create more gait instability.    OBJECTIVE:  VITALS (Last 24 Hours)  /57 (BP Location: Right arm, Patient Position: Lying)   Pulse 57   Temp 36.9 °C (98.4 °F) (Temporal)   Resp 21   Ht 1.778 m (5' 10\")   Wt 72.6 kg (160 lb)   SpO2 98%   BMI 22.96 kg/m²     PHYSICAL EXAM:  Gen - NAD  Abd - soft, no ttp, BS present  Right Foot - s/p TMA with dressing present  Skin - no rash    ABX: IV Unasyn    LABS:  Lab Results   Component Value Date    WBC 4.8 03/22/2024    HGB 11.1 (L) 03/22/2024    HCT 33.9 (L) 03/22/2024    MCV 95 03/22/2024     03/22/2024     Lab Results   Component Value Date    GLUCOSE 190 (H) 03/22/2024    CALCIUM 7.7 (L) 03/22/2024     03/22/2024    K 3.5 03/22/2024    CO2 23 03/22/2024     03/22/2024    BUN 17 03/22/2024    CREATININE 1.01 03/22/2024     Results from last 72 hours   Lab Units 03/19/24  1810   ALK PHOS U/L 278*   BILIRUBIN TOTAL mg/dL 0.7   PROTEIN TOTAL g/dL 6.6   ALT U/L 26   AST U/L 27   ALBUMIN g/dL 3.1*     Estimated Creatinine Clearance: 50.9 mL/min (by C-G formula based on SCr of 1.01 mg/dL).      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 TTE is without signs of endocarditis.  Right TMA Site Wound Infection - Staph aureus in wound cx and mixed bacteria  Right 4th Metatarsal OM  DM II - A1C 7.6% 11/2023, increased risk for infection/poor wound healing with diabetes  PAD - last PVR 6/2023 with mild right disease     IV Vancomycin and IV Unasyn still. Sensis on Staph aureus in wound pending.    No further workup or treatment needed for CoNS in blood.    Plan will be for 6 weeks of IV abx to treat for OM in the foot.     Monitoring for adverse effects of abx such as " rash/itching/diarrhea.     Will follow. Thanks!    Berny Valenzuela MD  [facility]  Office #[phone]

## 2024-03-22 NOTE — PROGRESS NOTES
"Vancomycin Dosing by Pharmacy- FOLLOW UP    Rene Ballard is a 89 y.o. male for whom Pharmacy has been consulted to dose vancomycin for other (bacteremia) . Based on the patient's indication and renal status this patient is being dosed based on a goal AUC of 500-600.     Renal function is currently stable.    Current vancomycin dose: 750 mg given every 12 hours    Estimated vancomycin AUC on current dose: 760 mg/L.hr     Visit Vitals  /51 (BP Location: Right arm, Patient Position: Lying)   Pulse 56   Temp 37.1 °C (98.8 °F) (Temporal)   Resp 17        Lab Results   Component Value Date    CREATININE 1.01 03/22/2024    CREATININE 0.93 03/21/2024    CREATININE 0.92 03/20/2024    CREATININE 1.00 03/19/2024        Patient weight is No results found for: \"PTWEIGHT\"    No results found for: \"CULTURE\"     I/O last 3 completed shifts:  In: 480 (6.6 mL/kg) [P.O.:480]  Out: 630 (8.7 mL/kg) [Urine:630 (0.2 mL/kg/hr)]  Weight: 72.6 kg   [unfilled]    No results found for: \"PATIENTTEMP\"     Assessment/Plan    Above goal AUC.  Dose given 3/22/24 @ 1148, lab drawn at 1212.  Continue current regimen and redraw lab tomorrow AM.  The next level will be obtained on 3/23/24 at 0500. May be obtained sooner if clinically indicated.   Will continue to monitor renal function daily while on vancomycin and order serum creatinine at least every 48 hours if not already ordered.  Follow for continued vancomycin needs, clinical response, and signs/symptoms of toxicity.       Romana A Kuchta, PharmD           "

## 2024-03-22 NOTE — PROGRESS NOTES
03/22/24 1151   Discharge Planning   [facility] to [facility], [facility] can accept at this time, if ABX changes to dapto they would need to re-review, daughter is aware and would like to talk to [facility] about self pay for abx if it changes to dapto. Daughter is agreeable that [facility] and they were very happy with [facility] in the past.     Patient will need PICC/midline prior to dc and need ABX finalized, still waiting on final cultures.

## 2024-03-22 NOTE — PROGRESS NOTES
"Rene Ballard is a 89 y.o. male on day 3 of admission presenting with Osteomyelitis of toe of right foot (CMS/HCC).    Subjective   Patient seen and examined today at bedside.  Sister Blessing on the phone.  All findings reiterated with patient and sister.  Patient is in agreements with plan and agrees to go to the facility.  Sister is in agreements as well.  Case discussed with all teams including ID and IM and /social work.  Patient denies any constitutional symptoms and has no other pedal complaints at this time.       Objective     REVIEW OF SYSTEMS  Negative except for otherwise stated      Physical Exam    Objective:     Last Recorded Vitals  Blood pressure 119/51, pulse 56, temperature 37.1 °C (98.8 °F), temperature source Temporal, resp. rate 17, height 1.778 m (5' 10\"), weight 72.6 kg (160 lb), SpO2 97 %.    Vasc: DP and PT pulses are faintly palpable bilateral. CFT is less than 3 seconds bilateral.  Skin temperature is warm to cool proximal to distal bilateral. No edema noted. Moderate periwound erythema.     Neuro:  Light touch is absent to the right foot.  Protective sensation is absent.  There is no clonus noted.  Denies any numbness, burning or tingling.      Derm: Nails 1-5 bilateral are intact. Skin is supple with normal texture and turgor noted. Webspaces are clean, dry and intact left foot.  Full-thickness ulceration noted to the right foot plantar aspect subfifth metatarsal.  Fibrotic base.  Purulent drainage noted.  Wound probes to bone.  Moderate periwound erythema noted.  Mild undermining proximally noted.  No tunneling.  Periwound maceration noted.     MSK: Muscle strength is 4/5 for all pedal groups tested.  Decreased ankle joint range of motion right.  No pain to palpation at feet B/L.  Right TMA noted.          Intake/Output last 3 Shifts:  I/O last 3 completed shifts:  In: 480 (6.6 mL/kg) [P.O.:480]  Out: 630 (8.7 mL/kg) [Urine:630 (0.2 mL/kg/hr)]  Weight: 72.6 kg     Relevant " "Results    Lab Results   Component Value Date    WBC 4.8 03/22/2024    HGB 11.1 (L) 03/22/2024    HCT 33.9 (L) 03/22/2024    MCV 95 03/22/2024     03/22/2024       Lab Results   Component Value Date    GLUCOSE 190 (H) 03/22/2024    CALCIUM 7.7 (L) 03/22/2024     03/22/2024    K 3.5 03/22/2024    CO2 23 03/22/2024     03/22/2024    BUN 17 03/22/2024    CREATININE 1.01 03/22/2024       Lab Results   Component Value Date    HGBA1C 7.3 (H) 03/19/2024      Lab Results   Component Value Date    CRP 8.72 (H) 03/19/2024      Lab Results   Component Value Date    SEDRATE 47 (H) 03/19/2024     No components found for: \"CMP\"       Results from last 7 days   Lab Units 03/22/24  0535 03/20/24  1034 03/19/24  1810   SODIUM mmol/L 136   < > 130*   POTASSIUM mmol/L 3.5   < > 4.5   CHLORIDE mmol/L 105   < > 101   CO2 mmol/L 23   < > 20*   BUN mg/dL 17   < > 24*   CREATININE mg/dL 1.01   < > 1.00   CALCIUM mg/dL 7.7*   < > 8.6   MAGNESIUM mg/dL  --   --  1.90   BILIRUBIN TOTAL mg/dL  --   --  0.7   ALT U/L  --   --  26   AST U/L  --   --  27    < > = values in this interval not displayed.     Results from last 7 days   Lab Units 03/19/24  2252   COLOR U  Yellow   APPEARANCE U  Hazy*   PH U  5.0   SPEC GRAV UR  1.021   PROTEIN U mg/dL 30 (1+)*   BLOOD UR  NEGATIVE   NITRITE U  NEGATIVE   WBC UR /HPF 11-20*         IMAGING REVIEW:  Transthoracic Echo Complete    Result Date: 3/21/2024   [facility], [address] [place] [address]              Tel [phone] and Fax [phone] TRANSTHORACIC ECHOCARDIOGRAM REPORT  Patient Name:      MAYELIN OLSON     Reading Physician:    [id number] Darien Padron MD Study Date:        3/21/2024            Ordering Provider:    [id number] LOUISA CARDOZO MRN/PID:           [id number]             Fellow: Accession#:        [id number]         Nurse: Date of Birth/Age: 11/8/1934 / 89 years Sonographer:          " [facility] Gender:            M                    Additional Staff: Height:            178.00 cm            Admit Date:           3/21/2024 Weight:            73.00 kg             Admission Status:     Inpatient -                                                               Routine BSA / BMI:         1.90 m2 / 23.04      Encounter#:           [id number]                    kg/m2                                         Department Location:  [facility] Non                                                               Invasive Blood Pressure: 106 /48 mmHg Study Type:    TRANSTHORACIC ECHO (TTE) COMPLETE Diagnosis/ICD: Encounter for other specified special examinations-Z01.89 Indication:    bacteremia due to staphylcoccus aureus CPT Code:      Echo Complete w Full Doppler-53430 Patient History: Diabetes:          Yes Pertinent History: CAD, HTN and Hyperlipidemia. Study Detail: The following Echo studies were performed: 2D, M-Mode, Doppler and               color flow.  PHYSICIAN INTERPRETATION: Left Ventricle: The left ventricular systolic function is normal, with an estimated ejection fraction of 60-65%. Estimated left ventricular mass is normal. There are no regional wall motion abnormalities. The left ventricular cavity size is normal. The left ventricular septal wall thickness is normal. There is normal left ventricular posterior wall thickness. Spectral Doppler shows an impaired relaxation pattern of left ventricular diastolic filling. Left Atrium: The left atrium is mildly dilated. Right Ventricle: The right ventricle is normal in size. There is mildly reduced right ventricular systolic function. TAPSE: 13.0 mm. Right Atrium: The right atrium is upper limits of normal in size. Aortic Valve: The aortic valve appears structurally normal. The aortic valve appears tricuspid with restriction. There is moderate aortic valve cusp calcification. There is evidence of mild to moderate aortic valve stenosis. The  aortic valve dimensionless index is 0.37. There is no evidence of aortic valve regurgitation. The peak instantaneous gradient of the aortic valve is 24.6 mmHg. The mean gradient of the aortic valve is 14.6 mmHg. Mitral Valve: The mitral valve is mildly thickened. There is mild to moderate mitral annular calcification. There is no evidence of mitral valve regurgitation. Tricuspid Valve: The tricuspid valve is structurally normal. There is mild tricuspid regurgitation. Pulmonic Valve: The pulmonic valve is not well visualized. There is no indication of pulmonic valve regurgitation. Pericardium: There is no pericardial effusion noted. Aorta: The aortic root is normal. Systemic Veins: The inferior vena cava appears to be of normal size. There is IVC inspiratory collapse greater than 50%. In comparison to the previous echocardiogram(s): Compared with study from 6/13/2023, no significant change.  CONCLUSIONS:  1. Left ventricular systolic function is normal with a 60-65% estimated ejection fraction.  2. Spectral Doppler shows an impaired relaxation pattern of left ventricular diastolic filling.  3. There is mildly reduced right ventricular systolic function.  4. Mild to moderate aortic valve stenosis.  5. The aortic valve appears tricuspid with restriction.  6. There is moderate aortic valve cusp calcification.  7. No definite valvular vegetations were visualized. QUANTITATIVE DATA SUMMARY: 2D MEASUREMENTS:                           Normal Ranges: LAs:           4.10 cm    (2.7-4.0cm) IVSd:          1.13 cm    (0.6-1.1cm) LVPWd:         1.05 cm    (0.6-1.1cm) LVIDd:         4.39 cm    (3.9-5.9cm) LVIDs:         3.21 cm LV Mass Index: 113.4 g/m2 LV % FS        26.9 % LA VOLUME:                               Normal Ranges: LA Vol A4C:        71.6 ml    (22+/-6mL/m2) LA Vol A2C:        54.0 ml LA Vol BP:         62.7 ml LA Vol Index A4C:  37.6 ml/m2 LA Vol Index A2C:  28.4 ml/m2 LA Vol Index BP:   32.9 ml/m2 LA Area A4C:        22.3 cm2 LA Area A2C:       19.2 cm2 LA Major Axis A4C: 5.9 cm LA Major Axis A2C: 5.8 cm LA Volume Index:   33.2 ml/m2 LA Vol A4C:        66.4 ml LA Vol A2C:        51.2 ml RA VOLUME BY A/L METHOD:                       Normal Ranges: RA Area A4C: 20.6 cm2 M-MODE MEASUREMENTS:                  Normal Ranges: Ao Root: 2.80 cm (2.0-3.7cm) LAs:     4.59 cm (2.7-4.0cm) AORTA MEASUREMENTS:                      Normal Ranges: Ao Sinus, d: 3.00 cm (2.1-3.5cm) Ao STJ, d:   3.10 cm (1.7-3.4cm) Asc Ao, d:   3.10 cm (2.1-3.4cm) LV SYSTOLIC FUNCTION BY 2D PLANIMETRY (MOD):                     Normal Ranges: EF-A4C View: 55.7 % (>=55%) EF-A2C View: 69.3 % EF-Biplane:  64.4 % LV DIASTOLIC FUNCTION:                              Normal Ranges: MV Peak E:        0.41 m/s   (0.7-1.2 m/s) MV Peak A:        0.89 m/s   (0.42-0.7 m/s) E/A Ratio:        0.47       (1.0-2.2) MV e'             0.09 m/s   (>8.0) MV lateral e'     0.09 m/s MV medial e'      0.03 m/s E/e' Ratio:       4.61       (<8.0) PulmV Sys Simon:    62.19 cm/s PulmV Odonnell Simon:   60.35 cm/s PulmV S/D Simon:    1.03 PulmV A Revs Simon: 21.90 cm/s PulmV A Revs Dur: 98.95 msec MITRAL VALVE:                 Normal Ranges: MV DT: 186 msec (150-240msec) AORTIC VALVE:                                    Normal Ranges: AoV Vmax:                2.48 m/s  (<=1.7m/s) AoV Peak P.6 mmHg (<20mmHg) AoV Mean P.6 mmHg (1.7-11.5mmHg) LVOT Max Simon:            0.85 m/s  (<=1.1m/s) AoV VTI:                 58.33 cm  (18-25cm) LVOT VTI:                21.37 cm LVOT Diameter:           2.10 cm   (1.8-2.4cm) AoV Area, VTI:           1.26 cm2  (2.5-5.5cm2) AoV Area,Vmax:           1.18 cm2  (2.5-4.5cm2) AoV Area, planim:        1.09 cm2  (2.5-4.5cm2) AoV Dimensionless Index: 0.37  RIGHT VENTRICLE: RV Basal 3.50 cm RV Mid   2.60 cm RV Major 6.7 cm TAPSE:   13.0 mm TRICUSPID VALVE/RVSP:                             Normal Ranges: Peak TR Velocity: 2.08 m/s Est. RA  Pressure: 3 mmHg RV Syst Pressure: 20.3 mmHg (< 30mmHg) IVC Diam:         1.85 cm PULMONIC VALVE:                         Normal Ranges: PV Accel Time: 93 msec  (>120ms) PV Max Simon:    1.2 m/s  (0.6-0.9m/s) PV Max P.4 mmHg Pulmonary Veins: PulmV A Revs Dur: 98.95 msec PulmV A Revs Simon: 21.90 cm/s PulmV Odonnell Simon:   60.35 cm/s PulmV S/D Simon:    1.03 PulmV Sys Simon:    62.19 cm/s  [id number] Darien Padron MD Electronically signed on 3/21/2024 at 6:18:04 PM  ** Final **             ASSESSMENT & PLAN:  Assessment/Plan   Principal Problem:    Osteomyelitis of toe of right foot (CMS/HCC)  Active Problems:    Osteomyelitis (CMS/HCC)      -Patient seen and examined at bedside. All findings discussed with patient.   -ID following. IV abx per ID  -PVRs last obtained .  Mild disease.  -Blood cultures: Gram positive cocci clusters.  -Wound culture +4 Gram positive and negative bacteria  -CRP and ESR elevated. No leukocytosis.  -Xray and CT read above.  Positive for OM of right fourth metatarsal.  ------  -Dressings in tact today; left in tact. Betadine wet to dry dressings ordered placed  -No plans for surgical intervention.  -Patient is cleared for discharge from a podiatry standpoint to a rehab facility. Patient okay to follow up outpatient with podiatry. Patient to receive PICC line per ID. Facility per patient and social work/TCC.  -Podiatry will sign off at this time.      Diet: Per primary  DVT ppx: Per primary  Weightbearing: Heel weight bearing right lower extremity in post op shoe  Wound Care: Betadine, 4 x 4, Kerlix. Orders placed.     Case has been discussed with attending, A&P above reflect tentative plan. Please await final signature from attending physician on service.  Ghassan Barton, DPM PGY-3  Please use Secure Chat if any questions

## 2024-03-22 NOTE — PROGRESS NOTES
"Rene Ballard is a 89 y.o. male on day 3 of admission presenting with Osteomyelitis of toe of right foot (CMS/HCC).    Subjective   Patient is laying flat on back on bed. He states he is feels like his legs are getting weaker. Patient had to use bedpan today because he could not walk himself to bathroom. Patient complains of left shoulder pain, but states it has improved. Patient denies any chest pain, stomach pain, SOB, dizziness, and HA.        Objective     Physical Exam  HENT:      Head: Normocephalic.   Cardiovascular:      Rate and Rhythm: Normal rate.      Heart sounds: Murmur heard.   Pulmonary:      Breath sounds: Normal breath sounds.   Abdominal:      General: Abdomen is flat. Bowel sounds are normal.      Tenderness: There is no abdominal tenderness. There is no guarding.   Musculoskeletal:      Left shoulder: Decreased range of motion.      Right lower leg: No edema.      Left lower leg: No edema.      Comments: Strength in left leg is 5/5.  Strength in right leg is 4/5.  Dressing on right foot is clean and dry, there is no erythema or discharge surrounding dressing. Unable to check capillary refill due to dressing. Podiatry following.     Strength in left arm is 3/5, ROM seems to have improved since yesterday  Strength in right arm is 4/5   Skin:     General: Skin is warm and dry.   Neurological:      Mental Status: He is alert.         Last Recorded Vitals  Blood pressure 136/63, pulse 57, temperature 36.9 °C (98.4 °F), temperature source Temporal, resp. rate 18, height 1.778 m (5' 10\"), weight 72.6 kg (160 lb), SpO2 98 %.  Intake/Output last 3 Shifts:  I/O last 3 completed shifts:  In: 480 (6.6 mL/kg) [P.O.:480]  Out: 630 (8.7 mL/kg) [Urine:630 (0.2 mL/kg/hr)]  Weight: 72.6 kg     Relevant Results              Scheduled medications  acetaminophen, 975 mg, oral, TID  ampicillin-sulbactam, 3 g, intravenous, q6h  ascorbic acid, 1,000 mg, oral, Daily  cholecalciferol, 5,000 Units, oral, " Daily  clopidogrel, 75 mg, oral, Daily  cyanocobalamin, 500 mcg, oral, Daily  docusate sodium, 100 mg, oral, BID  dorzolamide-timoloL, 1 drop, Left Eye, BID  enalapril, 2.5 mg, oral, Daily  enoxaparin, 40 mg, subcutaneous, q24h  insulin glargine, 25 Units, subcutaneous, Nightly  insulin lispro, 0-10 Units, subcutaneous, TID with meals  latanoprost, 1 drop, Both Eyes, Daily  lidocaine, 2 patch, transdermal, Daily  metoprolol tartrate, 12.5 mg, oral, BID  multivitamin with minerals, 1 tablet, oral, Daily  nepafenac, 1 drop, Both Eyes, TID  niacin, 500 mg, oral, Daily  pantoprazole, 40 mg, oral, Daily before breakfast  tamsulosin, 0.4 mg, oral, Daily  vancomycin, 750 mg, intravenous, q12h      Continuous medications     PRN medications  PRN medications: alum-mag hydroxide-simeth, dextrose, dextrose, glucagon, melatonin, polyethylene glycol, vancomycin  Results for orders placed or performed during the hospital encounter of 03/19/24 (from the past 24 hour(s))   Transthoracic Echo Complete   Result Value Ref Range    AV pk devi 2.48 m/s    AV mn grad 14.6 mmHg    LVOT diam 2.10 cm    LV biplane EF 64 %    MV avg E/e' ratio 4.61     MV E/A ratio 0.47     LA vol index A/L 32.9 ml/m2    Tricuspid annular plane systolic excursion 1.3 cm    LVIDd 4.39 cm    RVSP 20.3 mmHg    Aortic Valve Area by Continuity of VTI 1.26 cm2    Aortic Valve Area by Continuity of Peak Velocity 1.18 cm2    AV pk grad 24.6 mmHg    LV A4C EF 55.7    POCT GLUCOSE   Result Value Ref Range    POCT Glucose 244 (H) 74 - 99 mg/dL   POCT GLUCOSE   Result Value Ref Range    POCT Glucose 246 (H) 74 - 99 mg/dL   Basic Metabolic Panel   Result Value Ref Range    Glucose 190 (H) 74 - 99 mg/dL    Sodium 136 136 - 145 mmol/L    Potassium 3.5 3.5 - 5.3 mmol/L    Chloride 105 98 - 107 mmol/L    Bicarbonate 23 21 - 32 mmol/L    Anion Gap 12 10 - 20 mmol/L    Urea Nitrogen 17 6 - 23 mg/dL    Creatinine 1.01 0.50 - 1.30 mg/dL    eGFR 71 >60 mL/min/1.73m*2    Calcium  7.7 (L) 8.6 - 10.3 mg/dL   CBC   Result Value Ref Range    WBC 4.8 4.4 - 11.3 x10*3/uL    nRBC 0.0 0.0 - 0.0 /100 WBCs    RBC 3.57 (L) 4.50 - 5.90 x10*6/uL    Hemoglobin 11.1 (L) 13.5 - 17.5 g/dL    Hematocrit 33.9 (L) 41.0 - 52.0 %    MCV 95 80 - 100 fL    MCH 31.1 26.0 - 34.0 pg    MCHC 32.7 32.0 - 36.0 g/dL    RDW 13.1 11.5 - 14.5 %    Platelets 174 150 - 450 x10*3/uL   POCT GLUCOSE   Result Value Ref Range    POCT Glucose 179 (H) 74 - 99 mg/dL     Transthoracic Echo Complete    Result Date: 3/21/2024   [facility], [address] [place] [address]              Tel [phone] and Fax [phone] TRANSTHORACIC ECHOCARDIOGRAM REPORT  Patient Name:      MAYELIN Patel Physician:    [id number] Darien Padron MD Study Date:        3/21/2024            Ordering Provider:    [id number] LOUISA CARDOZO MRN/PID:           [id number]             Fellow: Accession#:        [id number]         Nurse: Date of Birth/Age: 11/8/1934 / 89 years Sonographer:          Consuelo Mosquera RDCS Gender:            M                    Additional Staff: Height:            178.00 cm            Admit Date:           3/21/2024 Weight:            73.00 kg             Admission Status:     Inpatient -                                                               Routine BSA / BMI:         1.90 m2 / 23.04      Encounter#:           [id number]                    kg/m2                                         Department Location:  [facility] Non                                                               Invasive Blood Pressure: 106 /48 mmHg Study Type:    TRANSTHORACIC ECHO (TTE) COMPLETE Diagnosis/ICD: Encounter for other specified special examinations-Z01.89 Indication:    bacteremia due to staphylcoccus aureus CPT Code:      Echo Complete w Full Doppler-27148 Patient History: Diabetes:          Yes Pertinent History: CAD, HTN and Hyperlipidemia. Study Detail: The  following Echo studies were performed: 2D, M-Mode, Doppler and               color flow.  PHYSICIAN INTERPRETATION: Left Ventricle: The left ventricular systolic function is normal, with an estimated ejection fraction of 60-65%. Estimated left ventricular mass is normal. There are no regional wall motion abnormalities. The left ventricular cavity size is normal. The left ventricular septal wall thickness is normal. There is normal left ventricular posterior wall thickness. Spectral Doppler shows an impaired relaxation pattern of left ventricular diastolic filling. Left Atrium: The left atrium is mildly dilated. Right Ventricle: The right ventricle is normal in size. There is mildly reduced right ventricular systolic function. TAPSE: 13.0 mm. Right Atrium: The right atrium is upper limits of normal in size. Aortic Valve: The aortic valve appears structurally normal. The aortic valve appears tricuspid with restriction. There is moderate aortic valve cusp calcification. There is evidence of mild to moderate aortic valve stenosis. The aortic valve dimensionless index is 0.37. There is no evidence of aortic valve regurgitation. The peak instantaneous gradient of the aortic valve is 24.6 mmHg. The mean gradient of the aortic valve is 14.6 mmHg. Mitral Valve: The mitral valve is mildly thickened. There is mild to moderate mitral annular calcification. There is no evidence of mitral valve regurgitation. Tricuspid Valve: The tricuspid valve is structurally normal. There is mild tricuspid regurgitation. Pulmonic Valve: The pulmonic valve is not well visualized. There is no indication of pulmonic valve regurgitation. Pericardium: There is no pericardial effusion noted. Aorta: The aortic root is normal. Systemic Veins: The inferior vena cava appears to be of normal size. There is IVC inspiratory collapse greater than 50%. In comparison to the previous echocardiogram(s): Compared with study from 6/13/2023, no significant change.   CONCLUSIONS:  1. Left ventricular systolic function is normal with a 60-65% estimated ejection fraction.  2. Spectral Doppler shows an impaired relaxation pattern of left ventricular diastolic filling.  3. There is mildly reduced right ventricular systolic function.  4. Mild to moderate aortic valve stenosis.  5. The aortic valve appears tricuspid with restriction.  6. There is moderate aortic valve cusp calcification.  7. No definite valvular vegetations were visualized. QUANTITATIVE DATA SUMMARY: 2D MEASUREMENTS:                           Normal Ranges: LAs:           4.10 cm    (2.7-4.0cm) IVSd:          1.13 cm    (0.6-1.1cm) LVPWd:         1.05 cm    (0.6-1.1cm) LVIDd:         4.39 cm    (3.9-5.9cm) LVIDs:         3.21 cm LV Mass Index: 113.4 g/m2 LV % FS        26.9 % LA VOLUME:                               Normal Ranges: LA Vol A4C:        71.6 ml    (22+/-6mL/m2) LA Vol A2C:        54.0 ml LA Vol BP:         62.7 ml LA Vol Index A4C:  37.6 ml/m2 LA Vol Index A2C:  28.4 ml/m2 LA Vol Index BP:   32.9 ml/m2 LA Area A4C:       22.3 cm2 LA Area A2C:       19.2 cm2 LA Major Axis A4C: 5.9 cm LA Major Axis A2C: 5.8 cm LA Volume Index:   33.2 ml/m2 LA Vol A4C:        66.4 ml LA Vol A2C:        51.2 ml RA VOLUME BY A/L METHOD:                       Normal Ranges: RA Area A4C: 20.6 cm2 M-MODE MEASUREMENTS:                  Normal Ranges: Ao Root: 2.80 cm (2.0-3.7cm) LAs:     4.59 cm (2.7-4.0cm) AORTA MEASUREMENTS:                      Normal Ranges: Ao Sinus, d: 3.00 cm (2.1-3.5cm) Ao STJ, d:   3.10 cm (1.7-3.4cm) Asc Ao, d:   3.10 cm (2.1-3.4cm) LV SYSTOLIC FUNCTION BY 2D PLANIMETRY (MOD):                     Normal Ranges: EF-A4C View: 55.7 % (>=55%) EF-A2C View: 69.3 % EF-Biplane:  64.4 % LV DIASTOLIC FUNCTION:                              Normal Ranges: MV Peak E:        0.41 m/s   (0.7-1.2 m/s) MV Peak A:        0.89 m/s   (0.42-0.7 m/s) E/A Ratio:        0.47       (1.0-2.2) MV e'             0.09 m/s   (>8.0)  MV lateral e'     0.09 m/s MV medial e'      0.03 m/s E/e' Ratio:       4.61       (<8.0) PulmV Sys Simon:    62.19 cm/s PulmV Odonnell Simon:   60.35 cm/s PulmV S/D Simon:    1.03 PulmV A Revs Simon: 21.90 cm/s PulmV A Revs Dur: 98.95 msec MITRAL VALVE:                 Normal Ranges: MV DT: 186 msec (150-240msec) AORTIC VALVE:                                    Normal Ranges: AoV Vmax:                2.48 m/s  (<=1.7m/s) AoV Peak P.6 mmHg (<20mmHg) AoV Mean P.6 mmHg (1.7-11.5mmHg) LVOT Max Simon:            0.85 m/s  (<=1.1m/s) AoV VTI:                 58.33 cm  (18-25cm) LVOT VTI:                21.37 cm LVOT Diameter:           2.10 cm   (1.8-2.4cm) AoV Area, VTI:           1.26 cm2  (2.5-5.5cm2) AoV Area,Vmax:           1.18 cm2  (2.5-4.5cm2) AoV Area, planim:        1.09 cm2  (2.5-4.5cm2) AoV Dimensionless Index: 0.37  RIGHT VENTRICLE: RV Basal 3.50 cm RV Mid   2.60 cm RV Major 6.7 cm TAPSE:   13.0 mm TRICUSPID VALVE/RVSP:                             Normal Ranges: Peak TR Velocity: 2.08 m/s Est. RA Pressure: 3 mmHg RV Syst Pressure: 20.3 mmHg (< 30mmHg) IVC Diam:         1.85 cm PULMONIC VALVE:                         Normal Ranges: PV Accel Time: 93 msec  (>120ms) PV Max Simon:    1.2 m/s  (0.6-0.9m/s) PV Max P.4 mmHg Pulmonary Veins: PulmV A Revs Dur: 98.95 msec PulmV A Revs Simon: 21.90 cm/s PulmV Odnonell Simon:   60.35 cm/s PulmV S/D Simon:    1.03 PulmV Sys Simon:    62.19 cm/s  [id number] Darien Padron MD Electronically signed on 3/21/2024 at 6:18:04 PM  ** Final **     XR knee left 4+ views    Result Date: 3/20/2024  Interpreted By:  Mauri Crow, STUDY: XR KNEE LEFT 4+ VIEWS;  3/20/2024 12:13 pm   INDICATION: Signs/Symptoms:left knee pain.   COMPARISON: Previous exam from 2022   ACCESSION NUMBER(S): [id number]   ORDERING CLINICIAN: CAMELIA SHERIDAN   TECHNIQUE: 4 views  of the  left knee were obtained.   FINDINGS: Medial and lateral meniscal chondrocalcinosis. Advanced patellofemoral  joint space loss with spur formation. Large patellar osteophyte at the insertion of the quadriceps tendon. Moderate to advanced medial compartment narrowing with moderate spur formation. Mild lateral compartment spurring. Mild fullness in the suprapatellar bursa. Prominent posterior arterial calcifications. No lytic or blastic destructive bone lesion. No acute fracture or dislocation. No opaque soft tissue foreign body. No periosteal reaction or erosion.       Small effusion. No acute fracture or dislocation.   Meniscal chondrocalcinosis.   Advanced left knee DJD as described.   MACRO: None   Signed by: Mauri Crow 3/20/2024 1:13 PM Dictation workstation:   [id number]    ECG 12 lead    Result Date: 3/20/2024  Sinus rhythm with 1st degree AV block Right bundle branch block Left anterior fascicular block  Bifascicular block  Abnormal ECG When compared with ECG of 17-JUN-2023 02:24, Previous ECG has undetermined rhythm, needs review Borderline criteria for Lateral infarct are no longer Present QT has shortened See ED provider note for full interpretation and clinical correlation Confirmed by Sadie Pierre ([id number]) on 3/20/2024 10:59:27 AM    CT foot right wo IV contrast    Result Date: 3/19/2024  STUDY: CT Extremity; Completed Time:  3/19/2024 8:34 PM. INDICATION: Evaluate for necrotizing soft tissue infection / osteomyelitis. COMPARISON: XR right foot 3/19/2024;  MR right foot 6/10/2023. ACCESSION NUMBER(S): [id number] ORDERING CLINICIAN: ÓSCAR GORDON TECHNIQUE:  Thin section axial images were obtained through the right foot without intravenous contrast.  Orthogonal reconstructed images were obtained and reviewed.  Automated mA/kV exposure control was utilized and patient examination was performed in strict accordance with principles of ALARA. FINDINGS:  There is redemonstration of transmetatarsal amputation of the forefoot with resection of the fifth digit.  There is diffuse osteopenia.  There is ulceration  along the lateral margin of the forefoot with induration, edema and soft tissue swelling of the forefoot extending into the ankle compatible with extensive soft tissue infection.  There is cortical erosion and periosteal reaction along the plantar and lateral margin of the fourth metatarsal bone compatible with acute osteomyelitis with small locule of gas along the plantar margin (image 245 series 301).  There is fragmentation and periosteal reaction extending into the base of the fourth metatarsal bone.  There are mild-to-moderate degenerative changes.  Midfoot alignment is maintained.  Talonavicular joint and calcaneocuboid joint alignment is maintained.  Ankle mortise and talar dome are intact. There are vascular calcifications.  There is edema of the intrinsic muscles of the foot.    Redemonstration transmetatarsal amputation of the forefoot with amputation of the fifth digit.  Large area of ulceration along the lateral aspect of the forefoot with acute osteomyelitis of the fourth metatarsal bone predominantly along the lateral and plantar margin with small locule gas, extensive soft tissue swelling and edema compatible with soft tissue infection extending from the forefoot through the level of the ankle. Signed by Easton Baron, DO    CT cervical spine wo IV contrast    Result Date: 3/19/2024  Interpreted By:  Rell Jewell, STUDY: CT CERVICAL SPINE WO IV CONTRAST;  3/19/2024 8:32 pm   INDICATION: Signs/Symptoms:Fall with head strike.   COMPARISON: None.   ACCESSION NUMBER(S): [id number]   ORDERING CLINICIAN: ÓSCAR GORDON   TECHNIQUE: Axial noncontrast images of the cervical spine with coronal and sagittal reconstructed images.   FINDINGS: ALIGNMENT: Normal. VERTEBRAE: No acute fracture. There is loss of disc height and anterior osteophyte formation at multiple levels worse at C5-6, C6-7 and C7-T1. Facet and uncovertebral hypertrophic changes cause moderate to severe bilateral neural foraminal  narrowing at multiple levels. Degenerative changes at the atlantodental interval. SPINAL CANAL: No critical spinal canal stenosis. Disc spur complex at multiple levels most severe at C6-7 where there is moderate stenosis. PREVERTEBRAL SOFT TISSUES: No prevertebral soft tissue swelling. LUNG APICES: Imaged portion of the lung apices are within normal limits.   OTHER FINDINGS: Atherosclerotic calcification of the carotid vessels.       No acute fracture or traumatic subluxation of the cervical spine.   Multilevel discogenic degenerative changes as noted above.   MACRO: None   Signed by: Rell Jewell 3/19/2024 9:03 PM Dictation workstation:   [id number]    CT head wo IV contrast    Result Date: 3/19/2024  Interpreted By:  Rell Jewell, STUDY: CT HEAD WO IV CONTRAST;  3/19/2024 8:32 pm   INDICATION: Signs/Symptoms:Fall with head strike.   COMPARISON: CT scan of the head 06/07/2023   ACCESSION NUMBER(S): [id number]   ORDERING CLINICIAN: ÓSCAR GORDON   TECHNIQUE: Axial noncontrast CT images of the head.   FINDINGS: BRAIN PARENCHYMA: Gray-white matter interfaces are preserved. No mass, mass effect or midline shift. Moderate deep and periventricular white matter hypodensities are nonspecific, but favored to represent chronic small vessel ischemic changes.   HEMORRHAGE: No acute intracranial hemorrhage. VENTRICLES and EXTRA-AXIAL SPACES: Moderate volume loss with prominence of the ventricles and sulci. EXTRACRANIAL SOFT TISSUES: Within normal limits. PARANASAL SINUSES/MASTOIDS: The visualized paranasal sinuses and mastoid air cells are aerated. CALVARIUM: No depressed skull fracture. No destructive osseous lesion.   OTHER FINDINGS: Atherosclerotic calcification of the carotid siphons and vertebral arteries..       No acute intracranial abnormality.   Chronic changes as noted above.   MACRO: None   Signed by: Rell Jewell 3/19/2024 8:59 PM Dictation workstation:   [id number]    XR shoulder left 2+  views    Result Date: 3/19/2024  STUDY: Shoulder Radiographs; 3/19/2024, 7:59PM INDICATION: Left shoulder pain. COMPARISON: None Available. ACCESSION NUMBER(S): [id number] ORDERING CLINICIAN: ÓSCAR GORDON TECHNIQUE:  Two view(s) of the left shoulder. FINDINGS:  There is no displaced fracture.  There is degenerative change in the acromioclavicular joint and narrowing of the subacromion space suggesting some rotator cuff thinning..  Alignment otherwise is unremarkable..    There is degenerative change in the acromioclavicular joint and narrowing of the subacromion space suggesting some rotator cuff thinning.  No fracture or dislocation is appreciated.. Signed by Samuel Brown MD    XR chest 1 view    Result Date: 3/19/2024  STUDY: Chest Radiograph;  3/19/16052 at 18:21 hours. INDICATION: Weakness and falls. COMPARISON: None Available ACCESSION NUMBER(S): [id number] ORDERING CLINICIAN: ÓSCAR GORDON TECHNIQUE:  Frontal chest was obtained at 18:21 hours. FINDINGS: CARDIOMEDIASTINAL SILHOUETTE: Patient is status post median sternotomy.  Heart size and mediastinal contours appear stable with prominence of the aortic arch.  There is minimal elevation left hemidiaphragm.  LUNGS: Lungs demonstrate chronic changes.  There is no pneumothorax or pleural effusion.  ABDOMEN: No remarkable upper abdominal findings.  BONES: No acute osseous changes.    No acute cardiopulmonary disease. Chronic changes. Signed by Easton Baron DO    XR foot right 3+ views    Result Date: 3/19/2024  STUDY: Foot Radiographs; 3/19/2024, 6:22PM INDICATION: Right foot wound. COMPARISON: 6/10/2023 MRI Right Foot. ACCESSION NUMBER(S): [id number] ORDERING CLINICIAN: ÓSCAR GORDON TECHNIQUE:  Three view(s) of the right foot. FINDINGS:  There are postsurgical changes following transmetatarsal amputation of the forefoot and resection of the fifth metatarsal bone.  There is ulceration along the lateral aspect of the forefoot.  There  is cortical irregularity along the lateral margin of the fifth metatarsal bone and periosteal reaction suggesting acute osteomyelitis.  There is also small focus of soft tissue gas/fistulous tract.  There is soft tissue swelling extending in the ankle.  Calcaneus is intact.  There are vascular calcifications.      Transmetatarsal amputation of the forefoot with resection the fifth digit.  Lateral forefoot ulceration with evidence of acute osteomyelitis in the fourth metatarsal bone with periosteal reaction and soft tissue swelling.  Small focus of soft tissue gas/fistulous tract.  Recommend repeat MRI or CT for further evaluation. Signed by Easton Baron DO             Malnutrition Diagnosis Status: New  Malnutrition Diagnosis: Severe malnutrition related to chronic disease or condition  As Evidenced by: reported intake <75% estimated needs for > 1month, severe muscle & subcutaneous fat depletion.  I agree with the dietitian's malnutrition diagnosis.      Assessment/Plan        Rene Ballard is a 89 y.o. male presenting post-Fall (Fell off toilet, has mutliple sites with pain, shoulder arm, knees). Patient has history of insulin-dependent diabetes, CAD with CABG x 3 off pump 3/2/2022 (LIMA-LAD, SVG-OM, SVG-PDA), GERD, mild systolic dysfunction EF 40-45%, moderate L carotid stenosis 50-69%, and right leg PAD   Patient states that he has bad knees and shoulders, for the 3 to 4 days leading up to his fall he felt very unsteady on his feet. His left shoulder has been stiff with difficulty moving it. He also had been generally not feeling well, very tired, and not eating well prior to the fall. Prior to coming to the ER he had a fall and then on the 19th he fell between the toilet and the wall when he was trying to get off the toilet. He attributes these falls to losing his balance. In ER work up noted there was concern for infection of his R fifth TMA site , pt started on antibiotics and admitted for further  care      Osteomyelitis of toe of right foot (CMS/HCC)  Bacteremia due to CoNS   Right TMA Site Wound Infection   Right leg PAD   -Podiatry following: No plan for surgical intervention. Opting for conservative management over surgical management due to nutrition status. Dr. Ribeiro plans to utilize hyperbaric chamber at wound center at [place] to help with healing.  -Weightbearing: Nonweightbearing right lower extremity per podiatry.  -Wound Care: Betadine, 4 x 4, Kerlix.  Podiatry to change.  -ID following:      -IV Vancomycin and IV Unasyn still.      -Sensis on Staph aureus in wound pending.      -No further workup or treatment needed for CoNS in blood, likely contaminant, not related to Staph aureus wound infx     -Plan will be for 6 weeks of IV abx to treat for OM in the foot.     -Monitoring for adverse effects of abx such as rash/itching/diarrhea.  -ECHO was preformed and (-) for vegetation   -Prior PVR on 6/12/23 showed evidence of mild arterial occlusive disease in the right lower extremity at rest      Left shoulder pain, s/p fall   Knee pain, s/p fall   -reviewed imaging result, XRAYS negative for fx, showed degenerative disease in AC joint and effusion in knee   -continue Tylenol prn and lidocaine patch for pain     DM  -insulin glargine injection  -sliding scale insulin   -hypoglycemia protocol in place   -contributes to poor wound healing  -Last A1c on 3/19/24 was 7.3, but POCT glucose levels in hospital are uncontrolled (range from 130s-270s), continue to monitor blood glucose and consider change in dose of lantus   -POCT glucose levels to be monitored 4x/day before meals and at bedtime     CAD with CABG  mild systolic dysfunction EF 40-45%  moderate L carotid stenosis 50-69%  -appears euvolemic on exam as no rails, edema, JVD   -stable  -continue plavix     GERD  -stable  -continue pantoprazole     Constipation  -resolved    VTE prophylaxis: Lovenox     Discharge planning  -PT/OT consulted, PT  recommends high intensity   -referral to SNF d/t patient stating he is unable to tolerate high intensity          I spent 60 minutes in the professional and overall care of this patient.      HEATHER TorresC

## 2024-03-23 LAB
ANION GAP SERPL CALC-SCNC: 12 MMOL/L (ref 10–20)
BUN SERPL-MCNC: 16 MG/DL (ref 6–23)
CALCIUM SERPL-MCNC: 8 MG/DL (ref 8.6–10.3)
CHLORIDE SERPL-SCNC: 107 MMOL/L (ref 98–107)
CO2 SERPL-SCNC: 23 MMOL/L (ref 21–32)
CREAT SERPL-MCNC: 0.87 MG/DL (ref 0.5–1.3)
EGFRCR SERPLBLD CKD-EPI 2021: 82 ML/MIN/1.73M*2
ERYTHROCYTE [DISTWIDTH] IN BLOOD BY AUTOMATED COUNT: 13.2 % (ref 11.5–14.5)
GLUCOSE BLD MANUAL STRIP-MCNC: 136 MG/DL (ref 74–99)
GLUCOSE BLD MANUAL STRIP-MCNC: 166 MG/DL (ref 74–99)
GLUCOSE BLD MANUAL STRIP-MCNC: 187 MG/DL (ref 74–99)
GLUCOSE BLD MANUAL STRIP-MCNC: 204 MG/DL (ref 74–99)
GLUCOSE SERPL-MCNC: 138 MG/DL (ref 74–99)
HCT VFR BLD AUTO: 35.1 % (ref 41–52)
HGB BLD-MCNC: 11.4 G/DL (ref 13.5–17.5)
MCH RBC QN AUTO: 31.3 PG (ref 26–34)
MCHC RBC AUTO-ENTMCNC: 32.5 G/DL (ref 32–36)
MCV RBC AUTO: 96 FL (ref 80–100)
NRBC BLD-RTO: 0 /100 WBCS (ref 0–0)
PLATELET # BLD AUTO: 185 X10*3/UL (ref 150–450)
POTASSIUM SERPL-SCNC: 3.5 MMOL/L (ref 3.5–5.3)
RBC # BLD AUTO: 3.64 X10*6/UL (ref 4.5–5.9)
SODIUM SERPL-SCNC: 138 MMOL/L (ref 136–145)
VANCOMYCIN SERPL-MCNC: 15.7 UG/ML (ref 5–20)
WBC # BLD AUTO: 5.6 X10*3/UL (ref 4.4–11.3)

## 2024-03-23 PROCEDURE — 2500000001 HC RX 250 WO HCPCS SELF ADMINISTERED DRUGS (ALT 637 FOR MEDICARE OP): Performed by: NURSE PRACTITIONER

## 2024-03-23 PROCEDURE — 2500000002 HC RX 250 W HCPCS SELF ADMINISTERED DRUGS (ALT 637 FOR MEDICARE OP, ALT 636 FOR OP/ED): Performed by: FAMILY MEDICINE

## 2024-03-23 PROCEDURE — 80202 ASSAY OF VANCOMYCIN: CPT | Performed by: INTERNAL MEDICINE

## 2024-03-23 PROCEDURE — 97530 THERAPEUTIC ACTIVITIES: CPT | Mod: GO

## 2024-03-23 PROCEDURE — 97110 THERAPEUTIC EXERCISES: CPT | Mod: GP,CQ

## 2024-03-23 PROCEDURE — 2500000002 HC RX 250 W HCPCS SELF ADMINISTERED DRUGS (ALT 637 FOR MEDICARE OP, ALT 636 FOR OP/ED): Performed by: NURSE PRACTITIONER

## 2024-03-23 PROCEDURE — 85027 COMPLETE CBC AUTOMATED: CPT | Performed by: NURSE PRACTITIONER

## 2024-03-23 PROCEDURE — 80048 BASIC METABOLIC PNL TOTAL CA: CPT | Performed by: NURSE PRACTITIONER

## 2024-03-23 PROCEDURE — 36415 COLL VENOUS BLD VENIPUNCTURE: CPT | Performed by: NURSE PRACTITIONER

## 2024-03-23 PROCEDURE — 99233 SBSQ HOSP IP/OBS HIGH 50: CPT | Performed by: INTERNAL MEDICINE

## 2024-03-23 PROCEDURE — 2500000005 HC RX 250 GENERAL PHARMACY W/O HCPCS: Performed by: NURSE PRACTITIONER

## 2024-03-23 PROCEDURE — 2500000004 HC RX 250 GENERAL PHARMACY W/ HCPCS (ALT 636 FOR OP/ED): Performed by: INTERNAL MEDICINE

## 2024-03-23 PROCEDURE — 2500000001 HC RX 250 WO HCPCS SELF ADMINISTERED DRUGS (ALT 637 FOR MEDICARE OP): Performed by: FAMILY MEDICINE

## 2024-03-23 PROCEDURE — 1100000001 HC PRIVATE ROOM DAILY

## 2024-03-23 PROCEDURE — 82947 ASSAY GLUCOSE BLOOD QUANT: CPT

## 2024-03-23 RX ORDER — VANCOMYCIN HYDROCHLORIDE 1 G/20ML
INJECTION, POWDER, LYOPHILIZED, FOR SOLUTION INTRAVENOUS DAILY PRN
Status: DISCONTINUED | OUTPATIENT
Start: 2024-03-23 | End: 2024-03-23 | Stop reason: SDUPTHER

## 2024-03-23 RX ADMIN — METOPROLOL TARTRATE 12.5 MG: 25 TABLET, FILM COATED ORAL at 10:06

## 2024-03-23 RX ADMIN — TAMSULOSIN HYDROCHLORIDE 0.4 MG: 0.4 CAPSULE ORAL at 10:05

## 2024-03-23 RX ADMIN — LATANOPROST 1 DROP: 50 SOLUTION/ DROPS OPHTHALMIC at 21:52

## 2024-03-23 RX ADMIN — AMPICILLIN SODIUM AND SULBACTAM SODIUM 3 G: 2; 1 INJECTION, POWDER, FOR SOLUTION INTRAMUSCULAR; INTRAVENOUS at 21:52

## 2024-03-23 RX ADMIN — PANTOPRAZOLE SODIUM 40 MG: 40 TABLET, DELAYED RELEASE ORAL at 06:06

## 2024-03-23 RX ADMIN — CYANOCOBALAMIN TAB 500 MCG 500 MCG: 500 TAB at 10:06

## 2024-03-23 RX ADMIN — OXYCODONE HYDROCHLORIDE AND ACETAMINOPHEN 1000 MG: 500 TABLET ORAL at 10:06

## 2024-03-23 RX ADMIN — DOCUSATE SODIUM 100 MG: 100 CAPSULE, LIQUID FILLED ORAL at 21:51

## 2024-03-23 RX ADMIN — MULTIPLE VITAMINS W/ MINERALS TAB 1 TABLET: TAB at 10:05

## 2024-03-23 RX ADMIN — INSULIN GLARGINE 25 UNITS: 100 INJECTION, SOLUTION SUBCUTANEOUS at 21:52

## 2024-03-23 RX ADMIN — AMPICILLIN SODIUM AND SULBACTAM SODIUM 3 G: 2; 1 INJECTION, POWDER, FOR SOLUTION INTRAMUSCULAR; INTRAVENOUS at 05:42

## 2024-03-23 RX ADMIN — AMPICILLIN SODIUM AND SULBACTAM SODIUM 3 G: 2; 1 INJECTION, POWDER, FOR SOLUTION INTRAMUSCULAR; INTRAVENOUS at 16:46

## 2024-03-23 RX ADMIN — INSULIN LISPRO 2 UNITS: 100 INJECTION, SOLUTION INTRAVENOUS; SUBCUTANEOUS at 12:35

## 2024-03-23 RX ADMIN — CLOPIDOGREL 75 MG: 75 TABLET ORAL at 10:05

## 2024-03-23 RX ADMIN — ACETAMINOPHEN 975 MG: 325 TABLET ORAL at 10:06

## 2024-03-23 RX ADMIN — Medication 5000 UNITS: at 10:18

## 2024-03-23 RX ADMIN — DOCUSATE SODIUM 100 MG: 100 CAPSULE, LIQUID FILLED ORAL at 10:05

## 2024-03-23 RX ADMIN — ACETAMINOPHEN 975 MG: 325 TABLET ORAL at 16:45

## 2024-03-23 RX ADMIN — Medication 500 MG: at 10:06

## 2024-03-23 RX ADMIN — LIDOCAINE 4% 2 PATCH: 40 PATCH TOPICAL at 10:07

## 2024-03-23 RX ADMIN — INSULIN LISPRO 4 UNITS: 100 INJECTION, SOLUTION INTRAVENOUS; SUBCUTANEOUS at 16:46

## 2024-03-23 RX ADMIN — ACETAMINOPHEN 975 MG: 325 TABLET ORAL at 21:51

## 2024-03-23 RX ADMIN — ENALAPRIL MALEATE 2.5 MG: 5 TABLET ORAL at 10:05

## 2024-03-23 RX ADMIN — AMPICILLIN SODIUM AND SULBACTAM SODIUM 3 G: 2; 1 INJECTION, POWDER, FOR SOLUTION INTRAMUSCULAR; INTRAVENOUS at 10:05

## 2024-03-23 RX ADMIN — DORZOLAMIDE HYDROCHLORIDE AND TIMOLOL MALEATE 1 DROP: 22.3; 6.8 SOLUTION/ DROPS OPHTHALMIC at 10:16

## 2024-03-23 RX ADMIN — DORZOLAMIDE HYDROCHLORIDE AND TIMOLOL MALEATE 1 DROP: 22.3; 6.8 SOLUTION/ DROPS OPHTHALMIC at 21:52

## 2024-03-23 ASSESSMENT — COGNITIVE AND FUNCTIONAL STATUS - GENERAL
TURNING FROM BACK TO SIDE WHILE IN FLAT BAD: A LITTLE
MOVING TO AND FROM BED TO CHAIR: A LOT
STANDING UP FROM CHAIR USING ARMS: A LOT
STANDING UP FROM CHAIR USING ARMS: A LOT
DAILY ACTIVITIY SCORE: 19
MOVING TO AND FROM BED TO CHAIR: A LOT
DRESSING REGULAR LOWER BODY CLOTHING: A LOT
TURNING FROM BACK TO SIDE WHILE IN FLAT BAD: A LITTLE
DAILY ACTIVITIY SCORE: 20
HELP NEEDED FOR BATHING: A LOT
WALKING IN HOSPITAL ROOM: A LOT
CLIMB 3 TO 5 STEPS WITH RAILING: TOTAL
WALKING IN HOSPITAL ROOM: TOTAL
TOILETING: A LITTLE
HELP NEEDED FOR BATHING: A LOT
MOBILITY SCORE: 15
STANDING UP FROM CHAIR USING ARMS: A LOT
MOBILITY SCORE: 13
MOBILITY SCORE: 13
WALKING IN HOSPITAL ROOM: TOTAL
CLIMB 3 TO 5 STEPS WITH RAILING: TOTAL
DRESSING REGULAR LOWER BODY CLOTHING: A LOT
CLIMB 3 TO 5 STEPS WITH RAILING: TOTAL
MOVING TO AND FROM BED TO CHAIR: A LOT

## 2024-03-23 ASSESSMENT — PAIN SCALES - GENERAL
PAINLEVEL_OUTOF10: 0 - NO PAIN
PAINLEVEL_OUTOF10: 0 - NO PAIN

## 2024-03-23 ASSESSMENT — PAIN - FUNCTIONAL ASSESSMENT
PAIN_FUNCTIONAL_ASSESSMENT: 0-10
PAIN_FUNCTIONAL_ASSESSMENT: 0-10

## 2024-03-23 NOTE — PROGRESS NOTES
"Rene Ballard is a 89 y.o. male on day 4 of admission presenting with Osteomyelitis of toe of right foot (CMS/HCC).    Subjective   Pt seen and examined. No new complaints.       Objective     Physical Exam  HENT:      Head: Normocephalic.   Cardiovascular:      Rate and Rhythm: Normal rate.      Heart sounds: Murmur heard.   Pulmonary:      Breath sounds: Normal breath sounds.   Abdominal:      General: Abdomen is flat. Bowel sounds are normal.      Tenderness: There is no abdominal tenderness. There is no guarding.   Musculoskeletal:      Left shoulder: Decreased range of motion.      Right lower leg: No edema.      Left lower leg: No edema.      Comments: Strength in left leg is 5/5.  Strength in right leg is 4/5.  Dressing on right foot is clean and dry, there is no erythema or discharge surrounding dressing. Unable to check capillary refill due to dressing. Podiatry following.     Strength in left arm is 3/5, ROM seems to have improved since yesterday  Strength in right arm is 4/5   Skin:     General: Skin is warm and dry.   Neurological:      Mental Status: He is alert.         Last Recorded Vitals  Blood pressure 128/61, pulse 56, temperature 37 °C (98.6 °F), temperature source Temporal, resp. rate 18, height 1.778 m (5' 10\"), weight 72.6 kg (160 lb), SpO2 96 %.  Intake/Output last 3 Shifts:  I/O last 3 completed shifts:  In: 809 (11.1 mL/kg) [P.O.:459; IV Piggyback:350]  Out: 800 (11 mL/kg) [Urine:800 (0.3 mL/kg/hr)]  Weight: 72.6 kg     Relevant Results              Scheduled medications  acetaminophen, 975 mg, oral, TID  ampicillin-sulbactam, 3 g, intravenous, q6h  ascorbic acid, 1,000 mg, oral, Daily  cholecalciferol, 5,000 Units, oral, Daily  clopidogrel, 75 mg, oral, Daily  cyanocobalamin, 500 mcg, oral, Daily  docusate sodium, 100 mg, oral, BID  dorzolamide-timoloL, 1 drop, Left Eye, BID  enalapril, 2.5 mg, oral, Daily  enoxaparin, 40 mg, subcutaneous, q24h  insulin glargine, 25 Units, subcutaneous, " Nightly  insulin lispro, 0-10 Units, subcutaneous, TID with meals  latanoprost, 1 drop, Both Eyes, Daily  lidocaine, 2 patch, transdermal, Daily  metoprolol tartrate, 12.5 mg, oral, BID  multivitamin with minerals, 1 tablet, oral, Daily  nepafenac, 1 drop, Both Eyes, TID  niacin, 500 mg, oral, Daily  pantoprazole, 40 mg, oral, Daily before breakfast  tamsulosin, 0.4 mg, oral, Daily      Continuous medications     PRN medications  PRN medications: alum-mag hydroxide-simeth, dextrose, dextrose, glucagon, melatonin, polyethylene glycol  Results for orders placed or performed during the hospital encounter of 03/19/24 (from the past 24 hour(s))   Vancomycin, Trough   Result Value Ref Range    Vancomycin, Trough 29.1 (HH) 5.0 - 20.0 ug/mL   POCT GLUCOSE   Result Value Ref Range    POCT Glucose 179 (H) 74 - 99 mg/dL   POCT GLUCOSE   Result Value Ref Range    POCT Glucose 189 (H) 74 - 99 mg/dL   POCT GLUCOSE   Result Value Ref Range    POCT Glucose 202 (H) 74 - 99 mg/dL   Basic Metabolic Panel   Result Value Ref Range    Glucose 138 (H) 74 - 99 mg/dL    Sodium 138 136 - 145 mmol/L    Potassium 3.5 3.5 - 5.3 mmol/L    Chloride 107 98 - 107 mmol/L    Bicarbonate 23 21 - 32 mmol/L    Anion Gap 12 10 - 20 mmol/L    Urea Nitrogen 16 6 - 23 mg/dL    Creatinine 0.87 0.50 - 1.30 mg/dL    eGFR 82 >60 mL/min/1.73m*2    Calcium 8.0 (L) 8.6 - 10.3 mg/dL   CBC   Result Value Ref Range    WBC 5.6 4.4 - 11.3 x10*3/uL    nRBC 0.0 0.0 - 0.0 /100 WBCs    RBC 3.64 (L) 4.50 - 5.90 x10*6/uL    Hemoglobin 11.4 (L) 13.5 - 17.5 g/dL    Hematocrit 35.1 (L) 41.0 - 52.0 %    MCV 96 80 - 100 fL    MCH 31.3 26.0 - 34.0 pg    MCHC 32.5 32.0 - 36.0 g/dL    RDW 13.2 11.5 - 14.5 %    Platelets 185 150 - 450 x10*3/uL   Vancomycin   Result Value Ref Range    Vancomycin 15.7 5.0 - 20.0 ug/mL   POCT GLUCOSE   Result Value Ref Range    POCT Glucose 136 (H) 74 - 99 mg/dL     Transthoracic Echo Complete    Result Date: 3/21/2024   [facility], [id number] [address]              Tel [phone] and Fax [phone] TRANSTHORACIC ECHOCARDIOGRAM REPORT  Patient Name:      MAYELIN OLSON     Reading Physician:    [id number] Darien Padron MD Study Date:        3/21/2024            Ordering Provider:    [id number] LOUISA CARDOZO MRN/PID:           [id number]             Fellow: Accession#:        [id number]         Nurse: Date of Birth/Age: 11/8/1934 / 89 years Sonographer:          Consuelo Mosquera RD Gender:            M                    Additional Staff: Height:            178.00 cm            Admit Date:           3/21/2024 Weight:            73.00 kg             Admission Status:     Inpatient -                                                               Routine BSA / BMI:         1.90 m2 / 23.04      Encounter#:           [id number]                    kg/m2                                         Department Location:  [facility] Non                                                               Invasive Blood Pressure: 106 /48 mmHg Study Type:    TRANSTHORACIC ECHO (TTE) COMPLETE Diagnosis/ICD: Encounter for other specified special examinations-Z01.89 Indication:    bacteremia due to staphylcoccus aureus CPT Code:      Echo Complete w Full Doppler-[id number] Patient History: Diabetes:          Yes Pertinent History: CAD, HTN and Hyperlipidemia. Study Detail: The following Echo studies were performed: 2D, M-Mode, Doppler and               color flow.  PHYSICIAN INTERPRETATION: Left Ventricle: The left ventricular systolic function is normal, with an estimated ejection fraction of 60-65%. Estimated left ventricular mass is normal. There are no regional wall motion abnormalities. The left ventricular cavity size is normal. The left ventricular septal wall thickness is normal. There is normal left ventricular posterior wall thickness. Spectral Doppler shows an impaired relaxation pattern of left  ventricular diastolic filling. Left Atrium: The left atrium is mildly dilated. Right Ventricle: The right ventricle is normal in size. There is mildly reduced right ventricular systolic function. TAPSE: 13.0 mm. Right Atrium: The right atrium is upper limits of normal in size. Aortic Valve: The aortic valve appears structurally normal. The aortic valve appears tricuspid with restriction. There is moderate aortic valve cusp calcification. There is evidence of mild to moderate aortic valve stenosis. The aortic valve dimensionless index is 0.37. There is no evidence of aortic valve regurgitation. The peak instantaneous gradient of the aortic valve is 24.6 mmHg. The mean gradient of the aortic valve is 14.6 mmHg. Mitral Valve: The mitral valve is mildly thickened. There is mild to moderate mitral annular calcification. There is no evidence of mitral valve regurgitation. Tricuspid Valve: The tricuspid valve is structurally normal. There is mild tricuspid regurgitation. Pulmonic Valve: The pulmonic valve is not well visualized. There is no indication of pulmonic valve regurgitation. Pericardium: There is no pericardial effusion noted. Aorta: The aortic root is normal. Systemic Veins: The inferior vena cava appears to be of normal size. There is IVC inspiratory collapse greater than 50%. In comparison to the previous echocardiogram(s): Compared with study from 6/13/2023, no significant change.  CONCLUSIONS:  1. Left ventricular systolic function is normal with a 60-65% estimated ejection fraction.  2. Spectral Doppler shows an impaired relaxation pattern of left ventricular diastolic filling.  3. There is mildly reduced right ventricular systolic function.  4. Mild to moderate aortic valve stenosis.  5. The aortic valve appears tricuspid with restriction.  6. There is moderate aortic valve cusp calcification.  7. No definite valvular vegetations were visualized. QUANTITATIVE DATA SUMMARY: 2D MEASUREMENTS:                            Normal Ranges: LAs:           4.10 cm    (2.7-4.0cm) IVSd:          1.13 cm    (0.6-1.1cm) LVPWd:         1.05 cm    (0.6-1.1cm) LVIDd:         4.39 cm    (3.9-5.9cm) LVIDs:         3.21 cm LV Mass Index: 113.4 g/m2 LV % FS        26.9 % LA VOLUME:                               Normal Ranges: LA Vol A4C:        71.6 ml    (22+/-6mL/m2) LA Vol A2C:        54.0 ml LA Vol BP:         62.7 ml LA Vol Index A4C:  37.6 ml/m2 LA Vol Index A2C:  28.4 ml/m2 LA Vol Index BP:   32.9 ml/m2 LA Area A4C:       22.3 cm2 LA Area A2C:       19.2 cm2 LA Major Axis A4C: 5.9 cm LA Major Axis A2C: 5.8 cm LA Volume Index:   33.2 ml/m2 LA Vol A4C:        66.4 ml LA Vol A2C:        51.2 ml RA VOLUME BY A/L METHOD:                       Normal Ranges: RA Area A4C: 20.6 cm2 M-MODE MEASUREMENTS:                  Normal Ranges: Ao Root: 2.80 cm (2.0-3.7cm) LAs:     4.59 cm (2.7-4.0cm) AORTA MEASUREMENTS:                      Normal Ranges: Ao Sinus, d: 3.00 cm (2.1-3.5cm) Ao STJ, d:   3.10 cm (1.7-3.4cm) Asc Ao, d:   3.10 cm (2.1-3.4cm) LV SYSTOLIC FUNCTION BY 2D PLANIMETRY (MOD):                     Normal Ranges: EF-A4C View: 55.7 % (>=55%) EF-A2C View: 69.3 % EF-Biplane:  64.4 % LV DIASTOLIC FUNCTION:                              Normal Ranges: MV Peak E:        0.41 m/s   (0.7-1.2 m/s) MV Peak A:        0.89 m/s   (0.42-0.7 m/s) E/A Ratio:        0.47       (1.0-2.2) MV e'             0.09 m/s   (>8.0) MV lateral e'     0.09 m/s MV medial e'      0.03 m/s E/e' Ratio:       4.61       (<8.0) PulmV Sys Simon:    62.19 cm/s PulmV Odonnell Simon:   60.35 cm/s PulmV S/D Simon:    1.03 PulmV A Revs Simon: 21.90 cm/s PulmV A Revs Dur: 98.95 msec MITRAL VALVE:                 Normal Ranges: MV DT: 186 msec (150-240msec) AORTIC VALVE:                                    Normal Ranges: AoV Vmax:                2.48 m/s  (<=1.7m/s) AoV Peak P.6 mmHg (<20mmHg) AoV Mean P.6 mmHg (1.7-11.5mmHg) LVOT Max Simon:            0.85  m/s  (<=1.1m/s) AoV VTI:                 58.33 cm  (18-25cm) LVOT VTI:                21.37 cm LVOT Diameter:           2.10 cm   (1.8-2.4cm) AoV Area, VTI:           1.26 cm2  (2.5-5.5cm2) AoV Area,Vmax:           1.18 cm2  (2.5-4.5cm2) AoV Area, planim:        1.09 cm2  (2.5-4.5cm2) AoV Dimensionless Index: 0.37  RIGHT VENTRICLE: RV Basal 3.50 cm RV Mid   2.60 cm RV Major 6.7 cm TAPSE:   13.0 mm TRICUSPID VALVE/RVSP:                             Normal Ranges: Peak TR Velocity: 2.08 m/s Est. RA Pressure: 3 mmHg RV Syst Pressure: 20.3 mmHg (< 30mmHg) IVC Diam:         1.85 cm PULMONIC VALVE:                         Normal Ranges: PV Accel Time: 93 msec  (>120ms) PV Max Simon:    1.2 m/s  (0.6-0.9m/s) PV Max P.4 mmHg Pulmonary Veins: PulmV A Revs Dur: 98.95 msec PulmV A Revs Simon: 21.90 cm/s PulmV Odonnell Simon:   60.35 cm/s PulmV S/D Simon:    1.03 PulmV Sys Simon:    62.19 cm/s  [id number] Darien Padron MD Electronically signed on 3/21/2024 at 6:18:04 PM  ** Final **     XR knee left 4+ views    Result Date: 3/20/2024  Interpreted By:  Mauri Crow, STUDY: XR KNEE LEFT 4+ VIEWS;  3/20/2024 12:13 pm   INDICATION: Signs/Symptoms:left knee pain.   COMPARISON: Previous exam from 2022   ACCESSION NUMBER(S): [id number]   ORDERING CLINICIAN: CAMELIA SHERIDAN   TECHNIQUE: 4 views  of the  left knee were obtained.   FINDINGS: Medial and lateral meniscal chondrocalcinosis. Advanced patellofemoral joint space loss with spur formation. Large patellar osteophyte at the insertion of the quadriceps tendon. Moderate to advanced medial compartment narrowing with moderate spur formation. Mild lateral compartment spurring. Mild fullness in the suprapatellar bursa. Prominent posterior arterial calcifications. No lytic or blastic destructive bone lesion. No acute fracture or dislocation. No opaque soft tissue foreign body. No periosteal reaction or erosion.       Small effusion. No acute fracture or dislocation.   Meniscal  chondrocalcinosis.   Advanced left knee DJD as described.   MACRO: None   Signed by: Mauri Crow 3/20/2024 1:13 PM Dictation workstation:   [id number]    ECG 12 lead    Result Date: 3/20/2024  Sinus rhythm with 1st degree AV block Right bundle branch block Left anterior fascicular block  Bifascicular block  Abnormal ECG When compared with ECG of 17-JUN-2023 02:24, Previous ECG has undetermined rhythm, needs review Borderline criteria for Lateral infarct are no longer Present QT has shortened See ED provider note for full interpretation and clinical correlation Confirmed by Sadie Pierre ([id number]) on 3/20/2024 10:59:27 AM    CT foot right wo IV contrast    Result Date: 3/19/2024  STUDY: CT Extremity; Completed Time:  3/19/2024 8:34 PM. INDICATION: Evaluate for necrotizing soft tissue infection / osteomyelitis. COMPARISON: XR right foot 3/19/2024;  MR right foot 6/10/2023. ACCESSION NUMBER(S): [id number] ORDERING CLINICIAN: ÓSCAR GORDON TECHNIQUE:  Thin section axial images were obtained through the right foot without intravenous contrast.  Orthogonal reconstructed images were obtained and reviewed.  Automated mA/kV exposure control was utilized and patient examination was performed in strict accordance with principles of ALARA. FINDINGS:  There is redemonstration of transmetatarsal amputation of the forefoot with resection of the fifth digit.  There is diffuse osteopenia.  There is ulceration along the lateral margin of the forefoot with induration, edema and soft tissue swelling of the forefoot extending into the ankle compatible with extensive soft tissue infection.  There is cortical erosion and periosteal reaction along the plantar and lateral margin of the fourth metatarsal bone compatible with acute osteomyelitis with small locule of gas along the plantar margin (image 245 series 301).  There is fragmentation and periosteal reaction extending into the base of the fourth metatarsal bone.  There are  mild-to-moderate degenerative changes.  Midfoot alignment is maintained.  Talonavicular joint and calcaneocuboid joint alignment is maintained.  Ankle mortise and talar dome are intact. There are vascular calcifications.  There is edema of the intrinsic muscles of the foot.    Redemonstration transmetatarsal amputation of the forefoot with amputation of the fifth digit.  Large area of ulceration along the lateral aspect of the forefoot with acute osteomyelitis of the fourth metatarsal bone predominantly along the lateral and plantar margin with small locule gas, extensive soft tissue swelling and edema compatible with soft tissue infection extending from the forefoot through the level of the ankle. Signed by Easton Baron, DO    CT cervical spine wo IV contrast    Result Date: 3/19/2024  Interpreted By:  Rell Jewell, STUDY: CT CERVICAL SPINE WO IV CONTRAST;  3/19/2024 8:32 pm   INDICATION: Signs/Symptoms:Fall with head strike.   COMPARISON: None.   ACCESSION NUMBER(S): [id number]   ORDERING CLINICIAN: ÓSCAR GORDON   TECHNIQUE: Axial noncontrast images of the cervical spine with coronal and sagittal reconstructed images.   FINDINGS: ALIGNMENT: Normal. VERTEBRAE: No acute fracture. There is loss of disc height and anterior osteophyte formation at multiple levels worse at C5-6, C6-7 and C7-T1. Facet and uncovertebral hypertrophic changes cause moderate to severe bilateral neural foraminal narrowing at multiple levels. Degenerative changes at the atlantodental interval. SPINAL CANAL: No critical spinal canal stenosis. Disc spur complex at multiple levels most severe at C6-7 where there is moderate stenosis. PREVERTEBRAL SOFT TISSUES: No prevertebral soft tissue swelling. LUNG APICES: Imaged portion of the lung apices are within normal limits.   OTHER FINDINGS: Atherosclerotic calcification of the carotid vessels.       No acute fracture or traumatic subluxation of the cervical spine.   Multilevel discogenic  degenerative changes as noted above.   MACRO: None   Signed by: Rell Jewell 3/19/2024 9:03 PM Dictation workstation:   [id number]    CT head wo IV contrast    Result Date: 3/19/2024  Interpreted By:  Rell Jewell, STUDY: CT HEAD WO IV CONTRAST;  3/19/2024 8:32 pm   INDICATION: Signs/Symptoms:Fall with head strike.   COMPARISON: CT scan of the head 06/07/2023   ACCESSION NUMBER(S): [id number]   ORDERING CLINICIAN: ÓSCAR GORDON   TECHNIQUE: Axial noncontrast CT images of the head.   FINDINGS: BRAIN PARENCHYMA: Gray-white matter interfaces are preserved. No mass, mass effect or midline shift. Moderate deep and periventricular white matter hypodensities are nonspecific, but favored to represent chronic small vessel ischemic changes.   HEMORRHAGE: No acute intracranial hemorrhage. VENTRICLES and EXTRA-AXIAL SPACES: Moderate volume loss with prominence of the ventricles and sulci. EXTRACRANIAL SOFT TISSUES: Within normal limits. PARANASAL SINUSES/MASTOIDS: The visualized paranasal sinuses and mastoid air cells are aerated. CALVARIUM: No depressed skull fracture. No destructive osseous lesion.   OTHER FINDINGS: Atherosclerotic calcification of the carotid siphons and vertebral arteries..       No acute intracranial abnormality.   Chronic changes as noted above.   MACRO: None   Signed by: Rell Jewell 3/19/2024 8:59 PM Dictation workstation:   [id number]    XR shoulder left 2+ views    Result Date: 3/19/2024  STUDY: Shoulder Radiographs; 3/19/2024, 7:59PM INDICATION: Left shoulder pain. COMPARISON: None Available. ACCESSION NUMBER(S): [id number] ORDERING CLINICIAN: ÓSCAR GORDON TECHNIQUE:  Two view(s) of the left shoulder. FINDINGS:  There is no displaced fracture.  There is degenerative change in the acromioclavicular joint and narrowing of the subacromion space suggesting some rotator cuff thinning..  Alignment otherwise is unremarkable..    There is degenerative change in the acromioclavicular  joint and narrowing of the subacromion space suggesting some rotator cuff thinning.  No fracture or dislocation is appreciated.. Signed by Samuel Brown MD    XR chest 1 view    Result Date: 3/19/2024  STUDY: Chest Radiograph;  3/19/09819 at 18:21 hours. INDICATION: Weakness and falls. COMPARISON: None Available ACCESSION NUMBER(S): [id number] ORDERING CLINICIAN: ÓSCAR GORDON TECHNIQUE:  Frontal chest was obtained at 18:21 hours. FINDINGS: CARDIOMEDIASTINAL SILHOUETTE: Patient is status post median sternotomy.  Heart size and mediastinal contours appear stable with prominence of the aortic arch.  There is minimal elevation left hemidiaphragm.  LUNGS: Lungs demonstrate chronic changes.  There is no pneumothorax or pleural effusion.  ABDOMEN: No remarkable upper abdominal findings.  BONES: No acute osseous changes.    No acute cardiopulmonary disease. Chronic changes. Signed by Easton Baron DO    XR foot right 3+ views    Result Date: 3/19/2024  STUDY: Foot Radiographs; 3/19/2024, 6:22PM INDICATION: Right foot wound. COMPARISON: 6/10/2023 MRI Right Foot. ACCESSION NUMBER(S): [id number] ORDERING CLINICIAN: ÓSCAR GORDON TECHNIQUE:  Three view(s) of the right foot. FINDINGS:  There are postsurgical changes following transmetatarsal amputation of the forefoot and resection of the fifth metatarsal bone.  There is ulceration along the lateral aspect of the forefoot.  There is cortical irregularity along the lateral margin of the fifth metatarsal bone and periosteal reaction suggesting acute osteomyelitis.  There is also small focus of soft tissue gas/fistulous tract.  There is soft tissue swelling extending in the ankle.  Calcaneus is intact.  There are vascular calcifications.      Transmetatarsal amputation of the forefoot with resection the fifth digit.  Lateral forefoot ulceration with evidence of acute osteomyelitis in the fourth metatarsal bone with periosteal reaction and soft tissue swelling.   Small focus of soft tissue gas/fistulous tract.  Recommend repeat MRI or CT for further evaluation. Signed by Easton Baron DO             Malnutrition Diagnosis Status: New  Malnutrition Diagnosis: Severe malnutrition related to chronic disease or condition  As Evidenced by: reported intake <75% estimated needs for > 1month, severe muscle & subcutaneous fat depletion.  I agree with the dietitian's malnutrition diagnosis.      Assessment/Plan        Rene Ballard is a 89 y.o. male presenting post-Fall (Fell off toilet, has mutliple sites with pain, shoulder arm, knees). Patient has history of insulin-dependent diabetes, CAD with CABG x 3 off pump 3/2/2022 (LIMA-LAD, SVG-OM, SVG-PDA), GERD, mild systolic dysfunction EF 40-45%, moderate L carotid stenosis 50-69%, and right leg PAD   Patient states that he has bad knees and shoulders, for the 3 to 4 days leading up to his fall he felt very unsteady on his feet. His left shoulder has been stiff with difficulty moving it. He also had been generally not feeling well, very tired, and not eating well prior to the fall. Prior to coming to the ER he had a fall and then on the 19th he fell between the toilet and the wall when he was trying to get off the toilet. He attributes these falls to losing his balance. In ER work up noted there was concern for infection of his R fifth TMA site , pt started on antibiotics and admitted for further care      Osteomyelitis of toe of right foot (CMS/HCC)  Bacteremia due to CoNS   Right TMA Site Wound Infection   Right leg PAD   -Podiatry following: No plan for surgical intervention. Opting for conservative management over surgical management due to nutrition status. Dr. Ribeiro plans to utilize hyperbaric chamber at wound center at [facility] to help with healing.  -Weightbearing: Nonweightbearing right lower extremity per podiatry.  -Wound Care: Betadine, 4 x 4, Kerlix.  Podiatry to change.  -ID following  -ECHO was preformed and  (-) for vegetation   -Prior PVR on 6/12/23 showed evidence of mild arterial occlusive disease in the right lower extremity at rest   -IV Unasyn 3g Q6H to cover MSSA and other mixed bacteria/GNB.   -Stopping IV Vancomycin. Will be OK for PICC line Monday if blood from 3/21 stays negative.   -Plan will be for 6 weeks of IV abx to treat for OM in the foot.      Left shoulder pain, s/p fall   Knee pain, s/p fall   -reviewed imaging result, XRAYS negative for fx, showed degenerative disease in AC joint and effusion in knee   -continue Tylenol prn and lidocaine patch for pain     DM  -insulin glargine injection  -sliding scale insulin   -hypoglycemia protocol in place   -contributes to poor wound healing  -Last A1c on 3/19/24 was 7.3, but POCT glucose levels in hospital are uncontrolled (range from 130s-270s), continue to monitor blood glucose and consider change in dose of lantus   -POCT glucose levels to be monitored 4x/day before meals and at bedtime     CAD with CABG  mild systolic dysfunction EF 40-45%  moderate L carotid stenosis 50-69%  -appears euvolemic on exam as no rails, edema, JVD   -stable  -continue plavix     GERD  -stable  -continue pantoprazole     Constipation  -resolved    VTE prophylaxis: Lovenox     Discharge planning  -PT/OT consulted, PT recommends high intensity   -referral to SNF d/t patient stating he is unable to tolerate high intensity         Robby Calloway MD

## 2024-03-23 NOTE — PROGRESS NOTES
Vancomycin Dosing by Pharmacy- Cessation of Therapy    Consult to pharmacy for vancomycin dosing has been discontinued by the prescriber, pharmacy will sign off at this time.    Please call pharmacy if there are further questions or re-enter a consult if vancomycin is resumed.     Frank Rincon, PharmD

## 2024-03-23 NOTE — PROGRESS NOTES
"Vancomycin Dosing by Pharmacy- FOLLOW UP    Rene Ballard is a 89 y.o. year old male who Pharmacy has been consulted for vancomycin dosing for other bacteremia  . Based on the patient's indication and renal status this patient is being dosed based on a goal AUC of 500-600.     Renal function is currently stable.    Current vancomycin dose: 750 mg given every 12 hours    Most recent random level: 15.7 mcg/mL @ 0627    Visit Vitals  /64 (BP Location: Right arm, Patient Position: Lying)   Pulse 55   Temp 37.1 °C (98.8 °F) (Temporal)   Resp 18        Lab Results   Component Value Date    CREATININE 0.87 03/23/2024    CREATININE 1.01 03/22/2024    CREATININE 0.93 03/21/2024    CREATININE 0.92 03/20/2024        Patient weight is No results found for: \"PTWEIGHT\"    No results found for: \"CULTURE\"     I/O last 3 completed shifts:  In: 809 (11.1 mL/kg) [P.O.:459; IV Piggyback:350]  Out: 800 (11 mL/kg) [Urine:800 (0.3 mL/kg/hr)]  Weight: 72.6 kg   [unfilled]    No results found for: \"PATIENTTEMP\"     Assessment/Plan    Within goal AUC range, however daily dosing has better outcomes     This dosing regimen is predicted by [employer] to result in the following pharmacokinetic parameters:  Loading dose: N/A  Regimen: 1500 mg IV every 24 hours.  Start time: 21:45 on 03/23/2024  Exposure target: AUC24 (range)400-600 mg/L.hr   AUC24,ss: 525 mg/L.hr  Probability of AUC24 > 400: 95 %  Ctrough,ss: 11.5 mg/L  Probability of Ctrough,ss > 20: 3 %  Probability of nephrotoxicity ([facility] PIA 2009): 7 %      The next level will be obtained on 3/25 at 0500. May be obtained sooner if clinically indicated.   Will continue to monitor renal function daily while on vancomycin and order serum creatinine at least every 48 hours if not already ordered.  Follow for continued vancomycin needs, clinical response, and signs/symptoms of toxicity.       Frank Rincon, PharmD           "

## 2024-03-23 NOTE — PROGRESS NOTES
"  INFECTIOUS DISEASE DAILY PROGRESS NOTE    SUBJECTIVE:    No overnight events. No new complaints. Afebrile. No rash/itching/diarrhea.     OBJECTIVE:  VITALS (Last 24 Hours)  /61 (BP Location: Right arm, Patient Position: Lying)   Pulse 56   Temp 37 °C (98.6 °F) (Temporal)   Resp 18   Ht 1.778 m (5' 10\")   Wt 72.6 kg (160 lb)   SpO2 96%   BMI 22.96 kg/m²     PHYSICAL EXAM:  Gen - NAD  Abd - soft, no ttp, BS present  Right Foot - s/p TMA with dressing present  Skin - no rash    ABX: IV Unasyn    LABS:  Lab Results   Component Value Date    WBC 5.6 03/23/2024    HGB 11.4 (L) 03/23/2024    HCT 35.1 (L) 03/23/2024    MCV 96 03/23/2024     03/23/2024     Lab Results   Component Value Date    GLUCOSE 138 (H) 03/23/2024    CALCIUM 8.0 (L) 03/23/2024     03/23/2024    K 3.5 03/23/2024    CO2 23 03/23/2024     03/23/2024    BUN 16 03/23/2024    CREATININE 0.87 03/23/2024           Estimated Creatinine Clearance: 59.1 mL/min (by C-G formula based on SCr of 0.87 mg/dL).      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 TTE is without signs of endocarditis. Repeat blood cx x2 3/21 NGTD.  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 Will be OK for PICC line Monday if blood from 3/21 stays negative.    IV Unasyn 3g Q6H to cover MSSA and other mixed bacteria/GNB.    Plan will be for 6 weeks of IV abx to treat for OM in the foot.     Monitoring for adverse effects of abx such as rash/itching/diarrhea - none.     Will follow. Thanks!    Berny Valenzuela MD  [facility]  Office #[phone]  "

## 2024-03-23 NOTE — PROGRESS NOTES
03/23/24 1048   Discharge Planning   Patient expects to be discharged to: [facility] -Will be OK for PICC line Monday if blood from 3/21 stays negative. per ID   Does the patient need discharge transport arranged? Yes   RoundTrip coordination needed? Yes   Has discharge transport been arranged? No

## 2024-03-24 LAB
ANION GAP SERPL CALC-SCNC: 11 MMOL/L (ref 10–20)
BACTERIA BLD AEROBE CULT: ABNORMAL
BACTERIA BLD CULT: ABNORMAL
BACTERIA BLD CULT: NORMAL
BACTERIA SPEC CULT: ABNORMAL
BACTERIA SPEC CULT: ABNORMAL
BUN SERPL-MCNC: 15 MG/DL (ref 6–23)
CALCIUM SERPL-MCNC: 8.2 MG/DL (ref 8.6–10.3)
CHLORIDE SERPL-SCNC: 106 MMOL/L (ref 98–107)
CO2 SERPL-SCNC: 27 MMOL/L (ref 21–32)
CREAT SERPL-MCNC: 0.92 MG/DL (ref 0.5–1.3)
EGFRCR SERPLBLD CKD-EPI 2021: 80 ML/MIN/1.73M*2
ERYTHROCYTE [DISTWIDTH] IN BLOOD BY AUTOMATED COUNT: 13.3 % (ref 11.5–14.5)
GLUCOSE BLD MANUAL STRIP-MCNC: 120 MG/DL (ref 74–99)
GLUCOSE BLD MANUAL STRIP-MCNC: 137 MG/DL (ref 74–99)
GLUCOSE BLD MANUAL STRIP-MCNC: 145 MG/DL (ref 74–99)
GLUCOSE BLD MANUAL STRIP-MCNC: 170 MG/DL (ref 74–99)
GLUCOSE BLD MANUAL STRIP-MCNC: 181 MG/DL (ref 74–99)
GLUCOSE SERPL-MCNC: 113 MG/DL (ref 74–99)
GRAM STN SPEC: ABNORMAL
HCT VFR BLD AUTO: 34.9 % (ref 41–52)
HGB BLD-MCNC: 11.5 G/DL (ref 13.5–17.5)
MCH RBC QN AUTO: 31.6 PG (ref 26–34)
MCHC RBC AUTO-ENTMCNC: 33 G/DL (ref 32–36)
MCV RBC AUTO: 96 FL (ref 80–100)
NRBC BLD-RTO: 0 /100 WBCS (ref 0–0)
PLATELET # BLD AUTO: 203 X10*3/UL (ref 150–450)
POTASSIUM SERPL-SCNC: 3.7 MMOL/L (ref 3.5–5.3)
RBC # BLD AUTO: 3.64 X10*6/UL (ref 4.5–5.9)
SODIUM SERPL-SCNC: 140 MMOL/L (ref 136–145)
WBC # BLD AUTO: 6.2 X10*3/UL (ref 4.4–11.3)

## 2024-03-24 PROCEDURE — 2500000002 HC RX 250 W HCPCS SELF ADMINISTERED DRUGS (ALT 637 FOR MEDICARE OP, ALT 636 FOR OP/ED): Performed by: NURSE PRACTITIONER

## 2024-03-24 PROCEDURE — 2500000002 HC RX 250 W HCPCS SELF ADMINISTERED DRUGS (ALT 637 FOR MEDICARE OP, ALT 636 FOR OP/ED): Performed by: FAMILY MEDICINE

## 2024-03-24 PROCEDURE — 2500000005 HC RX 250 GENERAL PHARMACY W/O HCPCS: Performed by: NURSE PRACTITIONER

## 2024-03-24 PROCEDURE — 82947 ASSAY GLUCOSE BLOOD QUANT: CPT

## 2024-03-24 PROCEDURE — 2500000004 HC RX 250 GENERAL PHARMACY W/ HCPCS (ALT 636 FOR OP/ED): Performed by: NURSE PRACTITIONER

## 2024-03-24 PROCEDURE — 80048 BASIC METABOLIC PNL TOTAL CA: CPT | Performed by: NURSE PRACTITIONER

## 2024-03-24 PROCEDURE — 2500000001 HC RX 250 WO HCPCS SELF ADMINISTERED DRUGS (ALT 637 FOR MEDICARE OP): Performed by: NURSE PRACTITIONER

## 2024-03-24 PROCEDURE — 2500000001 HC RX 250 WO HCPCS SELF ADMINISTERED DRUGS (ALT 637 FOR MEDICARE OP): Performed by: FAMILY MEDICINE

## 2024-03-24 PROCEDURE — 36415 COLL VENOUS BLD VENIPUNCTURE: CPT | Performed by: NURSE PRACTITIONER

## 2024-03-24 PROCEDURE — 1100000001 HC PRIVATE ROOM DAILY

## 2024-03-24 PROCEDURE — 99233 SBSQ HOSP IP/OBS HIGH 50: CPT | Performed by: INTERNAL MEDICINE

## 2024-03-24 PROCEDURE — 2500000004 HC RX 250 GENERAL PHARMACY W/ HCPCS (ALT 636 FOR OP/ED): Performed by: INTERNAL MEDICINE

## 2024-03-24 PROCEDURE — 85027 COMPLETE CBC AUTOMATED: CPT | Performed by: NURSE PRACTITIONER

## 2024-03-24 RX ADMIN — CYANOCOBALAMIN TAB 500 MCG 500 MCG: 500 TAB at 09:27

## 2024-03-24 RX ADMIN — ENALAPRIL MALEATE 2.5 MG: 5 TABLET ORAL at 09:28

## 2024-03-24 RX ADMIN — LIDOCAINE 4% 2 PATCH: 40 PATCH TOPICAL at 09:25

## 2024-03-24 RX ADMIN — ACETAMINOPHEN 975 MG: 325 TABLET ORAL at 16:57

## 2024-03-24 RX ADMIN — Medication 500 MG: at 09:29

## 2024-03-24 RX ADMIN — CLOPIDOGREL 75 MG: 75 TABLET ORAL at 09:27

## 2024-03-24 RX ADMIN — ENOXAPARIN SODIUM 40 MG: 40 INJECTION SUBCUTANEOUS at 09:26

## 2024-03-24 RX ADMIN — LATANOPROST 1 DROP: 50 SOLUTION/ DROPS OPHTHALMIC at 21:53

## 2024-03-24 RX ADMIN — DORZOLAMIDE HYDROCHLORIDE AND TIMOLOL MALEATE 1 DROP: 22.3; 6.8 SOLUTION/ DROPS OPHTHALMIC at 21:53

## 2024-03-24 RX ADMIN — AMPICILLIN SODIUM AND SULBACTAM SODIUM 3 G: 2; 1 INJECTION, POWDER, FOR SOLUTION INTRAMUSCULAR; INTRAVENOUS at 05:11

## 2024-03-24 RX ADMIN — METOPROLOL TARTRATE 12.5 MG: 25 TABLET, FILM COATED ORAL at 21:53

## 2024-03-24 RX ADMIN — INSULIN LISPRO 2 UNITS: 100 INJECTION, SOLUTION INTRAVENOUS; SUBCUTANEOUS at 13:05

## 2024-03-24 RX ADMIN — METOPROLOL TARTRATE 12.5 MG: 25 TABLET, FILM COATED ORAL at 09:27

## 2024-03-24 RX ADMIN — ACETAMINOPHEN 975 MG: 325 TABLET ORAL at 09:28

## 2024-03-24 RX ADMIN — MULTIPLE VITAMINS W/ MINERALS TAB 1 TABLET: TAB at 09:27

## 2024-03-24 RX ADMIN — AMPICILLIN SODIUM AND SULBACTAM SODIUM 3 G: 2; 1 INJECTION, POWDER, FOR SOLUTION INTRAMUSCULAR; INTRAVENOUS at 21:54

## 2024-03-24 RX ADMIN — DOCUSATE SODIUM 100 MG: 100 CAPSULE, LIQUID FILLED ORAL at 09:27

## 2024-03-24 RX ADMIN — AMPICILLIN SODIUM AND SULBACTAM SODIUM 3 G: 2; 1 INJECTION, POWDER, FOR SOLUTION INTRAMUSCULAR; INTRAVENOUS at 16:58

## 2024-03-24 RX ADMIN — DOCUSATE SODIUM 100 MG: 100 CAPSULE, LIQUID FILLED ORAL at 21:53

## 2024-03-24 RX ADMIN — OXYCODONE HYDROCHLORIDE AND ACETAMINOPHEN 1000 MG: 500 TABLET ORAL at 09:27

## 2024-03-24 RX ADMIN — NEPAFENAC 1 DROP: 1 SUSPENSION/ DROPS OPHTHALMIC at 15:00

## 2024-03-24 RX ADMIN — Medication 5000 UNITS: at 09:27

## 2024-03-24 RX ADMIN — DORZOLAMIDE HYDROCHLORIDE AND TIMOLOL MALEATE 1 DROP: 22.3; 6.8 SOLUTION/ DROPS OPHTHALMIC at 09:31

## 2024-03-24 RX ADMIN — INSULIN GLARGINE 25 UNITS: 100 INJECTION, SOLUTION SUBCUTANEOUS at 21:53

## 2024-03-24 RX ADMIN — TAMSULOSIN HYDROCHLORIDE 0.4 MG: 0.4 CAPSULE ORAL at 09:29

## 2024-03-24 RX ADMIN — AMPICILLIN SODIUM AND SULBACTAM SODIUM 3 G: 2; 1 INJECTION, POWDER, FOR SOLUTION INTRAMUSCULAR; INTRAVENOUS at 09:26

## 2024-03-24 RX ADMIN — PANTOPRAZOLE SODIUM 40 MG: 40 TABLET, DELAYED RELEASE ORAL at 06:12

## 2024-03-24 RX ADMIN — ACETAMINOPHEN 975 MG: 325 TABLET ORAL at 21:53

## 2024-03-24 RX ADMIN — NEPAFENAC 1 DROP: 1 SUSPENSION/ DROPS OPHTHALMIC at 09:00

## 2024-03-24 ASSESSMENT — COGNITIVE AND FUNCTIONAL STATUS - GENERAL
WALKING IN HOSPITAL ROOM: A LOT
CLIMB 3 TO 5 STEPS WITH RAILING: A LOT
DAILY ACTIVITIY SCORE: 19
HELP NEEDED FOR BATHING: A LITTLE
MOBILITY SCORE: 16
MOVING TO AND FROM BED TO CHAIR: A LOT
DRESSING REGULAR LOWER BODY CLOTHING: A LITTLE
DRESSING REGULAR UPPER BODY CLOTHING: A LITTLE
TOILETING: A LITTLE
PERSONAL GROOMING: A LITTLE
STANDING UP FROM CHAIR USING ARMS: A LOT

## 2024-03-24 ASSESSMENT — PAIN SCALES - GENERAL: PAINLEVEL_OUTOF10: 0 - NO PAIN

## 2024-03-24 NOTE — PROGRESS NOTES
"Rene Ballard is a 89 y.o. male on day 5 of admission presenting with Osteomyelitis of toe of right foot (CMS/HCC).    Subjective   Pt seen and examined. No new complaints.       Objective     Physical Exam  HENT:      Head: Normocephalic.   Cardiovascular:      Rate and Rhythm: Normal rate.      Heart sounds: Murmur heard.   Pulmonary:      Breath sounds: Normal breath sounds.   Abdominal:      General: Abdomen is flat. Bowel sounds are normal.      Tenderness: There is no abdominal tenderness. There is no guarding.   Musculoskeletal:      Left shoulder: Decreased range of motion.      Right lower leg: No edema.      Left lower leg: No edema.      Comments: Strength in left leg is 5/5.  Strength in right leg is 4/5.  Dressing on right foot is clean and dry, there is no erythema or discharge surrounding dressing. Unable to check capillary refill due to dressing. Podiatry following.     Strength in left arm is 3/5, ROM seems to have improved since yesterday  Strength in right arm is 4/5   Skin:     General: Skin is warm and dry.   Neurological:      Mental Status: He is alert.         Last Recorded Vitals  Blood pressure 120/50, pulse 60, temperature 36.8 °C (98.2 °F), temperature source Temporal, resp. rate 18, height 1.778 m (5' 10\"), weight 72.6 kg (160 lb), SpO2 97 %.  Intake/Output last 3 Shifts:  I/O last 3 completed shifts:  In: 550 (7.6 mL/kg) [IV Piggyback:550]  Out: 1450 (20 mL/kg) [Urine:1450 (0.6 mL/kg/hr)]  Weight: 72.6 kg     Relevant Results              Scheduled medications  acetaminophen, 975 mg, oral, TID  ampicillin-sulbactam, 3 g, intravenous, q6h  ascorbic acid, 1,000 mg, oral, Daily  cholecalciferol, 5,000 Units, oral, Daily  clopidogrel, 75 mg, oral, Daily  cyanocobalamin, 500 mcg, oral, Daily  docusate sodium, 100 mg, oral, BID  dorzolamide-timoloL, 1 drop, Left Eye, BID  enalapril, 2.5 mg, oral, Daily  enoxaparin, 40 mg, subcutaneous, q24h  insulin glargine, 25 Units, subcutaneous, " Nightly  insulin lispro, 0-10 Units, subcutaneous, TID with meals  latanoprost, 1 drop, Both Eyes, Daily  lidocaine, 2 patch, transdermal, Daily  metoprolol tartrate, 12.5 mg, oral, BID  multivitamin with minerals, 1 tablet, oral, Daily  nepafenac, 1 drop, Both Eyes, TID  niacin, 500 mg, oral, Daily  pantoprazole, 40 mg, oral, Daily before breakfast  tamsulosin, 0.4 mg, oral, Daily      Continuous medications     PRN medications  PRN medications: alum-mag hydroxide-simeth, dextrose, dextrose, glucagon, melatonin, polyethylene glycol  Results for orders placed or performed during the hospital encounter of 03/19/24 (from the past 24 hour(s))   POCT GLUCOSE   Result Value Ref Range    POCT Glucose 187 (H) 74 - 99 mg/dL   POCT GLUCOSE   Result Value Ref Range    POCT Glucose 204 (H) 74 - 99 mg/dL   POCT GLUCOSE   Result Value Ref Range    POCT Glucose 166 (H) 74 - 99 mg/dL   POCT GLUCOSE   Result Value Ref Range    POCT Glucose 137 (H) 74 - 99 mg/dL   Basic Metabolic Panel   Result Value Ref Range    Glucose 113 (H) 74 - 99 mg/dL    Sodium 140 136 - 145 mmol/L    Potassium 3.7 3.5 - 5.3 mmol/L    Chloride 106 98 - 107 mmol/L    Bicarbonate 27 21 - 32 mmol/L    Anion Gap 11 10 - 20 mmol/L    Urea Nitrogen 15 6 - 23 mg/dL    Creatinine 0.92 0.50 - 1.30 mg/dL    eGFR 80 >60 mL/min/1.73m*2    Calcium 8.2 (L) 8.6 - 10.3 mg/dL   CBC   Result Value Ref Range    WBC 6.2 4.4 - 11.3 x10*3/uL    nRBC 0.0 0.0 - 0.0 /100 WBCs    RBC 3.64 (L) 4.50 - 5.90 x10*6/uL    Hemoglobin 11.5 (L) 13.5 - 17.5 g/dL    Hematocrit 34.9 (L) 41.0 - 52.0 %    MCV 96 80 - 100 fL    MCH 31.6 26.0 - 34.0 pg    MCHC 33.0 32.0 - 36.0 g/dL    RDW 13.3 11.5 - 14.5 %    Platelets 203 150 - 450 x10*3/uL   POCT GLUCOSE   Result Value Ref Range    POCT Glucose 120 (H) 74 - 99 mg/dL     Transthoracic Echo Complete    Result Date: 3/21/2024   [facility], [address] [place] [address]              Tel [phone] and Fax [phone]  TRANSTHORACIC ECHOCARDIOGRAM REPORT  Patient Name:      MAYELIN OLSON     Reading Physician:    [id number] Darien Padron MD Study Date:        3/21/2024            Ordering Provider:    [id number] LOUISA CARDOZO MRN/PID:           [id number]             Fellow: Accession#:        [id number]         Nurse: Date of Birth/Age: 11/8/1934 / 89 years Sonographer:          Consuelo Mosquera RDCS Gender:            M                    Additional Staff: Height:            178.00 cm            Admit Date:           3/21/2024 Weight:            73.00 kg             Admission Status:     Inpatient -                                                               Routine BSA / BMI:         1.90 m2 / 23.04      Encounter#:           [id number]                    kg/m2                                         Department Location:  [facility] Non                                                               Invasive Blood Pressure: 106 /48 mmHg Study Type:    TRANSTHORACIC ECHO (TTE) COMPLETE Diagnosis/ICD: Encounter for other specified special examinations-Z01.89 Indication:    bacteremia due to staphylcoccus aureus CPT Code:      Echo Complete w Full Doppler-[id number] Patient History: Diabetes:          Yes Pertinent History: CAD, HTN and Hyperlipidemia. Study Detail: The following Echo studies were performed: 2D, M-Mode, Doppler and               color flow.  PHYSICIAN INTERPRETATION: Left Ventricle: The left ventricular systolic function is normal, with an estimated ejection fraction of 60-65%. Estimated left ventricular mass is normal. There are no regional wall motion abnormalities. The left ventricular cavity size is normal. The left ventricular septal wall thickness is normal. There is normal left ventricular posterior wall thickness. Spectral Doppler shows an impaired relaxation pattern of left ventricular diastolic filling. Left Atrium: The left atrium is mildly dilated. Right  Ventricle: The right ventricle is normal in size. There is mildly reduced right ventricular systolic function. TAPSE: 13.0 mm. Right Atrium: The right atrium is upper limits of normal in size. Aortic Valve: The aortic valve appears structurally normal. The aortic valve appears tricuspid with restriction. There is moderate aortic valve cusp calcification. There is evidence of mild to moderate aortic valve stenosis. The aortic valve dimensionless index is 0.37. There is no evidence of aortic valve regurgitation. The peak instantaneous gradient of the aortic valve is 24.6 mmHg. The mean gradient of the aortic valve is 14.6 mmHg. Mitral Valve: The mitral valve is mildly thickened. There is mild to moderate mitral annular calcification. There is no evidence of mitral valve regurgitation. Tricuspid Valve: The tricuspid valve is structurally normal. There is mild tricuspid regurgitation. Pulmonic Valve: The pulmonic valve is not well visualized. There is no indication of pulmonic valve regurgitation. Pericardium: There is no pericardial effusion noted. Aorta: The aortic root is normal. Systemic Veins: The inferior vena cava appears to be of normal size. There is IVC inspiratory collapse greater than 50%. In comparison to the previous echocardiogram(s): Compared with study from 6/13/2023, no significant change.  CONCLUSIONS:  1. Left ventricular systolic function is normal with a 60-65% estimated ejection fraction.  2. Spectral Doppler shows an impaired relaxation pattern of left ventricular diastolic filling.  3. There is mildly reduced right ventricular systolic function.  4. Mild to moderate aortic valve stenosis.  5. The aortic valve appears tricuspid with restriction.  6. There is moderate aortic valve cusp calcification.  7. No definite valvular vegetations were visualized. QUANTITATIVE DATA SUMMARY: 2D MEASUREMENTS:                           Normal Ranges: LAs:           4.10 cm    (2.7-4.0cm) IVSd:          1.13 cm     (0.6-1.1cm) LVPWd:         1.05 cm    (0.6-1.1cm) LVIDd:         4.39 cm    (3.9-5.9cm) LVIDs:         3.21 cm LV Mass Index: 113.4 g/m2 LV % FS        26.9 % LA VOLUME:                               Normal Ranges: LA Vol A4C:        71.6 ml    (22+/-6mL/m2) LA Vol A2C:        54.0 ml LA Vol BP:         62.7 ml LA Vol Index A4C:  37.6 ml/m2 LA Vol Index A2C:  28.4 ml/m2 LA Vol Index BP:   32.9 ml/m2 LA Area A4C:       22.3 cm2 LA Area A2C:       19.2 cm2 LA Major Axis A4C: 5.9 cm LA Major Axis A2C: 5.8 cm LA Volume Index:   33.2 ml/m2 LA Vol A4C:        66.4 ml LA Vol A2C:        51.2 ml RA VOLUME BY A/L METHOD:                       Normal Ranges: RA Area A4C: 20.6 cm2 M-MODE MEASUREMENTS:                  Normal Ranges: Ao Root: 2.80 cm (2.0-3.7cm) LAs:     4.59 cm (2.7-4.0cm) AORTA MEASUREMENTS:                      Normal Ranges: Ao Sinus, d: 3.00 cm (2.1-3.5cm) Ao STJ, d:   3.10 cm (1.7-3.4cm) Asc Ao, d:   3.10 cm (2.1-3.4cm) LV SYSTOLIC FUNCTION BY 2D PLANIMETRY (MOD):                     Normal Ranges: EF-A4C View: 55.7 % (>=55%) EF-A2C View: 69.3 % EF-Biplane:  64.4 % LV DIASTOLIC FUNCTION:                              Normal Ranges: MV Peak E:        0.41 m/s   (0.7-1.2 m/s) MV Peak A:        0.89 m/s   (0.42-0.7 m/s) E/A Ratio:        0.47       (1.0-2.2) MV e'             0.09 m/s   (>8.0) MV lateral e'     0.09 m/s MV medial e'      0.03 m/s E/e' Ratio:       4.61       (<8.0) PulmV Sys Simon:    62.19 cm/s PulmV Odonnell Simon:   60.35 cm/s PulmV S/D Simon:    1.03 PulmV A Revs Simon: 21.90 cm/s PulmV A Revs Dur: 98.95 msec MITRAL VALVE:                 Normal Ranges: MV DT: 186 msec (150-240msec) AORTIC VALVE:                                    Normal Ranges: AoV Vmax:                2.48 m/s  (<=1.7m/s) AoV Peak P.6 mmHg (<20mmHg) AoV Mean P.6 mmHg (1.7-11.5mmHg) LVOT Max Simon:            0.85 m/s  (<=1.1m/s) AoV VTI:                 58.33 cm  (18-25cm) LVOT VTI:                 21.37 cm LVOT Diameter:           2.10 cm   (1.8-2.4cm) AoV Area, VTI:           1.26 cm2  (2.5-5.5cm2) AoV Area,Vmax:           1.18 cm2  (2.5-4.5cm2) AoV Area, planim:        1.09 cm2  (2.5-4.5cm2) AoV Dimensionless Index: 0.37  RIGHT VENTRICLE: RV Basal 3.50 cm RV Mid   2.60 cm RV Major 6.7 cm TAPSE:   13.0 mm TRICUSPID VALVE/RVSP:                             Normal Ranges: Peak TR Velocity: 2.08 m/s Est. RA Pressure: 3 mmHg RV Syst Pressure: 20.3 mmHg (< 30mmHg) IVC Diam:         1.85 cm PULMONIC VALVE:                         Normal Ranges: PV Accel Time: 93 msec  (>120ms) PV Max Simon:    1.2 m/s  (0.6-0.9m/s) PV Max P.4 mmHg Pulmonary Veins: PulmV A Revs Dur: 98.95 msec PulmV A Revs Simon: 21.90 cm/s PulmV Odonnell Simon:   60.35 cm/s PulmV S/D Simon:    1.03 PulmV Sys Simon:    62.19 cm/s  [id number] Darien Padron MD Electronically signed on 3/21/2024 at 6:18:04 PM  ** Final **     XR knee left 4+ views    Result Date: 3/20/2024  Interpreted By:  Mauri Crow, STUDY: XR KNEE LEFT 4+ VIEWS;  3/20/2024 12:13 pm   INDICATION: Signs/Symptoms:left knee pain.   COMPARISON: Previous exam from 2022   ACCESSION NUMBER(S): [id number]   ORDERING CLINICIAN: CAMELIA SHERIDAN   TECHNIQUE: 4 views  of the  left knee were obtained.   FINDINGS: Medial and lateral meniscal chondrocalcinosis. Advanced patellofemoral joint space loss with spur formation. Large patellar osteophyte at the insertion of the quadriceps tendon. Moderate to advanced medial compartment narrowing with moderate spur formation. Mild lateral compartment spurring. Mild fullness in the suprapatellar bursa. Prominent posterior arterial calcifications. No lytic or blastic destructive bone lesion. No acute fracture or dislocation. No opaque soft tissue foreign body. No periosteal reaction or erosion.       Small effusion. No acute fracture or dislocation.   Meniscal chondrocalcinosis.   Advanced left knee DJD as described.   MACRO: None   Signed by: Mauri Crow  3/20/2024 1:13 PM Dictation workstation:   [id number]    ECG 12 lead    Result Date: 3/20/2024  Sinus rhythm with 1st degree AV block Right bundle branch block Left anterior fascicular block  Bifascicular block  Abnormal ECG When compared with ECG of 17-JUN-2023 02:24, Previous ECG has undetermined rhythm, needs review Borderline criteria for Lateral infarct are no longer Present QT has shortened See ED provider note for full interpretation and clinical correlation Confirmed by Sadie Pierre ([id number]) on 3/20/2024 10:59:27 AM    CT foot right wo IV contrast    Result Date: 3/19/2024  STUDY: CT Extremity; Completed Time:  3/19/2024 8:34 PM. INDICATION: Evaluate for necrotizing soft tissue infection / osteomyelitis. COMPARISON: XR right foot 3/19/2024;  MR right foot 6/10/2023. ACCESSION NUMBER(S): [id number] ORDERING CLINICIAN: ÓSCAR GORDON TECHNIQUE:  Thin section axial images were obtained through the right foot without intravenous contrast.  Orthogonal reconstructed images were obtained and reviewed.  Automated mA/kV exposure control was utilized and patient examination was performed in strict accordance with principles of ALARA. FINDINGS:  There is redemonstration of transmetatarsal amputation of the forefoot with resection of the fifth digit.  There is diffuse osteopenia.  There is ulceration along the lateral margin of the forefoot with induration, edema and soft tissue swelling of the forefoot extending into the ankle compatible with extensive soft tissue infection.  There is cortical erosion and periosteal reaction along the plantar and lateral margin of the fourth metatarsal bone compatible with acute osteomyelitis with small locule of gas along the plantar margin (image 245 series 301).  There is fragmentation and periosteal reaction extending into the base of the fourth metatarsal bone.  There are mild-to-moderate degenerative changes.  Midfoot alignment is maintained.  Talonavicular joint and  calcaneocuboid joint alignment is maintained.  Ankle mortise and talar dome are intact. There are vascular calcifications.  There is edema of the intrinsic muscles of the foot.    Redemonstration transmetatarsal amputation of the forefoot with amputation of the fifth digit.  Large area of ulceration along the lateral aspect of the forefoot with acute osteomyelitis of the fourth metatarsal bone predominantly along the lateral and plantar margin with small locule gas, extensive soft tissue swelling and edema compatible with soft tissue infection extending from the forefoot through the level of the ankle. Signed by Esaton Baron, DO    CT cervical spine wo IV contrast    Result Date: 3/19/2024  Interpreted By:  Rell eJwell, STUDY: CT CERVICAL SPINE WO IV CONTRAST;  3/19/2024 8:32 pm   INDICATION: Signs/Symptoms:Fall with head strike.   COMPARISON: None.   ACCESSION NUMBER(S): [id number]   ORDERING CLINICIAN: ÓSCAR GORDON   TECHNIQUE: Axial noncontrast images of the cervical spine with coronal and sagittal reconstructed images.   FINDINGS: ALIGNMENT: Normal. VERTEBRAE: No acute fracture. There is loss of disc height and anterior osteophyte formation at multiple levels worse at C5-6, C6-7 and C7-T1. Facet and uncovertebral hypertrophic changes cause moderate to severe bilateral neural foraminal narrowing at multiple levels. Degenerative changes at the atlantodental interval. SPINAL CANAL: No critical spinal canal stenosis. Disc spur complex at multiple levels most severe at C6-7 where there is moderate stenosis. PREVERTEBRAL SOFT TISSUES: No prevertebral soft tissue swelling. LUNG APICES: Imaged portion of the lung apices are within normal limits.   OTHER FINDINGS: Atherosclerotic calcification of the carotid vessels.       No acute fracture or traumatic subluxation of the cervical spine.   Multilevel discogenic degenerative changes as noted above.   MACRO: None   Signed by: Rell Jewell 3/19/2024 9:03 PM  Dictation workstation:   [id number]    CT head wo IV contrast    Result Date: 3/19/2024  Interpreted By:  Rell Jewell, STUDY: CT HEAD WO IV CONTRAST;  3/19/2024 8:32 pm   INDICATION: Signs/Symptoms:Fall with head strike.   COMPARISON: CT scan of the head 06/07/2023   ACCESSION NUMBER(S): [id number]   ORDERING CLINICIAN: ÓSCAR GORDON   TECHNIQUE: Axial noncontrast CT images of the head.   FINDINGS: BRAIN PARENCHYMA: Gray-white matter interfaces are preserved. No mass, mass effect or midline shift. Moderate deep and periventricular white matter hypodensities are nonspecific, but favored to represent chronic small vessel ischemic changes.   HEMORRHAGE: No acute intracranial hemorrhage. VENTRICLES and EXTRA-AXIAL SPACES: Moderate volume loss with prominence of the ventricles and sulci. EXTRACRANIAL SOFT TISSUES: Within normal limits. PARANASAL SINUSES/MASTOIDS: The visualized paranasal sinuses and mastoid air cells are aerated. CALVARIUM: No depressed skull fracture. No destructive osseous lesion.   OTHER FINDINGS: Atherosclerotic calcification of the carotid siphons and vertebral arteries..       No acute intracranial abnormality.   Chronic changes as noted above.   MACRO: None   Signed by: Rell Jewell 3/19/2024 8:59 PM Dictation workstation:   [id number]    XR shoulder left 2+ views    Result Date: 3/19/2024  STUDY: Shoulder Radiographs; 3/19/2024, 7:59PM INDICATION: Left shoulder pain. COMPARISON: None Available. ACCESSION NUMBER(S): [id number] ORDERING CLINICIAN: ÓSCAR GORDON TECHNIQUE:  Two view(s) of the left shoulder. FINDINGS:  There is no displaced fracture.  There is degenerative change in the acromioclavicular joint and narrowing of the subacromion space suggesting some rotator cuff thinning..  Alignment otherwise is unremarkable..    There is degenerative change in the acromioclavicular joint and narrowing of the subacromion space suggesting some rotator cuff thinning.  No fracture or  dislocation is appreciated.. Signed by Samuel Brown MD    XR chest 1 view    Result Date: 3/19/2024  STUDY: Chest Radiograph;  3/19/20531 at 18:21 hours. INDICATION: Weakness and falls. COMPARISON: None Available ACCESSION NUMBER(S): [id number] ORDERING CLINICIAN: ÓSCAR GORDON TECHNIQUE:  Frontal chest was obtained at 18:21 hours. FINDINGS: CARDIOMEDIASTINAL SILHOUETTE: Patient is status post median sternotomy.  Heart size and mediastinal contours appear stable with prominence of the aortic arch.  There is minimal elevation left hemidiaphragm.  LUNGS: Lungs demonstrate chronic changes.  There is no pneumothorax or pleural effusion.  ABDOMEN: No remarkable upper abdominal findings.  BONES: No acute osseous changes.    No acute cardiopulmonary disease. Chronic changes. Signed by Easton Baron DO    XR foot right 3+ views    Result Date: 3/19/2024  STUDY: Foot Radiographs; 3/19/2024, 6:22PM INDICATION: Right foot wound. COMPARISON: 6/10/2023 MRI Right Foot. ACCESSION NUMBER(S): [id number] ORDERING CLINICIAN: ÓSCAR GORDON TECHNIQUE:  Three view(s) of the right foot. FINDINGS:  There are postsurgical changes following transmetatarsal amputation of the forefoot and resection of the fifth metatarsal bone.  There is ulceration along the lateral aspect of the forefoot.  There is cortical irregularity along the lateral margin of the fifth metatarsal bone and periosteal reaction suggesting acute osteomyelitis.  There is also small focus of soft tissue gas/fistulous tract.  There is soft tissue swelling extending in the ankle.  Calcaneus is intact.  There are vascular calcifications.      Transmetatarsal amputation of the forefoot with resection the fifth digit.  Lateral forefoot ulceration with evidence of acute osteomyelitis in the fourth metatarsal bone with periosteal reaction and soft tissue swelling.  Small focus of soft tissue gas/fistulous tract.  Recommend repeat MRI or CT for further evaluation.  Signed by Easton Baron DO             Malnutrition Diagnosis Status: New  Malnutrition Diagnosis: Severe malnutrition related to chronic disease or condition  As Evidenced by: reported intake <75% estimated needs for > 1month, severe muscle & subcutaneous fat depletion.  I agree with the dietitian's malnutrition diagnosis.      Assessment/Plan        Rene Ballard is a 89 y.o. male presenting post-Fall (Fell off toilet, has mutliple sites with pain, shoulder arm, knees). Patient has history of insulin-dependent diabetes, CAD with CABG x 3 off pump 3/2/2022 (LIMA-LAD, SVG-OM, SVG-PDA), GERD, mild systolic dysfunction EF 40-45%, moderate L carotid stenosis 50-69%, and right leg PAD   Patient states that he has bad knees and shoulders, for the 3 to 4 days leading up to his fall he felt very unsteady on his feet. His left shoulder has been stiff with difficulty moving it. He also had been generally not feeling well, very tired, and not eating well prior to the fall. Prior to coming to the ER he had a fall and then on the 19th he fell between the toilet and the wall when he was trying to get off the toilet. He attributes these falls to losing his balance. In ER work up noted there was concern for infection of his R fifth TMA site , pt started on antibiotics and admitted for further care      Osteomyelitis of toe of right foot (CMS/HCC)  Bacteremia due to CoNS   Right TMA Site Wound Infection   Right leg PAD   -Podiatry following: No plan for surgical intervention. Opting for conservative management over surgical management due to nutrition status. Dr. Ribeiro plans to utilize hyperbaric chamber at wound center at [place] to help with healing.  -Weightbearing: Nonweightbearing right lower extremity per podiatry.  -Wound Care: Betadine, 4 x 4, Kerlix.  Podiatry to change.  -ID following  -ECHO was preformed and (-) for vegetation   -Prior PVR on 6/12/23 showed evidence of mild arterial occlusive disease in  the right lower extremity at rest   -IV Unasyn 3g Q6H to cover MSSA and other mixed bacteria/GNB.   -Will be OK for PICC line Monday if blood from 3/21 stays negative.   -Plan will be for 6 weeks of IV abx to treat for OM in the foot.      Left shoulder pain, s/p fall   Knee pain, s/p fall   -reviewed imaging result, XRAYS negative for fx, showed degenerative disease in AC joint and effusion in knee   -continue Tylenol prn and lidocaine patch for pain     DM  -insulin glargine injection  -sliding scale insulin   -hypoglycemia protocol in place   -contributes to poor wound healing  -Last A1c on 3/19/24 was 7.3, but POCT glucose levels in hospital are uncontrolled (range from 130s-270s), continue to monitor blood glucose and consider change in dose of lantus   -POCT glucose levels to be monitored 4x/day before meals and at bedtime     CAD with CABG  mild systolic dysfunction EF 40-45%  moderate L carotid stenosis 50-69%  -appears euvolemic on exam as no rails, edema, JVD   -stable  -continue plavix     GERD  -stable  -continue pantoprazole     Constipation  -resolved    VTE prophylaxis: Lovenox     Discharge planning  -PT/OT consulted, PT recommends high intensity   -referral to SNF d/t patient stating he is unable to tolerate high intensity         Robby Calloway MD

## 2024-03-25 VITALS
WEIGHT: 160 LBS | HEIGHT: 70 IN | RESPIRATION RATE: 18 BRPM | BODY MASS INDEX: 22.9 KG/M2 | HEART RATE: 59 BPM | SYSTOLIC BLOOD PRESSURE: 121 MMHG | OXYGEN SATURATION: 95 % | TEMPERATURE: 98.8 F | DIASTOLIC BLOOD PRESSURE: 50 MMHG

## 2024-03-25 DIAGNOSIS — Z79.4 CONTROLLED TYPE 2 DIABETES MELLITUS WITH OTHER CIRCULATORY COMPLICATION, WITH LONG-TERM CURRENT USE OF INSULIN (MULTI): ICD-10-CM

## 2024-03-25 DIAGNOSIS — E11.59 CONTROLLED TYPE 2 DIABETES MELLITUS WITH OTHER CIRCULATORY COMPLICATION, WITH LONG-TERM CURRENT USE OF INSULIN (MULTI): ICD-10-CM

## 2024-03-25 LAB
ANION GAP SERPL CALC-SCNC: 12 MMOL/L (ref 10–20)
BACTERIA BLD CULT: NORMAL
BACTERIA BLD CULT: NORMAL
BUN SERPL-MCNC: 15 MG/DL (ref 6–23)
CALCIUM SERPL-MCNC: 8 MG/DL (ref 8.6–10.3)
CHLORIDE SERPL-SCNC: 107 MMOL/L (ref 98–107)
CO2 SERPL-SCNC: 26 MMOL/L (ref 21–32)
CREAT SERPL-MCNC: 0.9 MG/DL (ref 0.5–1.3)
EGFRCR SERPLBLD CKD-EPI 2021: 82 ML/MIN/1.73M*2
ERYTHROCYTE [DISTWIDTH] IN BLOOD BY AUTOMATED COUNT: 13.5 % (ref 11.5–14.5)
GLUCOSE BLD MANUAL STRIP-MCNC: 165 MG/DL (ref 74–99)
GLUCOSE BLD MANUAL STRIP-MCNC: 177 MG/DL (ref 74–99)
GLUCOSE BLD MANUAL STRIP-MCNC: 84 MG/DL (ref 74–99)
GLUCOSE SERPL-MCNC: 97 MG/DL (ref 74–99)
HCT VFR BLD AUTO: 33.2 % (ref 41–52)
HGB BLD-MCNC: 10.8 G/DL (ref 13.5–17.5)
INR PPP: 1.2 (ref 0.9–1.1)
MCH RBC QN AUTO: 31.3 PG (ref 26–34)
MCHC RBC AUTO-ENTMCNC: 32.5 G/DL (ref 32–36)
MCV RBC AUTO: 96 FL (ref 80–100)
NRBC BLD-RTO: 0 /100 WBCS (ref 0–0)
PLATELET # BLD AUTO: 199 X10*3/UL (ref 150–450)
POTASSIUM SERPL-SCNC: 3.7 MMOL/L (ref 3.5–5.3)
PROTHROMBIN TIME: 13 SECONDS (ref 9.8–12.8)
RBC # BLD AUTO: 3.45 X10*6/UL (ref 4.5–5.9)
SODIUM SERPL-SCNC: 141 MMOL/L (ref 136–145)
WBC # BLD AUTO: 5.9 X10*3/UL (ref 4.4–11.3)

## 2024-03-25 PROCEDURE — 85610 PROTHROMBIN TIME: CPT | Performed by: INTERNAL MEDICINE

## 2024-03-25 PROCEDURE — 2500000001 HC RX 250 WO HCPCS SELF ADMINISTERED DRUGS (ALT 637 FOR MEDICARE OP): Performed by: NURSE PRACTITIONER

## 2024-03-25 PROCEDURE — C1751 CATH, INF, PER/CENT/MIDLINE: HCPCS

## 2024-03-25 PROCEDURE — 2500000002 HC RX 250 W HCPCS SELF ADMINISTERED DRUGS (ALT 637 FOR MEDICARE OP, ALT 636 FOR OP/ED): Performed by: NURSE PRACTITIONER

## 2024-03-25 PROCEDURE — 36573 INSJ PICC RS&I 5 YR+: CPT

## 2024-03-25 PROCEDURE — 2500000005 HC RX 250 GENERAL PHARMACY W/O HCPCS: Performed by: NURSE PRACTITIONER

## 2024-03-25 PROCEDURE — 36415 COLL VENOUS BLD VENIPUNCTURE: CPT | Performed by: INTERNAL MEDICINE

## 2024-03-25 PROCEDURE — 99233 SBSQ HOSP IP/OBS HIGH 50: CPT

## 2024-03-25 PROCEDURE — 82947 ASSAY GLUCOSE BLOOD QUANT: CPT

## 2024-03-25 PROCEDURE — 36415 COLL VENOUS BLD VENIPUNCTURE: CPT | Performed by: NURSE PRACTITIONER

## 2024-03-25 PROCEDURE — 2500000001 HC RX 250 WO HCPCS SELF ADMINISTERED DRUGS (ALT 637 FOR MEDICARE OP): Performed by: FAMILY MEDICINE

## 2024-03-25 PROCEDURE — 2500000002 HC RX 250 W HCPCS SELF ADMINISTERED DRUGS (ALT 637 FOR MEDICARE OP, ALT 636 FOR OP/ED): Performed by: FAMILY MEDICINE

## 2024-03-25 PROCEDURE — 97116 GAIT TRAINING THERAPY: CPT | Mod: GP

## 2024-03-25 PROCEDURE — 2500000004 HC RX 250 GENERAL PHARMACY W/ HCPCS (ALT 636 FOR OP/ED): Performed by: INTERNAL MEDICINE

## 2024-03-25 PROCEDURE — 85027 COMPLETE CBC AUTOMATED: CPT | Performed by: NURSE PRACTITIONER

## 2024-03-25 PROCEDURE — 97110 THERAPEUTIC EXERCISES: CPT | Mod: GP

## 2024-03-25 PROCEDURE — 2500000004 HC RX 250 GENERAL PHARMACY W/ HCPCS (ALT 636 FOR OP/ED): Performed by: NURSE PRACTITIONER

## 2024-03-25 PROCEDURE — 02HV33Z INSERTION OF INFUSION DEVICE INTO SUPERIOR VENA CAVA, PERCUTANEOUS APPROACH: ICD-10-PCS

## 2024-03-25 PROCEDURE — 2720000007 HC OR 272 NO HCPCS

## 2024-03-25 PROCEDURE — 80048 BASIC METABOLIC PNL TOTAL CA: CPT | Performed by: NURSE PRACTITIONER

## 2024-03-25 PROCEDURE — 2780000003 HC OR 278 NO HCPCS

## 2024-03-25 RX ORDER — DOCUSATE SODIUM 100 MG/1
100 CAPSULE, LIQUID FILLED ORAL 2 TIMES DAILY PRN
Status: ON HOLD
Start: 2024-03-25

## 2024-03-25 RX ORDER — LIDOCAINE HYDROCHLORIDE 10 MG/ML
5 INJECTION INFILTRATION; PERINEURAL ONCE
Status: DISCONTINUED | OUTPATIENT
Start: 2024-03-25 | End: 2024-03-25 | Stop reason: HOSPADM

## 2024-03-25 RX ORDER — POLYETHYLENE GLYCOL 3350 17 G/17G
17 POWDER, FOR SOLUTION ORAL DAILY PRN
Status: ON HOLD
Start: 2024-03-25

## 2024-03-25 RX ORDER — BLOOD SUGAR DIAGNOSTIC
STRIP MISCELLANEOUS
Qty: 400 EACH | Refills: 3 | Status: SHIPPED | OUTPATIENT
Start: 2024-03-25 | End: 2024-04-10 | Stop reason: SDUPTHER

## 2024-03-25 RX ORDER — LIDOCAINE 560 MG/1
2 PATCH PERCUTANEOUS; TOPICAL; TRANSDERMAL DAILY
Status: ON HOLD
Start: 2024-03-25

## 2024-03-25 RX ADMIN — AMPICILLIN SODIUM AND SULBACTAM SODIUM 3 G: 2; 1 INJECTION, POWDER, FOR SOLUTION INTRAMUSCULAR; INTRAVENOUS at 04:48

## 2024-03-25 RX ADMIN — Medication 5000 UNITS: at 09:11

## 2024-03-25 RX ADMIN — CYANOCOBALAMIN TAB 500 MCG 500 MCG: 500 TAB at 09:11

## 2024-03-25 RX ADMIN — AMPICILLIN SODIUM AND SULBACTAM SODIUM 3 G: 2; 1 INJECTION, POWDER, FOR SOLUTION INTRAMUSCULAR; INTRAVENOUS at 15:36

## 2024-03-25 RX ADMIN — TAMSULOSIN HYDROCHLORIDE 0.4 MG: 0.4 CAPSULE ORAL at 09:11

## 2024-03-25 RX ADMIN — METOPROLOL TARTRATE 12.5 MG: 25 TABLET, FILM COATED ORAL at 09:11

## 2024-03-25 RX ADMIN — ACETAMINOPHEN 975 MG: 325 TABLET ORAL at 15:36

## 2024-03-25 RX ADMIN — OXYCODONE HYDROCHLORIDE AND ACETAMINOPHEN 1000 MG: 500 TABLET ORAL at 09:11

## 2024-03-25 RX ADMIN — ACETAMINOPHEN 975 MG: 325 TABLET ORAL at 09:11

## 2024-03-25 RX ADMIN — DORZOLAMIDE HYDROCHLORIDE AND TIMOLOL MALEATE 1 DROP: 22.3; 6.8 SOLUTION/ DROPS OPHTHALMIC at 09:13

## 2024-03-25 RX ADMIN — Medication 500 MG: at 09:11

## 2024-03-25 RX ADMIN — CLOPIDOGREL 75 MG: 75 TABLET ORAL at 09:11

## 2024-03-25 RX ADMIN — MULTIPLE VITAMINS W/ MINERALS TAB 1 TABLET: TAB at 09:11

## 2024-03-25 RX ADMIN — INSULIN LISPRO 2 UNITS: 100 INJECTION, SOLUTION INTRAVENOUS; SUBCUTANEOUS at 12:11

## 2024-03-25 RX ADMIN — LIDOCAINE 4% 2 PATCH: 40 PATCH TOPICAL at 09:11

## 2024-03-25 RX ADMIN — INSULIN LISPRO 2 UNITS: 100 INJECTION, SOLUTION INTRAVENOUS; SUBCUTANEOUS at 17:01

## 2024-03-25 RX ADMIN — ENOXAPARIN SODIUM 40 MG: 40 INJECTION SUBCUTANEOUS at 09:13

## 2024-03-25 RX ADMIN — ENALAPRIL MALEATE 2.5 MG: 5 TABLET ORAL at 09:11

## 2024-03-25 RX ADMIN — AMPICILLIN SODIUM AND SULBACTAM SODIUM 3 G: 2; 1 INJECTION, POWDER, FOR SOLUTION INTRAMUSCULAR; INTRAVENOUS at 10:43

## 2024-03-25 RX ADMIN — DOCUSATE SODIUM 100 MG: 100 CAPSULE, LIQUID FILLED ORAL at 09:11

## 2024-03-25 ASSESSMENT — COGNITIVE AND FUNCTIONAL STATUS - GENERAL
MOVING FROM LYING ON BACK TO SITTING ON SIDE OF FLAT BED WITH BEDRAILS: A LITTLE
STANDING UP FROM CHAIR USING ARMS: A LOT
TOILETING: A LITTLE
MOBILITY SCORE: 16
CLIMB 3 TO 5 STEPS WITH RAILING: TOTAL
DRESSING REGULAR LOWER BODY CLOTHING: A LITTLE
DRESSING REGULAR UPPER BODY CLOTHING: A LITTLE
PERSONAL GROOMING: A LITTLE
MOBILITY SCORE: 16
TURNING FROM BACK TO SIDE WHILE IN FLAT BAD: A LITTLE
DAILY ACTIVITIY SCORE: 19
MOVING TO AND FROM BED TO CHAIR: A LOT
CLIMB 3 TO 5 STEPS WITH RAILING: A LOT
WALKING IN HOSPITAL ROOM: A LOT
WALKING IN HOSPITAL ROOM: A LITTLE
MOVING TO AND FROM BED TO CHAIR: A LITTLE
STANDING UP FROM CHAIR USING ARMS: A LITTLE
HELP NEEDED FOR BATHING: A LITTLE

## 2024-03-25 ASSESSMENT — PAIN SCALES - GENERAL
PAINLEVEL_OUTOF10: 0 - NO PAIN
PAINLEVEL_OUTOF10: 0 - NO PAIN

## 2024-03-25 ASSESSMENT — PAIN - FUNCTIONAL ASSESSMENT
PAIN_FUNCTIONAL_ASSESSMENT: 0-10
PAIN_FUNCTIONAL_ASSESSMENT: 0-10

## 2024-03-25 NOTE — PROGRESS NOTES
03/25/24 1530   Discharge Planning   Patient expects to be discharged to: [facility]   Does the patient need discharge transport arranged? Yes   [employer] coordination needed? Yes   Has discharge transport been arranged? Yes   What day is the transport expected? 03/25/24   What time is the transport expected? 1760

## 2024-03-25 NOTE — PROCEDURES
PICC Line Insertion Procedure Note    Procedure: Insertion of #4 FR/18G PICC    Indications:  Long Term IV therapy    Procedure Details   Informed consent was obtained for the procedure.    Maximum sterile technique was used including antiseptics, cap, gloves, gown, hand hygiene, mask, and sheet.    Sedation: No    #4 FR/18G PICC inserted to the R Basilic vein per hospital protocol.   Blood return:  yes    Findings:  Catheter inserted to 42 cm, with 1 cm. Exposed. Mid upper arm circumference is 26 cm.  Catheter was flushed with 20 cc NS. Patient did tolerate procedure well.    Recommendations:  Single lumen PICC line inserted utilizing modified Seldinger technique under ultrasound guidance without difficulty or complications. Tip located in SVC using ECG technology, no chest xray required.   PICC Brochure given to patient with infection prevention teaching instruction.  Ok to use PICC line.     Vascular Access Team Procedure Note     Visit Date: 3/25/2024      Patient Name: Rene Ballard         MRN: [id number]             Procedure: Single Lumen Peripherally Inserted Central Catheter Insertion      PICC - Adult 03/25/24 Single lumen Right Basilic vein (Active)   03/25/24 1337 Basilic vein   Earliest Known Present:    Placed by External Staff?:    Hand Hygiene Completed: Yes   Catheter Time Out Checklist Completed: Yes   Size (Fr): 4   Lumen Type: Single lumen   Description (optional):    Catheter to Vein Ratio Less Than 45%: Yes   Total Length (cm): 42 cm   External Length (cm): 1 cm   Orientation: Right   Site Prep: Chlorhexidine ;Usual sterile procedure followed   Local Anesthetic: Injectable   Indication: Parenteral medications   Insertion Team Members In The Room: Nurse   Initial Extremity Circumference (cm): 26 cm   Placed by: Angela Mota RN   Insertion attempts: 1   Patient Tolerance: Tolerated well   Comfort Measures: Subcutaneous anesthetic;Positioning   Procedure Location: Bedside   Safety  Measures: Patient specific safety measures addressed with RN   Estimated Blood Loss (mL): 3 mL   Vessel Fully Compressible Proximally and Distally to Insertion Site: Yes   Brisk Blood Return Obtained and Line Draws Easily: Yes   Tip Location: Cavo-atrial juncture   Line Confirmation: Blood return;ECG;Non-pulsatile blood flow;Ultrasound   Lot #: [id number]   : BARD   PICC Line Exp Date: 03/31/25   Number of Sutures Placed: 0   Post Procedure Checklist: Handoff with RN;Obtain all new IV tubing prior to use;Bed at lowest level and wheels locked;Line discharge information at bedside   Earliest Known Removed:    Removal Reason :    Removal Catheter Length (cm):    Catheter Tip Cultured:    Site Assessment Clean;Dry;Intact 03/25/24 1338   Proximal Lumen Status Flushed;Normal saline locked;Blood return noted 03/25/24 1338   Dressing Type Antimicrobial patch;Securing device;Transparent 03/25/24 1338   Dressing Status Clean;Dry 03/25/24 1338                      Angela Mota RN  3/25/2024  1:40 PM

## 2024-03-25 NOTE — PROGRESS NOTES
Physical Therapy    Physical Therapy Treatment    Patient Name: Rene Ballard  MRN: [id number]  Today's Date: 3/25/2024  Time Calculation  Start Time: 1138  Stop Time: 1203  Time Calculation (min): 25 min       Assessment/Plan   PT Assessment  End of Session Communication: Bedside nurse  Assessment Comment: PT treatment completed. Pt continues to show progression with transfers and ambulation. Pt would continue to benefit from high intensity level therapy on discharge.  End of Session Patient Position: Bed, 3 rail up, Alarm on  PT Plan  Inpatient/Swing Bed or Outpatient: Inpatient  PT Plan  Treatment/Interventions: Bed mobility, Transfer training, Strengthening, Endurance training, Range of motion, Therapeutic activity, Therapeutic exercise, Home exercise program  PT Plan: Skilled PT  PT Frequency: 4 times per week  PT Discharge Recommendations: High intensity level of continued care  PT Recommended Transfer Status: Assist x1  PT - OK to Discharge: Yes (PT POC established.)      General Visit Information:   PT  Visit  PT Received On: 03/25/24  General  Reason for Referral: Frequent falls, R foot 4th metatarsal osteomyelitis  Referred By: Vicente Nino MD  Past Medical History Relevant to Rehab: DM, CAD, GERD Carotid stenosis, PVD, arthritis, OA B Knees bladder CA, R foot digit amputations, TMA R foot  Prior to Session Communication: Bedside nurse  Patient Position Received: Bed, 3 rail up, Alarm on  General Comment: Pt pleasant and agreeable to treatment. Pt's WB status updated to Heel WBAT in surgical shoe per podiatry. RN finishing applying new dressing upon PT arrival.    Subjective   Precautions:  Precautions  LE Weight Bearing Status: Heel Weight Bearing in Post-Op Shoe (R LE)  Medical Precautions: Fall precautions       Objective   Pain:  Pain Assessment  Pain Assessment: 0-10  Pain Score: 0 - No pain    Activity Tolerance:  Activity Tolerance  Endurance: Tolerates 10 - 20 min exercise with multiple  rests  Treatments:  Therapeutic Exercise  Therapeutic Exercise Performed: Yes  Therapeutic Exercise Activity 1: Pt completes each of the following x20 reps in order to improve strength and endurance: ankle pumps (R side only), LAQs, seated marches, hip adduction pillow squeezes, hip abduction with resistance.         Bed Mobility  Bed Mobility: Yes  Bed Mobility 1  Bed Mobility 1: Supine to sitting  Level of Assistance 1: Close supervision  Bed Mobility Comments 1: Use of bed rails.    Ambulation/Gait Training  Ambulation/Gait Training Performed: Yes  Ambulation/Gait Training 1  Surface 1: Level tile  Device 1: Rolling walker  Assistance 1: Contact guard  Comments/Distance (ft) 1: 40 ft x2. Pt with difficulty maintaining heel WB due to chronic foot drop. Pt encouraged to keep R LE forward throughout ambulation to avoid shifting weight onto forefoot. Pt able to maintain staggered gait. Surgical shoe donned for all mobility.  Transfers  Transfer: Yes  Transfer 1  Technique 1: Sit to stand, Stand to sit  Transfer Device 1: Walker  Transfer Level of Assistance 1: Minimum assistance  Trials/Comments 1: Cues for hand placement and to keep R LE forward during all transfers.    Outcome Measures:  [facility] Basic Mobility  Turning from your back to your side while in a flat bed without using bedrails: A little  Moving from lying on your back to sitting on the side of a flat bed without using bedrails: A little  Moving to and from bed to chair (including a wheelchair): A little  Standing up from a chair using your arms (e.g. wheelchair or bedside chair): A little  To walk in hospital room: A little  Climbing 3-5 steps with railing: Total  Basic Mobility - Total Score: 16    Education Documentation  Home Exercise Program, taught by Nora Morton PT at 3/25/2024  1:13 PM.  Learner: Patient  Readiness: Acceptance  Method: Explanation  Response: Verbalizes Understanding    Precautions, taught by Nora Morton PT at 3/25/2024   1:13 PM.  Learner: Patient  Readiness: Acceptance  Method: Explanation  Response: Verbalizes Understanding    Body Mechanics, taught by Nora Morton PT at 3/25/2024  1:13 PM.  Learner: Patient  Readiness: Acceptance  Method: Explanation  Response: Verbalizes Understanding    Mobility Training, taught by Nora Morton, PT at 3/25/2024  1:13 PM.  Learner: Patient  Readiness: Acceptance  Method: Explanation  Response: Verbalizes Understanding    Education Comments  No comments found.        OP EDUCATION:       Encounter Problems       Encounter Problems (Active)       Balance       Pt will tolerate 10+ mins dynamic standing balance activities with min A or less.  (Not met)       Start:  03/21/24    Expected End:  03/27/24    Resolved:  03/25/24    Updated to: Pt will tolerate 10+ mins dynamic standing balance activities with mod I and a RW.    Update reason: change in WB status         Pt will tolerate 10+ mins dynamic standing balance activities with mod I and a RW. (Progressing)       Start:  03/25/24    Expected End:  03/27/24                   Mobility       STG - Patient will ambulate 20 ft with min A and a RW while maintaining WB precautions.  (Not met)       Start:  03/21/24    Expected End:  03/27/24    Resolved:  03/25/24    Updated to: STG - Patient will ambulate 75 ft  mod I and a RW while maintaining WB precautions.    Update reason: change in WB stauts         STG - Patient will ambulate 75 ft  mod I and a RW while maintaining WB precautions. (Progressing)       Start:  03/25/24    Expected End:  03/27/24                   PT Transfers       STG - Patient will perform bed mobility independently.  (Progressing)       Start:  03/21/24    Expected End:  03/27/24            STG - Patient will transfer sit to and from stand with min A and a RW.  (Not met)       Start:  03/21/24    Expected End:  03/27/24    Resolved:  03/25/24    Updated to: STG - Patient will transfer sit to and from stand with mod I and  a RW.    Update reason: change in WB status         STG - Patient will transfer sit to and from stand with mod I and a RW. (Progressing)       Start:  03/25/24    Expected End:  03/27/24                   Safety       LTG - Patient will adhere to WB precautions during ADL's and transfers independently.  (Progressing)       Start:  03/21/24    Expected End:  03/27/24

## 2024-03-25 NOTE — DISCHARGE SUMMARY
Discharge Diagnosis  Osteomyelitis of toe of right foot (CMS/HCC)    Issues Requiring Follow-Up  Podiatry follow up  -IV abx    Discharge Meds     Your medication list        START taking these medications        Instructions Last Dose Given Next Dose Due   ampicillin-sulbactam 3 gram/100 mL IV  Commonly known as: Unasyn      Infuse 100 mL (3 g) at 200 mL/hr over 30 minutes into a venous catheter every 6 hours. Once weekly labs CBC/diff, Creatinine, ESR, CRP fax to Dr. Valenzuela [phone]. Stop date 4/30/24. Can be via continuous pump if needed.       docusate sodium 100 mg capsule  Commonly known as: Colace      Take 1 capsule (100 mg) by mouth 2 times a day as needed for constipation.       lidocaine 4 % patch      Place 2 patches over 12 hours on the skin once daily. Remove & discard patch within 12 hours or as directed by MD.       polyethylene glycol 17 gram packet  Commonly known as: Glycolax, Miralax      Take 17 g by mouth once daily as needed (constipation).              CONTINUE taking these medications        Instructions Last Dose Given Next Dose Due   acetaminophen 325 mg tablet  Commonly known as: Tylenol           alpha tocopherol 268 mg (400 unit) capsule  Commonly known as: Vitamin E           alum-mag hydroxide-simeth 200-200-20 mg/5 mL oral suspension  Commonly known as: Mylanta           ascorbic acid 1,000 mg tablet  Commonly known as: Vitamin C           cholecalciferol 125 MCG (5000 UT) capsule  Commonly known as: Vitamin D-3           clopidogrel 75 mg tablet  Commonly known as: Plavix      Take 1 tablet (75 mg) by mouth once daily.       dorzolamide-timoloL 22.3-6.8 mg/mL ophthalmic solution  Commonly known as: Cosopt           enalapril 2.5 mg tablet  Commonly known as: Vasotec      Take 1 tablet (2.5 mg) by mouth once daily.       HumaLOG KwikPen Insulin 100 unit/mL injection  Generic drug: insulin lispro           Lantus U-100 Insulin 100 unit/mL injection  Generic drug: insulin  "glargine      INJECT 25 UNITS SUBCUTANEOUSLY EVERY NIGHT.  VIAL GOOD FOR 28 DAYS ONLY ONCE IN USE.  DISCARD THE REMAINING MEDICATION.       latanoprost 0.005 % ophthalmic solution  Commonly known as: Xalatan           metoprolol tartrate 25 mg tablet  Commonly known as: Lopressor      Take 0.5 tablets (12.5 mg) by mouth 2 times a day.       multivitamin tablet           Nevanac 0.1 % ophthalmic suspension  Generic drug: nepafenac           niacin 500 mg tablet           omeprazole 20 mg DR capsule  Commonly known as: PriLOSEC      Take 1 capsule (20 mg) by mouth once daily.       OneTouch Ultra Test strip  Generic drug: blood sugar diagnostic      Check glucose 4 x per day       pen needle, diabetic 31 gauge x 1/4\" needle  Commonly known as: UltiCare Pen Needle      USE 1 PEN NEEDLE SUBCUTANEOUSLY 3 TIMES DAILY BEFORE MEALS       tamsulosin 0.4 mg 24 hr capsule  Commonly known as: Flomax           Vitamin B-12 500 mcg tablet  Generic drug: cyanocobalamin                  STOP taking these medications      doxycycline 100 mg capsule  Commonly known as: Vibramycin                  Where to Get Your Medications        You can get these medications from any pharmacy    Bring a paper prescription for each of these medications  ampicillin-sulbactam 3 gram/100 mL IV       Information about where to get these medications is not yet available    Ask your nurse or doctor about these medications  docusate sodium 100 mg capsule  lidocaine 4 % patch  polyethylene glycol 17 gram packet         Test Results Pending At Discharge  Pending Labs       Order Current Status    Blood Culture Preliminary result            Hospital Course   Rene Ballard is a 89 y.o. male presenting post-Fall (Fell off toilet, has mutliple sites with pain, shoulder arm, knees). Patient has history of insulin-dependent diabetes, CAD with CABG x 3 off pump 3/2/2022 (LIMA-LAD, SVG-OM, SVG-PDA), GERD, mild systolic dysfunction EF 40-45%, moderate L carotid " stenosis 50-69%, and right leg PAD   Patient states that he has bad knees and shoulders, for the 3 to 4 days leading up to his fall he felt very unsteady on his feet. His left shoulder has been stiff with difficulty moving it. He also had been generally not feeling well, very tired, and not eating well prior to the fall. Prior to coming to the ER he had a fall and then on the 19th he fell between the toilet and the wall when he was trying to get off the toilet. He attributes these falls to losing his balance. In ER work up noted there was concern for infection of his R fifth TMA site , pt started on antibiotics and admitted for further care      Osteomyelitis of toe of right foot (CMS/HCC)  Bacteremia due to CoNS   Right TMA Site Wound Infection   Right leg PAD   -Podiatry following: No plan for surgical intervention. Opting for conservative management over surgical management due to nutrition status. Dr. Ribeiro plans to utilize hyperbaric chamber at wound center at [place] to help with healing.  -Weightbearing: Nonweightbearing right lower extremity per podiatry.  -Wound Care: Betadine, 4 x 4, Kerlix.  Podiatry to change.  -ID following  -ECHO was preformed and (-) for vegetation   -Prior PVR on 6/12/23 showed evidence of mild arterial occlusive disease in the right lower extremity at rest   -IV Unasyn 3g Q6H to cover MSSA and other mixed bacteria/GNB.   -Plan will be for 6 weeks of IV abx to treat for OM in the foot.   -Plan to place PICC line today      Left shoulder pain, s/p fall   Knee pain, s/p fall   -reviewed imaging result, XRAYS negative for fx, showed degenerative disease in AC joint and effusion in knee   -continue Tylenol prn and lidocaine patch for pain     DM  -insulin glargine injection  -sliding scale insulin   -hypoglycemia protocol in place   -contributes to poor wound healing  -Last A1c on 3/19/24 was 7.3, but POCT glucose levels in hospital are uncontrolled (range from 130s-270s),  continue to monitor blood glucose and consider change in dose of lantus   -POCT glucose levels to be monitored 4x/day before meals and at bedtime     CAD with CABG  mild systolic dysfunction EF 40-45%  moderate L carotid stenosis 50-69%  -appears euvolemic on exam as no rails, edema, JVD   -stable  -continue plavix     GERD  -stable  -continue pantoprazole     Constipation  -Miralax PRN     VTE prophylaxis: Lovenox     Disposition  -PT/OT consulted, PT recommends high intensity   -Medically ready to discharge to [facility] today    Pertinent Physical Exam At Time of Discharge  Physical Exam  Cardiovascular:      Rate and Rhythm: Normal rate.      Heart sounds: Murmur heard.   Pulmonary:      Effort: Pulmonary effort is normal.      Breath sounds: Normal breath sounds.   Abdominal:      General: Bowel sounds are normal.   Musculoskeletal:      Right lower leg: No edema.      Left lower leg: No edema.      Comments: Dressing on left foot appears clean and dry. No redness or warmth.    Skin:     General: Skin is warm and dry.   Neurological:      Mental Status: He is alert.       Outpatient Follow-Up  Future Appointments   Date Time Provider Department Center   3/26/2024 10:00 AM [facility] [id number] WOUND PROVIDER [id number] [facility]   4/2/2024 10:00 AM [facility] [id number] WOUND PROVIDER [id number] [facility]   4/9/2024 10:00 AM [facility] [id number] WOUND PROVIDER [id number] [facility]   4/16/2024 10:00 AM [facility] [id number] WOUND PROVIDER [id number] [facility]   4/23/2024 10:00 AM [facility] [id number] WOUND PROVIDER [id number] [facility]   4/30/2024 10:00 AM [facility] [id number] WOUND PROVIDER [id number] [facility]   11/18/2024 10:00 AM Olesya Daniel MD [id number] [facility]   11/18/2024  2:00 PM Julian Blanton DO [id number] [facility]         Lacey Andrea PA-C

## 2024-03-25 NOTE — PROGRESS NOTES
"Rene Ballard is a 89 y.o. male on day 6 of admission presenting with Osteomyelitis of toe of right foot (CMS/HCC).    Subjective   Patient has no new complaints. Denies any chest pain, palpitations, SOB. Patient states last BM was Tuesday or Wednesday.        Objective     Physical Exam  Cardiovascular:      Rate and Rhythm: Normal rate.      Heart sounds: Murmur heard.   Pulmonary:      Effort: Pulmonary effort is normal.      Breath sounds: Normal breath sounds.   Abdominal:      General: Bowel sounds are normal.   Musculoskeletal:      Right lower leg: No edema.      Left lower leg: No edema.      Comments: Dressing on left foot appears clean and dry. No redness or warmth.    Skin:     General: Skin is warm and dry.   Neurological:      Mental Status: He is alert.         Last Recorded Vitals  Blood pressure (!) 108/48, pulse 58, temperature 37.1 °C (98.8 °F), temperature source Temporal, resp. rate 20, height 1.778 m (5' 10\"), weight 72.6 kg (160 lb), SpO2 98 %.  Intake/Output last 3 Shifts:  I/O last 3 completed shifts:  In: 200 (2.8 mL/kg) [IV Piggyback:200]  Out: 1950 (26.9 mL/kg) [Urine:1950 (0.7 mL/kg/hr)]  Weight: 72.6 kg     Relevant Results                Scheduled medications  acetaminophen, 975 mg, oral, TID  ampicillin-sulbactam, 3 g, intravenous, q6h  ascorbic acid, 1,000 mg, oral, Daily  cholecalciferol, 5,000 Units, oral, Daily  clopidogrel, 75 mg, oral, Daily  cyanocobalamin, 500 mcg, oral, Daily  docusate sodium, 100 mg, oral, BID  dorzolamide-timoloL, 1 drop, Left Eye, BID  enalapril, 2.5 mg, oral, Daily  enoxaparin, 40 mg, subcutaneous, q24h  insulin glargine, 25 Units, subcutaneous, Nightly  insulin lispro, 0-10 Units, subcutaneous, TID with meals  latanoprost, 1 drop, Both Eyes, Daily  lidocaine, 5 mL, infiltration, Once  lidocaine, 2 patch, transdermal, Daily  metoprolol tartrate, 12.5 mg, oral, BID  multivitamin with minerals, 1 tablet, oral, Daily  nepafenac, 1 drop, Both Eyes, " TID  niacin, 500 mg, oral, Daily  pantoprazole, 40 mg, oral, Daily before breakfast  tamsulosin, 0.4 mg, oral, Daily      Continuous medications     PRN medications  PRN medications: alteplase, alum-mag hydroxide-simeth, dextrose, dextrose, glucagon, melatonin, polyethylene glycol  Results for orders placed or performed during the hospital encounter of 03/19/24 (from the past 24 hour(s))   POCT GLUCOSE   Result Value Ref Range    POCT Glucose 145 (H) 74 - 99 mg/dL   POCT GLUCOSE   Result Value Ref Range    POCT Glucose 170 (H) 74 - 99 mg/dL   Basic Metabolic Panel   Result Value Ref Range    Glucose 97 74 - 99 mg/dL    Sodium 141 136 - 145 mmol/L    Potassium 3.7 3.5 - 5.3 mmol/L    Chloride 107 98 - 107 mmol/L    Bicarbonate 26 21 - 32 mmol/L    Anion Gap 12 10 - 20 mmol/L    Urea Nitrogen 15 6 - 23 mg/dL    Creatinine 0.90 0.50 - 1.30 mg/dL    eGFR 82 >60 mL/min/1.73m*2    Calcium 8.0 (L) 8.6 - 10.3 mg/dL   CBC   Result Value Ref Range    WBC 5.9 4.4 - 11.3 x10*3/uL    nRBC 0.0 0.0 - 0.0 /100 WBCs    RBC 3.45 (L) 4.50 - 5.90 x10*6/uL    Hemoglobin 10.8 (L) 13.5 - 17.5 g/dL    Hematocrit 33.2 (L) 41.0 - 52.0 %    MCV 96 80 - 100 fL    MCH 31.3 26.0 - 34.0 pg    MCHC 32.5 32.0 - 36.0 g/dL    RDW 13.5 11.5 - 14.5 %    Platelets 199 150 - 450 x10*3/uL   POCT GLUCOSE   Result Value Ref Range    POCT Glucose 84 74 - 99 mg/dL   Protime-INR   Result Value Ref Range    Protime 13.0 (H) 9.8 - 12.8 seconds    INR 1.2 (H) 0.9 - 1.1   POCT GLUCOSE   Result Value Ref Range    POCT Glucose 177 (H) 74 - 99 mg/dL     Transthoracic Echo Complete    Result Date: 3/21/2024   [facility], [address] [place] [address]              Tel [phone] and Fax [phone] TRANSTHORACIC ECHOCARDIOGRAM REPORT  Patient Name:      MAYELIN OLSON     [place] Physician:    [id number] Darien Padron MD Study Date:        3/21/2024            Ordering Provider:     [id number] LOIUSA CARDOZO MRN/PID:           [id number]             Fellow: Accession#:        [id number]         Nurse: Date of Birth/Age: 11/8/1934 / 89 years Sonographer:          Consuelo Mosquera RDCS Gender:            M                    Additional Staff: Height:            178.00 cm            Admit Date:           3/21/2024 Weight:            73.00 kg             Admission Status:     Inpatient -                                                               Routine BSA / BMI:         1.90 m2 / 23.04      Encounter#:           [id number]                    kg/m2                                         Department Location:  [facility] Non                                                               Invasive Blood Pressure: 106 /48 mmHg Study Type:    TRANSTHORACIC ECHO (TTE) COMPLETE Diagnosis/ICD: Encounter for other specified special examinations-Z01.89 Indication:    bacteremia due to staphylcoccus aureus CPT Code:      Echo Complete w Full Doppler-13412 Patient History: Diabetes:          Yes Pertinent History: CAD, HTN and Hyperlipidemia. Study Detail: The following Echo studies were performed: 2D, M-Mode, Doppler and               color flow.  PHYSICIAN INTERPRETATION: Left Ventricle: The left ventricular systolic function is normal, with an estimated ejection fraction of 60-65%. Estimated left ventricular mass is normal. There are no regional wall motion abnormalities. The left ventricular cavity size is normal. The left ventricular septal wall thickness is normal. There is normal left ventricular posterior wall thickness. Spectral Doppler shows an impaired relaxation pattern of left ventricular diastolic filling. Left Atrium: The left atrium is mildly dilated. Right Ventricle: The right ventricle is normal in size. There is mildly reduced right ventricular systolic function. TAPSE: 13.0 mm. Right Atrium: The right atrium is upper limits of normal in size. Aortic Valve: The aortic valve appears structurally  normal. The aortic valve appears tricuspid with restriction. There is moderate aortic valve cusp calcification. There is evidence of mild to moderate aortic valve stenosis. The aortic valve dimensionless index is 0.37. There is no evidence of aortic valve regurgitation. The peak instantaneous gradient of the aortic valve is 24.6 mmHg. The mean gradient of the aortic valve is 14.6 mmHg. Mitral Valve: The mitral valve is mildly thickened. There is mild to moderate mitral annular calcification. There is no evidence of mitral valve regurgitation. Tricuspid Valve: The tricuspid valve is structurally normal. There is mild tricuspid regurgitation. Pulmonic Valve: The pulmonic valve is not well visualized. There is no indication of pulmonic valve regurgitation. Pericardium: There is no pericardial effusion noted. Aorta: The aortic root is normal. Systemic Veins: The inferior vena cava appears to be of normal size. There is IVC inspiratory collapse greater than 50%. In comparison to the previous echocardiogram(s): Compared with study from 6/13/2023, no significant change.  CONCLUSIONS:  1. Left ventricular systolic function is normal with a 60-65% estimated ejection fraction.  2. Spectral Doppler shows an impaired relaxation pattern of left ventricular diastolic filling.  3. There is mildly reduced right ventricular systolic function.  4. Mild to moderate aortic valve stenosis.  5. The aortic valve appears tricuspid with restriction.  6. There is moderate aortic valve cusp calcification.  7. No definite valvular vegetations were visualized. QUANTITATIVE DATA SUMMARY: 2D MEASUREMENTS:                           Normal Ranges: LAs:           4.10 cm    (2.7-4.0cm) IVSd:          1.13 cm    (0.6-1.1cm) LVPWd:         1.05 cm    (0.6-1.1cm) LVIDd:         4.39 cm    (3.9-5.9cm) LVIDs:         3.21 cm LV Mass Index: 113.4 g/m2 LV % FS        26.9 % LA VOLUME:                               Normal Ranges: LA Vol A4C:        71.6 ml     (22+/-6mL/m2) LA Vol A2C:        54.0 ml LA Vol BP:         62.7 ml LA Vol Index A4C:  37.6 ml/m2 LA Vol Index A2C:  28.4 ml/m2 LA Vol Index BP:   32.9 ml/m2 LA Area A4C:       22.3 cm2 LA Area A2C:       19.2 cm2 LA Major Axis A4C: 5.9 cm LA Major Axis A2C: 5.8 cm LA Volume Index:   33.2 ml/m2 LA Vol A4C:        66.4 ml LA Vol A2C:        51.2 ml RA VOLUME BY A/L METHOD:                       Normal Ranges: RA Area A4C: 20.6 cm2 M-MODE MEASUREMENTS:                  Normal Ranges: Ao Root: 2.80 cm (2.0-3.7cm) LAs:     4.59 cm (2.7-4.0cm) AORTA MEASUREMENTS:                      Normal Ranges: Ao Sinus, d: 3.00 cm (2.1-3.5cm) Ao STJ, d:   3.10 cm (1.7-3.4cm) Asc Ao, d:   3.10 cm (2.1-3.4cm) LV SYSTOLIC FUNCTION BY 2D PLANIMETRY (MOD):                     Normal Ranges: EF-A4C View: 55.7 % (>=55%) EF-A2C View: 69.3 % EF-Biplane:  64.4 % LV DIASTOLIC FUNCTION:                              Normal Ranges: MV Peak E:        0.41 m/s   (0.7-1.2 m/s) MV Peak A:        0.89 m/s   (0.42-0.7 m/s) E/A Ratio:        0.47       (1.0-2.2) MV e'             0.09 m/s   (>8.0) MV lateral e'     0.09 m/s MV medial e'      0.03 m/s E/e' Ratio:       4.61       (<8.0) PulmV Sys Simon:    62.19 cm/s PulmV Odonnell Simon:   60.35 cm/s PulmV S/D Simon:    1.03 PulmV A Revs Simon: 21.90 cm/s PulmV A Revs Dur: 98.95 msec MITRAL VALVE:                 Normal Ranges: MV DT: 186 msec (150-240msec) AORTIC VALVE:                                    Normal Ranges: AoV Vmax:                2.48 m/s  (<=1.7m/s) AoV Peak P.6 mmHg (<20mmHg) AoV Mean P.6 mmHg (1.7-11.5mmHg) LVOT Max Simon:            0.85 m/s  (<=1.1m/s) AoV VTI:                 58.33 cm  (18-25cm) LVOT VTI:                21.37 cm LVOT Diameter:           2.10 cm   (1.8-2.4cm) AoV Area, VTI:           1.26 cm2  (2.5-5.5cm2) AoV Area,Vmax:           1.18 cm2  (2.5-4.5cm2) AoV Area, planim:        1.09 cm2  (2.5-4.5cm2) AoV Dimensionless Index: 0.37  RIGHT  VENTRICLE: RV Basal 3.50 cm RV Mid   2.60 cm RV Major 6.7 cm TAPSE:   13.0 mm TRICUSPID VALVE/RVSP:                             Normal Ranges: Peak TR Velocity: 2.08 m/s Est. RA Pressure: 3 mmHg RV Syst Pressure: 20.3 mmHg (< 30mmHg) IVC Diam:         1.85 cm PULMONIC VALVE:                         Normal Ranges: PV Accel Time: 93 msec  (>120ms) PV Max Simon:    1.2 m/s  (0.6-0.9m/s) PV Max P.4 mmHg Pulmonary Veins: PulmV A Revs Dur: 98.95 msec PulmV A Revs Simon: 21.90 cm/s PulmV Odonnell Simon:   60.35 cm/s PulmV S/D Simon:    1.03 PulmV Sys Simon:    62.19 cm/s  [id number] Darien Padron MD Electronically signed on 3/21/2024 at 6:18:04 PM  ** Final **     XR knee left 4+ views    Result Date: 3/20/2024  Interpreted By:  Mauri Crow, STUDY: XR KNEE LEFT 4+ VIEWS;  3/20/2024 12:13 pm   INDICATION: Signs/Symptoms:left knee pain.   COMPARISON: Previous exam from 2022   ACCESSION NUMBER(S): [id number]   ORDERING CLINICIAN: CAMELIA SHERIDAN   TECHNIQUE: 4 views  of the  left knee were obtained.   FINDINGS: Medial and lateral meniscal chondrocalcinosis. Advanced patellofemoral joint space loss with spur formation. Large patellar osteophyte at the insertion of the quadriceps tendon. Moderate to advanced medial compartment narrowing with moderate spur formation. Mild lateral compartment spurring. Mild fullness in the suprapatellar bursa. Prominent posterior arterial calcifications. No lytic or blastic destructive bone lesion. No acute fracture or dislocation. No opaque soft tissue foreign body. No periosteal reaction or erosion.       Small effusion. No acute fracture or dislocation.   Meniscal chondrocalcinosis.   Advanced left knee DJD as described.   MACRO: None   Signed by: Mauri Crow 3/20/2024 1:13 PM Dictation workstation:   [id number]    ECG 12 lead    Result Date: 3/20/2024  Sinus rhythm with 1st degree AV block Right bundle branch block Left anterior fascicular block  Bifascicular block  Abnormal ECG When compared  with ECG of 17-JUN-2023 02:24, Previous ECG has undetermined rhythm, needs review Borderline criteria for Lateral infarct are no longer Present QT has shortened See ED provider note for full interpretation and clinical correlation Confirmed by Sadie Pierre ([id number]) on 3/20/2024 10:59:27 AM    CT foot right wo IV contrast    Result Date: 3/19/2024  STUDY: CT Extremity; Completed Time:  3/19/2024 8:34 PM. INDICATION: Evaluate for necrotizing soft tissue infection / osteomyelitis. COMPARISON: XR right foot 3/19/2024;  MR right foot 6/10/2023. ACCESSION NUMBER(S): [id number] ORDERING CLINICIAN: ÓSCAR GORDON TECHNIQUE:  Thin section axial images were obtained through the right foot without intravenous contrast.  Orthogonal reconstructed images were obtained and reviewed.  Automated mA/kV exposure control was utilized and patient examination was performed in strict accordance with principles of ALARA. FINDINGS:  There is redemonstration of transmetatarsal amputation of the forefoot with resection of the fifth digit.  There is diffuse osteopenia.  There is ulceration along the lateral margin of the forefoot with induration, edema and soft tissue swelling of the forefoot extending into the ankle compatible with extensive soft tissue infection.  There is cortical erosion and periosteal reaction along the plantar and lateral margin of the fourth metatarsal bone compatible with acute osteomyelitis with small locule of gas along the plantar margin (image 245 series 301).  There is fragmentation and periosteal reaction extending into the base of the fourth metatarsal bone.  There are mild-to-moderate degenerative changes.  Midfoot alignment is maintained.  Talonavicular joint and calcaneocuboid joint alignment is maintained.  Ankle mortise and talar dome are intact. There are vascular calcifications.  There is edema of the intrinsic muscles of the foot.    Redemonstration transmetatarsal amputation of the forefoot with  amputation of the fifth digit.  Large area of ulceration along the lateral aspect of the forefoot with acute osteomyelitis of the fourth metatarsal bone predominantly along the lateral and plantar margin with small locule gas, extensive soft tissue swelling and edema compatible with soft tissue infection extending from the forefoot through the level of the ankle. Signed by Easton Baron DO    CT cervical spine wo IV contrast    Result Date: 3/19/2024  Interpreted By:  Rell Jewell, STUDY: CT CERVICAL SPINE WO IV CONTRAST;  3/19/2024 8:32 pm   INDICATION: Signs/Symptoms:Fall with head strike.   COMPARISON: None.   ACCESSION NUMBER(S): [id number]   ORDERING CLINICIAN: ÓSCAR GORDON   TECHNIQUE: Axial noncontrast images of the cervical spine with coronal and sagittal reconstructed images.   FINDINGS: ALIGNMENT: Normal. VERTEBRAE: No acute fracture. There is loss of disc height and anterior osteophyte formation at multiple levels worse at C5-6, C6-7 and C7-T1. Facet and uncovertebral hypertrophic changes cause moderate to severe bilateral neural foraminal narrowing at multiple levels. Degenerative changes at the atlantodental interval. SPINAL CANAL: No critical spinal canal stenosis. Disc spur complex at multiple levels most severe at C6-7 where there is moderate stenosis. PREVERTEBRAL SOFT TISSUES: No prevertebral soft tissue swelling. LUNG APICES: Imaged portion of the lung apices are within normal limits.   OTHER FINDINGS: Atherosclerotic calcification of the carotid vessels.       No acute fracture or traumatic subluxation of the cervical spine.   Multilevel discogenic degenerative changes as noted above.   MACRO: None   Signed by: Rell Jewell 3/19/2024 9:03 PM Dictation workstation:   [id number]    CT head wo IV contrast    Result Date: 3/19/2024  Interpreted By:  Rell Jewell, STUDY: CT HEAD WO IV CONTRAST;  3/19/2024 8:32 pm   INDICATION: Signs/Symptoms:Fall with head strike.   COMPARISON: CT  scan of the head 06/07/2023   ACCESSION NUMBER(S): [id number]   ORDERING CLINICIAN: ÓSCAR GORDON   TECHNIQUE: Axial noncontrast CT images of the head.   FINDINGS: BRAIN PARENCHYMA: Gray-white matter interfaces are preserved. No mass, mass effect or midline shift. Moderate deep and periventricular white matter hypodensities are nonspecific, but favored to represent chronic small vessel ischemic changes.   HEMORRHAGE: No acute intracranial hemorrhage. VENTRICLES and EXTRA-AXIAL SPACES: Moderate volume loss with prominence of the ventricles and sulci. EXTRACRANIAL SOFT TISSUES: Within normal limits. PARANASAL SINUSES/MASTOIDS: The visualized paranasal sinuses and mastoid air cells are aerated. CALVARIUM: No depressed skull fracture. No destructive osseous lesion.   OTHER FINDINGS: Atherosclerotic calcification of the carotid siphons and vertebral arteries..       No acute intracranial abnormality.   Chronic changes as noted above.   MACRO: None   Signed by: Rell Jewell 3/19/2024 8:59 PM Dictation workstation:   [id number]    XR shoulder left 2+ views    Result Date: 3/19/2024  STUDY: Shoulder Radiographs; 3/19/2024, 7:59PM INDICATION: Left shoulder pain. COMPARISON: None Available. ACCESSION NUMBER(S): [id number] ORDERING CLINICIAN: ÓSCAR GORDON TECHNIQUE:  Two view(s) of the left shoulder. FINDINGS:  There is no displaced fracture.  There is degenerative change in the acromioclavicular joint and narrowing of the subacromion space suggesting some rotator cuff thinning..  Alignment otherwise is unremarkable..    There is degenerative change in the acromioclavicular joint and narrowing of the subacromion space suggesting some rotator cuff thinning.  No fracture or dislocation is appreciated.. Signed by Samuel Brown MD    XR chest 1 view    Result Date: 3/19/2024  STUDY: Chest Radiograph;  3/19/03752 at 18:21 hours. INDICATION: Weakness and falls. COMPARISON: None Available ACCESSION NUMBER(S):  [id number] ORDERING CLINICIAN: ÓSCAR GORDON TECHNIQUE:  Frontal chest was obtained at 18:21 hours. FINDINGS: CARDIOMEDIASTINAL SILHOUETTE: Patient is status post median sternotomy.  Heart size and mediastinal contours appear stable with prominence of the aortic arch.  There is minimal elevation left hemidiaphragm.  LUNGS: Lungs demonstrate chronic changes.  There is no pneumothorax or pleural effusion.  ABDOMEN: No remarkable upper abdominal findings.  BONES: No acute osseous changes.    No acute cardiopulmonary disease. Chronic changes. Signed by Easton Baron DO    XR foot right 3+ views    Result Date: 3/19/2024  STUDY: Foot Radiographs; 3/19/2024, 6:22PM INDICATION: Right foot wound. COMPARISON: 6/10/2023 MRI Right Foot. ACCESSION NUMBER(S): [id number] ORDERING CLINICIAN: ÓSCAR GORDON TECHNIQUE:  Three view(s) of the right foot. FINDINGS:  There are postsurgical changes following transmetatarsal amputation of the forefoot and resection of the fifth metatarsal bone.  There is ulceration along the lateral aspect of the forefoot.  There is cortical irregularity along the lateral margin of the fifth metatarsal bone and periosteal reaction suggesting acute osteomyelitis.  There is also small focus of soft tissue gas/fistulous tract.  There is soft tissue swelling extending in the ankle.  Calcaneus is intact.  There are vascular calcifications.      Transmetatarsal amputation of the forefoot with resection the fifth digit.  Lateral forefoot ulceration with evidence of acute osteomyelitis in the fourth metatarsal bone with periosteal reaction and soft tissue swelling.  Small focus of soft tissue gas/fistulous tract.  Recommend repeat MRI or CT for further evaluation. Signed by Easton Baron DO           Malnutrition Diagnosis Status: New  Malnutrition Diagnosis: Severe malnutrition related to chronic disease or condition  As Evidenced by: reported intake <75% estimated needs for > 1month,  severe muscle & subcutaneous fat depletion.  I agree with the dietitian's malnutrition diagnosis.      Assessment/Plan     Rene Ballard is a 89 y.o. male presenting post-Fall (Fell off toilet, has mutliple sites with pain, shoulder arm, knees). Patient has history of insulin-dependent diabetes, CAD with CABG x 3 off pump 3/2/2022 (LIMA-LAD, SVG-OM, SVG-PDA), GERD, mild systolic dysfunction EF 40-45%, moderate L carotid stenosis 50-69%, and right leg PAD   Patient states that he has bad knees and shoulders, for the 3 to 4 days leading up to his fall he felt very unsteady on his feet. His left shoulder has been stiff with difficulty moving it. He also had been generally not feeling well, very tired, and not eating well prior to the fall. Prior to coming to the ER he had a fall and then on the 19th he fell between the toilet and the wall when he was trying to get off the toilet. He attributes these falls to losing his balance. In ER work up noted there was concern for infection of his R fifth TMA site , pt started on antibiotics and admitted for further care      Osteomyelitis of toe of right foot (CMS/HCC)  Bacteremia due to CoNS   Right TMA Site Wound Infection   Right leg PAD   -Podiatry following: No plan for surgical intervention. Opting for conservative management over surgical management due to nutrition status. Dr. Ribeiro plans to utilize hyperbaric chamber at wound center at [place] to help with healing.  -Weightbearing: Nonweightbearing right lower extremity per podiatry.  -Wound Care: Betadine, 4 x 4, Kerlix.  Podiatry to change.  -ID following  -ECHO was preformed and (-) for vegetation   -Prior PVR on 6/12/23 showed evidence of mild arterial occlusive disease in the right lower extremity at rest   -IV Unasyn 3g Q6H to cover MSSA and other mixed bacteria/GNB.   -Plan will be for 6 weeks of IV abx to treat for OM in the foot.   -Plan to place PICC line today      Left shoulder pain, s/p fall   Knee  pain, s/p fall   -reviewed imaging result, XRAYS negative for fx, showed degenerative disease in AC joint and effusion in knee   -continue Tylenol prn and lidocaine patch for pain     DM  -insulin glargine injection  -sliding scale insulin   -hypoglycemia protocol in place   -contributes to poor wound healing  -Last A1c on 3/19/24 was 7.3, but POCT glucose levels in hospital are uncontrolled (range from 130s-270s), continue to monitor blood glucose and consider change in dose of lantus   -POCT glucose levels to be monitored 4x/day before meals and at bedtime     CAD with CABG  mild systolic dysfunction EF 40-45%  moderate L carotid stenosis 50-69%  -appears euvolemic on exam as no rails, edema, JVD   -stable  -continue plavix     GERD  -stable  -continue pantoprazole     Constipation  -Miralax PRN     VTE prophylaxis: Lovenox     Discharge planning  -PT/OT consulted, PT recommends high intensity   -Plan to discharge to [facility] today         I spent 60 minutes in the professional and overall care of this patient.      Lacey Andrea PA-C

## 2024-03-25 NOTE — PROGRESS NOTES
03/25/24 1511   Discharge Planning   Patient expects to be discharged to: [facility], had PICC placed, can dc today, waiting dc orders, patient aware.     1535- daughter trina aware of dc time and place along with patient, bedside RN, MD and .

## 2024-03-25 NOTE — CARE PLAN
The patient's goals for the shift include      The clinical goals for the shift include Maintain safety    Problem: Nutrition  Goal: Less than 5 days NPO/clear liquids  Outcome: Met  Goal: Consume prescribed supplement  Outcome: Met  Goal: Adequate PO fluid intake  Outcome: Met  Goal: BG  mg/dL  Outcome: Progressing     Problem: Diabetes  Goal: Achieve decreasing blood glucose levels by end of shift  Outcome: Progressing     Problem: Pain  Goal: My pain/discomfort is manageable  Outcome: Progressing     Problem: Safety  Goal: I will remain free of falls  Outcome: Met

## 2024-03-25 NOTE — PROGRESS NOTES
"  INFECTIOUS DISEASE DAILY PROGRESS NOTE    SUBJECTIVE:    No overnight events. No new complaints. Afebrile. No rash/itching/diarrhea.     OBJECTIVE:  VITALS (Last 24 Hours)  /63 (BP Location: Right arm, Patient Position: Lying)   Pulse 55   Temp 37.1 °C (98.8 °F) (Temporal)   Resp 20   Ht 1.778 m (5' 10\")   Wt 72.6 kg (160 lb)   SpO2 96%   BMI 22.96 kg/m²     PHYSICAL EXAM:  Gen - NAD  Abd - soft, no ttp, BS present  Right Foot - s/p TMA with dressing present  Skin - no rash    ABX: IV Unasyn    LABS:  Lab Results   Component Value Date    WBC 5.9 03/25/2024    HGB 10.8 (L) 03/25/2024    HCT 33.2 (L) 03/25/2024    MCV 96 03/25/2024     03/25/2024     Lab Results   Component Value Date    GLUCOSE 97 03/25/2024    CALCIUM 8.0 (L) 03/25/2024     03/25/2024    K 3.7 03/25/2024    CO2 26 03/25/2024     03/25/2024    BUN 15 03/25/2024    CREATININE 0.90 03/25/2024           Estimated Creatinine Clearance: 57.1 mL/min (by C-G formula based on SCr of 0.9 mg/dL).      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 TTE is without signs of endocarditis. Repeat blood cx x2 3/21 NGTD.  Right TMA Site Wound Infection due to MSSA and other mixed bacteria including GNB (not specified)  Right 4th Metatarsal OM  DM II - A1C 7.6% 11/2023, increased risk for infection/poor wound healing with diabetes  PAD - last PVR 6/2023 with mild right disease     PICC line today.     IV Unasyn 3g Q6H to cover MSSA and other mixed bacteria/GNB.    Stop date 4/30/24.    Once weekly labs CBC/diff, Creatinine, ESR, CRP fax to Dr. Valenzuela [phone].    Monitoring for adverse effects of abx such as rash/itching/diarrhea - none.     Will sign off. Please call back with questions. Thanks!    Berny Valenzuela MD  [facility]  Office #[phone]  "

## 2024-03-25 NOTE — NURSING NOTE
Called [facility] and spoke with Federico the nurse , report was given Federico verbalize understanding. Made Federico aware that I will call him once I have a  time in place. Also gave him our phone number for any questions or concerns.

## 2024-03-25 NOTE — CARE PLAN
The patient's goals for the shift include      The clinical goals for the shift include Pt will remain free from falls.

## 2024-03-26 ENCOUNTER — APPOINTMENT (OUTPATIENT)
Dept: WOUND CARE | Facility: CLINIC | Age: 89
End: 2024-03-26
Payer: MEDICARE

## 2024-03-26 ENCOUNTER — NURSING HOME VISIT (OUTPATIENT)
Dept: POST ACUTE CARE | Facility: EXTERNAL LOCATION | Age: 89
End: 2024-03-26
Payer: MEDICARE

## 2024-03-26 DIAGNOSIS — N40.0 BENIGN PROSTATIC HYPERPLASIA, UNSPECIFIED WHETHER LOWER URINARY TRACT SYMPTOMS PRESENT: ICD-10-CM

## 2024-03-26 DIAGNOSIS — I11.9 HYPERTENSIVE HEART DISEASE WITHOUT HEART FAILURE: ICD-10-CM

## 2024-03-26 DIAGNOSIS — E11.621 TYPE 2 DIABETES MELLITUS WITH FOOT ULCER, WITH LONG-TERM CURRENT USE OF INSULIN (MULTI): ICD-10-CM

## 2024-03-26 DIAGNOSIS — R53.1 WEAKNESS: Primary | ICD-10-CM

## 2024-03-26 DIAGNOSIS — Z79.4 TYPE 2 DIABETES MELLITUS WITH FOOT ULCER, WITH LONG-TERM CURRENT USE OF INSULIN (MULTI): ICD-10-CM

## 2024-03-26 DIAGNOSIS — M86.9 OSTEOMYELITIS OF TOE OF RIGHT FOOT (MULTI): ICD-10-CM

## 2024-03-26 DIAGNOSIS — L97.509 TYPE 2 DIABETES MELLITUS WITH FOOT ULCER, WITH LONG-TERM CURRENT USE OF INSULIN (MULTI): ICD-10-CM

## 2024-03-26 PROCEDURE — 99309 SBSQ NF CARE MODERATE MDM 30: CPT | Performed by: NURSE PRACTITIONER

## 2024-03-26 NOTE — LETTER
Patient: Rene Ballard  : 1934    Encounter Date: 2024    PROGRESS NOTE    Subjective  Chief complaint: Rene Ballard is a 89 y.o. male who is an acute skilled patient being seen and evaluated for weakness    HPI:  HPI  This patient was admitted to SNF for therapy due to generalized weakness and for medical management after recent hospitalization for OM toe of right foot.  Patient requires long-term IV antibiotics due to the osteomyelitis.  Patient tolerating without adverse effects.  Therapy to evaluate and establish plan of care and goals.  Patient seen and examined at bedside in no apparent distress.  Nursing staff voices no new concerns today.  Patient denies nausea vomiting fever chills.     Objective  Vital signs: 112/55, 98.6, 57, 18, blood sugar 102, 98%    Physical Exam  Constitutional:       General: He is not in acute distress.  Eyes:      Extraocular Movements: Extraocular movements intact.   Cardiovascular:      Rate and Rhythm: Normal rate and regular rhythm.      Heart sounds: Murmur heard.   Pulmonary:      Effort: Pulmonary effort is normal.      Breath sounds: Normal breath sounds.   Abdominal:      General: Bowel sounds are normal.      Palpations: Abdomen is soft.   Musculoskeletal:      Cervical back: Neck supple.      Right lower leg: Edema present.   Skin:     Comments: Dressing to right foot   Neurological:      Mental Status: He is alert.   Psychiatric:         Mood and Affect: Mood normal.         Behavior: Behavior is cooperative.         Assessment/Plan  Problem List Items Addressed This Visit       BPH (benign prostatic hyperplasia)     Monitor for increased  symptoms.  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, with long-term current use of insulin (CMS/[facility])     Glucoscans  Insulin  Sliding scale insulin         Hypertensive heart disease without heart failure     Monitor blood pressure  Metoprolol  Enalapril         Osteomyelitis of toe of right  foot (CMS/[facility])     Antibiotics until completed 4/30/2024  Follow-up outpatient   Heel only weightbearing to right lower extremity    RLE TX: Irrigate with normal saline, apply Betadine soaked Adaptic followed by 4 x 4, ABD and Kerlix wrap         Weakness - Primary     Therapy to evaluate and establish plan of care and goals          Medications, treatments, and labs reviewed  Continue medications and treatments as listed in EMR      Scribe Attestation  IMary Scribe   attest that this documentation has been prepared under the direction and in the presence of RENEE Reyes    Provider Attestation - Scribe documentation  All medical record entries made by the Scribe were at my direction and personally dictated by me. I have reviewed the chart and agree that the record accurately reflects my personal performance of the history, physical exam, discussion and plan.   RENEE Reyes        Electronically Signed By: RENEE Reyes   4/3/24  2:39 PM

## 2024-03-26 NOTE — PROGRESS NOTES
Physical Therapy    Physical Therapy Treatment    Patient Name: Rene Ballard  MRN: [id number]  Today's Date: 3/26/2024   Time in: 1100  Time out: 1141       Assessment/Plan      PT Plan  Inpatient/Swing Bed or Outpatient: Inpatient  PT Plan  Treatment/Interventions: Bed mobility, Transfer training, Strengthening, Endurance training, Range of motion, Therapeutic activity, Therapeutic exercise, Home exercise program  PT Plan: Skilled PT  PT Frequency: 4 times per week  PT Discharge Recommendations: High intensity level of continued care  PT Recommended Transfer Status: Assist x1  PT - OK to Discharge: Yes (PT POC established.)      General Visit Information:         03/23/24 1100   PT  Visit   PT Received On 03/23/24   Response to Previous Treatment Patient with no complaints from previous session.   General   Reason for Referral Frequent falls, R foot 4th metatarsal osteomyelitis   Referred By Vicente Nino MD   Past Medical History Relevant to Rehab DM, CAD, GERD Carotid stenosis, PVD, arthritis, OA B Knees bladder CA, R foot digit amputations, TMA R foot   Missed Visit No   Prior to Session Communication Bedside nurse   Patient Position Received Bed, 3 rail up;Alarm on   Preferred Learning Style auditory;visual;verbal;written   General Comment Pt pleasant and agreeable to treatment.   Precautions   LE Weight Bearing Status Right Non-Weight Bearing   Medical Precautions Fall precautions   Pain Assessment   Pain Assessment 0-10   Pain Score 0 - No pain   Cognition   Overall Cognitive Status WFL   Orientation Level Oriented X4   Following Commands Follows all commands and directions without difficulty   Attention WFL   Memory WFL   Static Sitting Balance   Static Sitting-Balance Support Feet supported;Bilateral upper extremity supported   Static Sitting-Level of Assistance Close supervision   Dynamic Sitting Balance   Dynamic Sitting-Balance Support Feet supported;Bilateral upper extremity supported   Dynamic  Sitting-Comments Close supervision   Static Standing Balance   Static Standing-Balance Support Bilateral upper extremity supported   Static Standing-Level of Assistance Minimum assistance   Therapeutic Exercise   Therapeutic Exercise Performed Yes   Therapeutic Exercise Activity 1 Patient is instructed in and performs supine and seated bilateral lower extremity exercises x 20 reps/10reps in sitting.   Therapeutic Activity   Therapeutic Activity Performed Yes   Balance/Neuromuscular Re-Education   Balance/Neuromuscular Re-Education Activity Performed Yes   Bed Mobility   Bed Mobility Yes   Bed Mobility 1   Bed Mobility 1 Supine to sitting;Sitting to supine   Level of Assistance 1 Close supervision   Bed Mobility Comments 1 Patient requires no manual assistance to stand.   Ambulation/Gait Training   Ambulation/Gait Training Performed No   Transfers   Transfer Yes   Transfer 1   Technique 1 Sit to stand;Stand to sit   Transfer Device 1 Walker;Gait belt   Transfer Level of Assistance 1 Minimum assistance   Activity Tolerance   Endurance Tolerates 10 - 20 min exercise with multiple rests   PT Assessment   PT Assessment Results Decreased strength;Decreased range of motion;Decreased endurance;Impaired balance;Decreased mobility;Orthopedic restrictions;Pain   Rehab Prognosis Good   End of Session Communication Bedside nurse   End of Session Patient Position Bed, 3 rail up;Alarm on   PT Plan   Inpatient/Swing Bed or Outpatient Inpatient   PT Plan   Treatment/Interventions Bed mobility;Transfer training;Strengthening;Endurance training;Range of motion;Therapeutic activity;Therapeutic exercise;Home exercise program   PT Plan Skilled PT   PT Frequency 4 times per week   PT Discharge Recommendations High intensity level of continued care   PT Recommended Transfer Status Assist x2   PT - OK to Discharge Yes  (Per plan of care)      03/23/24 1100   [facility] Basic Mobility   Turning from your back to your side while in a flat bed  without using bedrails 4   Moving from lying on your back to sitting on the side of a flat bed without using bedrails 3   Moving to and from bed to chair (including a wheelchair) 2   Standing up from a chair using your arms (e.g. wheelchair or bedside chair) 2   To walk in hospital room 1   Climbing 3-5 steps with railing 1   Basic Mobility - Total Score 13   E = Exercise and Early Mobility   Current Activity Lying in bed     OP EDUCATION:       Encounter Problems       Encounter Problems (Active)       Balance       Pt will tolerate 10+ mins dynamic standing balance activities with min A or less.  (Not met)       Start:  03/21/24    Expected End:  03/27/24    Resolved:  03/25/24    Updated to: Pt will tolerate 10+ mins dynamic standing balance activities with mod I and a RW.    Update reason: change in WB status         Pt will tolerate 10+ mins dynamic standing balance activities with mod I and a RW. (Progressing)       Start:  03/25/24    Expected End:  03/27/24                   Mobility       STG - Patient will ambulate 20 ft with min A and a RW while maintaining WB precautions.  (Not met)       Start:  03/21/24    Expected End:  03/27/24    Resolved:  03/25/24    Updated to: STG - Patient will ambulate 75 ft  mod I and a RW while maintaining WB precautions.    Update reason: change in WB stauts         STG - Patient will ambulate 75 ft  mod I and a RW while maintaining WB precautions. (Progressing)       Start:  03/25/24    Expected End:  03/27/24                   PT Transfers       STG - Patient will perform bed mobility independently.  (Progressing)       Start:  03/21/24    Expected End:  03/27/24            STG - Patient will transfer sit to and from stand with min A and a RW.  (Not met)       Start:  03/21/24    Expected End:  03/27/24    Resolved:  03/25/24    Updated to: STG - Patient will transfer sit to and from stand with mod I and a RW.    Update reason: change in WB status         STG - Patient  will transfer sit to and from stand with mod I and a RW. (Progressing)       Start:  03/25/24    Expected End:  03/27/24                   Safety       LTG - Patient will adhere to WB precautions during ADL's and transfers independently.  (Progressing)       Start:  03/21/24    Expected End:  03/27/24

## 2024-03-27 ENCOUNTER — NURSING HOME VISIT (OUTPATIENT)
Dept: POST ACUTE CARE | Facility: EXTERNAL LOCATION | Age: 89
End: 2024-03-27
Payer: MEDICARE

## 2024-03-27 DIAGNOSIS — L97.509 TYPE 2 DIABETES MELLITUS WITH FOOT ULCER, WITH LONG-TERM CURRENT USE OF INSULIN (MULTI): ICD-10-CM

## 2024-03-27 DIAGNOSIS — M86.9 OSTEOMYELITIS OF TOE OF RIGHT FOOT (MULTI): Primary | ICD-10-CM

## 2024-03-27 DIAGNOSIS — K21.9 GERD WITHOUT ESOPHAGITIS: ICD-10-CM

## 2024-03-27 DIAGNOSIS — Z79.4 TYPE 2 DIABETES MELLITUS WITH FOOT ULCER, WITH LONG-TERM CURRENT USE OF INSULIN (MULTI): ICD-10-CM

## 2024-03-27 DIAGNOSIS — N40.0 BENIGN PROSTATIC HYPERPLASIA, UNSPECIFIED WHETHER LOWER URINARY TRACT SYMPTOMS PRESENT: ICD-10-CM

## 2024-03-27 DIAGNOSIS — I11.9 HYPERTENSIVE HEART DISEASE WITHOUT HEART FAILURE: ICD-10-CM

## 2024-03-27 DIAGNOSIS — I73.9 PVD (PERIPHERAL VASCULAR DISEASE) (CMS-HCC): ICD-10-CM

## 2024-03-27 DIAGNOSIS — E11.621 TYPE 2 DIABETES MELLITUS WITH FOOT ULCER, WITH LONG-TERM CURRENT USE OF INSULIN (MULTI): ICD-10-CM

## 2024-03-27 DIAGNOSIS — M17.0 OSTEOARTHRITIS OF BOTH KNEES, UNSPECIFIED OSTEOARTHRITIS TYPE: ICD-10-CM

## 2024-03-27 DIAGNOSIS — H40.9 GLAUCOMA, UNSPECIFIED GLAUCOMA TYPE, UNSPECIFIED LATERALITY: ICD-10-CM

## 2024-03-27 PROBLEM — L97.515: Status: RESOLVED | Noted: 2023-05-01 | Resolved: 2024-03-27

## 2024-03-27 PROBLEM — E11.40 TYPE 2 DIABETES MELLITUS WITH DIABETIC NEUROPATHY (MULTI): Status: RESOLVED | Noted: 2023-01-01 | Resolved: 2024-03-27

## 2024-03-27 PROBLEM — E11.52 TYPE 2 DIABETES MELLITUS WITH DIABETIC PERIPHERAL ANGIOPATHY AND GANGRENE, WITH LONG-TERM CURRENT USE OF INSULIN (MULTI): Status: RESOLVED | Noted: 2023-01-01 | Resolved: 2024-03-27

## 2024-03-27 PROCEDURE — 99305 1ST NF CARE MODERATE MDM 35: CPT | Performed by: INTERNAL MEDICINE

## 2024-03-27 NOTE — LETTER
Patient: Rene Ballard  : 1934    Encounter Date: 2024    HISTORY & PHYSICAL    Subjective  Chief complaint: Rene Ballard is a 89 y.o. male who is being seen and evaluated for multiple medical problems.  Patient admitted to SNF for therapy due to weakness after recent hospitalization.    HPI:  HPI  Patient presented to ED status post fall off toilet with multiple pain sites.  Patient reported the day prior to admission had a fall the day of admission fell between the toilet and wall, states losing his balance.  Workup in the ED revealed concern for infection of his right fifth TMA site and patient was started on antibiotics.  Patient was admitted for further evaluation and management.  Patient did have imaging due to reports of left shoulder pain and knee pain status post fall that were negative for fracture, did reveal degenerative disease and AC joint and effusion in the knee.  Patient was provided pain control.  Patient was followed by podiatry for OM of toe on right foot.  Surgical intervention not necessary, opted for conservative management due to nutritional status.  Patient to be right heel weightbearing only.  Patient to continue on IV antibiotics x 6 weeks and follow-up outpatient.  Patient seen and examined at bedside, appears to be in no acute distress.  Denies chest pain or shortness of breath.  Denies nausea or vomiting.  Afebrile.  Past Medical History:   Diagnosis Date   • Abnormal EKG 2023   • BPH (benign prostatic hyperplasia)    • Chronic systolic heart failure (CMS/HCC)    • COVID-19 2023   • Essential hypertension    • GERD (gastroesophageal reflux disease)    • Hypertensive heart disease with heart failure (CMS/HCC)    • Osteoarthritis    • Osteomyelitis (CMS/HCC)    • Peripheral vascular disease, unspecified (CMS/HCC) 2022    PVD (peripheral vascular disease)   • Personal history of malignant neoplasm of bladder     History of carcinoma of bladder   •  Personal history of other diseases of the musculoskeletal system and connective tissue     History of arthritis   • Personal history of other endocrine, nutritional and metabolic disease     History of diabetes mellitus   • 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   • 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   • Type 2 diabetes mellitus with other circulatory complications (CMS/[facility]) 11/28/2022    Controlled diabetes mellitus with circulatory complication       Past Surgical History:   Procedure Laterality Date   • CARDIAC SURGERY     • IR  BLADDER ASPIRATION  06/23/2017    IR  BLADDER ASPIRATION 6[place]23/2017 [facility]   • OTHER SURGICAL HISTORY  06/19/2017    Surgery Penile Prosthetic Device   • WRIST SURGERY Right        Family History   Problem Relation Name Age of Onset   • No Known Problems Mother     • Heart disease Father     • Tuberculosis Maternal Grandmother         Social History     Socioeconomic History   • Marital status:      Spouse name: Not on file   • Number of children: Not on file   • Years of education: Not on file   • Highest education level: Not on file   Occupational History   • Not on file   Tobacco Use   • Smoking status: Never   • Smokeless tobacco: Never   Substance and Sexual Activity   • Alcohol use: Not Currently   • Drug use: Never   • Sexual activity: Not on file   Other Topics Concern   • Not on file   Social History Narrative   • Not on file     Social Determinants of Health     Financial Resource Strain: Low Risk  (3/20/2024)    Overall Financial Resource Strain (CARDIA)    • Difficulty of Paying Living Expenses: Not hard at all   Food Insecurity: Not on file   Transportation Needs: No Transportation Needs (3/20/2024)    PRAPARE - Transportation    • Lack of Transportation (Medical): No    • Lack of Transportation (Non-Medical): No   Physical Activity: Not on file   Stress: Not on file   Social Connections: Not on file   Intimate Partner Violence: Not on file   Housing  Stability: Low Risk  (3/20/2024)    Housing Stability Vital Sign    • Unable to Pay for Housing in the Last Year: No    • Number of Places Lived in the Last Year: 1    • Unstable Housing in the Last Year: No       Vital signs: 142/63, 98.6, 57, 18, 93%, blood sugar 211    Objective  Physical Exam  Constitutional:       General: He is not in acute distress.  Eyes:      Extraocular Movements: Extraocular movements intact.   Cardiovascular:      Rate and Rhythm: Normal rate and regular rhythm.   Pulmonary:      Effort: Pulmonary effort is normal.      Breath sounds: Normal breath sounds.   Abdominal:      General: Bowel sounds are normal.      Palpations: Abdomen is soft.   Musculoskeletal:      Cervical back: Neck supple.      Right lower leg: No edema.      Left lower leg: No edema.   Skin:     Comments: Dressing to right foot clean dry and intact   Neurological:      Mental Status: He is alert.   Psychiatric:         Mood and Affect: Mood normal.         Behavior: Behavior is cooperative.         Assessment/Plan  Problem List Items Addressed This Visit       BPH (benign prostatic hyperplasia)     Monitor for increased  symptoms.  Tamsulosin         Osteoarthritis of both knees     As needed pain medication         GERD without esophagitis     Monitor for increased GI symptoms  PPIs         PVD (peripheral vascular disease) (CMS/[facility])     Clopidogrel  Bleeding precautions         Type 2 diabetes mellitus with foot ulcer, with long-term current use of insulin (CMS/[facility])     Glucoscans  Insulin  Sliding scale insulin         Hypertensive heart disease without heart failure     Monitor blood pressure  Metoprolol  Enalapril  CHF: Monitor for shortness of breath         Osteomyelitis of toe of right foot (CMS/[facility]) - Primary     Antibiotics until completed 4/30/2024  Follow-up outpatient   Heel only weightbearing to right lower extremity    RLE TX: Irrigate with normal saline, apply Betadine soaked Adaptic followed by 4 x  4, ABD and Kerlix wrap         Glaucoma     Follows with ophthalmology  Continue eyedrops as currently ordered          Hospital records reviewed  Medications, treatments, and labs reviewed  Continue medications and treatments as listed in EMR  Discussed with nursing and therapy      Scribe Attestation  IMary Scribe   attest that this documentation has been prepared under the direction and in the presence of Joao Bailey MD    Provider Attestation - Scribe documentation  All medical record entries made by the Scribe were at my direction and personally dictated by me. I have reviewed the chart and agree that the record accurately reflects my personal performance of the history, physical exam, discussion and plan.   Joao Bailey MD      Electronically Signed By: Joao Bailey MD   3/27/24  6:52 PM

## 2024-03-27 NOTE — ASSESSMENT & PLAN NOTE
Antibiotics until completed 4/30/2024  Follow-up outpatient   Heel only weightbearing to right lower extremity    RLE TX: Irrigate with normal saline, apply Betadine soaked Adaptic followed by 4 x 4, ABD and Kerlix wrap

## 2024-03-27 NOTE — PROGRESS NOTES
HISTORY & PHYSICAL    Subjective   Chief complaint: Rene Ballard is a 89 y.o. male who is being seen and evaluated for multiple medical problems.  Patient admitted to SNF for therapy due to weakness after recent hospitalization.    HPI:  HPI  Patient presented to ED status post fall off toilet with multiple pain sites.  Patient reported the day prior to admission had a fall the day of admission fell between the toilet and wall, states losing his balance.  Workup in the ED revealed concern for infection of his right fifth TMA site and patient was started on antibiotics.  Patient was admitted for further evaluation and management.  Patient did have imaging due to reports of left shoulder pain and knee pain status post fall that were negative for fracture, did reveal degenerative disease and AC joint and effusion in the knee.  Patient was provided pain control.  Patient was followed by podiatry for OM of toe on right foot.  Surgical intervention not necessary, opted for conservative management due to nutritional status.  Patient to be right heel weightbearing only.  Patient to continue on IV antibiotics x 6 weeks and follow-up outpatient.  Patient seen and examined at bedside, appears to be in no acute distress.  Denies chest pain or shortness of breath.  Denies nausea or vomiting.  Afebrile.  Past Medical History:   Diagnosis Date    Abnormal EKG 05/01/2023    BPH (benign prostatic hyperplasia)     Chronic systolic heart failure (CMS/HCC)     COVID-19 05/01/2023    Essential hypertension     GERD (gastroesophageal reflux disease)     Hypertensive heart disease with heart failure (CMS/HCC)     Osteoarthritis     Osteomyelitis (CMS/HCC)     Peripheral vascular disease, unspecified (CMS/HCC) 11/28/2022    PVD (peripheral vascular disease)    Personal history of malignant neoplasm of bladder     History of carcinoma of bladder    Personal history of other diseases of the musculoskeletal system and connective tissue      History of arthritis    Personal history of other endocrine, nutritional and metabolic disease     History of diabetes mellitus    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) 11/28/2022    Controlled diabetes mellitus with circulatory complication       Past Surgical History:   Procedure Laterality Date    CARDIAC SURGERY      IR  BLADDER ASPIRATION  06/23/2017    IR  BLADDER ASPIRATION 6/23/2017 [facility] [place] INPATIENT LEGACY    OTHER SURGICAL HISTORY  06/19/2017    Surgery Penile Prosthetic Device    WRIST SURGERY Right        Family History   Problem Relation Name Age of Onset    No Known Problems Mother      Heart disease Father      Tuberculosis Maternal Grandmother         Social History     Socioeconomic History    Marital status:      Spouse name: Not on file    Number of children: Not on file    Years of education: Not on file    Highest education level: Not on file   Occupational History    Not on file   Tobacco Use    Smoking status: Never    Smokeless tobacco: Never   Substance and Sexual Activity    Alcohol use: Not Currently    Drug use: Never    Sexual activity: Not on file   Other Topics Concern    Not on file   Social History Narrative    Not on file     Social Determinants of Health     Financial Resource Strain: Low Risk  (3/20/2024)    Overall Financial Resource Strain (CARDIA)     Difficulty of Paying Living Expenses: Not hard at all   Food Insecurity: Not on file   Transportation Needs: No Transportation Needs (3/20/2024)    PRAPARE - Transportation     Lack of Transportation (Medical): No     Lack of Transportation (Non-Medical): No   Physical Activity: Not on file   Stress: Not on file   Social Connections: Not on file   Intimate Partner Violence: Not on file   Housing Stability: Low Risk  (3/20/2024)    Housing Stability Vital Sign     Unable to Pay for Housing in the Last Year: No      Number of Places Lived in the Last Year: 1     Unstable Housing in the Last Year: No       Vital signs: 142/63, 98.6, 57, 18, 93%, blood sugar 211    Objective   Physical Exam  Constitutional:       General: He is not in acute distress.  Eyes:      Extraocular Movements: Extraocular movements intact.   Cardiovascular:      Rate and Rhythm: Normal rate and regular rhythm.   Pulmonary:      Effort: Pulmonary effort is normal.      Breath sounds: Normal breath sounds.   Abdominal:      General: Bowel sounds are normal.      Palpations: Abdomen is soft.   Musculoskeletal:      Cervical back: Neck supple.      Right lower leg: No edema.      Left lower leg: No edema.   Skin:     Comments: Dressing to right foot clean dry and intact   Neurological:      Mental Status: He is alert.   Psychiatric:         Mood and Affect: Mood normal.         Behavior: Behavior is cooperative.         Assessment/Plan   Problem List Items Addressed This Visit       BPH (benign prostatic hyperplasia)     Monitor for increased  symptoms.  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, with long-term current use of insulin (CMS/[facility])     Glucoscans  Insulin  Sliding scale insulin         Hypertensive heart disease without heart failure     Monitor blood pressure  Metoprolol  Enalapril  CHF: Monitor for shortness of breath         Osteomyelitis of toe of right foot (CMS/[facility]) - Primary     Antibiotics until completed 4/30/2024  Follow-up outpatient   Heel only weightbearing to right lower extremity    RLE TX: Irrigate with normal saline, apply Betadine soaked Adaptic followed by 4 x 4, ABD and Kerlix wrap         Glaucoma     Follows with ophthalmology  Continue eyedrops as currently ordered           Hospital records reviewed  Medications, treatments, and labs reviewed  Continue medications and treatments as listed in EMR  Discussed with nursing and therapy      Scribe Attestation  I, Rosa M Davis   attest that this documentation has been prepared under the direction and in the presence of Joao Bailey MD    Provider Attestation - Scribe documentation  All medical record entries made by the Scribe were at my direction and personally dictated by me. I have reviewed the chart and agree that the record accurately reflects my personal performance of the history, physical exam, discussion and plan.   Joao Bailey MD   108

## 2024-03-28 ENCOUNTER — NURSING HOME VISIT (OUTPATIENT)
Dept: POST ACUTE CARE | Facility: EXTERNAL LOCATION | Age: 89
End: 2024-03-28
Payer: MEDICARE

## 2024-03-28 DIAGNOSIS — N40.0 BENIGN PROSTATIC HYPERPLASIA, UNSPECIFIED WHETHER LOWER URINARY TRACT SYMPTOMS PRESENT: ICD-10-CM

## 2024-03-28 DIAGNOSIS — Z91.89 AT RISK FOR MALNUTRITION: ICD-10-CM

## 2024-03-28 DIAGNOSIS — R53.1 WEAKNESS: Primary | ICD-10-CM

## 2024-03-28 DIAGNOSIS — I11.9 HYPERTENSIVE HEART DISEASE WITHOUT HEART FAILURE: ICD-10-CM

## 2024-03-28 DIAGNOSIS — M86.9 OSTEOMYELITIS OF TOE OF RIGHT FOOT (MULTI): ICD-10-CM

## 2024-03-28 DIAGNOSIS — K21.9 GERD WITHOUT ESOPHAGITIS: ICD-10-CM

## 2024-03-28 PROCEDURE — 99308 SBSQ NF CARE LOW MDM 20: CPT | Performed by: NURSE PRACTITIONER

## 2024-03-28 NOTE — LETTER
Patient: Rene Ballard  : 1934    Encounter Date: 2024    PROGRESS NOTE    Subjective  Chief complaint: Rene Ballard is a 89 y.o. male who is an acute skilled patient being seen and evaluated for weakness    HPI:  HPI  Patient does continue working in therapy, working towards established goals.  Therapy is focusing on therapeutic exercise and activities, ADL retraining, patient education, gait training.  Mini nutrition eval was completed and shows patient is at risk for being malnourished.  Patient is followed by the dietician.  Denies n/v/f/c abdominal pain..  Patient does continue on antibiotics for OM, right foot toe, tolerating without adverse effects.    Objective  Vital signs: 113/49, 97.8, 59, 98 blood sugar 161, 93%    Physical Exam  Constitutional:       General: He is not in acute distress.  Eyes:      Extraocular Movements: Extraocular movements intact.   Cardiovascular:      Rate and Rhythm: Normal rate and regular rhythm.      Heart sounds: Murmur heard.   Pulmonary:      Effort: Pulmonary effort is normal.      Breath sounds: Normal breath sounds.   Abdominal:      General: Bowel sounds are normal.      Palpations: Abdomen is soft.   Musculoskeletal:      Cervical back: Neck supple.      Right lower leg: Edema present.   Skin:     Comments: Dressing to right foot   Neurological:      Mental Status: He is alert.      Motor: Weakness present.   Psychiatric:         Mood and Affect: Mood normal.         Behavior: Behavior is cooperative.         Assessment/Plan  Problem List Items Addressed This Visit       BPH (benign prostatic hyperplasia)     Monitor for increased  symptoms.  Tamsulosin         GERD without esophagitis     Monitor for increased GI symptoms  PPIs         Hypertensive heart disease without heart failure     Monitor blood pressure  BP at goal  Metoprolol  Enalapril         Osteomyelitis of toe of right foot (CMS/HCC)     Antibiotics until completed  4/30/2024  Follow-up outpatient   Heel only weightbearing to right lower extremity    RLE TX: Irrigate with normal saline, apply Betadine soaked Adaptic followed by 4 x 4, ABD and Kerlix wrap         Weakness - Primary     Continue working towards goals in therapy         At risk for malnutrition     Dietitian following  Monitor weight          Medications, treatments, and labs reviewed  Continue medications and treatments as listed in EMR      Scribe Attestation  IMary Scribe   attest that this documentation has been prepared under the direction and in the presence of RENEE Reyes    Provider Attestation - Scribe documentation  All medical record entries made by the Scribe were at my direction and personally dictated by me. I have reviewed the chart and agree that the record accurately reflects my personal performance of the history, physical exam, discussion and plan.   RENEE Reyes        Electronically Signed By: RENEE Reyes   4/3/24  6:05 PM

## 2024-03-29 ENCOUNTER — NURSING HOME VISIT (OUTPATIENT)
Dept: POST ACUTE CARE | Facility: EXTERNAL LOCATION | Age: 89
End: 2024-03-29
Payer: MEDICARE

## 2024-03-29 DIAGNOSIS — L97.509 TYPE 2 DIABETES MELLITUS WITH FOOT ULCER, WITH LONG-TERM CURRENT USE OF INSULIN (MULTI): ICD-10-CM

## 2024-03-29 DIAGNOSIS — N40.0 BENIGN PROSTATIC HYPERPLASIA, UNSPECIFIED WHETHER LOWER URINARY TRACT SYMPTOMS PRESENT: ICD-10-CM

## 2024-03-29 DIAGNOSIS — M86.9 OSTEOMYELITIS OF TOE OF RIGHT FOOT (MULTI): ICD-10-CM

## 2024-03-29 DIAGNOSIS — I11.9 HYPERTENSIVE HEART DISEASE WITHOUT HEART FAILURE: ICD-10-CM

## 2024-03-29 DIAGNOSIS — E11.621 TYPE 2 DIABETES MELLITUS WITH FOOT ULCER, WITH LONG-TERM CURRENT USE OF INSULIN (MULTI): ICD-10-CM

## 2024-03-29 DIAGNOSIS — Z79.4 TYPE 2 DIABETES MELLITUS WITH FOOT ULCER, WITH LONG-TERM CURRENT USE OF INSULIN (MULTI): ICD-10-CM

## 2024-03-29 DIAGNOSIS — R53.1 WEAKNESS: Primary | ICD-10-CM

## 2024-03-29 PROBLEM — I10 HYPERTENSION, ESSENTIAL: Status: RESOLVED | Noted: 2023-05-01 | Resolved: 2024-03-29

## 2024-03-29 PROCEDURE — 99309 SBSQ NF CARE MODERATE MDM 30: CPT | Performed by: INTERNAL MEDICINE

## 2024-03-29 NOTE — PROGRESS NOTES
PROGRESS NOTE    Subjective   Chief complaint: Rene Ballard is a 89 y.o. male who is an acute skilled patient being seen and evaluated for weakness    HPI:  HPI  Therapy has evaluated patient and establish plan of care and goals.  Therapy to work with patient focusing on therapeutic exercise and activities, ADL retraining, patient education, gait training.  Patient seen and examined at bedside.  Patient is on antibiotics for osteomyelitis of right toe.  Patient is tolerating antibiotics without adverse effects.  Patient does continue with treatments and dressing changes in place per the EMR.  Patient is afebrile.    Objective   Vital signs: 115/54, 97.8, 64, 16, blood sugar 79, 94%    Physical Exam  Constitutional:       General: He is not in acute distress.  Eyes:      Extraocular Movements: Extraocular movements intact.   Cardiovascular:      Rate and Rhythm: Normal rate and regular rhythm.   Pulmonary:      Effort: Pulmonary effort is normal.      Breath sounds: Normal breath sounds.   Abdominal:      General: Bowel sounds are normal.      Palpations: Abdomen is soft.   Musculoskeletal:      Cervical back: Neck supple.      Right lower leg: No edema.      Left lower leg: No edema.   Skin:     Comments: Dressing to right foot clean dry and intact   Neurological:      Mental Status: He is alert.      Motor: Weakness present.   Psychiatric:         Mood and Affect: Mood normal.         Behavior: Behavior is cooperative.         Assessment/Plan   Problem List Items Addressed This Visit       BPH (benign prostatic hyperplasia)     Monitor for increased  symptoms.  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, with long-term current use of insulin (CMS/HCC)     Glucoscans  Insulin  Sliding scale insulin         Hypertensive heart disease without heart failure     Monitor blood pressure  Metoprolol  Enalapril         Osteomyelitis of toe of right foot (CMS/HCC)     Antibiotics until completed  4/30/2024  Follow-up outpatient   Heel only weightbearing to right lower extremity    RLE TX: Irrigate with normal saline, apply Betadine soaked Adaptic followed by 4 x 4, ABD and Kerlix wrap         Weakness - Primary     Continue to work in therapy towards goals          Medications, treatments, and labs reviewed  Continue medications and treatments as listed in EMR      Scribe Attestation  IMary, Scribe   attest that this documentation has been prepared under the direction and in the presence of Joao Bailey MD    Provider Attestation - Scribe documentation  All medical record entries made by the Scribe were at my direction and personally dictated by me. I have reviewed the chart and agree that the record accurately reflects my personal performance of the history, physical exam, discussion and plan.   Joao Bailey MD

## 2024-03-29 NOTE — LETTER
Patient: Rene Ballard  : 1934    Encounter Date: 2024    PROGRESS NOTE    Subjective  Chief complaint: Rene Ballard is a 89 y.o. male who is an acute skilled patient being seen and evaluated for weakness    HPI:  HPI  Therapy has evaluated patient and establish plan of care and goals.  Therapy to work with patient focusing on therapeutic exercise and activities, ADL retraining, patient education, gait training.  Patient seen and examined at bedside.  Patient is on antibiotics for osteomyelitis of right toe.  Patient is tolerating antibiotics without adverse effects.  Patient does continue with treatments and dressing changes in place per the EMR.  Patient is afebrile.    Objective  Vital signs: 115/54, 97.8, 64, 16, blood sugar 79, 94%    Physical Exam  Constitutional:       General: He is not in acute distress.  Eyes:      Extraocular Movements: Extraocular movements intact.   Cardiovascular:      Rate and Rhythm: Normal rate and regular rhythm.   Pulmonary:      Effort: Pulmonary effort is normal.      Breath sounds: Normal breath sounds.   Abdominal:      General: Bowel sounds are normal.      Palpations: Abdomen is soft.   Musculoskeletal:      Cervical back: Neck supple.      Right lower leg: No edema.      Left lower leg: No edema.   Skin:     Comments: Dressing to right foot clean dry and intact   Neurological:      Mental Status: He is alert.      Motor: Weakness present.   Psychiatric:         Mood and Affect: Mood normal.         Behavior: Behavior is cooperative.         Assessment/Plan  Problem List Items Addressed This Visit       BPH (benign prostatic hyperplasia)     Monitor for increased  symptoms.  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, with long-term current use of insulin (CMS/[facility])     Glucoscans  Insulin  Sliding scale insulin         Hypertensive heart disease without heart failure     Monitor blood pressure  Metoprolol  Enalapril         Osteomyelitis  of toe of right foot (CMS/HCC)     Antibiotics until completed 4/30/2024  Follow-up outpatient   Heel only weightbearing to right lower extremity    RLE TX: Irrigate with normal saline, apply Betadine soaked Adaptic followed by 4 x 4, ABD and Kerlix wrap         Weakness - Primary     Continue to work in therapy towards goals          Medications, treatments, and labs reviewed  Continue medications and treatments as listed in EMR      Scribe Attestation  I, Rosa M Davis   attest that this documentation has been prepared under the direction and in the presence of Joao Bailey MD    Provider Attestation - Scribe documentation  All medical record entries made by the Scribe were at my direction and personally dictated by me. I have reviewed the chart and agree that the record accurately reflects my personal performance of the history, physical exam, discussion and plan.   Joao Bailey MD        Electronically Signed By: Joao Bailey MD   3/29/24  6:37 PM

## 2024-04-01 ENCOUNTER — NURSING HOME VISIT (OUTPATIENT)
Dept: POST ACUTE CARE | Facility: EXTERNAL LOCATION | Age: 89
End: 2024-04-01
Payer: MEDICARE

## 2024-04-01 DIAGNOSIS — I11.9 HYPERTENSIVE HEART DISEASE WITHOUT HEART FAILURE: ICD-10-CM

## 2024-04-01 DIAGNOSIS — R53.1 WEAKNESS: Primary | ICD-10-CM

## 2024-04-01 DIAGNOSIS — M86.9 OSTEOMYELITIS OF TOE OF RIGHT FOOT (MULTI): ICD-10-CM

## 2024-04-01 DIAGNOSIS — N40.0 BENIGN PROSTATIC HYPERPLASIA, UNSPECIFIED WHETHER LOWER URINARY TRACT SYMPTOMS PRESENT: ICD-10-CM

## 2024-04-01 PROCEDURE — 99308 SBSQ NF CARE LOW MDM 20: CPT | Performed by: NURSE PRACTITIONER

## 2024-04-01 NOTE — PROGRESS NOTES
PROGRESS NOTE    Subjective   Chief complaint: Rene Ballard is a 89 y.o. male who is an acute skilled patient being seen and evaluated for weakness    HPI:  HPI  This patient was admitted to SNF for therapy due to generalized weakness and for medical management after recent hospitalization for OM toe of right foot.  Patient requires long-term IV antibiotics due to the osteomyelitis.  Patient tolerating without adverse effects.  Therapy to evaluate and establish plan of care and goals.  Patient seen and examined at bedside in no apparent distress.  Nursing staff voices no new concerns today.  Patient denies nausea vomiting fever chills.     Objective   Vital signs: 112/55, 98.6, 57, 18, blood sugar 102, 98%    Physical Exam  Constitutional:       General: He is not in acute distress.  Eyes:      Extraocular Movements: Extraocular movements intact.   Cardiovascular:      Rate and Rhythm: Normal rate and regular rhythm.      Heart sounds: Murmur heard.   Pulmonary:      Effort: Pulmonary effort is normal.      Breath sounds: Normal breath sounds.   Abdominal:      General: Bowel sounds are normal.      Palpations: Abdomen is soft.   Musculoskeletal:      Cervical back: Neck supple.      Right lower leg: Edema present.   Skin:     Comments: Dressing to right foot   Neurological:      Mental Status: He is alert.   Psychiatric:         Mood and Affect: Mood normal.         Behavior: Behavior is cooperative.         Assessment/Plan   Problem List Items Addressed This Visit       BPH (benign prostatic hyperplasia)     Monitor for increased  symptoms.  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, with long-term current use of insulin (CMS/HCC)     Glucoscans  Insulin  Sliding scale insulin         Hypertensive heart disease without heart failure     Monitor blood pressure  Metoprolol  Enalapril         Osteomyelitis of toe of right foot (CMS/HCC)     Antibiotics until completed 4/30/2024  Follow-up  outpatient   Heel only weightbearing to right lower extremity    RLE TX: Irrigate with normal saline, apply Betadine soaked Adaptic followed by 4 x 4, ABD and Kerlix wrap         Weakness - Primary     Therapy to evaluate and establish plan of care and goals          Medications, treatments, and labs reviewed  Continue medications and treatments as listed in EMR      Scribe Attestation  Mary STEWART Scribe   attest that this documentation has been prepared under the direction and in the presence of RENEE Reyes    Provider Attestation - Scribe documentation  All medical record entries made by the Scribe were at my direction and personally dictated by me. I have reviewed the chart and agree that the record accurately reflects my personal performance of the history, physical exam, discussion and plan.   RENEE Reyes

## 2024-04-01 NOTE — LETTER
Patient: Rene Ballard  : 1934    Encounter Date: 2024    PROGRESS NOTE    Subjective  Chief complaint: Rene Ballard is a 89 y.o. male who is an acute skilled patient being seen and evaluated for weakness    HPI:  HPI  Patient presents for f/u therapy and general medical care.  Patient seen and examined at bedside.  Therapy has been working with the patient to improve strength, endurance, ADLs, and transfers d/t generalized weakness.  Patient does continue on antibiotics for osteomyelitis, toe of right foot.  Patient is tolerating without adverse effects.  Afebrile.  Patient has no acute concerns today.    Objective  Vital signs: 144/57, 98.1, 58, 17, blood sugar 165, 95%    Physical Exam  Constitutional:       General: He is not in acute distress.  Eyes:      Extraocular Movements: Extraocular movements intact.   Cardiovascular:      Rate and Rhythm: Normal rate and regular rhythm.   Pulmonary:      Effort: Pulmonary effort is normal.      Breath sounds: Normal breath sounds.   Abdominal:      General: Bowel sounds are normal.      Palpations: Abdomen is soft.   Musculoskeletal:      Cervical back: Neck supple.      Right lower leg: No edema.      Left lower leg: No edema.   Skin:     Comments: Dressing to right foot clean dry and intact   Neurological:      Mental Status: He is alert.      Motor: Weakness present.   Psychiatric:         Mood and Affect: Mood normal.         Behavior: Behavior is cooperative.         Assessment/Plan  Problem List Items Addressed This Visit       BPH (benign prostatic hyperplasia)     Monitor for increased  symptoms.  Tamsulosin         Hypertensive heart disease without heart failure     Monitor blood pressure  Metoprolol  Enalapril         Osteomyelitis of toe of right foot (CMS/HCC)     Antibiotics until completed 2024  Follow-up outpatient   Heel only weightbearing to right lower extremity    RLE TX: Irrigate with normal saline, apply Betadine soaked  Adaptic followed by 4 x 4, ABD and Kerlix wrap         Weakness - Primary     Continue working in therapy          Medications, treatments, and labs reviewed  Continue medications and treatments as listed in EMR      Scribe Attestation  I, Rosa M Davis   attest that this documentation has been prepared under the direction and in the presence of RENEE Reyes    Provider Attestation - Scribe documentation  All medical record entries made by the Scribe were at my direction and personally dictated by me. I have reviewed the chart and agree that the record accurately reflects my personal performance of the history, physical exam, discussion and plan.   RENEE Reyes        Electronically Signed By: RENEE Reyes   4/5/24  2:18 PM

## 2024-04-02 ENCOUNTER — OFFICE VISIT (OUTPATIENT)
Dept: WOUND CARE | Facility: CLINIC | Age: 89
End: 2024-04-02
Payer: MEDICARE

## 2024-04-02 PROBLEM — Z91.89 AT RISK FOR MALNUTRITION: Status: ACTIVE | Noted: 2024-04-02

## 2024-04-02 PROCEDURE — 11042 DBRDMT SUBQ TIS 1ST 20SQCM/<: CPT

## 2024-04-02 NOTE — PROGRESS NOTES
PROGRESS NOTE    Subjective   Chief complaint: Rene Ballard is a 89 y.o. male who is an acute skilled patient being seen and evaluated for weakness    HPI:  HPI  Patient does continue working in therapy, working towards established goals.  Therapy is focusing on therapeutic exercise and activities, ADL retraining, patient education, gait training.  Mini nutrition eval was completed and shows patient is at risk for being malnourished.  Patient is followed by the dietician.  Denies n/v/f/c abdominal pain..  Patient does continue on antibiotics for OM, right foot toe, tolerating without adverse effects.    Objective   Vital signs: 113/49, 97.8, 59, 98 blood sugar 161, 93%    Physical Exam  Constitutional:       General: He is not in acute distress.  Eyes:      Extraocular Movements: Extraocular movements intact.   Cardiovascular:      Rate and Rhythm: Normal rate and regular rhythm.      Heart sounds: Murmur heard.   Pulmonary:      Effort: Pulmonary effort is normal.      Breath sounds: Normal breath sounds.   Abdominal:      General: Bowel sounds are normal.      Palpations: Abdomen is soft.   Musculoskeletal:      Cervical back: Neck supple.      Right lower leg: Edema present.   Skin:     Comments: Dressing to right foot   Neurological:      Mental Status: He is alert.      Motor: Weakness present.   Psychiatric:         Mood and Affect: Mood normal.         Behavior: Behavior is cooperative.         Assessment/Plan   Problem List Items Addressed This Visit       BPH (benign prostatic hyperplasia)     Monitor for increased  symptoms.  Tamsulosin         GERD without esophagitis     Monitor for increased GI symptoms  PPIs         Hypertensive heart disease without heart failure     Monitor blood pressure  BP at goal  Metoprolol  Enalapril         Osteomyelitis of toe of right foot (CMS/HCC)     Antibiotics until completed 4/30/2024  Follow-up outpatient   Heel only weightbearing to right lower extremity    RLE  TX: Irrigate with normal saline, apply Betadine soaked Adaptic followed by 4 x 4, ABD and Kerlix wrap         Weakness - Primary     Continue working towards goals in therapy         At risk for malnutrition     Dietitian following  Monitor weight          Medications, treatments, and labs reviewed  Continue medications and treatments as listed in EMR      Scribe Attestation  Mary STEWART Scribe   attest that this documentation has been prepared under the direction and in the presence of RENEE Reyes    Provider Attestation - Scribe documentation  All medical record entries made by the Scribe were at my direction and personally dictated by me. I have reviewed the chart and agree that the record accurately reflects my personal performance of the history, physical exam, discussion and plan.   RENEE Reyes

## 2024-04-02 NOTE — PROGRESS NOTES
PROGRESS NOTE    Subjective   Chief complaint: Rene Ballard is a 89 y.o. male who is an acute skilled patient being seen and evaluated for weakness    HPI:  HPI  Patient presents for f/u therapy and general medical care.  Patient seen and examined at bedside.  Therapy has been working with the patient to improve strength, endurance, ADLs, and transfers d/t generalized weakness.  Patient does continue on antibiotics for osteomyelitis, toe of right foot.  Patient is tolerating without adverse effects.  Afebrile.  Patient has no acute concerns today.    Objective   Vital signs: 144/57, 98.1, 58, 17, blood sugar 165, 95%    Physical Exam  Constitutional:       General: He is not in acute distress.  Eyes:      Extraocular Movements: Extraocular movements intact.   Cardiovascular:      Rate and Rhythm: Normal rate and regular rhythm.   Pulmonary:      Effort: Pulmonary effort is normal.      Breath sounds: Normal breath sounds.   Abdominal:      General: Bowel sounds are normal.      Palpations: Abdomen is soft.   Musculoskeletal:      Cervical back: Neck supple.      Right lower leg: No edema.      Left lower leg: No edema.   Skin:     Comments: Dressing to right foot clean dry and intact   Neurological:      Mental Status: He is alert.      Motor: Weakness present.   Psychiatric:         Mood and Affect: Mood normal.         Behavior: Behavior is cooperative.         Assessment/Plan   Problem List Items Addressed This Visit       BPH (benign prostatic hyperplasia)     Monitor for increased  symptoms.  Tamsulosin         Hypertensive heart disease without heart failure     Monitor blood pressure  Metoprolol  Enalapril         Osteomyelitis of toe of right foot (CMS/HCC)     Antibiotics until completed 4/30/2024  Follow-up outpatient   Heel only weightbearing to right lower extremity    RLE TX: Irrigate with normal saline, apply Betadine soaked Adaptic followed by 4 x 4, ABD and Kerlix wrap         Weakness -  Primary     Continue working in therapy          Medications, treatments, and labs reviewed  Continue medications and treatments as listed in EMR      Scribe Attestation  IMary Scribe   attest that this documentation has been prepared under the direction and in the presence of RENEE Reyes    Provider Attestation - Scribe documentation  All medical record entries made by the Scribe were at my direction and personally dictated by me. I have reviewed the chart and agree that the record accurately reflects my personal performance of the history, physical exam, discussion and plan.   RENEE Reyes

## 2024-04-03 ENCOUNTER — NURSING HOME VISIT (OUTPATIENT)
Dept: POST ACUTE CARE | Facility: EXTERNAL LOCATION | Age: 89
End: 2024-04-03
Payer: MEDICARE

## 2024-04-03 DIAGNOSIS — K21.9 GERD WITHOUT ESOPHAGITIS: ICD-10-CM

## 2024-04-03 DIAGNOSIS — R53.1 WEAKNESS: Primary | ICD-10-CM

## 2024-04-03 DIAGNOSIS — I11.9 HYPERTENSIVE HEART DISEASE WITHOUT HEART FAILURE: ICD-10-CM

## 2024-04-03 DIAGNOSIS — N40.0 BENIGN PROSTATIC HYPERPLASIA, UNSPECIFIED WHETHER LOWER URINARY TRACT SYMPTOMS PRESENT: ICD-10-CM

## 2024-04-03 DIAGNOSIS — M86.9 OSTEOMYELITIS OF TOE OF RIGHT FOOT (MULTI): ICD-10-CM

## 2024-04-03 PROCEDURE — 99308 SBSQ NF CARE LOW MDM 20: CPT | Performed by: INTERNAL MEDICINE

## 2024-04-03 NOTE — LETTER
Patient: Rene Ballard  : 1934    Encounter Date: 2024    PROGRESS NOTE    Subjective  Chief complaint: Rene Ballard is a 89 y.o. male who is an acute skilled patient being seen and evaluated for weakness    HPI:  HPI  Patient does continue working in therapy due to generalized weakness.  Patient does continue to work towards goals with goal to discharge home.  Patient is ambulating increasing distances, up to 160 feet with front wheel walker requiring SBA to supervision.  Patient is also performing stair negotiation, able to navigate 8 stairs with 1 handrail and CGA.  Patient seen and examined at bedside.  Patient does continue on antibiotics for osteomyelitis of the foot.  Patient is tolerating antibiotic without adverse effects.    Objective  Vital signs: 96/77, 98.0, 60, 16, 94%, blood sugar 154    Physical Exam  Constitutional:       General: He is not in acute distress.  Eyes:      Extraocular Movements: Extraocular movements intact.   Cardiovascular:      Rate and Rhythm: Normal rate and regular rhythm.   Pulmonary:      Effort: Pulmonary effort is normal.      Breath sounds: Normal breath sounds.   Abdominal:      General: Bowel sounds are normal.      Palpations: Abdomen is soft.   Musculoskeletal:      Cervical back: Neck supple.      Right lower leg: No edema.      Left lower leg: No edema.   Skin:     Comments: Dressing to right foot clean dry and intact   Neurological:      Mental Status: He is alert.      Motor: Weakness present.   Psychiatric:         Mood and Affect: Mood normal.         Behavior: Behavior is cooperative.         Assessment/Plan  Problem List Items Addressed This Visit       BPH (benign prostatic hyperplasia)     Monitor for increased  symptoms.  Tamsulosin         GERD without esophagitis     Monitor for increased GI symptoms  PPIs         Hypertensive heart disease without heart failure     Monitor blood pressure  Metoprolol  Enalapril  BP at goal          Osteomyelitis of toe of right foot (CMS/[facility])     Antibiotics until completed 4/30/2024  Follow-up outpatient   Heel only weightbearing to right lower extremity    RLE TX: Irrigate with normal saline, apply Betadine soaked Adaptic followed by 4 x 4, ABD and Kerlix wrap         Weakness - Primary     Continue working towards goal in therapy to return home          Medications, treatments, and labs reviewed  Continue medications and treatments as listed in EMR      Scribe Attestation  I, Rosa M Davis   attest that this documentation has been prepared under the direction and in the presence of Joao Bailey MD    Provider Attestation - Scribe documentation  All medical record entries made by the Scribe were at my direction and personally dictated by me. I have reviewed the chart and agree that the record accurately reflects my personal performance of the history, physical exam, discussion and plan.   Joao Bailey MD        Electronically Signed By: Joao Bailey MD   4/4/24  2:52 PM

## 2024-04-04 ENCOUNTER — NURSING HOME VISIT (OUTPATIENT)
Dept: POST ACUTE CARE | Facility: EXTERNAL LOCATION | Age: 89
End: 2024-04-04
Payer: MEDICARE

## 2024-04-04 DIAGNOSIS — R53.1 WEAKNESS: Primary | ICD-10-CM

## 2024-04-04 DIAGNOSIS — M86.9 OSTEOMYELITIS OF TOE OF RIGHT FOOT (MULTI): ICD-10-CM

## 2024-04-04 DIAGNOSIS — I11.9 HYPERTENSIVE HEART DISEASE WITHOUT HEART FAILURE: ICD-10-CM

## 2024-04-04 DIAGNOSIS — N40.0 BENIGN PROSTATIC HYPERPLASIA, UNSPECIFIED WHETHER LOWER URINARY TRACT SYMPTOMS PRESENT: ICD-10-CM

## 2024-04-04 PROCEDURE — 99308 SBSQ NF CARE LOW MDM 20: CPT | Performed by: NURSE PRACTITIONER

## 2024-04-04 NOTE — LETTER
Patient: Rene Ballard  : 1934    Encounter Date: 2024    PROGRESS NOTE    Subjective  Chief complaint: Rene Ballard is a 89 y.o. male who is an acute skilled patient being seen and evaluated for weakness    HPI:  HPI  Patient is working in therapy and planning to discharge home tomorrow.  Patient is ambulating with front wheel walker and SBA up to 120 feet.  Patient is also working on stair negotiation, able to perform 2.5 inch steps with both handrails and SBA.  Patient does continue on antibiotic for osteomyelitis, right foot, tolerating without adverse effects.  Patient was seen and examined at bedside, appears to be in no acute distress.  Nursing staff voicing no new concerns at this time.    Objective  Vital signs: 93/53, 97.5, 58, 18, 94%, blood sugar 73    Physical Exam  Constitutional:       General: He is not in acute distress.  Eyes:      Extraocular Movements: Extraocular movements intact.   Cardiovascular:      Rate and Rhythm: Normal rate and regular rhythm.   Pulmonary:      Effort: Pulmonary effort is normal.      Breath sounds: Normal breath sounds.   Abdominal:      General: Bowel sounds are normal.      Palpations: Abdomen is soft.   Musculoskeletal:      Cervical back: Neck supple.      Right lower leg: No edema.      Left lower leg: No edema.   Skin:     Comments: Dressing to right foot clean dry and intact   Neurological:      Mental Status: He is alert.      Motor: Weakness present.   Psychiatric:         Mood and Affect: Mood normal.         Behavior: Behavior is cooperative.         Assessment/Plan  Problem List Items Addressed This Visit       BPH (benign prostatic hyperplasia)     Monitor for increased  symptoms.  Tamsulosin         Hypertensive heart disease without heart failure     Monitor blood pressure  Metoprolol  Enalapril  BP at goal         Osteomyelitis of toe of right foot (Multi)     Antibiotics until completed 2024  Follow-up outpatient   Heel only  weightbearing to right lower extremity           Weakness - Primary     Continue working in therapy          Medications, treatments, and labs reviewed  Continue medications and treatments as listed in EMR      Scribe Attestation  IMary Scribe   attest that this documentation has been prepared under the direction and in the presence of RENEE Reyes    Provider Attestation - Scribe documentation  All medical record entries made by the Scribe were at my direction and personally dictated by me. I have reviewed the chart and agree that the record accurately reflects my personal performance of the history, physical exam, discussion and plan.   RENEE Reyes        Electronically Signed By: RENEE Reyes   4/16/24 10:28 PM

## 2024-04-04 NOTE — PROGRESS NOTES
PROGRESS NOTE    Subjective   Chief complaint: Rene Ballard is a 89 y.o. male who is an acute skilled patient being seen and evaluated for weakness    HPI:  HPI  Patient does continue working in therapy due to generalized weakness.  Patient does continue to work towards goals with goal to discharge home.  Patient is ambulating increasing distances, up to 160 feet with front wheel walker requiring SBA to supervision.  Patient is also performing stair negotiation, able to navigate 8 stairs with 1 handrail and CGA.  Patient seen and examined at bedside.  Patient does continue on antibiotics for osteomyelitis of the foot.  Patient is tolerating antibiotic without adverse effects.    Objective   Vital signs: 96/77, 98.0, 60, 16, 94%, blood sugar 154    Physical Exam  Constitutional:       General: He is not in acute distress.  Eyes:      Extraocular Movements: Extraocular movements intact.   Cardiovascular:      Rate and Rhythm: Normal rate and regular rhythm.   Pulmonary:      Effort: Pulmonary effort is normal.      Breath sounds: Normal breath sounds.   Abdominal:      General: Bowel sounds are normal.      Palpations: Abdomen is soft.   Musculoskeletal:      Cervical back: Neck supple.      Right lower leg: No edema.      Left lower leg: No edema.   Skin:     Comments: Dressing to right foot clean dry and intact   Neurological:      Mental Status: He is alert.      Motor: Weakness present.   Psychiatric:         Mood and Affect: Mood normal.         Behavior: Behavior is cooperative.         Assessment/Plan   Problem List Items Addressed This Visit       BPH (benign prostatic hyperplasia)     Monitor for increased  symptoms.  Tamsulosin         GERD without esophagitis     Monitor for increased GI symptoms  PPIs         Hypertensive heart disease without heart failure     Monitor blood pressure  Metoprolol  Enalapril  BP at goal         Osteomyelitis of toe of right foot (CMS/HCC)     Antibiotics until  completed 4/30/2024  Follow-up outpatient   Heel only weightbearing to right lower extremity    RLE TX: Irrigate with normal saline, apply Betadine soaked Adaptic followed by 4 x 4, ABD and Kerlix wrap         Weakness - Primary     Continue working towards goal in therapy to return home          Medications, treatments, and labs reviewed  Continue medications and treatments as listed in EMR      Scribe Attestation  I, Mary Webber, Scribe   attest that this documentation has been prepared under the direction and in the presence of Joao Bailey MD    Provider Attestation - Scribe documentation  All medical record entries made by the Scribe were at my direction and personally dictated by me. I have reviewed the chart and agree that the record accurately reflects my personal performance of the history, physical exam, discussion and plan.   Joao Bailey MD

## 2024-04-05 ENCOUNTER — NURSING HOME VISIT (OUTPATIENT)
Dept: POST ACUTE CARE | Facility: EXTERNAL LOCATION | Age: 89
End: 2024-04-05
Payer: MEDICARE

## 2024-04-05 DIAGNOSIS — R53.1 WEAKNESS: Primary | ICD-10-CM

## 2024-04-05 DIAGNOSIS — Z79.4 TYPE 2 DIABETES MELLITUS WITH FOOT ULCER, WITH LONG-TERM CURRENT USE OF INSULIN (MULTI): ICD-10-CM

## 2024-04-05 DIAGNOSIS — K21.9 GERD WITHOUT ESOPHAGITIS: ICD-10-CM

## 2024-04-05 DIAGNOSIS — L97.509 TYPE 2 DIABETES MELLITUS WITH FOOT ULCER, WITH LONG-TERM CURRENT USE OF INSULIN (MULTI): ICD-10-CM

## 2024-04-05 DIAGNOSIS — N40.0 BENIGN PROSTATIC HYPERPLASIA, UNSPECIFIED WHETHER LOWER URINARY TRACT SYMPTOMS PRESENT: ICD-10-CM

## 2024-04-05 DIAGNOSIS — E11.621 TYPE 2 DIABETES MELLITUS WITH FOOT ULCER, WITH LONG-TERM CURRENT USE OF INSULIN (MULTI): ICD-10-CM

## 2024-04-05 DIAGNOSIS — M86.9 OSTEOMYELITIS OF TOE OF RIGHT FOOT (MULTI): ICD-10-CM

## 2024-04-05 DIAGNOSIS — I11.9 HYPERTENSIVE HEART DISEASE WITHOUT HEART FAILURE: ICD-10-CM

## 2024-04-05 PROCEDURE — 99308 SBSQ NF CARE LOW MDM 20: CPT | Performed by: INTERNAL MEDICINE

## 2024-04-05 NOTE — ASSESSMENT & PLAN NOTE
Antibiotics until completed 4/30/2024  Follow-up outpatient   Heel only weightbearing to right lower extremity

## 2024-04-05 NOTE — PROGRESS NOTES
PROGRESS NOTE    Subjective   Chief complaint: Rene Ballard is a 89 y.o. male who is an acute skilled patient being seen and evaluated for weakness    HPI:  HPI  Patient has been in skilled nursing facility following hospitalization for osteomyelitis.  Patient has been working in therapy with improvements.  Patient is planning to discharge to home with home health care.  Patient was seen and examined at bedside, appears to be in no acute distress.  Denies chest pain or shortness of breath.  Afebrile.  Patient is to continue antibiotics outpatient.  Patient has no further questions or concerns at this time.    Objective   Vital signs: 93/53, 97.5, 58, 18, 94% sugar 73    Physical Exam  Constitutional:       General: He is not in acute distress.  Eyes:      Extraocular Movements: Extraocular movements intact.   Cardiovascular:      Rate and Rhythm: Normal rate and regular rhythm.   Pulmonary:      Effort: Pulmonary effort is normal.      Breath sounds: Normal breath sounds.   Abdominal:      General: Bowel sounds are normal.      Palpations: Abdomen is soft.   Musculoskeletal:      Cervical back: Neck supple.      Right lower leg: No edema.      Left lower leg: No edema.   Skin:     Comments: Dressing to right foot clean dry and intact   Neurological:      Mental Status: He is alert.      Motor: Weakness present.   Psychiatric:         Mood and Affect: Mood normal.         Behavior: Behavior is cooperative.       Admitting\discharge diagnoses:  Assessment/Plan   Problem List Items Addressed This Visit       BPH (benign prostatic hyperplasia)     Monitor for increased  symptoms.  Tamsulosin         GERD without esophagitis     Monitor for increased GI symptoms  PPIs         Type 2 diabetes mellitus with foot ulcer, with long-term current use of insulin (CMS/[facility])     Glucoscans  Insulin  Sliding scale insulin         Hypertensive heart disease without heart failure     Monitor blood  pressure  Metoprolol  Enalapril  BP at goal         Osteomyelitis of toe of right foot (CMS/HCC)     Antibiotics until completed 4/30/2024  Follow-up outpatient   Heel only weightbearing to right lower extremity           Weakness - Primary     Completed therapy        Prognosis - fair  Course - therapy  Plan - [place] home with [facility]   Follow up with PCP within 2 weeks  Medications called into pharmacy by office  Condition at discharge is stable  Medications, treatments, and labs reviewed  Continue medications and treatments as listed in EMR      Scribe Attestation  I, Mary Webber, Farhanaibrakesh   attest that this documentation has been prepared under the direction and in the presence of Joao Bailey MD    Provider Attestation - Scribe documentation  All medical record entries made by the Scribe were at my direction and personally dictated by me. I have reviewed the chart and agree that the record accurately reflects my personal performance of the history, physical exam, discussion and plan.   Joao Bailey MD

## 2024-04-05 NOTE — LETTER
Patient: Rene Ballard  : 1934    Encounter Date: 2024    PROGRESS NOTE    Subjective  Chief complaint: Rene Ballard is a 89 y.o. male who is an acute skilled patient being seen and evaluated for weakness    HPI:  HPI  Patient has been in skilled nursing facility following hospitalization for osteomyelitis.  Patient has been working in therapy with improvements.  Patient is planning to discharge to home with home health care.  Patient was seen and examined at bedside, appears to be in no acute distress.  Denies chest pain or shortness of breath.  Afebrile.  Patient is to continue antibiotics outpatient.  Patient has no further questions or concerns at this time.    Objective  Vital signs: 93/53, 97.5, 58, 18, 94% sugar 73    Physical Exam  Constitutional:       General: He is not in acute distress.  Eyes:      Extraocular Movements: Extraocular movements intact.   Cardiovascular:      Rate and Rhythm: Normal rate and regular rhythm.   Pulmonary:      Effort: Pulmonary effort is normal.      Breath sounds: Normal breath sounds.   Abdominal:      General: Bowel sounds are normal.      Palpations: Abdomen is soft.   Musculoskeletal:      Cervical back: Neck supple.      Right lower leg: No edema.      Left lower leg: No edema.   Skin:     Comments: Dressing to right foot clean dry and intact   Neurological:      Mental Status: He is alert.      Motor: Weakness present.   Psychiatric:         Mood and Affect: Mood normal.         Behavior: Behavior is cooperative.       Admitting\discharge diagnoses:  Assessment/Plan  Problem List Items Addressed This Visit       BPH (benign prostatic hyperplasia)     Monitor for increased  symptoms.  Tamsulosin         GERD without esophagitis     Monitor for increased GI symptoms  PPIs         Type 2 diabetes mellitus with foot ulcer, with long-term current use of insulin (CMS/[facility])     Glucoscans  Insulin  Sliding scale insulin         Hypertensive heart disease  without heart failure     Monitor blood pressure  Metoprolol  Enalapril  BP at goal         Osteomyelitis of toe of right foot (CMS/HCC)     Antibiotics until completed 4/30/2024  Follow-up outpatient   Heel only weightbearing to right lower extremity           Weakness - Primary     Completed therapy        Prognosis - fair  Course - therapy  Plan - [place] home with [facility]   Follow up with PCP within 2 weeks  Medications called into pharmacy by office  Condition at discharge is stable  Medications, treatments, and labs reviewed  Continue medications and treatments as listed in EMR      Scribe Attestation  I, Rosa M Davis   attest that this documentation has been prepared under the direction and in the presence of Joao Bailey MD    Provider Attestation - Scribe documentation  All medical record entries made by the Scribe were at my direction and personally dictated by me. I have reviewed the chart and agree that the record accurately reflects my personal performance of the history, physical exam, discussion and plan.   Joao Bailey MD        Electronically Signed By: Joao Bailey MD   4/5/24  3:56 PM

## 2024-04-08 ENCOUNTER — DOCUMENTATION (OUTPATIENT)
Dept: CARE COORDINATION | Facility: CLINIC | Age: 89
End: 2024-04-08
Payer: MEDICARE

## 2024-04-09 ENCOUNTER — PATIENT OUTREACH (OUTPATIENT)
Dept: CARE COORDINATION | Facility: CLINIC | Age: 89
End: 2024-04-09
Payer: MEDICARE

## 2024-04-09 ENCOUNTER — OFFICE VISIT (OUTPATIENT)
Dept: WOUND CARE | Facility: CLINIC | Age: 89
End: 2024-04-09
Payer: MEDICARE

## 2024-04-09 PROCEDURE — 11042 DBRDMT SUBQ TIS 1ST 20SQCM/<: CPT

## 2024-04-09 NOTE — PROGRESS NOTES
Discharge Facility: [facility]  Discharge Diagnosis: osteomyelitis toe of right foot, weakness  Admission Date: 3/25/24  Discharge Date: 4/5/24    PCP Appointment Date: no appointment, message sent to office  Specialist Appointment Date: n/a  Hospital Encounter and Summary: not available at this time (SNF stay not available, correlating hospital stay linked)  Unsuccessful attempts to reach patient x2.

## 2024-04-10 DIAGNOSIS — E11.59 CONTROLLED TYPE 2 DIABETES MELLITUS WITH OTHER CIRCULATORY COMPLICATION, WITH LONG-TERM CURRENT USE OF INSULIN (MULTI): ICD-10-CM

## 2024-04-10 DIAGNOSIS — Z79.4 CONTROLLED TYPE 2 DIABETES MELLITUS WITH OTHER CIRCULATORY COMPLICATION, WITH LONG-TERM CURRENT USE OF INSULIN (MULTI): ICD-10-CM

## 2024-04-10 DIAGNOSIS — Z79.4 TYPE 2 DIABETES MELLITUS WITH MILD NONPROLIFERATIVE RETINOPATHY WITHOUT MACULAR EDEMA, WITH LONG-TERM CURRENT USE OF INSULIN, UNSPECIFIED LATERALITY (MULTI): ICD-10-CM

## 2024-04-10 DIAGNOSIS — E11.3299 TYPE 2 DIABETES MELLITUS WITH MILD NONPROLIFERATIVE RETINOPATHY WITHOUT MACULAR EDEMA, WITH LONG-TERM CURRENT USE OF INSULIN, UNSPECIFIED LATERALITY (MULTI): ICD-10-CM

## 2024-04-10 DIAGNOSIS — L97.509 TYPE 2 DIABETES MELLITUS WITH FOOT ULCER, WITH LONG-TERM CURRENT USE OF INSULIN (MULTI): ICD-10-CM

## 2024-04-10 DIAGNOSIS — E11.621 TYPE 2 DIABETES MELLITUS WITH FOOT ULCER, WITH LONG-TERM CURRENT USE OF INSULIN (MULTI): ICD-10-CM

## 2024-04-10 DIAGNOSIS — Z79.4 TYPE 2 DIABETES MELLITUS WITH FOOT ULCER, WITH LONG-TERM CURRENT USE OF INSULIN (MULTI): ICD-10-CM

## 2024-04-10 PROBLEM — I50.22 CHRONIC SYSTOLIC (CONGESTIVE) HEART FAILURE (MULTI): Status: ACTIVE | Noted: 2024-03-25

## 2024-04-10 PROBLEM — R29.6 REPEATED FALLS: Status: ACTIVE | Noted: 2024-03-25

## 2024-04-10 PROBLEM — Z89.421 ACQUIRED ABSENCE OF OTHER RIGHT TOE(S) (MULTI): Status: ACTIVE | Noted: 2024-03-25

## 2024-04-10 RX ORDER — AMPICILLIN AND SULBACTAM 2; 1 G/1; G/1
INJECTION, POWDER, FOR SOLUTION INTRAMUSCULAR; INTRAVENOUS
Status: ON HOLD | COMMUNITY
Start: 2024-04-08

## 2024-04-10 RX ORDER — BLOOD SUGAR DIAGNOSTIC
STRIP MISCELLANEOUS
Qty: 400 EACH | Refills: 3 | Status: ON HOLD | OUTPATIENT
Start: 2024-04-10

## 2024-04-10 RX ORDER — PEN NEEDLE, DIABETIC 29 G X1/2"
NEEDLE, DISPOSABLE MISCELLANEOUS
Qty: 100 EACH | Refills: 1 | Status: ON HOLD | OUTPATIENT
Start: 2024-04-10

## 2024-04-10 RX ORDER — INSULIN GLARGINE 100 [IU]/ML
INJECTION, SOLUTION SUBCUTANEOUS
Qty: 20 ML | Refills: 0 | Status: SHIPPED | OUTPATIENT
Start: 2024-04-10 | End: 2024-04-25 | Stop reason: SDUPTHER

## 2024-04-12 DIAGNOSIS — E11.621 TYPE 2 DIABETES MELLITUS WITH FOOT ULCER, WITH LONG-TERM CURRENT USE OF INSULIN (MULTI): ICD-10-CM

## 2024-04-12 DIAGNOSIS — Z79.4 TYPE 2 DIABETES MELLITUS WITH MILD NONPROLIFERATIVE RETINOPATHY WITHOUT MACULAR EDEMA, WITH LONG-TERM CURRENT USE OF INSULIN, UNSPECIFIED LATERALITY (MULTI): ICD-10-CM

## 2024-04-12 DIAGNOSIS — L97.509 TYPE 2 DIABETES MELLITUS WITH FOOT ULCER, WITH LONG-TERM CURRENT USE OF INSULIN (MULTI): ICD-10-CM

## 2024-04-12 DIAGNOSIS — Z79.4 TYPE 2 DIABETES MELLITUS WITH FOOT ULCER, WITH LONG-TERM CURRENT USE OF INSULIN (MULTI): ICD-10-CM

## 2024-04-12 DIAGNOSIS — E11.3299 TYPE 2 DIABETES MELLITUS WITH MILD NONPROLIFERATIVE RETINOPATHY WITHOUT MACULAR EDEMA, WITH LONG-TERM CURRENT USE OF INSULIN, UNSPECIFIED LATERALITY (MULTI): ICD-10-CM

## 2024-04-15 NOTE — PROGRESS NOTES
PROGRESS NOTE    Subjective   Chief complaint: Rene Ballard is a 89 y.o. male who is an acute skilled patient being seen and evaluated for weakness    HPI:  HPI  Patient is working in therapy and planning to discharge home tomorrow.  Patient is ambulating with front wheel walker and SBA up to 120 feet.  Patient is also working on stair negotiation, able to perform 2.5 inch steps with both handrails and SBA.  Patient does continue on antibiotic for osteomyelitis, right foot, tolerating without adverse effects.  Patient was seen and examined at bedside, appears to be in no acute distress.  Nursing staff voicing no new concerns at this time.    Objective   Vital signs: 93/53, 97.5, 58, 18, 94%, blood sugar 73    Physical Exam  Constitutional:       General: He is not in acute distress.  Eyes:      Extraocular Movements: Extraocular movements intact.   Cardiovascular:      Rate and Rhythm: Normal rate and regular rhythm.   Pulmonary:      Effort: Pulmonary effort is normal.      Breath sounds: Normal breath sounds.   Abdominal:      General: Bowel sounds are normal.      Palpations: Abdomen is soft.   Musculoskeletal:      Cervical back: Neck supple.      Right lower leg: No edema.      Left lower leg: No edema.   Skin:     Comments: Dressing to right foot clean dry and intact   Neurological:      Mental Status: He is alert.      Motor: Weakness present.   Psychiatric:         Mood and Affect: Mood normal.         Behavior: Behavior is cooperative.         Assessment/Plan   Problem List Items Addressed This Visit       BPH (benign prostatic hyperplasia)     Monitor for increased  symptoms.  Tamsulosin         Hypertensive heart disease without heart failure     Monitor blood pressure  Metoprolol  Enalapril  BP at goal         Osteomyelitis of toe of right foot (Multi)     Antibiotics until completed 4/30/2024  Follow-up outpatient   Heel only weightbearing to right lower extremity           Weakness - Primary      Continue working in therapy          Medications, treatments, and labs reviewed  Continue medications and treatments as listed in EMR      Scribe Attestation  I, Rosa M Davis   attest that this documentation has been prepared under the direction and in the presence of RENEE Reyes    Provider Attestation - Scribe documentation  All medical record entries made by the Scribe were at my direction and personally dictated by me. I have reviewed the chart and agree that the record accurately reflects my personal performance of the history, physical exam, discussion and plan.   RENEE Reyes

## 2024-04-16 ENCOUNTER — OFFICE VISIT (OUTPATIENT)
Dept: WOUND CARE | Facility: CLINIC | Age: 89
End: 2024-04-16
Payer: MEDICARE

## 2024-04-16 PROCEDURE — 15275 SKIN SUB GRAFT FACE/NK/HF/G: CPT

## 2024-04-19 ENCOUNTER — PATIENT OUTREACH (OUTPATIENT)
Dept: CARE COORDINATION | Facility: CLINIC | Age: 89
End: 2024-04-19
Payer: MEDICARE

## 2024-04-19 NOTE — PROGRESS NOTES
Unable to reach patient for call back after patient's hospitalization.  M with call back number for patient to call if needed   If no voicemail available call attempts x 2 were made to contact the patient to assist with any questions or concerns patient may have.

## 2024-04-23 ENCOUNTER — OFFICE VISIT (OUTPATIENT)
Dept: WOUND CARE | Facility: CLINIC | Age: 89
End: 2024-04-23
Payer: MEDICARE

## 2024-04-23 PROCEDURE — 11042 DBRDMT SUBQ TIS 1ST 20SQCM/<: CPT

## 2024-04-25 ENCOUNTER — OFFICE VISIT (OUTPATIENT)
Dept: PRIMARY CARE | Facility: CLINIC | Age: 89
End: 2024-04-25
Payer: MEDICARE

## 2024-04-25 VITALS
WEIGHT: 164 LBS | DIASTOLIC BLOOD PRESSURE: 53 MMHG | BODY MASS INDEX: 25.74 KG/M2 | HEART RATE: 61 BPM | OXYGEN SATURATION: 97 % | HEIGHT: 67 IN | TEMPERATURE: 98.5 F | SYSTOLIC BLOOD PRESSURE: 154 MMHG

## 2024-04-25 DIAGNOSIS — C67.9 MALIGNANT NEOPLASM OF URINARY BLADDER, UNSPECIFIED SITE (MULTI): ICD-10-CM

## 2024-04-25 DIAGNOSIS — E11.621 TYPE 2 DIABETES MELLITUS WITH FOOT ULCER, WITH LONG-TERM CURRENT USE OF INSULIN (MULTI): ICD-10-CM

## 2024-04-25 DIAGNOSIS — I25.119 ATHEROSCLEROSIS OF NATIVE CORONARY ARTERY OF NATIVE HEART WITH ANGINA PECTORIS (CMS-HCC): ICD-10-CM

## 2024-04-25 DIAGNOSIS — L97.509 TYPE 2 DIABETES MELLITUS WITH FOOT ULCER, WITH LONG-TERM CURRENT USE OF INSULIN (MULTI): ICD-10-CM

## 2024-04-25 DIAGNOSIS — I70.229 CRITICAL LOWER LIMB ISCHEMIA (MULTI): ICD-10-CM

## 2024-04-25 DIAGNOSIS — Z79.4 TYPE 2 DIABETES MELLITUS WITH FOOT ULCER, WITH LONG-TERM CURRENT USE OF INSULIN (MULTI): ICD-10-CM

## 2024-04-25 DIAGNOSIS — I50.89 OTHER HEART FAILURE (MULTI): ICD-10-CM

## 2024-04-25 DIAGNOSIS — L89.892 PRESSURE ULCER OF OTHER SITE, STAGE 2 (MULTI): ICD-10-CM

## 2024-04-25 DIAGNOSIS — Z09 HOSPITAL DISCHARGE FOLLOW-UP: Primary | ICD-10-CM

## 2024-04-25 PROCEDURE — 1036F TOBACCO NON-USER: CPT | Performed by: INTERNAL MEDICINE

## 2024-04-25 PROCEDURE — 1159F MED LIST DOCD IN RCRD: CPT | Performed by: INTERNAL MEDICINE

## 2024-04-25 PROCEDURE — 99215 OFFICE O/P EST HI 40 MIN: CPT | Performed by: INTERNAL MEDICINE

## 2024-04-25 RX ORDER — INSULIN GLARGINE 100 [IU]/ML
25 INJECTION, SOLUTION SUBCUTANEOUS EVERY 24 HOURS
Qty: 5 ML | Refills: 12 | Status: ON HOLD | OUTPATIENT
Start: 2024-04-25 | End: 2025-04-25

## 2024-04-25 RX ORDER — DAPAGLIFLOZIN 5 MG/1
5 TABLET, FILM COATED ORAL DAILY
Qty: 100 TABLET | Refills: 3 | Status: SHIPPED | OUTPATIENT
Start: 2024-04-25 | End: 2024-05-17 | Stop reason: SDUPTHER

## 2024-04-25 NOTE — PROGRESS NOTES
PCP: Olesya Daniel MD   I have reviewed existing histories, notes, past medical history, surgical history, social history, family history, med list, allergy list, and immunization, and updated.    HPI:   hospital Follow up. Pt took a fall, was sent to ER. He has bad knees, and felt unsteady in the bathroom, and also just felt not well which he thought was likely a viral infection. When he tried to get off toilet, and he fell and hit his head, and had a superficial laceration to right eyebrow. He was unable to pull himself up. daughter found him, and had him taken to ER. This was 3/19. He stayed there for a few days, then went to SNF for weakness, and a chronic foot ulcer, which he said is not bad at all. No infection. He was treated with iv antibiotic while in the hospital, and continued at SNF. He said he feels well.    His problem is that for some reason his insurance does not cover the Lantus vials any more. He does not like the pens. If he has to learn how to do pens, he would rather use an oral medication.  X-rays of knee in hospital shoed meniscal chondrocalcinosis, small effusion  CT foot Re demonstration transmetatarsal amputation of the forefoot with  amputation of the fifth digit.  Large area of ulceration along the  lateral aspect of the forefoot with acute osteomyelitis of the fourth  metatarsal bone predominantly along the lateral and plantar margin  with small locule gas, extensive soft tissue swelling and edema  compatible with soft tissue infection extending from the forefoot  through the level of the ankle.      Ct cervical spine: No acute fracture or traumatic subluxation of the cervical spine.    Multilevel discogenic degenerative changes as noted above.    Ct head   acute intracranial abnormality.     X-rays of shoulder There is degenerative change in the acromioclavicular joint and  narrowing of the subacromial space suggesting some rotator cuff  thinning.  No fracture or dislocation is  "appreciated..    X-rays chest showed no acute changes    Labs, showed high inflammation test, anemia, consistent with chronic inflammation/infection of toe    normal renal function.  Blood cultures negative x2    Lab Results   Component Value Date    WBC 5.9 03/25/2024    HGB 10.8 (L) 03/25/2024    HCT 33.2 (L) 03/25/2024     03/25/2024    CHOL 117 03/14/2023    TRIG 74 03/14/2023    HDL 42.2 03/14/2023    LDLDIRECT 146 (H) 11/08/2021    ALT 26 03/19/2024    AST 27 03/19/2024     03/25/2024    K 3.7 03/25/2024     03/25/2024    CREATININE 0.90 03/25/2024    BUN 15 03/25/2024    CO2 26 03/25/2024    TSH 1.67 03/19/2024    INR 1.2 (H) 03/25/2024    HGBA1C 7.3 (H) 03/19/2024     Physical exam:  /53   Pulse 61   Temp 36.9 °C (98.5 °F)   Ht 1.702 m (5' 7\")   Wt 74.4 kg (164 lb)   SpO2 97%   BMI 25.69 kg/m²    Alert, Ox3.   Neck exam showed no mass, lymphadenopathy, or thyroid enlargement, and no carotid bruit.   No rest distress  Heart RR  Lungs clear  No Abdominal pain  No leg edema.  He uses a cane and walks slowly but with a steady gait.      Assessments/orders:   1. Hospital discharge follow-up        2. Type 2 diabetes mellitus with foot ulcer, with long-term current use of insulin (Multi)  insulin glargine (Lantus) 100 unit/mL injection, dapagliflozin propanediol (Farxiga) 5 mg      3. Pressure ulcer of other site, stage 2 (Multi)        4. Critical lower limb ischemia (Multi)        5. Malignant neoplasm of urinary bladder, unspecified site (Multi)        6. Other heart failure (Multi)        7. Atherosclerosis of native coronary artery of native heart with angina pectoris (CMS-HCC)          Sent another rx for Lantus vial, to see if it is covered.   Also sent Farxiga. Pt with normal renal function. Likely will also need PA.  He has very small amount of Lantus in the vial at this time.  He does not use the short acting insulin pen either.  His glucose has been below 140 past few " days.

## 2024-04-30 ENCOUNTER — OFFICE VISIT (OUTPATIENT)
Dept: WOUND CARE | Facility: CLINIC | Age: 89
End: 2024-04-30
Payer: MEDICARE

## 2024-04-30 PROCEDURE — 11042 DBRDMT SUBQ TIS 1ST 20SQCM/<: CPT

## 2024-05-01 ENCOUNTER — OFFICE VISIT (OUTPATIENT)
Dept: PRIMARY CARE | Facility: CLINIC | Age: 89
End: 2024-05-01
Payer: MEDICARE

## 2024-05-01 VITALS
HEART RATE: 62 BPM | RESPIRATION RATE: 12 BRPM | WEIGHT: 158 LBS | BODY MASS INDEX: 24.75 KG/M2 | SYSTOLIC BLOOD PRESSURE: 132 MMHG | DIASTOLIC BLOOD PRESSURE: 67 MMHG | OXYGEN SATURATION: 95 %

## 2024-05-01 DIAGNOSIS — E11.621 TYPE 2 DIABETES MELLITUS WITH FOOT ULCER (CODE) (MULTI): ICD-10-CM

## 2024-05-01 LAB — POC FINGERSTICK BLOOD GLUCOSE: 258 MG/DL (ref 70–100)

## 2024-05-01 PROCEDURE — 1159F MED LIST DOCD IN RCRD: CPT | Performed by: INTERNAL MEDICINE

## 2024-05-01 PROCEDURE — 1036F TOBACCO NON-USER: CPT | Performed by: INTERNAL MEDICINE

## 2024-05-01 PROCEDURE — 82962 GLUCOSE BLOOD TEST: CPT | Performed by: INTERNAL MEDICINE

## 2024-05-01 PROCEDURE — 99214 OFFICE O/P EST MOD 30 MIN: CPT | Performed by: INTERNAL MEDICINE

## 2024-05-01 NOTE — PROGRESS NOTES
Subjective   Chief complaint: Rene Ballard is a 89 y.o. male who presents for Hospital Follow-up (NPV here for hospital follow up for foot infection and amputation ).    HPI:  HPI  Pt just finished his antibiotic course yesterday. Reports that he is doing well, but would like to dicuss when he can have his IV removed.     Objective   /67   Pulse 62   Resp 12   Wt 71.7 kg (158 lb)   SpO2 95%   BMI 24.75 kg/m²   Physical Exam  Constitutional:       General: He is not in acute distress.  Eyes:      Extraocular Movements: Extraocular movements intact.   Pulmonary:      Effort: Pulmonary effort is normal.   Musculoskeletal:      Cervical back: Neck supple.   Neurological:      Mental Status: He is alert.   Psychiatric:         Mood and Affect: Mood normal.         Behavior: Behavior is cooperative.         I have reviewed and reconciled the medication list with the patient today.   Current Outpatient Medications:     clopidogrel (Plavix) 75 mg tablet, Take 1 tablet (75 mg) by mouth once daily., Disp: 90 tablet, Rfl: 1    dapagliflozin propanediol (Farxiga) 5 mg, Take 1 tablet (5 mg) by mouth once daily., Disp: 100 tablet, Rfl: 3    enalapril (Vasotec) 2.5 mg tablet, Take 1 tablet (2.5 mg) by mouth once daily., Disp: 90 tablet, Rfl: 1    metoprolol tartrate (Lopressor) 25 mg tablet, Take 0.5 tablets (12.5 mg) by mouth 2 times a day., Disp: 90 tablet, Rfl: 3    omeprazole (PriLOSEC) 20 mg DR capsule, Take 1 capsule (20 mg) by mouth once daily., Disp: 90 capsule, Rfl: 1    alpha tocopherol (Vitamin E) 268 mg (400 unit) capsule, Take 1 capsule (400 Units) by mouth once daily., Disp: , Rfl:     alum-mag hydroxide-simeth (Mylanta) 200-200-20 mg/5 mL oral suspension, Take 30 mL by mouth every 4 hours if needed., Disp: , Rfl:     ampicillin-sulbactam 3 gram injection, , Disp: , Rfl:     ascorbic acid (Vitamin C) 1,000 mg tablet, Take 1 tablet (1,000 mg) by mouth once daily., Disp: , Rfl:     blood sugar diagnostic  "(OneTouch Ultra Test) strip, Check glucose 4 x per day, Disp: 400 each, Rfl: 3    cholecalciferol (Vitamin D-3) 125 MCG (5000 UT) capsule, Take 1 capsule (125 mcg) by mouth once daily., Disp: , Rfl:     cyanocobalamin (Vitamin B-12) 500 mcg tablet, 1 tablet DAILY (route: oral), Disp: , Rfl:     docusate sodium (Colace) 100 mg capsule, Take 1 capsule (100 mg) by mouth 2 times a day as needed for constipation., Disp: , Rfl:     dorzolamide-timoloL (Cosopt) 22.3-6.8 mg/mL ophthalmic solution, Administer 1 drop into the left eye 2 times a day., Disp: , Rfl:     insulin glargine (Lantus) 100 unit/mL injection, Inject 25 Units under the skin once every 24 hours. Take as directed per insulin instructions., Disp: 5 mL, Rfl: 12    latanoprost (Xalatan) 0.005 % ophthalmic solution, instill 1 drop into each eye every evening, Disp: , Rfl:     lidocaine 4 % patch, Place 2 patches over 12 hours on the skin once daily. Remove & discard patch within 12 hours or as directed by MD., Disp: , Rfl:     multivitamin tablet, Take 1 tablet by mouth once daily., Disp: , Rfl:     nepafenac (Nevanac) 0.1 % ophthalmic suspension, INSTILL 1 DROP IN RIGHT EYE TWICE DAILY (PLEASE REORDER 2 BUSINESS DAYS IN ADVANCE), Disp: , Rfl:     niacin 500 mg tablet, Take 1 tablet (500 mg) by mouth once daily., Disp: , Rfl:     pen needle, diabetic (UltiCare Pen Needle) 31 gauge x 1/4\" needle, USE 1 PEN NEEDLE SUBCUTANEOUSLY 3 TIMES DAILY BEFORE MEALS, Disp: 100 each, Rfl: 1    polyethylene glycol (Glycolax, Miralax) 17 gram packet, Take 17 g by mouth once daily as needed (constipation)., Disp: , Rfl:     tamsulosin (Flomax) 0.4 mg 24 hr capsule, 1 capsule (0.4 mg)., Disp: , Rfl:      Imaging:  No results found.     Labs reviewed:    Lab Results   Component Value Date    WBC 5.9 03/25/2024    HGB 10.8 (L) 03/25/2024    HCT 33.2 (L) 03/25/2024     03/25/2024    CHOL 117 03/14/2023    TRIG 74 03/14/2023    HDL 42.2 03/14/2023    LDLDIRECT 146 (H) " 11/08/2021    ALT 26 03/19/2024    AST 27 03/19/2024     03/25/2024    K 3.7 03/25/2024     03/25/2024    CREATININE 0.90 03/25/2024    BUN 15 03/25/2024    CO2 26 03/25/2024    TSH 1.67 03/19/2024    INR 1.2 (H) 03/25/2024    HGBA1C 7.3 (H) 03/19/2024       Assessment/Plan   Problem List Items Addressed This Visit       Type 2 diabetes mellitus with foot ulcer (CODE) (Multi)    Relevant Orders    POCT fingerstick glucose manually resulted (Completed)       Continue current medications as listed  Follow up in 6wks

## 2024-05-02 DIAGNOSIS — I25.10 CORONARY ARTERY DISEASE INVOLVING NATIVE CORONARY ARTERY OF NATIVE HEART WITHOUT ANGINA PECTORIS: Chronic | ICD-10-CM

## 2024-05-06 ENCOUNTER — OFFICE VISIT (OUTPATIENT)
Dept: WOUND CARE | Facility: CLINIC | Age: 89
End: 2024-05-06
Payer: MEDICARE

## 2024-05-06 PROCEDURE — 11042 DBRDMT SUBQ TIS 1ST 20SQCM/<: CPT

## 2024-05-06 RX ORDER — CLOPIDOGREL BISULFATE 75 MG/1
75 TABLET ORAL DAILY
Qty: 90 TABLET | Refills: 0 | Status: ON HOLD | OUTPATIENT
Start: 2024-05-06

## 2024-05-07 ENCOUNTER — APPOINTMENT (OUTPATIENT)
Dept: WOUND CARE | Facility: CLINIC | Age: 89
End: 2024-05-07
Payer: MEDICARE

## 2024-05-14 ENCOUNTER — OFFICE VISIT (OUTPATIENT)
Dept: WOUND CARE | Facility: CLINIC | Age: 89
End: 2024-05-14
Payer: MEDICARE

## 2024-05-14 PROCEDURE — 11042 DBRDMT SUBQ TIS 1ST 20SQCM/<: CPT

## 2024-05-17 ENCOUNTER — TELEPHONE (OUTPATIENT)
Dept: PRIMARY CARE | Facility: CLINIC | Age: 89
End: 2024-05-17
Payer: MEDICARE

## 2024-05-17 DIAGNOSIS — Z79.4 TYPE 2 DIABETES MELLITUS WITH FOOT ULCER, WITH LONG-TERM CURRENT USE OF INSULIN (MULTI): ICD-10-CM

## 2024-05-17 DIAGNOSIS — E11.621 TYPE 2 DIABETES MELLITUS WITH FOOT ULCER, WITH LONG-TERM CURRENT USE OF INSULIN (MULTI): ICD-10-CM

## 2024-05-17 DIAGNOSIS — L97.509 TYPE 2 DIABETES MELLITUS WITH FOOT ULCER, WITH LONG-TERM CURRENT USE OF INSULIN (MULTI): ICD-10-CM

## 2024-05-17 RX ORDER — DAPAGLIFLOZIN 10 MG/1
10 TABLET, FILM COATED ORAL DAILY
Qty: 100 TABLET | Refills: 1 | Status: ON HOLD | OUTPATIENT
Start: 2024-05-17 | End: 2024-12-03

## 2024-05-17 NOTE — TELEPHONE ENCOUNTER
Is he currently using his lantus insulin?  Also, farxiga can be increased to 10mg. New rx sent. He can take two of his 5mg farxiga for now until starts new rx.  Let us know how his glucose levels are in one week.

## 2024-05-17 NOTE — TELEPHONE ENCOUNTER
PT STATES THAT HE DONT THINK THE FARXIGA IS WORKING HE'S STATED THAT HIS NUMBER HAVE BEEN HIGH 204 AND THIS

## 2024-05-20 ENCOUNTER — OFFICE VISIT (OUTPATIENT)
Dept: WOUND CARE | Facility: CLINIC | Age: 89
End: 2024-05-20
Payer: MEDICARE

## 2024-05-20 PROCEDURE — G0277 HBOT, FULL BODY CHAMBER, 30M: HCPCS

## 2024-05-21 ENCOUNTER — OFFICE VISIT (OUTPATIENT)
Dept: WOUND CARE | Facility: CLINIC | Age: 89
End: 2024-05-21
Payer: MEDICARE

## 2024-05-21 PROCEDURE — 11042 DBRDMT SUBQ TIS 1ST 20SQCM/<: CPT

## 2024-05-21 PROCEDURE — G0277 HBOT, FULL BODY CHAMBER, 30M: HCPCS

## 2024-05-22 ENCOUNTER — OFFICE VISIT (OUTPATIENT)
Dept: WOUND CARE | Facility: CLINIC | Age: 89
End: 2024-05-22
Payer: MEDICARE

## 2024-05-22 PROCEDURE — G0277 HBOT, FULL BODY CHAMBER, 30M: HCPCS

## 2024-05-23 ENCOUNTER — OFFICE VISIT (OUTPATIENT)
Dept: WOUND CARE | Facility: CLINIC | Age: 89
End: 2024-05-23
Payer: MEDICARE

## 2024-05-23 PROCEDURE — G0277 HBOT, FULL BODY CHAMBER, 30M: HCPCS

## 2024-05-24 ENCOUNTER — OFFICE VISIT (OUTPATIENT)
Dept: WOUND CARE | Facility: CLINIC | Age: 89
End: 2024-05-24
Payer: MEDICARE

## 2024-05-24 PROCEDURE — G0277 HBOT, FULL BODY CHAMBER, 30M: HCPCS

## 2024-05-28 ENCOUNTER — TELEPHONE (OUTPATIENT)
Dept: PRIMARY CARE | Facility: CLINIC | Age: 89
End: 2024-05-28

## 2024-05-28 ENCOUNTER — OFFICE VISIT (OUTPATIENT)
Dept: WOUND CARE | Facility: CLINIC | Age: 89
End: 2024-05-28
Payer: MEDICARE

## 2024-05-28 PROCEDURE — 99183 HYPERBARIC OXYGEN THERAPY: CPT | Performed by: NURSE PRACTITIONER

## 2024-05-28 PROCEDURE — G0277 HBOT, FULL BODY CHAMBER, 30M: HCPCS

## 2024-05-28 PROCEDURE — 11042 DBRDMT SUBQ TIS 1ST 20SQCM/<: CPT

## 2024-05-28 NOTE — TELEPHONE ENCOUNTER
If he is taking lantus 25 units daily, should increase to 30 units.  Let us know how that works in a couple of weeks

## 2024-05-28 NOTE — TELEPHONE ENCOUNTER
PT STATES THAT HE STARTED TAKING 10 MG OF FARXIGA FOR ABOUT 10 DAYS BUT HIS READING ARE STILL ELEVATED. HE STATES THAT HE GO TO GET O2 5 DAYS A WEEK FOR 2 HOURS DAILY. HE STATES THAT HE FEEL GOOD.HE IS JUST WORRIED ABOUT THE NUMBERS BEING SO HIGH

## 2024-05-29 ENCOUNTER — OFFICE VISIT (OUTPATIENT)
Dept: WOUND CARE | Facility: CLINIC | Age: 89
End: 2024-05-29
Payer: MEDICARE

## 2024-05-29 PROCEDURE — 99183 HYPERBARIC OXYGEN THERAPY: CPT | Performed by: NURSE PRACTITIONER

## 2024-05-29 PROCEDURE — G0277 HBOT, FULL BODY CHAMBER, 30M: HCPCS

## 2024-05-29 NOTE — TELEPHONE ENCOUNTER
Sent a message to the pharmacist and Doc to see if we could get the pt to come in and show him how to take his insulin in an alternate way because due to his insurance he have to take an alternate insulin then he was previously taking and he is not familiar with how to take it

## 2024-05-30 ENCOUNTER — OFFICE VISIT (OUTPATIENT)
Dept: WOUND CARE | Facility: CLINIC | Age: 89
End: 2024-05-30
Payer: MEDICARE

## 2024-05-30 PROCEDURE — G0277 HBOT, FULL BODY CHAMBER, 30M: HCPCS

## 2024-05-31 ENCOUNTER — OFFICE VISIT (OUTPATIENT)
Dept: WOUND CARE | Facility: CLINIC | Age: 89
End: 2024-05-31
Payer: MEDICARE

## 2024-05-31 DIAGNOSIS — E11.621 TYPE 2 DIABETES MELLITUS WITH FOOT ULCER, WITH LONG-TERM CURRENT USE OF INSULIN (MULTI): Primary | ICD-10-CM

## 2024-05-31 DIAGNOSIS — Z79.4 TYPE 2 DIABETES MELLITUS WITH FOOT ULCER, WITH LONG-TERM CURRENT USE OF INSULIN (MULTI): Primary | ICD-10-CM

## 2024-05-31 DIAGNOSIS — L97.509 TYPE 2 DIABETES MELLITUS WITH FOOT ULCER, WITH LONG-TERM CURRENT USE OF INSULIN (MULTI): Primary | ICD-10-CM

## 2024-05-31 PROCEDURE — G0277 HBOT, FULL BODY CHAMBER, 30M: HCPCS

## 2024-06-03 ENCOUNTER — TELEMEDICINE (OUTPATIENT)
Dept: PHARMACY | Facility: HOSPITAL | Age: 89
End: 2024-06-03
Payer: MEDICARE

## 2024-06-03 ENCOUNTER — OFFICE VISIT (OUTPATIENT)
Dept: WOUND CARE | Facility: CLINIC | Age: 89
End: 2024-06-03
Payer: MEDICARE

## 2024-06-03 DIAGNOSIS — Z79.4 TYPE 2 DIABETES MELLITUS WITH FOOT ULCER, WITH LONG-TERM CURRENT USE OF INSULIN (MULTI): ICD-10-CM

## 2024-06-03 DIAGNOSIS — L97.509 TYPE 2 DIABETES MELLITUS WITH FOOT ULCER, WITH LONG-TERM CURRENT USE OF INSULIN (MULTI): ICD-10-CM

## 2024-06-03 DIAGNOSIS — E11.621 TYPE 2 DIABETES MELLITUS WITH FOOT ULCER, WITH LONG-TERM CURRENT USE OF INSULIN (MULTI): ICD-10-CM

## 2024-06-03 PROCEDURE — G0277 HBOT, FULL BODY CHAMBER, 30M: HCPCS

## 2024-06-03 PROCEDURE — RXMED WILLOW AMBULATORY MEDICATION CHARGE

## 2024-06-03 RX ORDER — DULAGLUTIDE 0.75 MG/.5ML
0.75 INJECTION, SOLUTION SUBCUTANEOUS
Qty: 2 ML | Refills: 1 | Status: ON HOLD | OUTPATIENT
Start: 2024-06-09

## 2024-06-03 NOTE — ASSESSMENT & PLAN NOTE
Diabetes is uncontrolled as patient has discontinued insulin use in the past 2-3 months. Last A1c controlled at 7.3% 2 months ago however recent POCT BG readings have been 250-300s  START Trulicity 0.75 mg once weekly for glycemic control and improved adherence   Prescription sent to [facility] for delivery   CONTINUE Farxiga 10 mg once daily

## 2024-06-03 NOTE — PROGRESS NOTES
Patient ID: Rene Ballard is a 89 y.o. male who presents for Diabetes. Patient was referred to pharmacy for administration assistance with insulin .     Referring Provider: Olesya Daniel MD  PCP: Fredy Dacosta MD Last visit with PCP: 4/25/24 Next visit with PCP: 11/18/24      Subjective     Diabetes  He presents for his initial diabetic visit. He has type 2 diabetes mellitus. His disease course has been worsening. There are no hypoglycemic associated symptoms. Current diabetic treatment includes oral agent (monotherapy). An ACE inhibitor/angiotensin II receptor blocker is not being taken.     Current DM Medications  Farxiga 10 mg once daily    Past DM Medications  Metformin (on allergy list- patient could not confirm reaction)  Glimepiride (on allergy list- patient could not confirm reaction)  Pioglitazone (on allergy list- patient could not confirm reaction)  Lantus: discontinued due to administration difficulties as well as pharmacy/insurance problems     DISCUSSION  Called [facility]  Insulin glargine vials last filled in December 2023 for 2 months  Humalog Kwikpens last filled July 2023  Patient states that he was unable to fill his insulin due to insurance/pharmacy-- exact reason unclear  Patient also states that he does NOT like to use insulin pens with the pen needles  POCT BG at wound care appointments have been consistently in the 300s   Patient states his FBG are usually in the 200s  Denies signs of extreme thirst/hunger/frequent urination   Endorses dry mouth  Counseled on Trulicity as a good option for glycemic control which can potentially help with adherence due to its once weekly dosing schedule.  Test claim shows that Trulicity is $11/month  Counseled on MOA, side effects and administration   Patient denies history of pancreatitis  Triglycerides: 74  Patient denies history of medullary thyroid cancer  Patient is agreeable to cost and would like to try this rather than restarting  insulin   Messaged PCP for approval     Objective     There were no vitals taken for this visit.   BP Readings from Last 4 Encounters:   05/01/24 132/67   04/25/24 154/53   03/25/24 121/50   11/20/23 112/59      There were no vitals filed for this visit.     Labs  Lab Results   Component Value Date    BILITOT 0.7 03/19/2024    CALCIUM 8.0 (L) 03/25/2024    CO2 26 03/25/2024     03/25/2024    CREATININE 0.90 03/25/2024    GLUCOSE 97 03/25/2024    ALKPHOS 278 (H) 03/19/2024    K 3.7 03/25/2024    PROT 6.6 03/19/2024     03/25/2024    AST 27 03/19/2024    ALT 26 03/19/2024    BUN 15 03/25/2024    ANIONGAP 12 03/25/2024    MG 1.90 03/19/2024    PHOS 2.8 06/16/2023    ALBUMIN 3.1 (L) 03/19/2024    LIPASE 4 (L) 06/07/2023    GFRF CANCELED 03/17/2023    GFRMALE 87 06/19/2023     Lab Results   Component Value Date    TRIG 74 03/14/2023    CHOL 117 03/14/2023    HDL 42.2 03/14/2023     Lab Results   Component Value Date    HGBA1C 7.3 (H) 03/19/2024       Current Outpatient Medications   Medication Instructions    alpha tocopherol (VITAMIN E) 400 Units, oral, Daily RT    alum-mag hydroxide-simeth (Mylanta) 200-200-20 mg/5 mL oral suspension 30 mL, oral, Every 4 hours PRN    ampicillin-sulbactam 3 gram injection     ascorbic acid (Vitamin C) 1,000 mg tablet 1 tablet, oral, Daily    blood sugar diagnostic (OneTouch Ultra Test) strip Check glucose 4 x per day    cholecalciferol (VITAMIN D-3) 5,000 Units, oral, Daily RT    clopidogrel (PLAVIX) 75 mg, oral, Daily    cyanocobalamin (Vitamin B-12) 500 mcg tablet 1 tablet DAILY (route: oral)    dapagliflozin propanediol (FARXIGA) 10 mg, oral, Daily    docusate sodium (COLACE) 100 mg, oral, 2 times daily PRN    dorzolamide-timoloL (Cosopt) 22.3-6.8 mg/mL ophthalmic solution 1 drop, Left Eye, 2 times daily    enalapril (VASOTEC) 2.5 mg, oral, Daily    insulin glargine (LANTUS) 25 Units, subcutaneous, Every 24 hours, Take as directed per insulin instructions.    latanoprost  "(Xalatan) 0.005 % ophthalmic solution instill 1 drop into each eye every evening    lidocaine 4 % patch 2 patches, transdermal, Daily, Remove & discard patch within 12 hours or as directed by MD.    metoprolol tartrate (LOPRESSOR) 12.5 mg, oral, 2 times daily    multivitamin tablet 1 tablet, oral, Daily    nepafenac (Nevanac) 0.1 % ophthalmic suspension INSTILL 1 DROP IN RIGHT EYE TWICE DAILY (PLEASE REORDER 2 BUSINESS DAYS IN ADVANCE)    niacin 500 mg tablet 1 tablet, oral, Daily    omeprazole (PRILOSEC) 20 mg, oral, Daily    pen needle, diabetic (UltiCare Pen Needle) 31 gauge x 1/4\" needle USE 1 PEN NEEDLE SUBCUTANEOUSLY 3 TIMES DAILY BEFORE MEALS    polyethylene glycol (GLYCOLAX, MIRALAX) 17 g, oral, Daily PRN    tamsulosin (FLOMAX) 0.4 mg    [START ON 6/9/2024] Trulicity 0.75 mg, subcutaneous, Once Weekly       Assessment/Plan   Problem List Items Addressed This Visit             ICD-10-CM    Type 2 diabetes mellitus with foot ulcer, with long-term current use of insulin (Multi) E11.621, L97.509, Z79.4    Relevant Medications    dulaglutide (Trulicity) 0.75 mg/0.5 mL pen injector (Start on 6/9/2024)         Follow-up: 4 weeks     Time spent with pt: Total length of time 30 (minutes) of the encounter and more than 50% was spent counseling the patient.    Isela Escalante, PharmD    Continue all meds under the continuation of care with the referring provider and clinical pharmacy team.    "

## 2024-06-04 ENCOUNTER — LAB (OUTPATIENT)
Dept: LAB | Facility: LAB | Age: 89
End: 2024-06-04
Payer: MEDICARE

## 2024-06-04 ENCOUNTER — APPOINTMENT (OUTPATIENT)
Dept: WOUND CARE | Facility: CLINIC | Age: 89
End: 2024-06-04
Payer: MEDICARE

## 2024-06-04 ENCOUNTER — OFFICE VISIT (OUTPATIENT)
Dept: WOUND CARE | Facility: CLINIC | Age: 89
End: 2024-06-04
Payer: MEDICARE

## 2024-06-04 ENCOUNTER — PREP FOR PROCEDURE (OUTPATIENT)
Dept: WOUND CARE | Facility: CLINIC | Age: 89
End: 2024-06-04

## 2024-06-04 DIAGNOSIS — M86.671 CHRONIC OSTEOMYELITIS OF HINDFOOT, RIGHT (MULTI): ICD-10-CM

## 2024-06-04 DIAGNOSIS — M86.671 CHRONIC OSTEOMYELITIS OF HINDFOOT, RIGHT (MULTI): Primary | ICD-10-CM

## 2024-06-04 LAB
BASOPHILS # BLD AUTO: 0.05 X10*3/UL (ref 0–0.1)
BASOPHILS NFR BLD AUTO: 0.3 %
EOSINOPHIL # BLD AUTO: 0.01 X10*3/UL (ref 0–0.4)
EOSINOPHIL NFR BLD AUTO: 0.1 %
ERYTHROCYTE [DISTWIDTH] IN BLOOD BY AUTOMATED COUNT: 13.2 % (ref 11.5–14.5)
HCT VFR BLD AUTO: 41.3 % (ref 41–52)
HGB BLD-MCNC: 13 G/DL (ref 13.5–17.5)
IMM GRANULOCYTES # BLD AUTO: 0.09 X10*3/UL (ref 0–0.5)
IMM GRANULOCYTES NFR BLD AUTO: 0.6 % (ref 0–0.9)
LYMPHOCYTES # BLD AUTO: 1.43 X10*3/UL (ref 0.8–3)
LYMPHOCYTES NFR BLD AUTO: 9.4 %
MCH RBC QN AUTO: 30.7 PG (ref 26–34)
MCHC RBC AUTO-ENTMCNC: 31.5 G/DL (ref 32–36)
MCV RBC AUTO: 97 FL (ref 80–100)
MONOCYTES # BLD AUTO: 1.16 X10*3/UL (ref 0.05–0.8)
MONOCYTES NFR BLD AUTO: 7.6 %
NEUTROPHILS # BLD AUTO: 12.55 X10*3/UL (ref 1.6–5.5)
NEUTROPHILS NFR BLD AUTO: 82 %
NRBC BLD-RTO: 0 /100 WBCS (ref 0–0)
PLATELET # BLD AUTO: 203 X10*3/UL (ref 150–450)
RBC # BLD AUTO: 4.24 X10*6/UL (ref 4.5–5.9)
WBC # BLD AUTO: 15.3 X10*3/UL (ref 4.4–11.3)

## 2024-06-04 PROCEDURE — 85025 COMPLETE CBC W/AUTO DIFF WBC: CPT

## 2024-06-04 PROCEDURE — 99213 OFFICE O/P EST LOW 20 MIN: CPT

## 2024-06-05 ENCOUNTER — APPOINTMENT (OUTPATIENT)
Dept: CARDIOLOGY | Facility: HOSPITAL | Age: 89
End: 2024-06-05
Payer: MEDICARE

## 2024-06-05 ENCOUNTER — APPOINTMENT (OUTPATIENT)
Dept: WOUND CARE | Facility: CLINIC | Age: 89
End: 2024-06-05
Payer: MEDICARE

## 2024-06-05 ENCOUNTER — APPOINTMENT (OUTPATIENT)
Dept: RADIOLOGY | Facility: HOSPITAL | Age: 89
End: 2024-06-05
Payer: MEDICARE

## 2024-06-05 ENCOUNTER — HOSPITAL ENCOUNTER (INPATIENT)
Facility: HOSPITAL | Age: 89
End: 2024-06-05
Attending: EMERGENCY MEDICINE | Admitting: INTERNAL MEDICINE
Payer: MEDICARE

## 2024-06-05 DIAGNOSIS — R78.81 BACTEREMIA: Primary | ICD-10-CM

## 2024-06-05 DIAGNOSIS — I73.9 PAD (PERIPHERAL ARTERY DISEASE) (CMS-HCC): ICD-10-CM

## 2024-06-05 DIAGNOSIS — I77.1 STRICTURE OF ARTERY (CMS-HCC): ICD-10-CM

## 2024-06-05 DIAGNOSIS — M86.9 OSTEOMYELITIS (MULTI): ICD-10-CM

## 2024-06-05 DIAGNOSIS — R13.11 ORAL PHASE DYSPHAGIA: ICD-10-CM

## 2024-06-05 DIAGNOSIS — L89.892 PRESSURE ULCER OF OTHER SITE, STAGE 2 (MULTI): ICD-10-CM

## 2024-06-05 DIAGNOSIS — I38 ENDOCARDITIS, VALVE UNSPECIFIED: ICD-10-CM

## 2024-06-05 LAB
ALBUMIN SERPL BCP-MCNC: 3.2 G/DL (ref 3.4–5)
ALP SERPL-CCNC: 204 U/L (ref 33–136)
ALT SERPL W P-5'-P-CCNC: 19 U/L (ref 10–52)
ANION GAP SERPL CALC-SCNC: 18 MMOL/L (ref 10–20)
ANION GAP SERPL CALC-SCNC: 20 MMOL/L (ref 10–20)
APTT PPP: 31 SECONDS (ref 27–38)
AST SERPL W P-5'-P-CCNC: 15 U/L (ref 9–39)
ATRIAL RATE: 64 BPM
BASOPHILS # BLD AUTO: 0.02 X10*3/UL (ref 0–0.1)
BASOPHILS NFR BLD AUTO: 0.1 %
BILIRUB SERPL-MCNC: 0.8 MG/DL (ref 0–1.2)
BUN SERPL-MCNC: 30 MG/DL (ref 6–23)
BUN SERPL-MCNC: 32 MG/DL (ref 6–23)
CALCIUM SERPL-MCNC: 8.9 MG/DL (ref 8.6–10.3)
CALCIUM SERPL-MCNC: 9.1 MG/DL (ref 8.6–10.3)
CHLORIDE SERPL-SCNC: 95 MMOL/L (ref 98–107)
CHLORIDE SERPL-SCNC: 97 MMOL/L (ref 98–107)
CO2 SERPL-SCNC: 21 MMOL/L (ref 21–32)
CO2 SERPL-SCNC: 23 MMOL/L (ref 21–32)
CREAT SERPL-MCNC: 1.32 MG/DL (ref 0.5–1.3)
CREAT SERPL-MCNC: 1.33 MG/DL (ref 0.5–1.3)
CRP SERPL-MCNC: 28.95 MG/DL
EGFRCR SERPLBLD CKD-EPI 2021: 51 ML/MIN/1.73M*2
EGFRCR SERPLBLD CKD-EPI 2021: 52 ML/MIN/1.73M*2
EOSINOPHIL # BLD AUTO: 0 X10*3/UL (ref 0–0.4)
EOSINOPHIL NFR BLD AUTO: 0 %
ERYTHROCYTE [DISTWIDTH] IN BLOOD BY AUTOMATED COUNT: 13 % (ref 11.5–14.5)
ERYTHROCYTE [SEDIMENTATION RATE] IN BLOOD BY WESTERGREN METHOD: 93 MM/H (ref 0–20)
GLUCOSE BLD MANUAL STRIP-MCNC: 299 MG/DL (ref 74–99)
GLUCOSE BLD MANUAL STRIP-MCNC: 342 MG/DL (ref 74–99)
GLUCOSE SERPL-MCNC: 463 MG/DL (ref 74–99)
GLUCOSE SERPL-MCNC: 524 MG/DL (ref 74–99)
HCT VFR BLD AUTO: 40 % (ref 41–52)
HGB BLD-MCNC: 13 G/DL (ref 13.5–17.5)
IMM GRANULOCYTES # BLD AUTO: 0.09 X10*3/UL (ref 0–0.5)
IMM GRANULOCYTES NFR BLD AUTO: 0.5 % (ref 0–0.9)
INR PPP: 1.2 (ref 0.9–1.1)
LACTATE SERPL-SCNC: 1.9 MMOL/L (ref 0.4–2)
LACTATE SERPL-SCNC: 2.2 MMOL/L (ref 0.4–2)
LYMPHOCYTES # BLD AUTO: 1.01 X10*3/UL (ref 0.8–3)
LYMPHOCYTES NFR BLD AUTO: 5.7 %
MCH RBC QN AUTO: 31 PG (ref 26–34)
MCHC RBC AUTO-ENTMCNC: 32.5 G/DL (ref 32–36)
MCV RBC AUTO: 96 FL (ref 80–100)
MONOCYTES # BLD AUTO: 1.1 X10*3/UL (ref 0.05–0.8)
MONOCYTES NFR BLD AUTO: 6.2 %
NEUTROPHILS # BLD AUTO: 15.54 X10*3/UL (ref 1.6–5.5)
NEUTROPHILS NFR BLD AUTO: 87.5 %
NRBC BLD-RTO: 0 /100 WBCS (ref 0–0)
P AXIS: 52 DEGREES
P OFFSET: 180 MS
P ONSET: 102 MS
PLATELET # BLD AUTO: 201 X10*3/UL (ref 150–450)
POTASSIUM SERPL-SCNC: 4.5 MMOL/L (ref 3.5–5.3)
POTASSIUM SERPL-SCNC: 4.7 MMOL/L (ref 3.5–5.3)
PR INTERVAL: 220 MS
PROT SERPL-MCNC: 7 G/DL (ref 6.4–8.2)
PROTHROMBIN TIME: 13.8 SECONDS (ref 9.8–12.8)
Q ONSET: 212 MS
QRS COUNT: 11 BEATS
QRS DURATION: 136 MS
QT INTERVAL: 452 MS
QTC CALCULATION(BAZETT): 466 MS
QTC FREDERICIA: 461 MS
R AXIS: -60 DEGREES
RBC # BLD AUTO: 4.19 X10*6/UL (ref 4.5–5.9)
SODIUM SERPL-SCNC: 131 MMOL/L (ref 136–145)
SODIUM SERPL-SCNC: 133 MMOL/L (ref 136–145)
T AXIS: 11 DEGREES
T OFFSET: 438 MS
VENTRICULAR RATE: 64 BPM
WBC # BLD AUTO: 17.8 X10*3/UL (ref 4.4–11.3)

## 2024-06-05 PROCEDURE — 99285 EMERGENCY DEPT VISIT HI MDM: CPT

## 2024-06-05 PROCEDURE — 71045 X-RAY EXAM CHEST 1 VIEW: CPT | Performed by: RADIOLOGY

## 2024-06-05 PROCEDURE — 85730 THROMBOPLASTIN TIME PARTIAL: CPT | Performed by: EMERGENCY MEDICINE

## 2024-06-05 PROCEDURE — 2500000002 HC RX 250 W HCPCS SELF ADMINISTERED DRUGS (ALT 637 FOR MEDICARE OP, ALT 636 FOR OP/ED): Performed by: EMERGENCY MEDICINE

## 2024-06-05 PROCEDURE — 84075 ASSAY ALKALINE PHOSPHATASE: CPT | Performed by: EMERGENCY MEDICINE

## 2024-06-05 PROCEDURE — 82947 ASSAY GLUCOSE BLOOD QUANT: CPT

## 2024-06-05 PROCEDURE — 71045 X-RAY EXAM CHEST 1 VIEW: CPT

## 2024-06-05 PROCEDURE — 96365 THER/PROPH/DIAG IV INF INIT: CPT

## 2024-06-05 PROCEDURE — 87186 SC STD MICRODIL/AGAR DIL: CPT | Mod: 91,AHULAB | Performed by: EMERGENCY MEDICINE

## 2024-06-05 PROCEDURE — 82374 ASSAY BLOOD CARBON DIOXIDE: CPT | Performed by: INTERNAL MEDICINE

## 2024-06-05 PROCEDURE — 85610 PROTHROMBIN TIME: CPT | Performed by: EMERGENCY MEDICINE

## 2024-06-05 PROCEDURE — 73630 X-RAY EXAM OF FOOT: CPT | Mod: RT

## 2024-06-05 PROCEDURE — 86140 C-REACTIVE PROTEIN: CPT | Performed by: NURSE PRACTITIONER

## 2024-06-05 PROCEDURE — 85652 RBC SED RATE AUTOMATED: CPT | Performed by: NURSE PRACTITIONER

## 2024-06-05 PROCEDURE — 87070 CULTURE OTHR SPECIMN AEROBIC: CPT | Mod: AHULAB | Performed by: EMERGENCY MEDICINE

## 2024-06-05 PROCEDURE — 2500000004 HC RX 250 GENERAL PHARMACY W/ HCPCS (ALT 636 FOR OP/ED)

## 2024-06-05 PROCEDURE — 2500000001 HC RX 250 WO HCPCS SELF ADMINISTERED DRUGS (ALT 637 FOR MEDICARE OP)

## 2024-06-05 PROCEDURE — 87040 BLOOD CULTURE FOR BACTERIA: CPT | Mod: AHULAB | Performed by: EMERGENCY MEDICINE

## 2024-06-05 PROCEDURE — 2500000002 HC RX 250 W HCPCS SELF ADMINISTERED DRUGS (ALT 637 FOR MEDICARE OP, ALT 636 FOR OP/ED): Performed by: INTERNAL MEDICINE

## 2024-06-05 PROCEDURE — 73630 X-RAY EXAM OF FOOT: CPT | Mod: RIGHT SIDE | Performed by: RADIOLOGY

## 2024-06-05 PROCEDURE — 2500000004 HC RX 250 GENERAL PHARMACY W/ HCPCS (ALT 636 FOR OP/ED): Mod: JZ | Performed by: INTERNAL MEDICINE

## 2024-06-05 PROCEDURE — 93005 ELECTROCARDIOGRAM TRACING: CPT

## 2024-06-05 PROCEDURE — 2500000004 HC RX 250 GENERAL PHARMACY W/ HCPCS (ALT 636 FOR OP/ED): Performed by: EMERGENCY MEDICINE

## 2024-06-05 PROCEDURE — 36415 COLL VENOUS BLD VENIPUNCTURE: CPT | Performed by: EMERGENCY MEDICINE

## 2024-06-05 PROCEDURE — 2500000001 HC RX 250 WO HCPCS SELF ADMINISTERED DRUGS (ALT 637 FOR MEDICARE OP): Performed by: INTERNAL MEDICINE

## 2024-06-05 PROCEDURE — 2060000001 HC INTERMEDIATE ICU ROOM DAILY

## 2024-06-05 PROCEDURE — 83605 ASSAY OF LACTIC ACID: CPT | Performed by: EMERGENCY MEDICINE

## 2024-06-05 PROCEDURE — 87205 SMEAR GRAM STAIN: CPT | Mod: AHULAB | Performed by: EMERGENCY MEDICINE

## 2024-06-05 PROCEDURE — 85025 COMPLETE CBC W/AUTO DIFF WBC: CPT | Performed by: EMERGENCY MEDICINE

## 2024-06-05 RX ORDER — DEXTROSE 50 % IN WATER (D50W) INTRAVENOUS SYRINGE
12.5
Status: DISCONTINUED | OUTPATIENT
Start: 2024-06-05 | End: 2024-06-05

## 2024-06-05 RX ORDER — TRAMADOL HYDROCHLORIDE 50 MG/1
50 TABLET ORAL EVERY 6 HOURS PRN
Status: DISCONTINUED | OUTPATIENT
Start: 2024-06-05 | End: 2024-06-13 | Stop reason: HOSPADM

## 2024-06-05 RX ORDER — VANCOMYCIN HYDROCHLORIDE 1 G/200ML
1000 INJECTION, SOLUTION INTRAVENOUS ONCE
Status: COMPLETED | OUTPATIENT
Start: 2024-06-05 | End: 2024-06-05

## 2024-06-05 RX ORDER — INSULIN LISPRO 100 [IU]/ML
20 INJECTION, SOLUTION INTRAVENOUS; SUBCUTANEOUS ONCE
Status: COMPLETED | OUTPATIENT
Start: 2024-06-05 | End: 2024-06-05

## 2024-06-05 RX ORDER — CLOPIDOGREL BISULFATE 75 MG/1
75 TABLET ORAL DAILY
Status: DISCONTINUED | OUTPATIENT
Start: 2024-06-05 | End: 2024-06-13 | Stop reason: HOSPADM

## 2024-06-05 RX ORDER — INSULIN GLARGINE 100 [IU]/ML
25 INJECTION, SOLUTION SUBCUTANEOUS EVERY 24 HOURS
Status: DISCONTINUED | OUTPATIENT
Start: 2024-06-06 | End: 2024-06-12

## 2024-06-05 RX ORDER — CEFAZOLIN SODIUM 2 G/100ML
2 INJECTION, SOLUTION INTRAVENOUS EVERY 8 HOURS
Status: DISCONTINUED | OUTPATIENT
Start: 2024-06-05 | End: 2024-06-05

## 2024-06-05 RX ORDER — ONDANSETRON HYDROCHLORIDE 2 MG/ML
4 INJECTION, SOLUTION INTRAVENOUS EVERY 6 HOURS PRN
Status: DISCONTINUED | OUTPATIENT
Start: 2024-06-05 | End: 2024-06-13 | Stop reason: HOSPADM

## 2024-06-05 RX ORDER — ALUMINUM HYDROXIDE, MAGNESIUM HYDROXIDE, AND SIMETHICONE 1200; 120; 1200 MG/30ML; MG/30ML; MG/30ML
20 SUSPENSION ORAL 4 TIMES DAILY PRN
Status: DISCONTINUED | OUTPATIENT
Start: 2024-06-05 | End: 2024-06-13 | Stop reason: HOSPADM

## 2024-06-05 RX ORDER — VANCOMYCIN HYDROCHLORIDE 1 G/20ML
INJECTION, POWDER, LYOPHILIZED, FOR SOLUTION INTRAVENOUS DAILY PRN
Status: DISCONTINUED | OUTPATIENT
Start: 2024-06-05 | End: 2024-06-07

## 2024-06-05 RX ORDER — DOCUSATE SODIUM 100 MG/1
100 CAPSULE, LIQUID FILLED ORAL 2 TIMES DAILY
Status: DISCONTINUED | OUTPATIENT
Start: 2024-06-05 | End: 2024-06-13 | Stop reason: HOSPADM

## 2024-06-05 RX ORDER — LIDOCAINE 560 MG/1
2 PATCH PERCUTANEOUS; TOPICAL; TRANSDERMAL DAILY
Status: DISCONTINUED | OUTPATIENT
Start: 2024-06-05 | End: 2024-06-13 | Stop reason: HOSPADM

## 2024-06-05 RX ORDER — INSULIN LISPRO 100 [IU]/ML
20 INJECTION, SOLUTION INTRAVENOUS; SUBCUTANEOUS ONCE
Status: DISCONTINUED | OUTPATIENT
Start: 2024-06-05 | End: 2024-06-05

## 2024-06-05 RX ORDER — INSULIN LISPRO 100 [IU]/ML
0-5 INJECTION, SOLUTION INTRAVENOUS; SUBCUTANEOUS
Status: DISCONTINUED | OUTPATIENT
Start: 2024-06-05 | End: 2024-06-07

## 2024-06-05 RX ORDER — METOPROLOL TARTRATE 25 MG/1
12.5 TABLET, FILM COATED ORAL 2 TIMES DAILY
Status: DISCONTINUED | OUTPATIENT
Start: 2024-06-05 | End: 2024-06-13 | Stop reason: HOSPADM

## 2024-06-05 RX ORDER — PANTOPRAZOLE SODIUM 40 MG/1
40 TABLET, DELAYED RELEASE ORAL
Status: DISCONTINUED | OUTPATIENT
Start: 2024-06-06 | End: 2024-06-13 | Stop reason: HOSPADM

## 2024-06-05 RX ORDER — DEXTROSE 50 % IN WATER (D50W) INTRAVENOUS SYRINGE
12.5
Status: DISCONTINUED | OUTPATIENT
Start: 2024-06-05 | End: 2024-06-13 | Stop reason: HOSPADM

## 2024-06-05 RX ORDER — DEXTROSE 50 % IN WATER (D50W) INTRAVENOUS SYRINGE
25
Status: DISCONTINUED | OUTPATIENT
Start: 2024-06-05 | End: 2024-06-05

## 2024-06-05 RX ORDER — PANTOPRAZOLE SODIUM 40 MG/1
40 TABLET, DELAYED RELEASE ORAL
Status: DISCONTINUED | OUTPATIENT
Start: 2024-06-06 | End: 2024-06-05

## 2024-06-05 RX ORDER — INSULIN GLARGINE 100 [IU]/ML
10 INJECTION, SOLUTION SUBCUTANEOUS ONCE
Status: DISCONTINUED | OUTPATIENT
Start: 2024-06-05 | End: 2024-06-12 | Stop reason: WASHOUT

## 2024-06-05 RX ORDER — ACETAMINOPHEN 325 MG/1
650 TABLET ORAL EVERY 6 HOURS PRN
Status: DISCONTINUED | OUTPATIENT
Start: 2024-06-05 | End: 2024-06-13 | Stop reason: HOSPADM

## 2024-06-05 RX ORDER — TALC
3 POWDER (GRAM) TOPICAL NIGHTLY PRN
Status: DISCONTINUED | OUTPATIENT
Start: 2024-06-05 | End: 2024-06-13 | Stop reason: HOSPADM

## 2024-06-05 RX ORDER — TAMSULOSIN HYDROCHLORIDE 0.4 MG/1
0.4 CAPSULE ORAL DAILY
Status: DISCONTINUED | OUTPATIENT
Start: 2024-06-06 | End: 2024-06-13 | Stop reason: HOSPADM

## 2024-06-05 RX ORDER — ENOXAPARIN SODIUM 100 MG/ML
40 INJECTION SUBCUTANEOUS EVERY 24 HOURS
Status: DISCONTINUED | OUTPATIENT
Start: 2024-06-05 | End: 2024-06-13 | Stop reason: HOSPADM

## 2024-06-05 RX ORDER — DEXTROSE 50 % IN WATER (D50W) INTRAVENOUS SYRINGE
25
Status: DISCONTINUED | OUTPATIENT
Start: 2024-06-05 | End: 2024-06-13 | Stop reason: HOSPADM

## 2024-06-05 RX ORDER — DORZOLAMIDE HYDROCHLORIDE AND TIMOLOL MALEATE 20; 5 MG/ML; MG/ML
1 SOLUTION/ DROPS OPHTHALMIC 2 TIMES DAILY
Status: DISCONTINUED | OUTPATIENT
Start: 2024-06-05 | End: 2024-06-13 | Stop reason: HOSPADM

## 2024-06-05 RX ORDER — LATANOPROST 50 UG/ML
1 SOLUTION/ DROPS OPHTHALMIC NIGHTLY
Status: DISCONTINUED | OUTPATIENT
Start: 2024-06-05 | End: 2024-06-13 | Stop reason: HOSPADM

## 2024-06-05 RX ORDER — POLYETHYLENE GLYCOL 3350 17 G/17G
17 POWDER, FOR SOLUTION ORAL DAILY PRN
Status: DISCONTINUED | OUTPATIENT
Start: 2024-06-05 | End: 2024-06-13 | Stop reason: HOSPADM

## 2024-06-05 RX ADMIN — TRAMADOL HYDROCHLORIDE 50 MG: 50 TABLET, COATED ORAL at 17:09

## 2024-06-05 RX ADMIN — PIPERACILLIN SODIUM AND TAZOBACTAM SODIUM 2.25 G: 2; .25 INJECTION, SOLUTION INTRAVENOUS at 20:29

## 2024-06-05 RX ADMIN — ENOXAPARIN SODIUM 40 MG: 40 INJECTION SUBCUTANEOUS at 19:03

## 2024-06-05 RX ADMIN — ACETAMINOPHEN 650 MG: 325 TABLET ORAL at 19:49

## 2024-06-05 RX ADMIN — VANCOMYCIN HYDROCHLORIDE 1000 MG: 1 INJECTION, SOLUTION INTRAVENOUS at 12:58

## 2024-06-05 RX ADMIN — METOPROLOL TARTRATE 12.5 MG: 25 TABLET, FILM COATED ORAL at 21:53

## 2024-06-05 RX ADMIN — DOCUSATE SODIUM 100 MG: 100 CAPSULE, LIQUID FILLED ORAL at 21:53

## 2024-06-05 RX ADMIN — LATANOPROST 1 DROP: 50 SOLUTION/ DROPS OPHTHALMIC at 21:54

## 2024-06-05 RX ADMIN — DORZOLAMIDE HYDROCHLORIDE AND TIMOLOL MALEATE 1 DROP: 22.3; 6.8 SOLUTION/ DROPS OPHTHALMIC at 21:53

## 2024-06-05 RX ADMIN — CEFAZOLIN SODIUM 2 G: 2 INJECTION, SOLUTION INTRAVENOUS at 15:09

## 2024-06-05 RX ADMIN — INSULIN LISPRO 20 UNITS: 100 INJECTION, SOLUTION INTRAVENOUS; SUBCUTANEOUS at 13:38

## 2024-06-05 RX ADMIN — INSULIN LISPRO 3 UNITS: 100 INJECTION, SOLUTION INTRAVENOUS; SUBCUTANEOUS at 22:05

## 2024-06-05 RX ADMIN — PIPERACILLIN SODIUM AND TAZOBACTAM SODIUM 4.5 G: 4; .5 INJECTION, SOLUTION INTRAVENOUS at 11:43

## 2024-06-05 RX ADMIN — SODIUM CHLORIDE 500 ML: 9 INJECTION, SOLUTION INTRAVENOUS at 11:43

## 2024-06-05 SDOH — SOCIAL STABILITY: SOCIAL INSECURITY: HAVE YOU HAD ANY THOUGHTS OF HARMING ANYONE ELSE?: NO

## 2024-06-05 SDOH — SOCIAL STABILITY: SOCIAL INSECURITY: HAVE YOU HAD THOUGHTS OF HARMING ANYONE ELSE?: NO

## 2024-06-05 SDOH — SOCIAL STABILITY: SOCIAL INSECURITY: HAS ANYONE EVER THREATENED TO HURT YOUR FAMILY OR YOUR PETS?: NO

## 2024-06-05 SDOH — SOCIAL STABILITY: SOCIAL INSECURITY: DO YOU FEEL ANYONE HAS EXPLOITED OR TAKEN ADVANTAGE OF YOU FINANCIALLY OR OF YOUR PERSONAL PROPERTY?: NO

## 2024-06-05 SDOH — SOCIAL STABILITY: SOCIAL INSECURITY: DO YOU FEEL UNSAFE GOING BACK TO THE PLACE WHERE YOU ARE LIVING?: NO

## 2024-06-05 SDOH — SOCIAL STABILITY: SOCIAL INSECURITY: DOES ANYONE TRY TO KEEP YOU FROM HAVING/CONTACTING OTHER FRIENDS OR DOING THINGS OUTSIDE YOUR HOME?: NO

## 2024-06-05 SDOH — SOCIAL STABILITY: SOCIAL INSECURITY: ABUSE: ADULT

## 2024-06-05 SDOH — SOCIAL STABILITY: SOCIAL INSECURITY: WERE YOU ABLE TO COMPLETE ALL THE BEHAVIORAL HEALTH SCREENINGS?: YES

## 2024-06-05 SDOH — SOCIAL STABILITY: SOCIAL INSECURITY: ARE YOU OR HAVE YOU BEEN THREATENED OR ABUSED PHYSICALLY, EMOTIONALLY, OR SEXUALLY BY ANYONE?: NO

## 2024-06-05 SDOH — SOCIAL STABILITY: SOCIAL INSECURITY: ARE THERE ANY APPARENT SIGNS OF INJURIES/BEHAVIORS THAT COULD BE RELATED TO ABUSE/NEGLECT?: NO

## 2024-06-05 ASSESSMENT — ACTIVITIES OF DAILY LIVING (ADL)
WALKS IN HOME: NEEDS ASSISTANCE
JUDGMENT_ADEQUATE_SAFELY_COMPLETE_DAILY_ACTIVITIES: YES
ADEQUATE_TO_COMPLETE_ADL: YES
LACK_OF_TRANSPORTATION: NO
TOILETING: INDEPENDENT
BATHING: INDEPENDENT
HEARING - LEFT EAR: DIFFICULTY WITH NOISE
DRESSING YOURSELF: INDEPENDENT
FEEDING YOURSELF: INDEPENDENT
HEARING - RIGHT EAR: DIFFICULTY WITH NOISE
PATIENT'S MEMORY ADEQUATE TO SAFELY COMPLETE DAILY ACTIVITIES?: YES
GROOMING: INDEPENDENT

## 2024-06-05 ASSESSMENT — COGNITIVE AND FUNCTIONAL STATUS - GENERAL
STANDING UP FROM CHAIR USING ARMS: A LITTLE
MOVING TO AND FROM BED TO CHAIR: A LOT
PATIENT BASELINE BEDBOUND: NO
TOILETING: A LITTLE
WALKING IN HOSPITAL ROOM: A LITTLE
MOVING FROM LYING ON BACK TO SITTING ON SIDE OF FLAT BED WITH BEDRAILS: A LITTLE
MOBILITY SCORE: 16
DAILY ACTIVITIY SCORE: 23
TURNING FROM BACK TO SIDE WHILE IN FLAT BAD: A LITTLE
CLIMB 3 TO 5 STEPS WITH RAILING: A LOT

## 2024-06-05 ASSESSMENT — PAIN DESCRIPTION - LOCATION
LOCATION: LEG
LOCATION: FOOT
LOCATION: HEAD

## 2024-06-05 ASSESSMENT — PAIN SCALES - GENERAL
PAINLEVEL_OUTOF10: 2
PAINLEVEL_OUTOF10: 3
PAINLEVEL_OUTOF10: 7
PAINLEVEL_OUTOF10: 10 - WORST POSSIBLE PAIN
PAINLEVEL_OUTOF10: 5 - MODERATE PAIN

## 2024-06-05 ASSESSMENT — LIFESTYLE VARIABLES
AUDIT-C TOTAL SCORE: 0
AUDIT-C TOTAL SCORE: 0
HOW MANY STANDARD DRINKS CONTAINING ALCOHOL DO YOU HAVE ON A TYPICAL DAY: PATIENT DOES NOT DRINK
HOW OFTEN DO YOU HAVE 6 OR MORE DRINKS ON ONE OCCASION: NEVER
HOW OFTEN DO YOU HAVE A DRINK CONTAINING ALCOHOL: NEVER
SKIP TO QUESTIONS 9-10: 1

## 2024-06-05 ASSESSMENT — PAIN DESCRIPTION - ORIENTATION: ORIENTATION: POSTERIOR

## 2024-06-05 ASSESSMENT — PAIN - FUNCTIONAL ASSESSMENT
PAIN_FUNCTIONAL_ASSESSMENT: 0-10

## 2024-06-05 ASSESSMENT — COLUMBIA-SUICIDE SEVERITY RATING SCALE - C-SSRS
6. HAVE YOU EVER DONE ANYTHING, STARTED TO DO ANYTHING, OR PREPARED TO DO ANYTHING TO END YOUR LIFE?: NO
2. HAVE YOU ACTUALLY HAD ANY THOUGHTS OF KILLING YOURSELF?: NO
1. IN THE PAST MONTH, HAVE YOU WISHED YOU WERE DEAD OR WISHED YOU COULD GO TO SLEEP AND NOT WAKE UP?: NO

## 2024-06-05 ASSESSMENT — PATIENT HEALTH QUESTIONNAIRE - PHQ9
SUM OF ALL RESPONSES TO PHQ9 QUESTIONS 1 & 2: 0
2. FEELING DOWN, DEPRESSED OR HOPELESS: NOT AT ALL
1. LITTLE INTEREST OR PLEASURE IN DOING THINGS: NOT AT ALL

## 2024-06-05 NOTE — PROGRESS NOTES
06/05/24 1253   [facility] Disability Status   Are you deaf or do you have serious difficulty hearing? N   Are you blind or do you have serious difficulty seeing, even when wearing glasses? N   Because of a physical, mental, or emotional condition, do you have serious difficulty concentrating, remembering, or making decisions? (5 years old or older) N   Do you have serious difficulty walking or climbing stairs? Y  (walker/cane)   Do you have serious difficulty dressing or bathing? N   Because of a physical, mental, or emotional condition, do you have serious difficulty doing errands alone such as visiting the doctor? Y

## 2024-06-05 NOTE — ED TRIAGE NOTES
"C/O ABNORMAL LAB, PT DAUGHTER STATES \"HIS WHITE COUNT IS REALLY HIGH, HE WAS AT THE WOUND CLINIC YESTERDAY\" PT DAUGHTER STATES \"HE WAS ON THE TOILET TODAY AND KIND OF ROLLED OFF OF IT ONTO THE CARPETED FLOOR\" DENIES CP/SOB/PALPITATIONS/N/V/D/F/CHILLS   "

## 2024-06-05 NOTE — CONSULTS
Vancomycin Dosing by Pharmacy- INITIAL    Rene Ballard is a 89 y.o. year old male who Pharmacy has been consulted for vancomycin dosing for osteomyelitis/septic arthritis. Based on the patient's indication and renal status this patient will be dosed based on a goal trough/random level of 15-20.     Renal function is currently declining.    Visit Vitals  BP 98/61 (BP Location: Left arm, Patient Position: Lying)   Pulse 59   Temp 36.7 °C (98 °F)   Resp 16        Lab Results   Component Value Date    CREATININE 1.33 (H) 2024    CREATININE 1.32 (H) 2024    CREATININE 0.90 2024    CREATININE 0.92 2024        Patient weight is as follows:   Vitals:    24 1033   Weight: 65.8 kg (145 lb)       Cultures:  No results found for the encounter in last 14 days.        No intake/output data recorded.  I/O during current shift:  No intake/output data recorded.    Temp (24hrs), Av.7 °C (98 °F), Min:36.7 °C (98 °F), Max:36.7 °C (98 °F)         Assessment/Plan     Patient will not be given a loading dose.  Will initiate vancomycin maintenance, a one time dose of 1000 mg.  Follow-up level will be ordered on 2024 at 1000 unless clinically indicated sooner.  Will continue to monitor renal function daily while on vancomycin and order serum creatinine at least every 48 hours if not already ordered.  Follow for continued vancomycin needs, clinical response, and signs/symptoms of toxicity.       Michael Plummer, PharmD

## 2024-06-05 NOTE — PROGRESS NOTES
Pharmacy Medication History Review    Rene Ballard is a 89 y.o. male admitted for Osteomyelitis (Multi). Pharmacy reviewed the patient's fwobl-[id number] medications and allergies for accuracy.    The list below reflectives the updated PTA list. Please review each medication in order reconciliation for additional clarification and justification.  Prior to Admission Medications   Prescriptions Last Dose Informant     alpha tocopherol (Vitamin E) 268 mg (400 unit) capsule       Sig: Take 1 capsule (400 Units) by mouth once daily.   alum-mag hydroxide-simeth (Mylanta) 200-200-20 mg/5 mL oral suspension       Sig: Take 30 mL by mouth every 4 hours if needed.   ampicillin-sulbactam 3 gram injection       ascorbic acid (Vitamin C) 1,000 mg tablet       Sig: Take 1 tablet (1,000 mg) by mouth once daily.   blood sugar diagnostic (OneTouch Ultra Test) strip       Sig: Check glucose 4 x per day   cholecalciferol (Vitamin D-3) 125 MCG (5000 UT) capsule       Sig: Take 1 capsule (125 mcg) by mouth once daily.   clopidogrel (Plavix) 75 mg tablet 2024      Sig: TAKE ONE TABLET BY MOUTH EVERY DAY   cyanocobalamin (Vitamin B-12) 500 mcg tablet 2024      Si tablet DAILY (route: oral)   dapagliflozin propanediol (Farxiga) 10 mg 2024      Sig: Take 1 tablet (10 mg) by mouth once daily.   docusate sodium (Colace) 100 mg capsule Past Week      Sig: Take 1 capsule (100 mg) by mouth 2 times a day as needed for constipation.   dorzolamide-timoloL (Cosopt) 22.3-6.8 mg/mL ophthalmic solution 2024      Sig: Administer 1 drop into the left eye 2 times a day.   dulaglutide (Trulicity) 0.75 mg/0.5 mL pen injector       Sig: Inject 0.75 mg under the skin 1 (one) time per week.   enalapril (Vasotec) 2.5 mg tablet 2024      Sig: Take 1 tablet (2.5 mg) by mouth once daily.   insulin glargine (Lantus) 100 unit/mL injection 2024      Sig: Inject 25 Units under the skin once every 24 hours. Take as directed per insulin  "instructions.   latanoprost (Xalatan) 0.005 % ophthalmic solution 2024      Sig: instill 1 drop into each eye every evening   lidocaine 4 % patch Past Week      Sig: Place 2 patches over 12 hours on the skin once daily. Remove & discard patch within 12 hours or as directed by MD.   metoprolol tartrate (Lopressor) 25 mg tablet 2024      Sig: Take 0.5 tablets (12.5 mg) by mouth 2 times a day.   multivitamin tablet 2024      Sig: Take 1 tablet by mouth once daily.             niacin 500 mg tablet Past Week      Sig: Take 1 tablet (500 mg) by mouth once daily.   omeprazole (PriLOSEC) 20 mg DR capsule 2024      Sig: Take 1 capsule (20 mg) by mouth once daily.   pen needle, diabetic (UltiCare Pen Needle) 31 gauge x /4\" needle Past Week      Sig: USE 1 PEN NEEDLE SUBCUTANEOUSLY 3 TIMES DAILY BEFORE MEALS   polyethylene glycol (Glycolax, Miralax) 17 gram packet Past Week      Sig: Take 17 g by mouth once daily as needed (constipation).   tamsulosin (Flomax) 0.4 mg 24 hr capsule 2024      Si capsule (0.4 mg).      Facility-Administered Medications: None      Allergies  Reviewed by Cassidy Hoffmann RN on 2024        Severity Reactions Comments    Glimepiride Not Specified Unknown Unknown    Lisinopril Not Specified Unknown Side effects, hot tingling in hands and feets, hand pain.    Metformin Not Specified Unknown Myalgia    Pioglitazone Not Specified Unknown Myalgia    Statins-hmg-coa Reductase Inhibitors Not Specified Unknown             Below are additional concerns with the patient's PTA list.  PHARMACIES VERIFIED ALL MEDICATIONS AND DOSES.    Angie Aldrich    "

## 2024-06-05 NOTE — PROGRESS NOTES
Transitional Care Coordination Progress Note:  Plan per Medical/Surgical team: treatment of right foot osteomyelitis & hyperglycemia with insulin, IV ATB, IV fluids  Status: Inpatient   Payor source: medicare A/B, [employer]  Discharge disposition: Home alone  [facility] nurse Eliud- weekly wound changes  Left message to identify HHC agency   [facility]  Was [facility] last month  Potential Barriers: ?surgery, ?ATB plan (hx of PICC line with IV ATB @ SNF)  ADOD: 6/7/2024   WINTER Olivera RN, BSN Transitional Care Coordinator ED# [phone]      06/05/24 1254   Discharge Planning   Living Arrangements Alone   Support Systems Children   Assistance Needed right foot wound care, ATB plan per ID   Type of Residence Private residence   Number of Stairs to Enter Residence 1   Number of Stairs Within Residence 0   Do you have animals or pets at home? No   Home or Post Acute Services In home services   Type of Home Care Services Home nursing visits   Patient expects to be discharged to: Home alone  HHC nurse Eliud- weekly wound changes  [facility]  Left message to identify [facility] agency   Does the patient need discharge transport arranged? No   Financial Resource Strain   How hard is it for you to pay for the very basics like food, housing, medical care, and heating? Not hard   Housing Stability   In the last 12 months, was there a time when you were not able to pay the mortgage or rent on time? N   In the last 12 months, how many places have you lived? 1   In the last 12 months, was there a time when you did not have a steady place to sleep or slept in a shelter (including now)? N   Transportation Needs   In the past 12 months, has lack of transportation kept you from medical appointments or from getting medications? no   In the past 12 months, has lack of transportation kept you from meetings, work, or from getting things needed for daily living? No

## 2024-06-05 NOTE — ED PROVIDER NOTES
HPI   Chief Complaint   Patient presents with    Weakness, Gen       Chief complaint: Weakness, elevated white blood cell count    History of present illness: Patient is an 89-year-old male with history of diabetes, coronary artery disease, peripheral vascular disease, presenting to the emergency department with complaints of weakness.  According to the patient and family, the patient has been increasingly weak over the past several days.  The patient has been seen in the wound clinic recently for a chronic wound of his right lower extremity which is not healing.  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  The patient's weakness has been getting worse there is been no fever.  Today, the patient rolled off the toilet onto a carpeted floor and had difficulty standing.  The patient recently had blood work performed which demonstrated an elevated white blood cell count as a result, the patient presents to the emergency department for further evaluation.  They deny any brown fever.      History provided by:  Patient   used: No                        No data recorded                   Patient History   Past Medical History:   Diagnosis Date    Abnormal EKG 05/01/2023    BPH (benign prostatic hyperplasia)     Chronic systolic heart failure (Multi)     COVID-19 05/01/2023    Essential hypertension     GERD (gastroesophageal reflux disease)     Hypertensive heart disease with heart failure (Multi)     Osteoarthritis     Osteomyelitis (Multi)     Peripheral vascular disease, unspecified ([facility]) 11/28/2022    PVD (peripheral vascular disease)    Personal history of malignant neoplasm of bladder     History of carcinoma of bladder    Personal history of other diseases of the musculoskeletal system and connective tissue     History of arthritis    Personal history of other endocrine, nutritional and metabolic disease     History of diabetes mellitus    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    Controlled diabetes mellitus with circulatory complication     Past Surgical History:   Procedure Laterality Date    CARDIAC SURGERY      IR  BLADDER ASPIRATION  06/23/2017    IR  BLADDER ASPIRATION 6/23/2017 [facility] [place] [facility]    OTHER SURGICAL HISTORY  06/19/2017    Surgery Penile Prosthetic Device    WRIST SURGERY Right      Family History   Problem Relation Name Age of Onset    No Known Problems Mother      Heart disease Father      Tuberculosis Maternal Grandmother       Social History     Tobacco Use    Smoking status: Never    Smokeless tobacco: Never   Substance Use Topics    Alcohol use: Not Currently    Drug use: Never       Physical Exam   ED Triage Vitals [06/05/24 1033]   Temperature Heart Rate Respirations BP   36.7 °C (98 °F) 67 16 (!) 94/47      Pulse Ox Temp src Heart Rate Source Patient Position   96 % -- -- --      BP Location FiO2 (%)     -- --       Physical Exam  Vitals and nursing note reviewed.   Constitutional:       General: He is not in acute distress.     Appearance: He is well-developed.   HENT:      Head: Normocephalic and atraumatic.   Eyes:      Conjunctiva/sclera: Conjunctivae normal.   Cardiovascular:      Rate and Rhythm: Normal rate and regular rhythm.      Heart sounds: No murmur heard.  Pulmonary:      Effort: Pulmonary effort is normal. No respiratory distress.      Breath sounds: Normal breath sounds.   Abdominal:      Palpations: Abdomen is soft.      Tenderness: There is no abdominal tenderness.   Musculoskeletal:         General: No swelling.      Cervical back: Neck supple.   Feet:      Comments: The patient has an amputation of the fifth metatarsal on the right foot.  An open wound is appreciated on the end of the patient's lateral amputation site.  When pressure is applied, purulence is appreciated.  No crepitus is  appreciated  Skin:     General: Skin is warm and dry.      Capillary Refill: Capillary refill takes less than 2 seconds.   Neurological:      Mental Status: He is alert.   Psychiatric:         Mood and Affect: Mood normal.         ED Course & MDM   Diagnoses as of 06/07/24 1110   Osteomyelitis (Multi)       Medical Decision Making  Medical decision making: Patient remained stable throughout his time in the emergency department.  CBC demonstrated a white cell count of 17.8 without any other significant abnormalities the patient's Chem-7 demonstrated hyperglycemia with a glucose of 524 with no evidence of anion gap.  Patient's creatinine is 1.3 with a BUN of 30.  The patient's LFTs were all within normal limits.  INR is 1.2 PTT was 31.  X-ray of the patient's right lower extremity demonstrated progressive osteomyelitis in the fourth metatarsal bone while x-ray of the patient's chest demonstrated no significant acute cardiopulmonary abnormality.  The patient's EKG demonstrated a normal sinus rhythm with a rate of 64 bpm isoelectric ST segments narrow QRS complexes and a QTc of 466.    Patient presents to the emergency department with an infection of the right lower extremity.  On his presentation to the emergency department, when pressure was applied to the patient's wound brown purulence was appreciated.  The severity of the patient's infection was verified with an x-ray which demonstrated osteomyelitis.  The patient was given IV hydration in the emergency department and started on vancomycin and Zosyn.  Also of note, the patient was extremely hyperglycemic during his time in the emergency department and as result the patient was given 20 units of fast acting insulin.  Repeat glucose was 324 which is greatly improved.  I have concerns given the patient has a nidus of infection as well as an uptrending white cell count with leukocytosis of 17.  Cultures were obtained.  I spoke with the hospitalist regarding this  patient's case they express expressed understanding.  They agreed the patient could be admitted to their service for further evaluation and therapy.  This was explained to the patient and family expressed understanding the patient was then admitted to the hospital in otherwise stable condition.    Amount and/or Complexity of Data Reviewed  Labs: ordered. Decision-making details documented in ED Course.  Radiology: ordered. Decision-making details documented in ED Course.  ECG/medicine tests: ordered and independent interpretation performed. Decision-making details documented in ED Course.        Procedure  Procedures     Darnell Yip MD  06/07/24 1118

## 2024-06-05 NOTE — PROGRESS NOTES
Home alone  [facility] nurse Eliud- weekly wound changes  [facility]  Left message to identify [facility] agency      06/05/24 1254   Current Planned Discharge Disposition   Current Planned Discharge Disposition Home Health

## 2024-06-06 ENCOUNTER — APPOINTMENT (OUTPATIENT)
Dept: RADIOLOGY | Facility: HOSPITAL | Age: 89
End: 2024-06-06
Payer: MEDICARE

## 2024-06-06 ENCOUNTER — APPOINTMENT (OUTPATIENT)
Dept: VASCULAR MEDICINE | Facility: HOSPITAL | Age: 89
DRG: 564 | End: 2024-06-06
Payer: MEDICARE

## 2024-06-06 LAB
ANION GAP SERPL CALC-SCNC: 18 MMOL/L (ref 10–20)
BUN SERPL-MCNC: 33 MG/DL (ref 6–23)
CALCIUM SERPL-MCNC: 8.5 MG/DL (ref 8.6–10.3)
CHLORIDE SERPL-SCNC: 94 MMOL/L (ref 98–107)
CO2 SERPL-SCNC: 21 MMOL/L (ref 21–32)
CREAT SERPL-MCNC: 1.14 MG/DL (ref 0.5–1.3)
EGFRCR SERPLBLD CKD-EPI 2021: 61 ML/MIN/1.73M*2
ERYTHROCYTE [DISTWIDTH] IN BLOOD BY AUTOMATED COUNT: 12.9 % (ref 11.5–14.5)
GLUCOSE BLD MANUAL STRIP-MCNC: 210 MG/DL (ref 74–99)
GLUCOSE BLD MANUAL STRIP-MCNC: 219 MG/DL (ref 74–99)
GLUCOSE BLD MANUAL STRIP-MCNC: 287 MG/DL (ref 74–99)
GLUCOSE BLD MANUAL STRIP-MCNC: 309 MG/DL (ref 74–99)
GLUCOSE SERPL-MCNC: 291 MG/DL (ref 74–99)
HCT VFR BLD AUTO: 37.9 % (ref 41–52)
HGB BLD-MCNC: 12.5 G/DL (ref 13.5–17.5)
HOLD SPECIMEN: NORMAL
HOLD SPECIMEN: NORMAL
MCH RBC QN AUTO: 31.3 PG (ref 26–34)
MCHC RBC AUTO-ENTMCNC: 33 G/DL (ref 32–36)
MCV RBC AUTO: 95 FL (ref 80–100)
NRBC BLD-RTO: 0 /100 WBCS (ref 0–0)
PLATELET # BLD AUTO: 195 X10*3/UL (ref 150–450)
POTASSIUM SERPL-SCNC: 4.3 MMOL/L (ref 3.5–5.3)
RBC # BLD AUTO: 4 X10*6/UL (ref 4.5–5.9)
SODIUM SERPL-SCNC: 129 MMOL/L (ref 136–145)
VANCOMYCIN TROUGH SERPL-MCNC: 5.2 UG/ML (ref 5–20)
WBC # BLD AUTO: 19.5 X10*3/UL (ref 4.4–11.3)

## 2024-06-06 PROCEDURE — 80048 BASIC METABOLIC PNL TOTAL CA: CPT | Performed by: PHARMACIST

## 2024-06-06 PROCEDURE — 72050 X-RAY EXAM NECK SPINE 4/5VWS: CPT | Performed by: RADIOLOGY

## 2024-06-06 PROCEDURE — 80202 ASSAY OF VANCOMYCIN: CPT | Performed by: INTERNAL MEDICINE

## 2024-06-06 PROCEDURE — 93923 UPR/LXTR ART STDY 3+ LVLS: CPT | Performed by: SURGERY

## 2024-06-06 PROCEDURE — 73130 X-RAY EXAM OF HAND: CPT | Mod: LT

## 2024-06-06 PROCEDURE — 70450 CT HEAD/BRAIN W/O DYE: CPT | Performed by: RADIOLOGY

## 2024-06-06 PROCEDURE — 93923 UPR/LXTR ART STDY 3+ LVLS: CPT

## 2024-06-06 PROCEDURE — 82947 ASSAY GLUCOSE BLOOD QUANT: CPT | Mod: 91

## 2024-06-06 PROCEDURE — 2500000001 HC RX 250 WO HCPCS SELF ADMINISTERED DRUGS (ALT 637 FOR MEDICARE OP)

## 2024-06-06 PROCEDURE — 99223 1ST HOSP IP/OBS HIGH 75: CPT | Performed by: INTERNAL MEDICINE

## 2024-06-06 PROCEDURE — 83036 HEMOGLOBIN GLYCOSYLATED A1C: CPT | Mod: AHULAB | Performed by: INTERNAL MEDICINE

## 2024-06-06 PROCEDURE — 2060000001 HC INTERMEDIATE ICU ROOM DAILY

## 2024-06-06 PROCEDURE — 2500000002 HC RX 250 W HCPCS SELF ADMINISTERED DRUGS (ALT 637 FOR MEDICARE OP, ALT 636 FOR OP/ED)

## 2024-06-06 PROCEDURE — 2500000002 HC RX 250 W HCPCS SELF ADMINISTERED DRUGS (ALT 637 FOR MEDICARE OP, ALT 636 FOR OP/ED): Performed by: INTERNAL MEDICINE

## 2024-06-06 PROCEDURE — 2500000001 HC RX 250 WO HCPCS SELF ADMINISTERED DRUGS (ALT 637 FOR MEDICARE OP): Performed by: INTERNAL MEDICINE

## 2024-06-06 PROCEDURE — 2500000004 HC RX 250 GENERAL PHARMACY W/ HCPCS (ALT 636 FOR OP/ED): Performed by: PHARMACIST

## 2024-06-06 PROCEDURE — 70450 CT HEAD/BRAIN W/O DYE: CPT

## 2024-06-06 PROCEDURE — 36415 COLL VENOUS BLD VENIPUNCTURE: CPT

## 2024-06-06 PROCEDURE — 2500000004 HC RX 250 GENERAL PHARMACY W/ HCPCS (ALT 636 FOR OP/ED): Performed by: NURSE PRACTITIONER

## 2024-06-06 PROCEDURE — 73130 X-RAY EXAM OF HAND: CPT | Mod: LEFT SIDE | Performed by: RADIOLOGY

## 2024-06-06 PROCEDURE — 36415 COLL VENOUS BLD VENIPUNCTURE: CPT | Performed by: PHARMACIST

## 2024-06-06 PROCEDURE — 72050 X-RAY EXAM NECK SPINE 4/5VWS: CPT

## 2024-06-06 PROCEDURE — 2500000004 HC RX 250 GENERAL PHARMACY W/ HCPCS (ALT 636 FOR OP/ED)

## 2024-06-06 PROCEDURE — 85027 COMPLETE CBC AUTOMATED: CPT

## 2024-06-06 PROCEDURE — 2500000005 HC RX 250 GENERAL PHARMACY W/O HCPCS

## 2024-06-06 RX ORDER — VANCOMYCIN HYDROCHLORIDE 1 G/200ML
1000 INJECTION, SOLUTION INTRAVENOUS EVERY 24 HOURS
Status: DISCONTINUED | OUTPATIENT
Start: 2024-06-06 | End: 2024-06-07

## 2024-06-06 RX ORDER — SODIUM CHLORIDE 9 MG/ML
75 INJECTION, SOLUTION INTRAVENOUS CONTINUOUS
Status: DISCONTINUED | OUTPATIENT
Start: 2024-06-06 | End: 2024-06-06

## 2024-06-06 RX ORDER — CYCLOBENZAPRINE HCL 5 MG
5 TABLET ORAL 3 TIMES DAILY
Status: DISCONTINUED | OUTPATIENT
Start: 2024-06-06 | End: 2024-06-13 | Stop reason: HOSPADM

## 2024-06-06 RX ADMIN — TAMSULOSIN HYDROCHLORIDE 0.4 MG: 0.4 CAPSULE ORAL at 11:58

## 2024-06-06 RX ADMIN — CYCLOBENZAPRINE HYDROCHLORIDE 5 MG: 5 TABLET, FILM COATED ORAL at 18:46

## 2024-06-06 RX ADMIN — DORZOLAMIDE HYDROCHLORIDE AND TIMOLOL MALEATE 1 DROP: 22.3; 6.8 SOLUTION/ DROPS OPHTHALMIC at 23:10

## 2024-06-06 RX ADMIN — INSULIN GLARGINE 25 UNITS: 100 INJECTION, SOLUTION SUBCUTANEOUS at 21:03

## 2024-06-06 RX ADMIN — TRAMADOL HYDROCHLORIDE 50 MG: 50 TABLET, COATED ORAL at 21:08

## 2024-06-06 RX ADMIN — DOCUSATE SODIUM 100 MG: 100 CAPSULE, LIQUID FILLED ORAL at 11:57

## 2024-06-06 RX ADMIN — INSULIN LISPRO 3 UNITS: 100 INJECTION, SOLUTION INTRAVENOUS; SUBCUTANEOUS at 17:15

## 2024-06-06 RX ADMIN — METOPROLOL TARTRATE 12.5 MG: 25 TABLET, FILM COATED ORAL at 21:05

## 2024-06-06 RX ADMIN — PIPERACILLIN SODIUM AND TAZOBACTAM SODIUM 2.25 G: 2; .25 INJECTION, SOLUTION INTRAVENOUS at 15:10

## 2024-06-06 RX ADMIN — ENOXAPARIN SODIUM 40 MG: 40 INJECTION SUBCUTANEOUS at 20:21

## 2024-06-06 RX ADMIN — TRAMADOL HYDROCHLORIDE 50 MG: 50 TABLET, COATED ORAL at 10:06

## 2024-06-06 RX ADMIN — METOPROLOL TARTRATE 12.5 MG: 25 TABLET, FILM COATED ORAL at 11:56

## 2024-06-06 RX ADMIN — VANCOMYCIN HYDROCHLORIDE 1000 MG: 1 INJECTION, SOLUTION INTRAVENOUS at 18:46

## 2024-06-06 RX ADMIN — PIPERACILLIN SODIUM AND TAZOBACTAM SODIUM 2.25 G: 2; .25 INJECTION, SOLUTION INTRAVENOUS at 21:14

## 2024-06-06 RX ADMIN — TRAMADOL HYDROCHLORIDE 50 MG: 50 TABLET, COATED ORAL at 00:14

## 2024-06-06 RX ADMIN — SODIUM CHLORIDE 75 ML/HR: 9 INJECTION, SOLUTION INTRAVENOUS at 06:31

## 2024-06-06 RX ADMIN — DOCUSATE SODIUM 100 MG: 100 CAPSULE, LIQUID FILLED ORAL at 21:02

## 2024-06-06 RX ADMIN — PIPERACILLIN SODIUM AND TAZOBACTAM SODIUM 2.25 G: 2; .25 INJECTION, SOLUTION INTRAVENOUS at 06:35

## 2024-06-06 RX ADMIN — PIPERACILLIN SODIUM AND TAZOBACTAM SODIUM 2.25 G: 2; .25 INJECTION, SOLUTION INTRAVENOUS at 01:58

## 2024-06-06 RX ADMIN — LIDOCAINE 4% 2 PATCH: 40 PATCH TOPICAL at 09:57

## 2024-06-06 RX ADMIN — CLOPIDOGREL 75 MG: 75 TABLET ORAL at 11:56

## 2024-06-06 RX ADMIN — INSULIN LISPRO 2 UNITS: 100 INJECTION, SOLUTION INTRAVENOUS; SUBCUTANEOUS at 11:00

## 2024-06-06 RX ADMIN — INSULIN LISPRO 4 UNITS: 100 INJECTION, SOLUTION INTRAVENOUS; SUBCUTANEOUS at 20:20

## 2024-06-06 RX ADMIN — LATANOPROST 1 DROP: 50 SOLUTION/ DROPS OPHTHALMIC at 21:18

## 2024-06-06 ASSESSMENT — PAIN SCALES - GENERAL
PAINLEVEL_OUTOF10: 10 - WORST POSSIBLE PAIN
PAINLEVEL_OUTOF10: 8
PAINLEVEL_OUTOF10: 0 - NO PAIN
PAINLEVEL_OUTOF10: 7

## 2024-06-06 ASSESSMENT — PAIN SCALES - WONG BAKER: WONGBAKER_NUMERICALRESPONSE: HURTS WHOLE LOT

## 2024-06-06 ASSESSMENT — PAIN - FUNCTIONAL ASSESSMENT
PAIN_FUNCTIONAL_ASSESSMENT: FLACC (FACE, LEGS, ACTIVITY, CRY, CONSOLABILITY)
PAIN_FUNCTIONAL_ASSESSMENT: 0-10

## 2024-06-06 ASSESSMENT — PAIN DESCRIPTION - LOCATION: LOCATION: NECK

## 2024-06-06 NOTE — CONSULTS
"INFECTIOUS DISEASE INPATIENT INITIAL CONSULTATION    Referred By: Franco Arce    Reason For Consult: right 4th toe osteomyelitis     HPI:  This is a 89 y.o. male with PMH of CAD, HFrEF, DM II, PAD s/p right TMA who presented with fatigue, fevers/chills, right foot drainage.    Was admitted recently with right TMA site infection due to MSSA and mixed bacteria with OM of the remaining 4th metatarsal. Completed 6 weeks of IV Unasyn. Had been doing OK but in the last 1.5 weeks more fatigued, some fever/chills. Follows with podiatry and noted to have some bloody drainage in the right TMA site. Came here and has bacteremia with Staph aureus. On IV Vanc/Zosyn.      Allergies:  Glimepiride, Lisinopril, Metformin, Pioglitazone, and Statins-hmg-coa reductase inhibitors     Vitals (Last 24 Hours):  Heart Rate:  [59-74]   Temp:  [36.8 °C (98.2 °F)-37.9 °C (100.3 °F)]   Resp:  [17-21]   BP: ()/()   Height:  [177.8 cm (5' 10\")]   Weight:  [64.5 kg (142 lb 3.2 oz)]   SpO2:  [92 %-99 %]      PHYSICAL EXAM:  Gen - NAD  Heart - RRR, no murmurs  Lungs - clear bilaterally, no wheezing  Abd - soft, no ttp, BS present  Right Foot - not much swelling in the foot but is warm and there is bloody/purulent drainage from wound in the base of the TMA site  Skin - no rash    MEDS:    Current Facility-Administered Medications:     acetaminophen (Tylenol) tablet 650 mg, 650 mg, oral, q6h PRN, Franco Arce MD, 650 mg at 06/05/24 1949    alum-mag hydroxide-simeth (Mylanta) 200-200-20 mg/5 mL oral suspension 20 mL, 20 mL, oral, 4x daily PRN, Franco Arce MD    clopidogrel (Plavix) tablet 75 mg, 75 mg, oral, Daily, Mauri Ruffin PA-C, 75 mg at 06/06/24 1156    dextrose 50 % injection 12.5 g, 12.5 g, intravenous, q15 min PRN, Mauri Ruffin PA-C    dextrose 50 % injection 25 g, 25 g, intravenous, q15 min PRN, Mauri Ruffin PA-C    docusate sodium (Colace) capsule 100 mg, 100 mg, oral, BID, Mauri Ruffin PA-C, 100 mg at " 06/06/24 1157    dorzolamide-timoloL (Cosopt) 22.3-6.8 mg/mL ophthalmic solution 1 drop, 1 drop, Left Eye, BID, Mauri Ruffin PA-C, 1 drop at 06/05/24 2153    enoxaparin (Lovenox) syringe 40 mg, 40 mg, subcutaneous, q24h, Mauri Ruffin PA-C, 40 mg at 06/05/24 1903    glucagon (Glucagen) injection 1 mg, 1 mg, intramuscular, q15 min PRN, Mauri Ruffin PA-C    glucagon (Glucagen) injection 1 mg, 1 mg, intramuscular, q15 min PRN, Mauri Ruffin PA-C    insulin glargine (Lantus) injection 10 Units, 10 Units, subcutaneous, Once, Mauri Ruffin PA-C    insulin glargine (Lantus) injection 25 Units, 25 Units, subcutaneous, q24h, Mauri Ruffin PA-C    insulin lispro (HumaLOG) injection 0-5 Units, 0-5 Units, subcutaneous, Before meals & nightly, Franco Arce MD, 3 Units at 06/05/24 2205    latanoprost (Xalatan) 0.005 % ophthalmic solution 1 drop, 1 drop, Both Eyes, Nightly, Mauri Ruffin PA-C, 1 drop at 06/05/24 2154    lidocaine 4 % patch 2 patch, 2 patch, transdermal, Daily, Mauri Ruffin PA-C, 2 patch at 06/06/24 0957    melatonin tablet 3 mg, 3 mg, oral, Nightly PRN, Franco Arce MD    metoprolol tartrate (Lopressor) tablet 12.5 mg, 12.5 mg, oral, BID, Mauri Ruffin PA-C, 12.5 mg at 06/06/24 1156    ondansetron (Zofran) injection 4 mg, 4 mg, intravenous, q6h PRN, Franco Arce MD    pantoprazole (ProtoNix) EC tablet 40 mg, 40 mg, oral, Daily before breakfast, Mauri Ruffin PA-C    piperacillin-tazobactam-dextrose (Zosyn) IV 2.25 g, 2.25 g, intravenous, q6h, Mauri Ruffin PA-C, Stopped at 06/06/24 0705    polyethylene glycol (Glycolax, Miralax) packet 17 g, 17 g, oral, Daily PRN, Mauri Ruffin PA-C    sodium chloride 0.9% infusion, 75 mL/hr, intravenous, Continuous, Ashlyn May, APRN-CNP, Last Rate: 75 mL/hr at 06/06/24 0631, 75 mL/hr at 06/06/24 0631    tamsulosin (Flomax) 24 hr capsule 0.4 mg, 0.4 mg, oral, Daily, Mauri Ruffin PA-C, 0.4 mg at 06/06/24 1158    traMADol  (Ultram) tablet 50 mg, 50 mg, oral, q6h PRN, Franco Arce MD, 50 mg at 06/06/24 1006    vancomycin (Vancocin) pharmacy to dose - pharmacy monitoring, , miscellaneous, Daily PRN, Franco Arce MD     LABS:  Lab Results   Component Value Date    WBC 19.5 (H) 06/06/2024    HGB 12.5 (L) 06/06/2024    HCT 37.9 (L) 06/06/2024    MCV 95 06/06/2024     06/06/2024      Results from last 72 hours   Lab Units 06/05/24  1305   SODIUM mmol/L 133*   POTASSIUM mmol/L 4.5   CHLORIDE mmol/L 97*   CO2 mmol/L 21   BUN mg/dL 32*   CREATININE mg/dL 1.33*   GLUCOSE mg/dL 463*   CALCIUM mg/dL 8.9   ANION GAP mmol/L 20   EGFR mL/min/1.73m*2 51*     Results from last 72 hours   Lab Units 06/05/24  1136   ALK PHOS U/L 204*   BILIRUBIN TOTAL mg/dL 0.8   PROTEIN TOTAL g/dL 7.0   ALT U/L 19   AST U/L 15   ALBUMIN g/dL 3.2*     Estimated Creatinine Clearance: 34.4 mL/min (A) (by C-G formula based on SCr of 1.33 mg/dL (H)).    IMAGING:  X-Ray Right Foot 6/5  IMPRESSION:  1. Findings consistent with progressive osteomyelitis of the 4th  metatarsal bone with brown osseous erosion of the lateral and plantar  aspect of the shaft with overlying soft tissue swelling and  ulceration consistent with persistent overlying soft tissue  infection. If it would alter clinical management, an MRI may be  obtained.    2. Redemonstrated transmetatarsal amputation of the forefoot and 5th  metatarsal resection.    ASSESSMENT/PLAN:    Right TMA Site Infection - recent MSSA/mixed bacteria here  Right 4th Metatarsal OM  Staph Aureus Bacteremia  Acute Renal Failure - CrCl is 34 currently, affects abx dosing  PAD  Sepsis - fever, leukocytosis, acute renal failure    He is at high risk for need for BKA. This may be best a this point given IV abx therapy has not been able to heal his infection and he back with sepsis and bacteremia.    IV Vanc/Zosyn.    Monitoring for adverse effects of abx such as rash/itching/diarrhea. Monitoring Vancomycin levels and renal  function. Higher risk for nephrotoxicity given acute renal failure.    Will follow. Thanks!    Berny Valenzuela MD  [facility]  Office #[phone]

## 2024-06-06 NOTE — CONSULTS
PODIATRY CONSULT NOTE    SERVICE DATE: 6/6/2024   SERVICE TIME:  4:19 PM      REASON FOR CONSULT: right 4th toe osteomyelitis   REQUESTING PHYSICIAN: Darnell Yip MD   PRIMARY CARE PHYSICIAN: Olesya Daniel MD    Subjective   HPI:  Mr. Ballard is a 89 y.o. male with PMH of CAD, HFrEF, DM II, PAD s/p right TMA. Patient presents for possible infection to his right TMA site with family in the room.  Patient reports recently being  admitted for his right TMA site infection due to MSSA and mixed bacteria with OM of the remaining 4th metatarsal. Patient endorses fatigue, fevers, and chills. Patient also reports malodor to the area and an increase in drainage as well as pain the past few weeks  Patient endorses completing 6 weeks of IV Unasyn. Discussed with patient possibility of more proximal amputation. Patient expressed feeling to have a surgical procedure that would not have recurrent issues in the future. Discussed possibility of BKA with the patient. Recommended getting an MRI of the right foot and discussing further options in the future.     Podiatry was consulted for right 4th toe osteomyelitis .    ROS: 10-point review of systems was performed and is otherwise negative except as noted in HPI.  PMH: Reviewed/documented below.  PSH:  Noncontributory except per HPI   FH: Reviewed and noncontributory   SOCIAL:  Reviewed/documented below.  ALLERGIES: Reviewed/documented below.  MEDS: Reviewed/documented below.  VS: Reviewed/documented below.    Past Medical History:   Diagnosis Date    Abnormal EKG 05/01/2023    BPH (benign prostatic hyperplasia)     Chronic systolic heart failure (Multi)     COVID-19 05/01/2023    Essential hypertension     GERD (gastroesophageal reflux disease)     Hypertensive heart disease with heart failure (Multi)     Osteoarthritis     Osteomyelitis (Multi)     Peripheral vascular disease, unspecified (CMS-[facility]) 11/28/2022    PVD (peripheral vascular disease)    Personal history of  malignant neoplasm of bladder     History of carcinoma of bladder    Personal history of other diseases of the musculoskeletal system and connective tissue     History of arthritis    Personal history of other endocrine, nutritional and metabolic disease     History of diabetes mellitus    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    Controlled diabetes mellitus with circulatory complication     Past Surgical History:   Procedure Laterality Date    CARDIAC SURGERY      IR  BLADDER ASPIRATION  06/23/2017    IR  BLADDER ASPIRATION 6/23/2017 [facility] [place] INPATIENT LEGACY    OTHER SURGICAL HISTORY  06/19/2017    Surgery Penile Prosthetic Device    WRIST SURGERY Right      Family History   Problem Relation Name Age of Onset    No Known Problems Mother      Heart disease Father      Tuberculosis Maternal Grandmother       Social History     Tobacco Use    Smoking status: Never    Smokeless tobacco: Never   Substance Use Topics    Alcohol use: Not Currently    Drug use: Never      Medications Prior to Admission   Medication Sig Dispense Refill Last Dose    clopidogrel (Plavix) 75 mg tablet TAKE ONE TABLET BY MOUTH EVERY DAY 90 tablet 0 6/4/2024    cyanocobalamin (Vitamin B-12) 500 mcg tablet 1 tablet DAILY (route: oral)   6/4/2024    dapagliflozin propanediol (Farxiga) 10 mg Take 1 tablet (10 mg) by mouth once daily. 100 tablet 1 6/4/2024    docusate sodium (Colace) 100 mg capsule Take 1 capsule (100 mg) by mouth 2 times a day as needed for constipation.   Past Week    dorzolamide-timoloL (Cosopt) 22.3-6.8 mg/mL ophthalmic solution Administer 1 drop into the left eye 2 times a day.   6/4/2024    enalapril (Vasotec) 2.5 mg tablet Take 1 tablet (2.5 mg) by mouth once daily. 90 tablet 1 6/4/2024    insulin glargine (Lantus) 100 unit/mL injection Inject 25 Units under the skin once every 24 hours. Take as directed per  "insulin instructions. 5 mL 12 6/4/2024    latanoprost (Xalatan) 0.005 % ophthalmic solution instill 1 drop into each eye every evening   6/4/2024    lidocaine 4 % patch Place 2 patches over 12 hours on the skin once daily. Remove & discard patch within 12 hours or as directed by MD.   Past Week    metoprolol tartrate (Lopressor) 25 mg tablet Take 0.5 tablets (12.5 mg) by mouth 2 times a day. 90 tablet 3 6/4/2024    multivitamin tablet Take 1 tablet by mouth once daily.   6/4/2024    niacin 500 mg tablet Take 1 tablet (500 mg) by mouth once daily.   Past Week    omeprazole (PriLOSEC) 20 mg DR capsule Take 1 capsule (20 mg) by mouth once daily. 90 capsule 1 6/4/2024    pen needle, diabetic (UltiCare Pen Needle) 31 gauge x 1/4\" needle USE 1 PEN NEEDLE SUBCUTANEOUSLY 3 TIMES DAILY BEFORE MEALS 100 each 1 Past Week    polyethylene glycol (Glycolax, Miralax) 17 gram packet Take 17 g by mouth once daily as needed (constipation).   Past Week    tamsulosin (Flomax) 0.4 mg 24 hr capsule 1 capsule (0.4 mg).   6/4/2024    alpha tocopherol (Vitamin E) 268 mg (400 unit) capsule Take 1 capsule (400 Units) by mouth once daily.       alum-mag hydroxide-simeth (Mylanta) 200-200-20 mg/5 mL oral suspension Take 30 mL by mouth every 4 hours if needed.       ampicillin-sulbactam 3 gram injection        ascorbic acid (Vitamin C) 1,000 mg tablet Take 1 tablet (1,000 mg) by mouth once daily.       blood sugar diagnostic (OneTouch Ultra Test) strip Check glucose 4 x per day 400 each 3     cholecalciferol (Vitamin D-3) 125 MCG (5000 UT) capsule Take 1 capsule (125 mcg) by mouth once daily.       [START ON 6/9/2024] dulaglutide (Trulicity) 0.75 mg/0.5 mL pen injector Inject 0.75 mg under the skin 1 (one) time per week. 2 mL 1         Medications:  Scheduled Meds: clopidogrel, 75 mg, oral, Daily  docusate sodium, 100 mg, oral, BID  dorzolamide-timoloL, 1 drop, Left Eye, BID  enoxaparin, 40 mg, subcutaneous, q24h  insulin glargine, 10 Units, " subcutaneous, Once  insulin glargine, 25 Units, subcutaneous, q24h  insulin lispro, 0-5 Units, subcutaneous, Before meals & nightly  latanoprost, 1 drop, Both Eyes, Nightly  lidocaine, 2 patch, transdermal, Daily  metoprolol tartrate, 12.5 mg, oral, BID  pantoprazole, 40 mg, oral, Daily before breakfast  piperacillin-tazobactam, 2.25 g, intravenous, q6h  tamsulosin, 0.4 mg, oral, Daily      Continuous Infusions: sodium chloride 0.9%, 75 mL/hr, Last Rate: 75 mL/hr (06/06/24 0631)      PRN Meds: PRN medications: acetaminophen, alum-mag hydroxide-simeth, dextrose, dextrose, glucagon, glucagon, melatonin, ondansetron, polyethylene glycol, traMADol, vancomycin    Allergies as of 06/05/2024 - Reviewed 06/05/2024   Allergen Reaction Noted    Glimepiride Unknown 08/11/2006    Lisinopril Unknown 02/22/2011    Metformin Unknown 08/11/2006    Pioglitazone Unknown 08/11/2006    Statins-hmg-coa reductase inhibitors Unknown 05/01/2023            Objective   PHYSICAL EXAM:  Physical Exam Performed:  Vitals:    06/06/24 1233   BP: 151/72   Pulse: 68   Resp: 18   Temp: 36.6 °C (97.8 °F)   SpO2: 96%     Body mass index is 20.4 kg/m².    Patient is AOx3 and in no acute distress. Patient is alert and cooperative. Sitting comfortably in bed with dressing clean, dry and intact. Heel off-loading boots in place B/L.    Vascular: Faintly palpable DP/PT pulses B/L. Moderate non-pitting edema noted B/L. Hair growth absent B/L. CFT<5 to B/L hallux. Temperature is warm to cool from tibial tuberosity to distal digits B/L. No lymphatic streaking noted B/L.    Musculoskeletal: Gross active and passive ROM intact to age and activity level. Moves all extremities spontaneously. pain to palpation to right foot [place]-wound area  Neurological: Intact light touch sensation B/L. Pain stimuli diminished B/L. Denies any numbness, burning or tingling.    Dermatologic: Nails 1-5 are within normal limits for thickness and length B/L. Skin appears diffusely  xerotic B/L. Web spaces 1-4 B/L are clean, dry and intact. No rashes or nodules noted B/L. No hyperkeratotic tissue noted B/L.     Wound:  Measurements: 3.8cm x 4.1cm x 0.4cm  Mixed wound base of Fibrogranular tissue with bone in the wound base tissue.   Moderate sPurulent drainage with fibrotic slough.   Moderate salinas-wound maceration.   Moderate [place]-wound erythema.   Minimal evidence of ascending cellulitis   Mild palpable fluctuance. Moderate malodor. Mild increased warmth.   Significant probe to bone or deep structures within the wound base.   Moderate tunneling or tracking.   Minimal undermining. Skin edges irregular but intact.    LABS:   Results for orders placed or performed during the hospital encounter of 06/05/24 (from the past 24 hour(s))   POCT GLUCOSE   Result Value Ref Range    POCT Glucose 299 (H) 74 - 99 mg/dL   SST TOP   Result Value Ref Range    Extra Tube Hold for add-ons.    CBC   Result Value Ref Range    WBC 19.5 (H) 4.4 - 11.3 x10*3/uL    nRBC 0.0 0.0 - 0.0 /100 WBCs    RBC 4.00 (L) 4.50 - 5.90 x10*6/uL    Hemoglobin 12.5 (L) 13.5 - 17.5 g/dL    Hematocrit 37.9 (L) 41.0 - 52.0 %    MCV 95 80 - 100 fL    MCH 31.3 26.0 - 34.0 pg    MCHC 33.0 32.0 - 36.0 g/dL    RDW 12.9 11.5 - 14.5 %    Platelets 195 150 - 450 x10*3/uL   POCT GLUCOSE   Result Value Ref Range    POCT Glucose 219 (H) 74 - 99 mg/dL   POCT GLUCOSE   Result Value Ref Range    POCT Glucose 210 (H) 74 - 99 mg/dL   Vancomycin, Trough   Result Value Ref Range    Vancomycin, Trough 5.2 5.0 - 20.0 ug/mL   Basic Metabolic Panel   Result Value Ref Range    Glucose 291 (H) 74 - 99 mg/dL    Sodium 129 (L) 136 - 145 mmol/L    Potassium 4.3 3.5 - 5.3 mmol/L    Chloride 94 (L) 98 - 107 mmol/L    Bicarbonate 21 21 - 32 mmol/L    Anion Gap 18 10 - 20 mmol/L    Urea Nitrogen 33 (H) 6 - 23 mg/dL    Creatinine 1.14 0.50 - 1.30 mg/dL    eGFR 61 >60 mL/min/1.73m*2    Calcium 8.5 (L) 8.6 - 10.3 mg/dL   POCT GLUCOSE   Result Value Ref Range    POCT  Glucose 287 (H) 74 - 99 mg/dL      Lab Results   Component Value Date    HGBA1C 7.3 (H) 03/19/2024      Lab Results   Component Value Date    CRP 28.95 (H) 06/05/2024      Lab Results   Component Value Date    SEDRATE 93 (H) 06/05/2024        Results from last 7 days   Lab Units 06/06/24  0442   WBC AUTO x10*3/uL 19.5*   RBC AUTO x10*6/uL 4.00*   HEMOGLOBIN g/dL 12.5*   HEMATOCRIT % 37.9*     Results from last 7 days   Lab Units 06/06/24  1503 06/05/24  1305 06/05/24  1136   SODIUM mmol/L 129*   < > 131*   POTASSIUM mmol/L 4.3   < > 4.7   CHLORIDE mmol/L 94*   < > 95*   CO2 mmol/L 21   < > 23   BUN mg/dL 33*   < > 30*   CREATININE mg/dL 1.14   < > 1.32*   CALCIUM mg/dL 8.5*   < > 9.1   BILIRUBIN TOTAL mg/dL  --   --  0.8   ALT U/L  --   --  19   AST U/L  --   --  15    < > = values in this interval not displayed.           IMAGING REVIEW:  Vascular US PVR Without Exercise    Result Date: 6/6/2024             [place] [id number]   Tel [phone] and Fax [phone]  Vascular Lab Report [facility] US PVR WITHOUT EXERCISE  Patient Name:      MAYELIN Patel Physician:  [id number] Sylvester Nguyen DO Study Date:        6/6/2024            Ordering Physician: [id number] GERALD HANKS MRN/PID:           [id number]            Technologist:       Benjamin Godinez RVT Accession#:        [id number]        Technologist 2: Date of Birth/Age: 11/8/1934 / 89      Encounter#:         [id number]                    years Gender:            M Admission Status:  Outpatient          Location Performed: [facility]  Diagnosis/ICD: Peripheral vascular disease, unspecified-I73.9; Stricture of                artery-I77.1 CPT Codes:     20929 Peripheral artery PVR (multi segmental pressure  CONCLUSIONS: Right Lower PVR: Right pressures of >220 mmHg suggest no compressibility of vessels and may make absolute Segmental Limb Pressures (SLP)  unreliable. There is evidence of mild multi vessel disease. Monophasic flow is noted in the right posterior tibial artery, right dorsalis pedis artery and right popliteal artery. Multiphasic flow is noted in the right common femoral artery. Right leg results called per waveforms due to calcified and partially calcified arterial pressures. Right trasmetatarsal amputation. Left Lower PVR: Left pressures of >220 mmHg suggest no compressibility of vessels and may make absolute Segmental Limb Pressures (SLP) unreliable. There is evidence of moderate multi-vessel disease. Decreased digital perfusion noted. Monophasic flow is noted in the left posterior tibial artery and left dorsalis pedis artery. Multiphasic flow is noted in the left common femoral artery and left popliteal artery. Left leg results called per waveforms due to calcified and partially calcified arterial pressures.  Comparison: Compared with study from 6/12/2023, On todays exam the left leg demonstates moderate mutivessel arterial occlusive disease.  Imaging & Doppler Findings:  RIGHT Lower PVR                Pressures Ratios Right High Thigh               255 mmHg  2.11 Right Low Thigh                188 mmHg  1.55 Right Calf                     137 mmHg  1.13 Right Posterior Tibial (Ankle) 43 mmHg   0.36 Right Dorsalis Pedis (Ankle)   178 mmHg  1.47   LEFT Lower PVR                Pressures Ratios Left High Thigh               184 mmHg  1.52 Left Low Thigh                176 mmHg  1.45 Left Calf                     142 mmHg  1.17 Left Posterior Tibial (Ankle) 46 mmHg   0.38 Left Dorsalis Pedis (Ankle)   255 mmHg  2.11 Left Digit (Great Toe)        32 mmHg   0.26                     Right     Left Brachial Pressure 121 mmHg 108 mmHg   [id number] Sylvester Nguyen DO Electronically signed by [id number] Sylvester Nguyen DO on 6/6/2024 at 2:30:19 PM  ** Final **     XR hand left 3+ views    Result Date: 6/6/2024  Interpreted By:  Lara Severino, STUDY: XR HAND LEFT 3+ VIEWS;   6/6/2024 1:42 pm   INDICATION: Signs/Symptoms:Pain post fall.   COMPARISON: None.   ACCESSION NUMBER(S): [id number]   ORDERING CLINICIAN: JACQUELYN STROUD   FINDINGS: 3 portable views of the left hand obtained.   No acute fracture or osseous displacement. Radiocarpal joint space narrowing and chondrocalcinosis in the ulnocarpal joint space. Multifocal degenerative changes including joint space narrowing and spurring including the 1st CMC, 1st through 5th MCP and multiple interphalangeal joints. Prominent lucent probable subchondral cyst/geode in the trapezium. Soft tissue swelling in the 2nd digit. Multifocal soft tissue presumed vascular calcifications.       No acute fracture. Soft tissue swelling in the 2nd digit. Multifocal productive arthritic changes noted.   MACRO: None.   Signed by: Lara Severino 6/6/2024 1:50 PM Dictation workstation:   [id number]    CT head wo IV contrast    Result Date: 6/6/2024  Interpreted By:  Win Peres, STUDY: CT HEAD WO IV CONTRAST; ;  6/6/2024 12:16 pm   INDICATION: Signs/Symptoms:Head injury.   COMPARISON: CT head from 03/19/2024.   ACCESSION NUMBER(S): [id number]   ORDERING CLINICIAN: JACQUELYN STROUD   TECHNIQUE: Routine unenhanced CT of the head was performed.   FINDINGS: There is no acute intracranial hemorrhage or mass effect. There is no abnormal extra-axial fluid collection. The ventricles, sulci, and basilar cisterns are prominent in size due to age related diffuse cerebral volume loss.   There are regions of hypoattenuation within the bilateral cerebral hemispheric white matter which are probably sequela of chronic small vessel ischemic changes. Gray-white differentiation is maintained throughout.   There are no acute calvarial fractures. The visualized paranasal sinuses and mastoid air cells are essentially clear.   Degenerative pannus formation at the level of C1-C2.       Findings of probable chronic small vessel ischemic changes and age related diffuse cerebral  volume loss. No acute intracranial abnormality or mass effect.   This study was interpreted at [facility].   MACRO: None   Signed by: Win Peres 6/6/2024 12:39 PM Dictation workstation:   [id number]    XR cervical spine complete 4-5 views    Result Date: 6/6/2024  Interpreted By:  Franck Pascal, STUDY: XR CERVICAL SPINE COMPLETE 4-5 VIEWS; 6/6/2024 10:55 am   INDICATION: Signs/Symptoms:Pain s/p fall.   COMPARISON: None.   ACCESSION NUMBER(S): [id number]   ORDERING CLINICIAN: JACQUELYN STROUD   FINDINGS: Multiple views of the  lumbar spine are obtained. Minimal anterolisthesis of C6 and C7.. The vertebral body heights are preserved. Disc space loss from C5 through C7. Endplate sclerosis and osteophytes are seen throughout the spine. No acute fracture-dislocation.       Discogenic degenerative changes as described.     Signed by: Franck Pascal 6/6/2024 11:12 AM Dictation workstation:   [id number]    ECG 12 lead    Result Date: 6/5/2024  Sinus rhythm with 1st degree AV block Right bundle branch block Left anterior fascicular block  Bifascicular block  Abnormal ECG When compared with ECG of 19-MAR-2024 19:08, No significant change was found    XR foot right 3+ views    Result Date: 6/5/2024  Interpreted By:  Shalini Wright, STUDY: XR FOOT RIGHT 3+ VIEWS; ;  6/5/2024 11:54 am   INDICATION: Signs/Symptoms:Foot wound..   COMPARISON: Right foot x-ray series and CT 03/19/2024.   ACCESSION NUMBER(S): [id number]   ORDERING CLINICIAN: ALLAN VIGIL   FINDINGS: Bones are osteopenic. No acute fracture or dislocation.   Postoperative changes of prior transmetatarsal forefoot amputation and resection of the 5th metatarsal are again seen. There is new lucency due to osseous erosion the lateral and plantar aspect of the 4th metatarsal shaft with some cortical irregularity at the tip of the amputation margin. This underlies an irregular ulcerative soft tissue wound with adjacent soft tissue  swelling.   Soft tissue swelling around the foot and ankle and vascular calcifications are again seen.       1. Findings consistent with progressive osteomyelitis of the 4th metatarsal bone with brown osseous erosion of the lateral and plantar aspect of the shaft with overlying soft tissue swelling and ulceration consistent with persistent overlying soft tissue infection. If it would alter clinical management, an MRI may be obtained.   2. Redemonstrated transmetatarsal amputation of the forefoot and 5th metatarsal resection.   MACRO: None   Signed by: Shalini Wright 6/5/2024 12:19 PM Dictation workstation:   [id number]    XR chest 1 view    Result Date: 6/5/2024  Interpreted By:  Shalini Wright, STUDY: XR CHEST 1 VIEW;  6/5/2024 11:54 am   INDICATION: Signs/Symptoms:SOB.   COMPARISON: Chest x-ray 03/19/2024 and 06/17/2023. CT chest 06/07/2023..   ACCESSION NUMBER(S): [id number]   ORDERING CLINICIAN: ALLAN VIGIL   FINDINGS: AP radiograph of the chest was provided.   Median sternotomy wires are present.   CARDIOMEDIASTINAL SILHOUETTE: Cardiomediastinal silhouette is normal in size and configuration.   LUNGS and PLEURA: No focal consolidation or other airspace opacity. No pleural effusion or pneumothorax.   ABDOMEN: No remarkable upper abdominal findings.   BONES: No acute osseous changes.       1.  No evidence of acute cardiopulmonary process.       MACRO: None   Signed by: Shalini Wright 6/5/2024 12:09 PM Dictation workstation:   [id number]            Assessment/Plan   ASSESSMENT & PLAN:    #Osteomyelitis right foot  #Sepsis  #PVD  #T2DM  #HTN  - Patient was seen and evaluated; all findings were discussed and all questions were answered to patient's satisfaction.  - Charts, labs, vitals and imaging all reviewed.   - Imaging: Xray Right foot: Possible OM to 4th metatarsal  CT Pending  - Labs: WBC: 19.5,   - PVRs: Left leg moderate PAD Right leg Mild PVD  - Wound culture: Pending  - Blood culture:  Pending    Plan:  - Abx: Per Primary  - Pain Regimen: Per Primary  - Bowel Regimen: Per Primary  - Likely to require surgical intervention with debridement of soft tissue and bone this hospital visit. Discussed this in detail with patient as well as risks and benefits   - Will review CT and discuss with family moving forward  - Patient will either need a proximal Right TMA amputation or a Right BKA amputation, pending CT results and patients wishes.  - Dressings: Betadine soaked 4x4's, 4x4's, Krelix, ACE wrap.  - Nursing staff is able to change/reinforce dressing if & as necessary until next day’s dressing change. Thank you.  - Podiatry will continue to follow while in house.      Weightbearing: WBAT      Case to be discussed with attending, A&P above reflects a tentative plan. Please await for the final signature from the attending physician on service.    Eladio Chaves  PGY-2  Podiatric Medicine & Surgery  Please Mesfinku message me with any questions or concerns.            SIGNATURE: Eladio Chaves DPM PATIENT NAME: Rene Ballard   DATE: June 6, 2024 MRN: [id number]   TIME: 4:19 PM CONTACT: Haiku message     I saw and evaluated the patient. I personally obtained the key and critical portions of the history and physical exam or was physically present for key and critical portions performed by the resident/fellow. I reviewed the resident/fellow's documentation and discussed the patient with the resident/fellow. I agree with the resident/fellow's medical decision making as documented in the note.    Patient is at high risk of a BKA. Discussed patient with primary podiatrist, Dr. Ribeiro. Patient requires at least a choparts amputation. I do believe we could close the amputation, however his ability to heal is very poor, as noted in his recent history. Patient would be best served with a BKA at this time. Patient is DNR and would need to be removed from DNR while in the OR and family does not want this. This does  limit the options at this time. Family wants to consider hospice. I reminded family what this would entail and that discussing with social work would be best as they can provide better insight as to the specifics of hospice treatment. There is not a need for emergent surgery and patient could live with this wound for awhile, however he could certainly decline at any point. Continued wound care is an option for the patient that would allow some better options than hospice, but knowing that the goal would just be to keep the wound as stable as possible and the likelihood of healing is very low. I had a long discussion with the family regarding everything above and they will consider their options and discuss with social work.    I spent 60 minutes in the professional and overall care of this patient.    Robbie Islas DPM

## 2024-06-06 NOTE — H&P
Rene Ballard is a 89 y.o. male   Weakness, Gen       Patient with a past medical history of peripheral artery disease type 2 diabetes mellitus with a diabetic foot ulcer diabetic neuropathy hypertension osteomyelitis of the toe of right foot benign prostatic hypertrophy is admitted with increasing fatigue fever chills and worsening osteomyelitis of foot  The patient also had an accidental fall this Saturday where his legs gave out under him and he fell laid on the floor for unknown period of time and called help to get him up  Denies any loss of consciousness chest pain or palpitations    Past Medical History  Past Medical History:   Diagnosis Date    Abnormal EKG 05/01/2023    BPH (benign prostatic hyperplasia)     Chronic systolic heart failure (Multi)     COVID-19 05/01/2023    Essential hypertension     GERD (gastroesophageal reflux disease)     Hypertensive heart disease with heart failure (Multi)     Osteoarthritis     Osteomyelitis (Multi)     Peripheral vascular disease, unspecified (CMS-[facility]) 11/28/2022    PVD (peripheral vascular disease)    Personal history of malignant neoplasm of bladder     History of carcinoma of bladder    Personal history of other diseases of the musculoskeletal system and connective tissue     History of arthritis    Personal history of other endocrine, nutritional and metabolic disease     History of diabetes mellitus    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 He has never used smokeless tobacco. He reports that he does not currently use alcohol. He reports that he does not use drugs.    Family History  Family History   Problem Relation Name Age of Onset    No Known Problems Mother      Heart disease Father      Tuberculosis Maternal Grandmother          Allergies  Glimepiride, Lisinopril, Metformin, Pioglitazone, and Statins-hmg-coa reductase inhibitors    Review of Systems     Constitutional: Feeling poorly  Eyes: no blurred vision and no diplopia.   ENT: no hearing loss, no tinnitus, no earache, no sore throat, no hoarseness and no swollen glands in the neck.   Cardiovascular: no chest pain, no tightness or heavy pressure, no shortness of breath, no palpitations and no lower extremity edema.  Positive heart murmur  Respiratory: no cough, wheezing or shortness of breath at rest or exertion  Gastrointestinal: no change in bowel habits, no diarrhea, no constipation, no bloody stools, no nausea, no vomiting, no abdominal pain, no signs and symptoms of ulcer disease, no laurie colored stools and no intolerance to fatty foods.   Genitourinary: no urinary frequency, no dysuria, no hematuria, no burning sensation during urination, urinary stream is not smaller and urinary stream does not start and stop.   Musculoskeletal: Generalized arthralgias  Skin: Osteomyelitis  Neurological: no headaches, no dizziness, no seizures, no tingling, no numbness, no signs and symptoms of stroke and no limb weakness.   Psychiatric: no confusion, no memory lapses or loss, no depression and no sleep disturbances.   Endocrine:  no excessive thirst, no dry skin, no cold intolerance, no heat intolerance and no increased urinary frequency.   Hematologic/Lymphatic: is not slow to heal, does not bleed easily, does not bruise easily, no thrombophlebitis, no anemia and no history of blood transfusion.   All other systems have been reviewed and are negative for complaint.     Vitals:    06/06/24  0742   BP: 117/50   Pulse: 72   Resp: 17   Temp: 36.8 °C (98.2 °F)   SpO2: 95%        Scheduled medications  clopidogrel, 75 mg, oral, Daily  docusate sodium, 100 mg, oral, BID  dorzolamide-timoloL, 1 drop, Left Eye, BID  enoxaparin, 40 mg, subcutaneous, q24h  insulin glargine, 10 Units, subcutaneous, Once  insulin glargine, 25 Units, subcutaneous, q24h  insulin lispro, 0-5 Units, subcutaneous, Before meals & nightly  latanoprost, 1 drop, Both Eyes, Nightly  lidocaine, 2 patch, transdermal, Daily  metoprolol tartrate, 12.5 mg, oral, BID  pantoprazole, 40 mg, oral, Daily before breakfast  piperacillin-tazobactam, 2.25 g, intravenous, q6h  tamsulosin, 0.4 mg, oral, Daily      Continuous medications  sodium chloride 0.9%, 75 mL/hr, Last Rate: 75 mL/hr (06/06/24 0631)      PRN medications  PRN medications: acetaminophen, alum-mag hydroxide-simeth, dextrose, dextrose, glucagon, glucagon, melatonin, ondansetron, polyethylene glycol, traMADol, vancomycin    Results from last 7 days   Lab Units 06/06/24  0442 06/05/24  1136 06/04/24  1137   WBC AUTO x10*3/uL 19.5* 17.8* 15.3*   HEMOGLOBIN g/dL 12.5* 13.0* 13.0*   HEMATOCRIT % 37.9* 40.0* 41.3   PLATELETS AUTO x10*3/uL 195 201 203     Results from last 7 days   Lab Units 06/05/24  1305 06/05/24  1136   SODIUM mmol/L 133* 131*   POTASSIUM mmol/L 4.5 4.7   CHLORIDE mmol/L 97* 95*   CO2 mmol/L 21 23   BUN mg/dL 32* 30*   CREATININE mg/dL 1.33* 1.32*   CALCIUM mg/dL 8.9 9.1   PROTEIN TOTAL g/dL  --  7.0   BILIRUBIN TOTAL mg/dL  --  0.8   ALK PHOS U/L  --  204*   ALT U/L  --  19   AST U/L  --  15   GLUCOSE mg/dL 463* 524*            Vascular US PVR Without Exercise         XR foot right 3+ views   Final Result   1. Findings consistent with progressive osteomyelitis of the 4th   metatarsal bone with brown osseous erosion of the lateral and plantar   aspect of the shaft with overlying soft tissue swelling and   ulceration consistent with persistent overlying soft tissue    infection. If it would alter clinical management, an MRI may be   obtained.        2. 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        MACRO:   None        Signed by: Shalini Wright 6/5/2024 12:19 PM   Dictation workstation:   [id number]      XR chest 1 view   Final Result   1.  No evidence of acute cardiopulmonary process.                  MACRO:   None        Signed by: Shalini Wright 6/5/2024 12:09 PM   Dictation workstation:   [id number]          Physical Exam     Constitutional   General appearance: Alert and in no acute distress.   Eyes   Inspection of eyes: Sclera and conjunctiva were normal.    Pupil exam: Pupils were equal in size. Extraocular movements were intact.   Pulmonary   Respiratory assessment: No respiratory distress, normal respiratory rhythm and effort.    Auscultation of Lungs: Clear bilateral breath sounds.   Cardiovascular   Auscultation of heart: Apical pulse normal, heart rate and rhythm normal, normal S1 and S2, 3 x 6 systolic ejection murmur   Exam for edema: No peripheral edema.   Abdomen   Abdominal Exam: No bruits, normal bowel sounds, soft, non-tender, no abdominal mass palpated.    Liver and Spleen exam: No hepato-splenomegaly.   Musculoskeletal     Inspection of digits and nails: No clubbing or cyanosis of the fingernails.    Inspection/palpation of joints, bones and muscles: No joint swelling. N right foot dressing  Skin   Skin inspection: Normal skin color and pigmentation, normal skin turgor and no visible rash.   Neurologic   Cranial nerves: Nerves 2-12 were intact, no focal neuro defects.   Psychiatric   Orientation: Oriented to person, place, and time.    Mood and affect: Normal.       Assessment/Plan      #Sepsis  #Osteomyelitis  Started broad-spectrum antibiotics  Discussed with the patient and family about the need for an amputation for source control and treating the infection    #Poorly controlled diabetes mellitus  Will  adjust medications  Check A1c    #Dyslipidemia  Continue statins and maintain LDL less than 70    #Hypertension  Continue home medications    #Fall with head and neck injury  CT head negative  CT cervical spine negative  X-ray of the wrist and hand negative for fractures  Pain control  Flexeril  Heating pads    Discussed with podiatry and infectious disease

## 2024-06-06 NOTE — PROGRESS NOTES
Vancomycin Dosing by Pharmacy- FOLLOW UP    Rene Ballard is a 89 y.o. year old male who Pharmacy has been consulted for vancomycin dosing for osteomyelitis/septic arthritis. Based on the patient's indication and renal status this patient is being dosed based on a goal AUC of 400-600.     Renal function is currently improving.    Current vancomycin dose: 1000 mg given once  @ 1258    Most recent random level: 5.2 mcg/mL @ 1503     Visit Vitals  /64 (BP Location: Left arm, Patient Position: Sitting)   Pulse 70   Temp 36.9 °C (98.4 °F) (Temporal)   Resp 17        Lab Results   Component Value Date    CREATININE 1.14 2024    CREATININE 1.33 (H) 2024    CREATININE 1.32 (H) 2024    CREATININE 0.90 2024        Patient weight is as follows:   Vitals:    24   Weight: 64.5 kg (142 lb 3.2 oz)       Cultures:  Susceptibility data for the encounter in last 14 days.  Collected Specimen Info Organism   24 Tissue/Biopsy from Skin/Superficial Abscess Staphylococcus aureus   24 Blood culture from Peripheral Venipuncture Staphylococcus aureus   24 Blood culture from Peripheral Venipuncture Staphylococcus aureus        I/O last 3 completed shifts:  In: 250 (3.9 mL/kg) [IV Piggyback:250]  Out: 400 (6.2 mL/kg) [Urine:400 (0.2 mL/kg/hr)]  Weight: 64.5 kg   I/O during current shift:  No intake/output data recorded.    Temp (24hrs), Av.1 °C (98.7 °F), Min:36.6 °C (97.8 °F), Max:37.9 °C (100.3 °F)      Assessment/Plan    Within goal AUC range. Continue current vancomycin regimen.    This dosing regimen is predicted by [employer] to result in the following pharmacokinetic parameters:  Loading dose: N/A  Regimen: 1000 mg IV every 24 hours.  Start time: 00:58 on 2024  Exposure target: AUC24 (range)400-600 mg/L.hr   AUC24,ss: 465 mg/L.hr  Probability of AUC24 > 400: 80 %  Ctrough,ss: 14.7 mg/L  Probability of Ctrough,ss > 20: 11 %  Probability of nephrotoxicity ([place]  PIA 2009): 10 %      The next level will be obtained on 6/8 at 0500. May be obtained sooner if clinically indicated.   Will continue to monitor renal function daily while on vancomycin and order serum creatinine at least every 48 hours if not already ordered.  Follow for continued vancomycin needs, clinical response, and signs/symptoms of toxicity.       Frank Rincon, PharmD

## 2024-06-06 NOTE — CARE PLAN
Problem: Pain  Goal: My pain/discomfort is manageable  6/5/2024 2045 by Suzette Diaz RN  Outcome: Progressing  6/5/2024 2045 by Suzette Diaz RN  Outcome: Not Progressing     Problem: Safety  Goal: Patient will be injury free during hospitalization  6/5/2024 2045 by Suzette Diaz RN  Outcome: Progressing  6/5/2024 2045 by Suzette Diaz RN  Outcome: Not Progressing  Goal: I will remain free of falls  6/5/2024 2045 by Suzette Diaz RN  Outcome: Progressing  6/5/2024 2045 by Suzette Diaz RN  Outcome: Not Progressing     Problem: Daily Care  Goal: Daily care needs are met  6/5/2024 2045 by Suzette Diaz RN  Outcome: Progressing  6/5/2024 2045 by Suzette Diaz RN  Outcome: Not Progressing     Problem: Psychosocial Needs  Goal: Demonstrates ability to cope with hospitalization/illness  6/5/2024 2045 by Suzette Diaz RN  Outcome: Progressing  6/5/2024 2045 by Suzette Diaz RN  Outcome: Not Progressing  Goal: Collaborate with me, my family, and caregiver to identify my specific goals  6/5/2024 2045 by Suzette Diaz RN  Outcome: Progressing  6/5/2024 2045 by Suzette Diaz RN  Outcome: Not Progressing  Flowsheets (Taken 6/5/2024 2041)  Cultural Requests During Hospitalization: no  Spiritual Requests During Hospitalization: no     Problem: Skin  Goal: Decreased wound size/increased tissue granulation at next dressing change  6/5/2024 2045 by Suzette Diaz RN  Outcome: Progressing  6/5/2024 2045 by Suzette Diaz RN  Outcome: Not Progressing  Goal: Participates in plan/prevention/treatment measures  6/5/2024 2045 by Suzette Diaz RN  Outcome: Progressing  6/5/2024 2045 by Suzette Diaz RN  Outcome: Not Progressing  Goal: Prevent/manage excess moisture  6/5/2024 2045 by Suzette Diaz RN  Outcome: Progressing  Flowsheets (Taken 6/5/2024 2045)  Prevent/manage excess moisture: Cleanse incontinence/protect with barrier cream  6/5/2024 2045 by Suzette Diaz RN  Outcome: Not  Progressing  Flowsheets (Taken 6/5/2024 2045)  Prevent/manage excess moisture: Cleanse incontinence/protect with barrier cream  Goal: Prevent/minimize sheer/friction injuries  6/5/2024 2045 by Suzette Diaz RN  Outcome: Progressing  6/5/2024 2045 by Suzette Diaz RN  Outcome: Not Progressing  Goal: Promote/optimize nutrition  6/5/2024 2045 by Suzette Diaz RN  Outcome: Progressing  6/5/2024 2045 by Suzette Diaz RN  Outcome: Not Progressing  Goal: Promote skin healing  6/5/2024 2045 by Suzette Diaz RN  Outcome: Progressing  6/5/2024 2045 by Suzette Diaz RN  Outcome: Not Progressing     Problem: Fall/Injury  Goal: Not fall by end of shift  6/5/2024 2045 by Suzette Diaz RN  Outcome: Progressing  6/5/2024 2045 by Suzette Diaz RN  Outcome: Not Progressing  Goal: Be free from injury by end of the shift  6/5/2024 2045 by Suzette Diaz RN  Outcome: Progressing  6/5/2024 2045 by Suzette Diaz RN  Outcome: Not Progressing  Goal: Verbalize understanding of personal risk factors for fall in the hospital  6/5/2024 2045 by Suzette Diaz RN  Outcome: Progressing  6/5/2024 2045 by Suzette Diaz RN  Outcome: Not Progressing  Goal: Verbalize understanding of risk factor reduction measures to prevent injury from fall in the home  6/5/2024 2045 by Suzette Diaz RN  Outcome: Progressing  6/5/2024 2045 by Suzette Diaz RN  Outcome: Not Progressing  Goal: Use assistive devices by end of the shift  6/5/2024 2045 by Suzette Diaz RN  Outcome: Progressing  6/5/2024 2045 by Suzette Diaz RN  Outcome: Not Progressing  Goal: Pace activities to prevent fatigue by end of the shift  6/5/2024 2045 by Suzette Diaz RN  Outcome: Progressing  6/5/2024 2045 by Suzette Diaz RN  Outcome: Not Progressing   The patient's goals for the shift include      The clinical goals for the shift include manage pain      Problem: Pain  Goal: My pain/discomfort is manageable  6/5/2024 2045 by Suzette Diaz RN  Outcome:  Progressing  6/5/2024 2045 by Suzette Diaz RN  Outcome: Not Progressing     Problem: Safety  Goal: Patient will be injury free during hospitalization  6/5/2024 2045 by Suzette Diaz RN  Outcome: Progressing  6/5/2024 2045 by Suzette Diaz RN  Outcome: Not Progressing  Goal: I will remain free of falls  6/5/2024 2045 by Suzette Diaz RN  Outcome: Progressing  6/5/2024 2045 by Suzette Diaz RN  Outcome: Not Progressing     Problem: Daily Care  Goal: Daily care needs are met  6/5/2024 2045 by Suzette Diaz RN  Outcome: Progressing  6/5/2024 2045 by Suzette Diaz RN  Outcome: Not Progressing     Problem: Psychosocial Needs  Goal: Demonstrates ability to cope with hospitalization/illness  6/5/2024 2045 by Suzette Diaz RN  Outcome: Progressing  6/5/2024 2045 by Suzette Diaz RN  Outcome: Not Progressing  Goal: Collaborate with me, my family, and caregiver to identify my specific goals  6/5/2024 2045 by Suzette Diaz RN  Outcome: Progressing  6/5/2024 2045 by Suzette Diaz RN  Outcome: Not Progressing  Flowsheets (Taken 6/5/2024 2041)  Cultural Requests During Hospitalization: no  Spiritual Requests During Hospitalization: no     Problem: Discharge Barriers  Goal: My discharge needs are met  6/5/2024 2045 by Suzette Diaz RN  Outcome: Progressing  6/5/2024 2045 by Suzette Diaz RN  Outcome: Not Progressing     Problem: Skin  Goal: Decreased wound size/increased tissue granulation at next dressing change  6/5/2024 2045 by Suzette Diaz RN  Outcome: Progressing  6/5/2024 2045 by Suzette Diaz RN  Outcome: Not Progressing  Goal: Participates in plan/prevention/treatment measures  6/5/2024 2045 by Suzette Diaz RN  Outcome: Progressing  6/5/2024 2045 by Suzette Diaz RN  Outcome: Not Progressing  Goal: Prevent/manage excess moisture  6/5/2024 2045 by Suzette Diaz RN  Outcome: Progressing  Flowsheets (Taken 6/5/2024 2045)  Prevent/manage excess moisture: Cleanse incontinence/protect with  barrier cream  6/5/2024 2045 by Suzette Diaz RN  Outcome: Not Progressing  Flowsheets (Taken 6/5/2024 2045)  Prevent/manage excess moisture: Cleanse incontinence/protect with barrier cream  Goal: Prevent/minimize sheer/friction injuries  6/5/2024 2045 by Suzette Diaz RN  Outcome: Progressing  6/5/2024 2045 by Suzette Diaz RN  Outcome: Not Progressing  Goal: Promote/optimize nutrition  6/5/2024 2045 by Suzette Diaz RN  Outcome: Progressing  6/5/2024 2045 by Suzette Diaz RN  Outcome: Not Progressing  Goal: Promote skin healing  6/5/2024 2045 by Suzette Diaz RN  Outcome: Progressing  6/5/2024 2045 by Suzette Diaz RN  Outcome: Not Progressing     Problem: Fall/Injury  Goal: Not fall by end of shift  6/5/2024 2045 by Suzette Diaz RN  Outcome: Progressing  6/5/2024 2045 by Suzette Diaz RN  Outcome: Not Progressing  Goal: Be free from injury by end of the shift  6/5/2024 2045 by Suzette Diaz RN  Outcome: Progressing  6/5/2024 2045 by Suzette Diaz RN  Outcome: Not Progressing  Goal: Verbalize understanding of personal risk factors for fall in the hospital  6/5/2024 2045 by Suzette Diaz RN  Outcome: Progressing  6/5/2024 2045 by Suzette Diaz RN  Outcome: Not Progressing  Goal: Verbalize understanding of risk factor reduction measures to prevent injury from fall in the home  6/5/2024 2045 by Suzette Diaz RN  Outcome: Progressing  6/5/2024 2045 by Suzette Diaz RN  Outcome: Not Progressing  Goal: Use assistive devices by end of the shift  6/5/2024 2045 by Suzette Diaz RN  Outcome: Progressing  6/5/2024 2045 by Suzette Diaz RN  Outcome: Not Progressing  Goal: Pace activities to prevent fatigue by end of the shift  6/5/2024 2045 by Suzette Diaz RN  Outcome: Progressing  6/5/2024 2045 by Suzette Diaz RN  Outcome: Not Progressing

## 2024-06-07 ENCOUNTER — APPOINTMENT (OUTPATIENT)
Dept: RADIOLOGY | Facility: HOSPITAL | Age: 89
End: 2024-06-07
Payer: MEDICARE

## 2024-06-07 ENCOUNTER — APPOINTMENT (OUTPATIENT)
Dept: WOUND CARE | Facility: CLINIC | Age: 89
End: 2024-06-07
Payer: MEDICARE

## 2024-06-07 LAB
ANION GAP SERPL CALC-SCNC: 12 MMOL/L (ref 10–20)
APPEARANCE UR: ABNORMAL
BACTERIA #/AREA URNS AUTO: ABNORMAL /HPF
BACTERIA SPEC CULT: ABNORMAL
BILIRUB UR STRIP.AUTO-MCNC: NEGATIVE MG/DL
BUN SERPL-MCNC: 32 MG/DL (ref 6–23)
CALCIUM SERPL-MCNC: 8.4 MG/DL (ref 8.6–10.3)
CHLORIDE SERPL-SCNC: 94 MMOL/L (ref 98–107)
CHLORIDE UR-SCNC: 18 MMOL/L
CHLORIDE/CREATININE (MMOL/G) IN URINE: 23 MMOL/G CREAT (ref 23–275)
CO2 SERPL-SCNC: 24 MMOL/L (ref 21–32)
COLOR UR: YELLOW
CREAT SERPL-MCNC: 0.98 MG/DL (ref 0.5–1.3)
CREAT UR-MCNC: 78.9 MG/DL (ref 20–370)
CREAT UR-MCNC: 79.7 MG/DL (ref 20–370)
EGFRCR SERPLBLD CKD-EPI 2021: 74 ML/MIN/1.73M*2
ERYTHROCYTE [DISTWIDTH] IN BLOOD BY AUTOMATED COUNT: 12.6 % (ref 11.5–14.5)
EST. AVERAGE GLUCOSE BLD GHB EST-MCNC: 203 MG/DL
GLUCOSE BLD MANUAL STRIP-MCNC: 160 MG/DL (ref 74–99)
GLUCOSE BLD MANUAL STRIP-MCNC: 177 MG/DL (ref 74–99)
GLUCOSE BLD MANUAL STRIP-MCNC: 210 MG/DL (ref 74–99)
GLUCOSE BLD MANUAL STRIP-MCNC: 98 MG/DL (ref 74–99)
GLUCOSE SERPL-MCNC: 186 MG/DL (ref 74–99)
GLUCOSE UR STRIP.AUTO-MCNC: ABNORMAL MG/DL
GRAM STN SPEC: ABNORMAL
GRAM STN SPEC: ABNORMAL
HBA1C MFR BLD: 8.7 %
HCT VFR BLD AUTO: 40 % (ref 41–52)
HGB BLD-MCNC: 13.2 G/DL (ref 13.5–17.5)
KETONES UR STRIP.AUTO-MCNC: ABNORMAL MG/DL
LEUKOCYTE ESTERASE UR QL STRIP.AUTO: ABNORMAL
MCH RBC QN AUTO: 31 PG (ref 26–34)
MCHC RBC AUTO-ENTMCNC: 33 G/DL (ref 32–36)
MCV RBC AUTO: 94 FL (ref 80–100)
MUCOUS THREADS #/AREA URNS AUTO: ABNORMAL /LPF
NITRITE UR QL STRIP.AUTO: NEGATIVE
NRBC BLD-RTO: 0 /100 WBCS (ref 0–0)
OSMOLALITY SERPL: 287 MOSM/KG (ref 280–300)
OSMOLALITY UR: 789 MOSM/KG (ref 200–1200)
PH UR STRIP.AUTO: 5.5 [PH]
PLATELET # BLD AUTO: 218 X10*3/UL (ref 150–450)
POTASSIUM SERPL-SCNC: 3.5 MMOL/L (ref 3.5–5.3)
PROT UR STRIP.AUTO-MCNC: ABNORMAL MG/DL
RBC # BLD AUTO: 4.26 X10*6/UL (ref 4.5–5.9)
RBC # UR STRIP.AUTO: ABNORMAL /UL
RBC #/AREA URNS AUTO: ABNORMAL /HPF
SODIUM SERPL-SCNC: 126 MMOL/L (ref 136–145)
SODIUM UR-SCNC: 20 MMOL/L
SODIUM/CREAT UR-RTO: 25 MMOL/G CREAT
SP GR UR STRIP.AUTO: 1.03
SQUAMOUS #/AREA URNS AUTO: ABNORMAL /HPF
TSH SERPL-ACNC: 1.12 MIU/L (ref 0.44–3.98)
URATE SERPL-MCNC: 3.7 MG/DL (ref 4–7.5)
UROBILINOGEN UR STRIP.AUTO-MCNC: NORMAL MG/DL
WBC # BLD AUTO: 18 X10*3/UL (ref 4.4–11.3)
WBC #/AREA URNS AUTO: ABNORMAL /HPF
WBC CLUMPS #/AREA URNS AUTO: ABNORMAL /HPF
YEAST BUDDING #/AREA UR COMP ASSIST: PRESENT /HPF

## 2024-06-07 PROCEDURE — 82436 ASSAY OF URINE CHLORIDE: CPT | Performed by: INTERNAL MEDICINE

## 2024-06-07 PROCEDURE — 83935 ASSAY OF URINE OSMOLALITY: CPT | Mod: AHULAB | Performed by: INTERNAL MEDICINE

## 2024-06-07 PROCEDURE — 2500000004 HC RX 250 GENERAL PHARMACY W/ HCPCS (ALT 636 FOR OP/ED): Performed by: INTERNAL MEDICINE

## 2024-06-07 PROCEDURE — 2500000001 HC RX 250 WO HCPCS SELF ADMINISTERED DRUGS (ALT 637 FOR MEDICARE OP)

## 2024-06-07 PROCEDURE — 2500000004 HC RX 250 GENERAL PHARMACY W/ HCPCS (ALT 636 FOR OP/ED): Mod: JZ | Performed by: INTERNAL MEDICINE

## 2024-06-07 PROCEDURE — 2500000004 HC RX 250 GENERAL PHARMACY W/ HCPCS (ALT 636 FOR OP/ED)

## 2024-06-07 PROCEDURE — 2500000005 HC RX 250 GENERAL PHARMACY W/O HCPCS

## 2024-06-07 PROCEDURE — 84443 ASSAY THYROID STIM HORMONE: CPT | Performed by: INTERNAL MEDICINE

## 2024-06-07 PROCEDURE — 84550 ASSAY OF BLOOD/URIC ACID: CPT | Performed by: INTERNAL MEDICINE

## 2024-06-07 PROCEDURE — 85027 COMPLETE CBC AUTOMATED: CPT

## 2024-06-07 PROCEDURE — 36415 COLL VENOUS BLD VENIPUNCTURE: CPT

## 2024-06-07 PROCEDURE — 2500000002 HC RX 250 W HCPCS SELF ADMINISTERED DRUGS (ALT 637 FOR MEDICARE OP, ALT 636 FOR OP/ED): Performed by: INTERNAL MEDICINE

## 2024-06-07 PROCEDURE — 83930 ASSAY OF BLOOD OSMOLALITY: CPT | Mod: AHULAB | Performed by: INTERNAL MEDICINE

## 2024-06-07 PROCEDURE — 80048 BASIC METABOLIC PNL TOTAL CA: CPT

## 2024-06-07 PROCEDURE — 73700 CT LOWER EXTREMITY W/O DYE: CPT | Mod: RIGHT SIDE | Performed by: RADIOLOGY

## 2024-06-07 PROCEDURE — 82570 ASSAY OF URINE CREATININE: CPT | Performed by: INTERNAL MEDICINE

## 2024-06-07 PROCEDURE — 36415 COLL VENOUS BLD VENIPUNCTURE: CPT | Performed by: INTERNAL MEDICINE

## 2024-06-07 PROCEDURE — 81001 URINALYSIS AUTO W/SCOPE: CPT

## 2024-06-07 PROCEDURE — 99232 SBSQ HOSP IP/OBS MODERATE 35: CPT | Performed by: INTERNAL MEDICINE

## 2024-06-07 PROCEDURE — 87086 URINE CULTURE/COLONY COUNT: CPT | Mod: AHULAB

## 2024-06-07 PROCEDURE — 82947 ASSAY GLUCOSE BLOOD QUANT: CPT | Mod: 91

## 2024-06-07 PROCEDURE — 2500000001 HC RX 250 WO HCPCS SELF ADMINISTERED DRUGS (ALT 637 FOR MEDICARE OP): Performed by: INTERNAL MEDICINE

## 2024-06-07 PROCEDURE — 73700 CT LOWER EXTREMITY W/O DYE: CPT | Mod: RT

## 2024-06-07 PROCEDURE — 2500000002 HC RX 250 W HCPCS SELF ADMINISTERED DRUGS (ALT 637 FOR MEDICARE OP, ALT 636 FOR OP/ED)

## 2024-06-07 PROCEDURE — 2060000001 HC INTERMEDIATE ICU ROOM DAILY

## 2024-06-07 RX ORDER — CEFAZOLIN SODIUM 2 G/100ML
2 INJECTION, SOLUTION INTRAVENOUS EVERY 8 HOURS
Status: DISCONTINUED | OUTPATIENT
Start: 2024-06-07 | End: 2024-06-13 | Stop reason: HOSPADM

## 2024-06-07 RX ORDER — INSULIN LISPRO 100 [IU]/ML
0-10 INJECTION, SOLUTION INTRAVENOUS; SUBCUTANEOUS
Status: DISCONTINUED | OUTPATIENT
Start: 2024-06-07 | End: 2024-06-13 | Stop reason: HOSPADM

## 2024-06-07 RX ORDER — MORPHINE SULFATE 2 MG/ML
2 INJECTION, SOLUTION INTRAMUSCULAR; INTRAVENOUS EVERY 4 HOURS PRN
Status: DISCONTINUED | OUTPATIENT
Start: 2024-06-07 | End: 2024-06-13 | Stop reason: HOSPADM

## 2024-06-07 RX ORDER — SODIUM CHLORIDE 9 MG/ML
75 INJECTION, SOLUTION INTRAVENOUS CONTINUOUS
Status: DISCONTINUED | OUTPATIENT
Start: 2024-06-07 | End: 2024-06-13 | Stop reason: HOSPADM

## 2024-06-07 RX ADMIN — INSULIN LISPRO 2 UNITS: 100 INJECTION, SOLUTION INTRAVENOUS; SUBCUTANEOUS at 17:15

## 2024-06-07 RX ADMIN — LIDOCAINE 4% 2 PATCH: 40 PATCH TOPICAL at 08:48

## 2024-06-07 RX ADMIN — INSULIN GLARGINE 25 UNITS: 100 INJECTION, SOLUTION SUBCUTANEOUS at 21:58

## 2024-06-07 RX ADMIN — CLOPIDOGREL 75 MG: 75 TABLET ORAL at 08:47

## 2024-06-07 RX ADMIN — CEFAZOLIN SODIUM 2 G: 2 INJECTION, SOLUTION INTRAVENOUS at 12:57

## 2024-06-07 RX ADMIN — SODIUM CHLORIDE 75 ML/HR: 9 INJECTION, SOLUTION INTRAVENOUS at 08:30

## 2024-06-07 RX ADMIN — TRAMADOL HYDROCHLORIDE 50 MG: 50 TABLET, COATED ORAL at 14:25

## 2024-06-07 RX ADMIN — METOPROLOL TARTRATE 12.5 MG: 25 TABLET, FILM COATED ORAL at 08:48

## 2024-06-07 RX ADMIN — TRAMADOL HYDROCHLORIDE 50 MG: 50 TABLET, COATED ORAL at 21:13

## 2024-06-07 RX ADMIN — DOCUSATE SODIUM 100 MG: 100 CAPSULE, LIQUID FILLED ORAL at 08:48

## 2024-06-07 RX ADMIN — CEFAZOLIN SODIUM 2 G: 2 INJECTION, SOLUTION INTRAVENOUS at 21:20

## 2024-06-07 RX ADMIN — INSULIN LISPRO 2 UNITS: 100 INJECTION, SOLUTION INTRAVENOUS; SUBCUTANEOUS at 12:57

## 2024-06-07 RX ADMIN — DORZOLAMIDE HYDROCHLORIDE AND TIMOLOL MALEATE 1 DROP: 22.3; 6.8 SOLUTION/ DROPS OPHTHALMIC at 21:02

## 2024-06-07 RX ADMIN — PANTOPRAZOLE SODIUM 40 MG: 40 TABLET, DELAYED RELEASE ORAL at 08:53

## 2024-06-07 RX ADMIN — ENOXAPARIN SODIUM 40 MG: 40 INJECTION SUBCUTANEOUS at 17:15

## 2024-06-07 RX ADMIN — CYCLOBENZAPRINE HYDROCHLORIDE 5 MG: 5 TABLET, FILM COATED ORAL at 14:25

## 2024-06-07 RX ADMIN — LATANOPROST 1 DROP: 50 SOLUTION/ DROPS OPHTHALMIC at 21:02

## 2024-06-07 RX ADMIN — PIPERACILLIN SODIUM AND TAZOBACTAM SODIUM 2.25 G: 2; .25 INJECTION, SOLUTION INTRAVENOUS at 08:53

## 2024-06-07 RX ADMIN — TRAMADOL HYDROCHLORIDE 50 MG: 50 TABLET, COATED ORAL at 08:48

## 2024-06-07 RX ADMIN — PIPERACILLIN SODIUM AND TAZOBACTAM SODIUM 2.25 G: 2; .25 INJECTION, SOLUTION INTRAVENOUS at 02:37

## 2024-06-07 RX ADMIN — INSULIN LISPRO 2 UNITS: 100 INJECTION, SOLUTION INTRAVENOUS; SUBCUTANEOUS at 08:47

## 2024-06-07 RX ADMIN — TAMSULOSIN HYDROCHLORIDE 0.4 MG: 0.4 CAPSULE ORAL at 08:48

## 2024-06-07 RX ADMIN — CYCLOBENZAPRINE HYDROCHLORIDE 5 MG: 5 TABLET, FILM COATED ORAL at 08:48

## 2024-06-07 RX ADMIN — DORZOLAMIDE HYDROCHLORIDE AND TIMOLOL MALEATE 1 DROP: 22.3; 6.8 SOLUTION/ DROPS OPHTHALMIC at 09:00

## 2024-06-07 RX ADMIN — METOPROLOL TARTRATE 12.5 MG: 25 TABLET, FILM COATED ORAL at 21:13

## 2024-06-07 ASSESSMENT — PAIN SCALES - GENERAL
PAINLEVEL_OUTOF10: 9
PAINLEVEL_OUTOF10: 2
PAINLEVEL_OUTOF10: 0 - NO PAIN
PAINLEVEL_OUTOF10: 10 - WORST POSSIBLE PAIN
PAINLEVEL_OUTOF10: 2
PAINLEVEL_OUTOF10: 2
PAINLEVEL_OUTOF10: 9

## 2024-06-07 ASSESSMENT — PAIN - FUNCTIONAL ASSESSMENT
PAIN_FUNCTIONAL_ASSESSMENT: 0-10
PAIN_FUNCTIONAL_ASSESSMENT: FLACC (FACE, LEGS, ACTIVITY, CRY, CONSOLABILITY)
PAIN_FUNCTIONAL_ASSESSMENT: 0-10

## 2024-06-07 ASSESSMENT — COGNITIVE AND FUNCTIONAL STATUS - GENERAL
DAILY ACTIVITIY SCORE: 21
MOVING TO AND FROM BED TO CHAIR: A LOT
CLIMB 3 TO 5 STEPS WITH RAILING: A LOT
DRESSING REGULAR UPPER BODY CLOTHING: A LITTLE
MOVING FROM LYING ON BACK TO SITTING ON SIDE OF FLAT BED WITH BEDRAILS: A LITTLE
MOBILITY SCORE: 14
TURNING FROM BACK TO SIDE WHILE IN FLAT BAD: A LITTLE
STANDING UP FROM CHAIR USING ARMS: A LOT
EATING MEALS: A LITTLE
PERSONAL GROOMING: A LITTLE
WALKING IN HOSPITAL ROOM: A LOT

## 2024-06-07 NOTE — PROGRESS NOTES
Mayelin Olson is a 89 y.o. male on day 2 of admission presenting with Osteomyelitis (Multi).        Subjective   Saw and examined patient. Patient alert but tired-appearing, says he has been eating and drinking but does not have much of an appetite. Patient says his shoulders, knees, and calfs hurt when he moves. No chest pain, dyspnea, abdominal pain, bowel or bladder problems.         Objective     Last Recorded Vitals  /64 (BP Location: Left arm, Patient Position: Lying)   Pulse 65   Temp 37.2 °C (98.9 °F) (Skin)   Resp 18   Wt 64.5 kg (142 lb 3.2 oz)   SpO2 92%   Intake/Output last 3 Shifts:    Intake/Output Summary (Last 24 hours) at 6/7/2024 0717  Last data filed at 6/7/2024 0636  Gross per 24 hour   Intake --   Output 1400 ml   Net -1400 ml       Admission Weight  Weight: 65.8 kg (145 lb) (06/05/24 1033)    Daily Weight  06/05/24 : 64.5 kg (142 lb 3.2 oz)    Image Results  Vascular US PVR Without Exercise               [place] [address]    Tel [phone] and Fax [phone]       Vascular Lab Report  VASC US PVR WITHOUT EXERCISE       Patient Name:      MAYELIN OLSON    Reading Physician:  [id number] Sylvester Nguyen DO  Study Date:        6/6/2024            Ordering Physician: [id number] GERALD HANKS  MRN/PID:           [id number]            Technologist:       Benjamin Godinez STEVE  Accession#:        [id number]        Technologist 2:  Date of Birth/Age: 11/8/1934 / 89      Encounter#:         [id number]                     years  Gender:            M  Admission Status:  Outpatient          Location Performed: [facility]       Diagnosis/ICD: Peripheral vascular disease, unspecified-I73.9; Stricture of                 artery-I77.1  CPT Codes:     87293 Peripheral artery PVR (multi segmental pressure       CONCLUSIONS:  Right Lower PVR: Right pressures of >220 mmHg suggest no compressibility  of vessels and may make absolute Segmental Limb Pressures (SLP) unreliable. There is evidence of mild multi vessel disease. Monophasic flow is noted in the right posterior tibial artery, right dorsalis pedis artery and right popliteal artery. Multiphasic flow is noted in the right common femoral artery. Right leg results called per waveforms due to calcified and partially calcified arterial pressures.  Right trasmetatarsal amputation.  Left Lower PVR: Left pressures of >220 mmHg suggest no compressibility of vessels and may make absolute Segmental Limb Pressures (SLP) unreliable. There is evidence of moderate multi-vessel disease. Decreased digital perfusion noted. Monophasic flow is noted in the left posterior tibial artery and left dorsalis pedis artery. Multiphasic flow is noted in the left common femoral artery and left popliteal artery. Left leg results called per waveforms due to calcified and partially calcified arterial pressures.     Comparison:  Compared with study from 6/12/2023, On todays exam the left leg demonstates moderate mutivessel arterial occlusive disease.     Imaging & Doppler Findings:     RIGHT Lower PVR                Pressures Ratios  Right High Thigh               255 mmHg  2.11  Right Low Thigh                188 mmHg  1.55  Right Calf                     137 mmHg  1.13  Right Posterior Tibial (Ankle) 43 mmHg   0.36  Right Dorsalis Pedis (Ankle)   178 mmHg  1.47          LEFT Lower PVR                Pressures Ratios  Left High Thigh               184 mmHg  1.52  Left Low Thigh                176 mmHg  1.45  Left Calf                     142 mmHg  1.17  Left Posterior Tibial (Ankle) 46 mmHg   0.38  Left Dorsalis Pedis (Ankle)   255 mmHg  2.11  Left Digit (Great Toe)        32 mmHg   0.26                          Right     Left  Brachial Pressure 121 mmHg 108 mmHg          [id number] Sylvester Nguyen DO  Electronically signed by [id number] Sylvester Nguyen DO on 6/6/2024 at 2:30:19 PM       ** Final **  XR  hand left 3+ views  Narrative: Interpreted By:  Lara Severino,   STUDY:  XR HAND LEFT 3+ VIEWS;  6/6/2024 1:42 pm      INDICATION:  Signs/Symptoms:Pain post fall.      COMPARISON:  None.      ACCESSION NUMBER(S):  [id number]      ORDERING CLINICIAN:  JACQUELYN STROUD      FINDINGS:  3 portable views of the left hand obtained.      No acute fracture or osseous displacement. Radiocarpal joint space  narrowing and chondrocalcinosis in the ulnocarpal joint space.  Multifocal degenerative changes including joint space narrowing and  spurring including the 1st CMC, 1st through 5th MCP and multiple  interphalangeal joints. Prominent lucent probable subchondral  cyst/geode in the trapezium. Soft tissue swelling in the 2nd digit.  Multifocal soft tissue presumed vascular calcifications.      Impression: No acute fracture. Soft tissue swelling in the 2nd digit. Multifocal  productive arthritic changes noted.      MACRO:  None.      Signed by: Lara Severino 6/6/2024 1:50 PM  Dictation workstation:   [id number]  CT head wo IV contrast  Narrative: Interpreted By:  Win Peres,   STUDY:  CT HEAD WO IV CONTRAST; ;  6/6/2024 12:16 pm      INDICATION:  Signs/Symptoms:Head injury.      COMPARISON:  CT head from 03/19/2024.      ACCESSION NUMBER(S):  [id number]      ORDERING CLINICIAN:  JACQUELYN STROUD      TECHNIQUE:  Routine unenhanced CT of the head was performed.      FINDINGS:  There is no acute intracranial hemorrhage or mass effect. There is no  abnormal extra-axial fluid collection. The ventricles, sulci, and  basilar cisterns are prominent in size due to age related diffuse  cerebral volume loss.      There are regions of hypoattenuation within the bilateral cerebral  hemispheric white matter which are probably sequela of chronic small  vessel ischemic changes. Gray-white differentiation is maintained  throughout.      There are no acute calvarial fractures. The visualized paranasal  sinuses and mastoid air cells are  essentially clear.      Degenerative pannus formation at the level of C1-C2.      Impression: Findings of probable chronic small vessel ischemic changes and age  related diffuse cerebral volume loss. No acute intracranial  abnormality or mass effect.      This study was interpreted at [facility].      MACRO:  None      Signed by: Win Peres 6/6/2024 12:39 PM  Dictation workstation:   [id number]  XR cervical spine complete 4-5 views  Narrative: Interpreted By:  Franck Pascal,   STUDY:  XR CERVICAL SPINE COMPLETE 4-5 VIEWS; 6/6/2024 10:55 am      INDICATION:  Signs/Symptoms:Pain s/p fall.      COMPARISON:  None.      ACCESSION NUMBER(S):  [id number]      ORDERING CLINICIAN:  JACQEULYN STROUD      FINDINGS:  Multiple views of the  lumbar spine are obtained. Minimal  anterolisthesis of C6 and C7.. The vertebral body heights are  preserved. Disc space loss from C5 through C7. Endplate sclerosis and  osteophytes are seen throughout the spine. No acute  fracture-dislocation.      Impression: Discogenic degenerative changes as described.          Signed by: Franck Pascal 6/6/2024 11:12 AM  Dictation workstation:   [id number]      Physical Exam  Constitutional: NAD  Eyes: no icterus   ENMT: mucous membranes moist  Head/Neck: supple  Resp/thorax: CTA bilat, no cough, on RA  C/V: RRR, murmur auscultated throughout chest  : external catheter  GI: S/ND/NT  M/S: bandage on right foot  Extremities: no edema  Neurological: non-focal  Skin: Warm and dry    Relevant Results             Scheduled medications  ceFAZolin, 2 g, intravenous, q8h  clopidogrel, 75 mg, oral, Daily  cyclobenzaprine, 5 mg, oral, TID  docusate sodium, 100 mg, oral, BID  dorzolamide-timoloL, 1 drop, Left Eye, BID  enoxaparin, 40 mg, subcutaneous, q24h  insulin glargine, 10 Units, subcutaneous, Once  insulin glargine, 25 Units, subcutaneous, q24h  insulin lispro, 0-10 Units, subcutaneous, TID  latanoprost, 1 drop, Both  Eyes, Nightly  lidocaine, 2 patch, transdermal, Daily  metoprolol tartrate, 12.5 mg, oral, BID  pantoprazole, 40 mg, oral, Daily before breakfast  tamsulosin, 0.4 mg, oral, Daily      Continuous medications  sodium chloride 0.9%, 75 mL/hr, Last Rate: 75 mL/hr (06/07/24 0830)      PRN medications  PRN medications: acetaminophen, alum-mag hydroxide-simeth, dextrose, dextrose, glucagon, glucagon, melatonin, morphine, ondansetron, polyethylene glycol, traMADol  Results for orders placed or performed during the hospital encounter of 06/05/24 (from the past 24 hour(s))   Lavender Top   Result Value Ref Range    Extra Tube Hold for add-ons.    Hemoglobin A1c   Result Value Ref Range    Hemoglobin A1C 8.7 (H) see below %    Estimated Average Glucose 203 Not Established mg/dL   Vancomycin, Trough   Result Value Ref Range    Vancomycin, Trough 5.2 5.0 - 20.0 ug/mL   Basic Metabolic Panel   Result Value Ref Range    Glucose 291 (H) 74 - 99 mg/dL    Sodium 129 (L) 136 - 145 mmol/L    Potassium 4.3 3.5 - 5.3 mmol/L    Chloride 94 (L) 98 - 107 mmol/L    Bicarbonate 21 21 - 32 mmol/L    Anion Gap 18 10 - 20 mmol/L    Urea Nitrogen 33 (H) 6 - 23 mg/dL    Creatinine 1.14 0.50 - 1.30 mg/dL    eGFR 61 >60 mL/min/1.73m*2    Calcium 8.5 (L) 8.6 - 10.3 mg/dL   POCT GLUCOSE   Result Value Ref Range    POCT Glucose 287 (H) 74 - 99 mg/dL   POCT GLUCOSE   Result Value Ref Range    POCT Glucose 309 (H) 74 - 99 mg/dL   Basic metabolic panel   Result Value Ref Range    Glucose 186 (H) 74 - 99 mg/dL    Sodium 126 (L) 136 - 145 mmol/L    Potassium 3.5 3.5 - 5.3 mmol/L    Chloride 94 (L) 98 - 107 mmol/L    Bicarbonate 24 21 - 32 mmol/L    Anion Gap 12 10 - 20 mmol/L    Urea Nitrogen 32 (H) 6 - 23 mg/dL    Creatinine 0.98 0.50 - 1.30 mg/dL    eGFR 74 >60 mL/min/1.73m*2    Calcium 8.4 (L) 8.6 - 10.3 mg/dL   CBC   Result Value Ref Range    WBC 18.0 (H) 4.4 - 11.3 x10*3/uL    nRBC 0.0 0.0 - 0.0 /100 WBCs    RBC 4.26 (L) 4.50 - 5.90 x10*6/uL     Hemoglobin 13.2 (L) 13.5 - 17.5 g/dL    Hematocrit 40.0 (L) 41.0 - 52.0 %    MCV 94 80 - 100 fL    MCH 31.0 26.0 - 34.0 pg    MCHC 33.0 32.0 - 36.0 g/dL    RDW 12.6 11.5 - 14.5 %    Platelets 218 150 - 450 x10*3/uL   POCT GLUCOSE   Result Value Ref Range    POCT Glucose 210 (H) 74 - 99 mg/dL   Urinalysis with Reflex Culture and Microscopic   Result Value Ref Range    Color, Urine Yellow Light-Yellow, Yellow, Dark-Yellow    Appearance, Urine Turbid (N) Clear    Specific Gravity, Urine 1.035 1.005 - 1.035    pH, Urine 5.5 5.0, 5.5, 6.0, 6.5, 7.0, 7.5, 8.0    Protein, Urine 50 (1+) (A) NEGATIVE, 10 (TRACE), 20 (TRACE) mg/dL    Glucose, Urine OVER (4+) (A) Normal mg/dL    Blood, Urine 0.03 (TRACE) (A) NEGATIVE    Ketones, Urine 10 (1+) (A) NEGATIVE mg/dL    Bilirubin, Urine NEGATIVE NEGATIVE    Urobilinogen, Urine Normal Normal mg/dL    Nitrite, Urine NEGATIVE NEGATIVE    Leukocyte Esterase, Urine 500 Stephan/µL (A) NEGATIVE   Sodium, Urine Random   Result Value Ref Range    Sodium, Urine Random 20 mmol/L    Creatinine, Urine Random 78.9 20.0 - 370.0 mg/dL    Sodium/Creatinine Ratio 25 Not established. mmol/g Creat   Chloride, Urine Random   Result Value Ref Range    Chloride, Urine Random 18 mmol/L    Creatinine, Urine Random 79.7 20.0 - 370.0 mg/dL    Chloride/Creatinine Ratio 23 23 - 275 mmol/g creat   Microscopic Only, Urine   Result Value Ref Range    WBC, Urine 11-20 (A) 1-5, NONE /HPF    WBC Clumps, Urine FEW Reference range not established. /HPF    RBC, Urine 1-2 NONE, 1-2, 3-5 /HPF    Squamous Epithelial Cells, Urine 10-25 (FEW) Reference range not established. /HPF    Bacteria, Urine 1+ (A) NONE SEEN /HPF    Budding Yeast, Urine PRESENT (A) NONE /HPF    Mucus, Urine FEW Reference range not established. /LPF   Uric Acid   Result Value Ref Range    Uric Acid 3.7 (L) 4.0 - 7.5 mg/dL   TSH with reflex to Free T4 if abnormal   Result Value Ref Range    Thyroid Stimulating Hormone 1.12 0.44 - 3.98 mIU/L   POCT GLUCOSE    Result Value Ref Range    POCT Glucose 160 (H) 74 - 99 mg/dL       Assessment/Plan   This patient currently has cardiac telemetry ordered; if you would like to modify or discontinue the telemetry order, click here to go to the orders activity to modify/discontinue the order.    Patient is an 89-year-old male with PMH of osteomyelitis, PAD s/p right TMA on Plavix,  CAD s/p CABGx3 3/2/2022, HFrEF, aortic stenosis, bladder cancer, T2DM, OA of shoulder and knees, BPH, HLD, HTN, GERD, and glaucoma presents to [facility] ED from wound clinic where he was being treated for osteomyelitis of the right foot from previous admission in March 2024. Patient said for the past 1 week he had increasing weakness and lethargy and WBC count taken in the wound clinic remained elevated. Over the weekend he also suffered a fall and hit his head, and complains of MSK pain in his neck, shoulders, . ED workup notable for leukocytosis of 17, CRP 28, ESR 93, lactate 2.2, and XR showing progressive osteomyelitis of the 4th metatarsal with overlying soft tissue infection. Patient was started on IV abx and admitted to [facility] for further management.      Osteomyelitis Right 4th Metatarsal  Bacteremia with Staph Aureus  PAD s/p right TMA on Plavix  Leukocytosis  Sepsis (leukocytosis, fever, elevated lactate)  -WBC 18, no fever  -ID following, continue IV Ancef  -Podiatry following, PVRs show mild multivessel disease bilaterally, CT pending (no MRI due to pacemaker), surgery planned early next week  -Continue plavix for PAD    Generalized Pain Head/Neck/Back s/p fall  -CT head/spine negative, hand XR negative for fractures  -Continue PRN tramadol, flexeril, heating pad    T2DM  -increased lispro to 0-10u on sliding scale, continue 25u glargine HS  -A1c 8.7% on 6/6/2024    Hyponatremia  -Na 126, start IV saline, UA and urine osmols pending, continue to monitor    Glaucoma  -Continue Cosopt    Hypertension  -Continue metoprolol    BPH  -Continue  Flomax    GERD  -Continue Protonix    DVT Prophylaxis  -Continue lovenox    Discharge Planning  -PT/OT consulted                Mauri Ruffin PA-C

## 2024-06-07 NOTE — PROGRESS NOTES
06/07/24 1103   Discharge Planning   Patient expects to be discharged to: [facility] vs SNF     Podiatry discussing possible amputation with patient--right TM4 amputation vs right BKA.  Anticipate patient will need pt/ot after amputation.  Anticipate need for SNF--asked [facility] Luna to choice patient for possible SNF placement.  Podiatry and ID following.  Following wound and blood cultures.  TCC will continue to follow for discharge planning needs.

## 2024-06-07 NOTE — PROGRESS NOTES
"  INFECTIOUS DISEASE DAILY PROGRESS NOTE    SUBJECTIVE:    No overnight events. Afebrile. No rash/itching/diarrhea. He is still feeling lethargic, weak, achy.    OBJECTIVE:  VITALS (Last 24 Hours)  /68   Pulse 68   Temp 36.9 °C (98.4 °F) (Temporal)   Resp 15   Ht 1.778 m (5' 10\")   Wt 64.5 kg (142 lb 3.2 oz)   SpO2 93%   BMI 20.40 kg/m²     PHYSICAL EXAM:  Gen - NAD  Heart - RRR  Lungs - no wheezing  Abd - soft, no ttp, BS present  Right Foot - dressing present, no signs of ascending infection  Skin - no rash    ABX: IV Vanc/Zosyn    LABS:  Lab Results   Component Value Date    WBC 18.0 (H) 06/07/2024    HGB 13.2 (L) 06/07/2024    HCT 40.0 (L) 06/07/2024    MCV 94 06/07/2024     06/07/2024     Lab Results   Component Value Date    GLUCOSE 186 (H) 06/07/2024    CALCIUM 8.4 (L) 06/07/2024     (L) 06/07/2024    K 3.5 06/07/2024    CO2 24 06/07/2024    CL 94 (L) 06/07/2024    BUN 32 (H) 06/07/2024    CREATININE 0.98 06/07/2024     Results from last 72 hours   Lab Units 06/05/24  1136   ALK PHOS U/L 204*   BILIRUBIN TOTAL mg/dL 0.8   PROTEIN TOTAL g/dL 7.0   ALT U/L 19   AST U/L 15   ALBUMIN g/dL 3.2*     Estimated Creatinine Clearance: 46.6 mL/min (by C-G formula based on SCr of 0.98 mg/dL).    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    Changed abx to IV Cefazolin 2g Q8H.     Ordered for repeat blood cx x2 tomorrow AM. TTE pending for endocarditis evaluation.     Monitoring for adverse effects of abx such as rash/itching/diarrhea - none so far.     Will follow peripherally over the weekend. Please call Dr. Devi with questions. Thanks!    Berny " MD Nicolas  [facility]  Office #[phone]

## 2024-06-07 NOTE — PROGRESS NOTES
Discussed operation risks goals  And  Alternatives in detail  With  The  Patient  And her   again.  She appears to understand and wishes  To proceed.  15 minutes face to face time greater than 50%  In consultation.   PODIATRY PROGRESS NOTE    SERVICE DATE: 6/7/2024   SERVICE TIME:  12:55 PM      REASON FOR CONSULT: right 4th toe osteomyelitis   REQUESTING PHYSICIAN: Darnell Yip MD   PRIMARY CARE PHYSICIAN: Olesya Daniel MD    Subjective   HPI:  Pt seen at bedside.  No overnight events.  Pt admits to some pain to operative extremity.  Has been elevating foot on pillow.  Pt denies any constitutional symptoms.   No other pedal complaints at this time.   Discussed PVR Results With Patient and his wife    Podiatry was consulted for right 4th toe osteomyelitis .    ROS: 10-point review of systems was performed and is otherwise negative except as noted in HPI.  PMH: Reviewed/documented below.  PSH:  Noncontributory except per HPI   FH: Reviewed and noncontributory   SOCIAL:  Reviewed/documented below.  ALLERGIES: Reviewed/documented below.  MEDS: Reviewed/documented below.  VS: Reviewed/documented below.    Past Medical History:   Diagnosis Date    Abnormal EKG 05/01/2023    BPH (benign prostatic hyperplasia)     Chronic systolic heart failure (Multi)     COVID-19 05/01/2023    Essential hypertension     GERD (gastroesophageal reflux disease)     Hypertensive heart disease with heart failure (Multi)     Osteoarthritis     Osteomyelitis (Multi)     Peripheral vascular disease, unspecified (CMS-[facility]) 11/28/2022    PVD (peripheral vascular disease)    Personal history of malignant neoplasm of bladder     History of carcinoma of bladder    Personal history of other diseases of the musculoskeletal system and connective tissue     History of arthritis    Personal history of other endocrine, nutritional and metabolic disease     History of diabetes mellitus    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    Controlled diabetes mellitus with circulatory complication     Past Surgical History:   Procedure Laterality Date    CARDIAC  SURGERY      IR  BLADDER ASPIRATION  06/23/2017    IR  BLADDER ASPIRATION 6/23/2017 [facility] [place] INPATIENT LEGACY    OTHER SURGICAL HISTORY  06/19/2017    Surgery Penile Prosthetic Device    WRIST SURGERY Right      Family History   Problem Relation Name Age of Onset    No Known Problems Mother      Heart disease Father      Tuberculosis Maternal Grandmother       Social History     Tobacco Use    Smoking status: Never    Smokeless tobacco: Never   Substance Use Topics    Alcohol use: Not Currently    Drug use: Never      Medications Prior to Admission   Medication Sig Dispense Refill Last Dose    clopidogrel (Plavix) 75 mg tablet TAKE ONE TABLET BY MOUTH EVERY DAY 90 tablet 0 6/4/2024    cyanocobalamin (Vitamin B-12) 500 mcg tablet 1 tablet DAILY (route: oral)   6/4/2024    dapagliflozin propanediol (Farxiga) 10 mg Take 1 tablet (10 mg) by mouth once daily. 100 tablet 1 6/4/2024    docusate sodium (Colace) 100 mg capsule Take 1 capsule (100 mg) by mouth 2 times a day as needed for constipation.   Past Week    dorzolamide-timoloL (Cosopt) 22.3-6.8 mg/mL ophthalmic solution Administer 1 drop into the left eye 2 times a day.   6/4/2024    enalapril (Vasotec) 2.5 mg tablet Take 1 tablet (2.5 mg) by mouth once daily. 90 tablet 1 6/4/2024    insulin glargine (Lantus) 100 unit/mL injection Inject 25 Units under the skin once every 24 hours. Take as directed per insulin instructions. 5 mL 12 6/4/2024    latanoprost (Xalatan) 0.005 % ophthalmic solution instill 1 drop into each eye every evening   6/4/2024    lidocaine 4 % patch Place 2 patches over 12 hours on the skin once daily. Remove & discard patch within 12 hours or as directed by MD.   Past Week    metoprolol tartrate (Lopressor) 25 mg tablet Take 0.5 tablets (12.5 mg) by mouth 2 times a day. 90 tablet 3 6/4/2024    multivitamin tablet Take 1 tablet by mouth once daily.   6/4/2024    niacin 500 mg tablet Take 1 tablet (500 mg) by mouth once daily.   Past Week     "omeprazole (PriLOSEC) 20 mg DR capsule Take 1 capsule (20 mg) by mouth once daily. 90 capsule 1 6/4/2024    pen needle, diabetic (UltiCare Pen Needle) 31 gauge x 1/4\" needle USE 1 PEN NEEDLE SUBCUTANEOUSLY 3 TIMES DAILY BEFORE MEALS 100 each 1 Past Week    polyethylene glycol (Glycolax, Miralax) 17 gram packet Take 17 g by mouth once daily as needed (constipation).   Past Week    tamsulosin (Flomax) 0.4 mg 24 hr capsule 1 capsule (0.4 mg).   6/4/2024    alpha tocopherol (Vitamin E) 268 mg (400 unit) capsule Take 1 capsule (400 Units) by mouth once daily.       alum-mag hydroxide-simeth (Mylanta) 200-200-20 mg/5 mL oral suspension Take 30 mL by mouth every 4 hours if needed.       ampicillin-sulbactam 3 gram injection        ascorbic acid (Vitamin C) 1,000 mg tablet Take 1 tablet (1,000 mg) by mouth once daily.       blood sugar diagnostic (OneTouch Ultra Test) strip Check glucose 4 x per day 400 each 3     cholecalciferol (Vitamin D-3) 125 MCG (5000 UT) capsule Take 1 capsule (125 mcg) by mouth once daily.       [START ON 6/9/2024] dulaglutide (Trulicity) 0.75 mg/0.5 mL pen injector Inject 0.75 mg under the skin 1 (one) time per week. 2 mL 1         Medications:  Scheduled Meds: ceFAZolin, 2 g, intravenous, q8h  clopidogrel, 75 mg, oral, Daily  cyclobenzaprine, 5 mg, oral, TID  docusate sodium, 100 mg, oral, BID  dorzolamide-timoloL, 1 drop, Left Eye, BID  enoxaparin, 40 mg, subcutaneous, q24h  insulin glargine, 10 Units, subcutaneous, Once  insulin glargine, 25 Units, subcutaneous, q24h  insulin lispro, 0-10 Units, subcutaneous, TID  latanoprost, 1 drop, Both Eyes, Nightly  lidocaine, 2 patch, transdermal, Daily  metoprolol tartrate, 12.5 mg, oral, BID  pantoprazole, 40 mg, oral, Daily before breakfast  tamsulosin, 0.4 mg, oral, Daily      Continuous Infusions: sodium chloride 0.9%, 75 mL/hr, Last Rate: 75 mL/hr (06/07/24 0830)      PRN Meds: PRN medications: acetaminophen, alum-mag hydroxide-simeth, dextrose, " dextrose, glucagon, glucagon, melatonin, ondansetron, polyethylene glycol, traMADol    Allergies as of 06/05/2024 - Reviewed 06/05/2024   Allergen Reaction Noted    Glimepiride Unknown 08/11/2006    Lisinopril Unknown 02/22/2011    Metformin Unknown 08/11/2006    Pioglitazone Unknown 08/11/2006    Statins-hmg-coa reductase inhibitors Unknown 05/01/2023            Objective   PHYSICAL EXAM:  Physical Exam Performed:  Vitals:    06/07/24 1145   BP: 124/68   Pulse: 68   Resp: 15   Temp: 36.9 °C (98.4 °F)   SpO2: 93%     Body mass index is 20.4 kg/m².    Patient is AOx3 and in no acute distress. Patient is alert and cooperative. Sitting comfortably in bed with dressing clean, dry and intact. Heel off-loading boots in place B/L.    Vascular: Faintly palpable DP/PT pulses B/L. Moderate non-pitting edema noted B/L. Hair growth absent B/L. CFT<5 to B/L hallux. Temperature is warm to cool from tibial tuberosity to distal digits B/L. No lymphatic streaking noted B/L.    Musculoskeletal: Gross active and passive ROM intact to age and activity level. Moves all extremities spontaneously. pain to palpation to right foot [place]-wound area  Neurological: Intact light touch sensation B/L. Pain stimuli diminished B/L. Denies any numbness, burning or tingling.    Dermatologic: Nails 1-5 are within normal limits for thickness and length B/L. Skin appears diffusely xerotic B/L. Web spaces 1-4 B/L are clean, dry and intact. No rashes or nodules noted B/L. No hyperkeratotic tissue noted B/L.     Wound:  Measurements: 3.8cm x 4.1cm x 0.4cm  Mixed wound base of Fibrogranular tissue with bone in the wound base tissue.   Moderate sPurulent drainage with fibrotic slough.   Moderate sal[place]-wound maceration.   Moderate [place]-wound erythema.   Minimal evidence of ascending cellulitis   Mild palpable fluctuance. Moderate malodor. Mild increased warmth.   Significant probe to bone or deep structures within the wound base.   Moderate tunneling or  tracking.   Minimal undermining. Skin edges irregular but intact.    LABS:   Results for orders placed or performed during the hospital encounter of 06/05/24 (from the past 24 hour(s))   Lavender Top   Result Value Ref Range    Extra Tube Hold for add-ons.    Hemoglobin A1c   Result Value Ref Range    Hemoglobin A1C 8.7 (H) see below %    Estimated Average Glucose 203 Not Established mg/dL   Vancomycin, Trough   Result Value Ref Range    Vancomycin, Trough 5.2 5.0 - 20.0 ug/mL   Basic Metabolic Panel   Result Value Ref Range    Glucose 291 (H) 74 - 99 mg/dL    Sodium 129 (L) 136 - 145 mmol/L    Potassium 4.3 3.5 - 5.3 mmol/L    Chloride 94 (L) 98 - 107 mmol/L    Bicarbonate 21 21 - 32 mmol/L    Anion Gap 18 10 - 20 mmol/L    Urea Nitrogen 33 (H) 6 - 23 mg/dL    Creatinine 1.14 0.50 - 1.30 mg/dL    eGFR 61 >60 mL/min/1.73m*2    Calcium 8.5 (L) 8.6 - 10.3 mg/dL   POCT GLUCOSE   Result Value Ref Range    POCT Glucose 287 (H) 74 - 99 mg/dL   POCT GLUCOSE   Result Value Ref Range    POCT Glucose 309 (H) 74 - 99 mg/dL   Basic metabolic panel   Result Value Ref Range    Glucose 186 (H) 74 - 99 mg/dL    Sodium 126 (L) 136 - 145 mmol/L    Potassium 3.5 3.5 - 5.3 mmol/L    Chloride 94 (L) 98 - 107 mmol/L    Bicarbonate 24 21 - 32 mmol/L    Anion Gap 12 10 - 20 mmol/L    Urea Nitrogen 32 (H) 6 - 23 mg/dL    Creatinine 0.98 0.50 - 1.30 mg/dL    eGFR 74 >60 mL/min/1.73m*2    Calcium 8.4 (L) 8.6 - 10.3 mg/dL   CBC   Result Value Ref Range    WBC 18.0 (H) 4.4 - 11.3 x10*3/uL    nRBC 0.0 0.0 - 0.0 /100 WBCs    RBC 4.26 (L) 4.50 - 5.90 x10*6/uL    Hemoglobin 13.2 (L) 13.5 - 17.5 g/dL    Hematocrit 40.0 (L) 41.0 - 52.0 %    MCV 94 80 - 100 fL    MCH 31.0 26.0 - 34.0 pg    MCHC 33.0 32.0 - 36.0 g/dL    RDW 12.6 11.5 - 14.5 %    Platelets 218 150 - 450 x10*3/uL   POCT GLUCOSE   Result Value Ref Range    POCT Glucose 210 (H) 74 - 99 mg/dL   Urinalysis with Reflex Culture and Microscopic   Result Value Ref Range    Color, Urine Yellow  Light-Yellow, Yellow, Dark-Yellow    Appearance, Urine Turbid (N) Clear    Specific Gravity, Urine 1.035 1.005 - 1.035    pH, Urine 5.5 5.0, 5.5, 6.0, 6.5, 7.0, 7.5, 8.0    Protein, Urine 50 (1+) (A) NEGATIVE, 10 (TRACE), 20 (TRACE) mg/dL    Glucose, Urine OVER (4+) (A) Normal mg/dL    Blood, Urine 0.03 (TRACE) (A) NEGATIVE    Ketones, Urine 10 (1+) (A) NEGATIVE mg/dL    Bilirubin, Urine NEGATIVE NEGATIVE    Urobilinogen, Urine Normal Normal mg/dL    Nitrite, Urine NEGATIVE NEGATIVE    Leukocyte Esterase, Urine 500 Stephan/µL (A) NEGATIVE   Sodium, Urine Random   Result Value Ref Range    Sodium, Urine Random 20 mmol/L    Creatinine, Urine Random 78.9 20.0 - 370.0 mg/dL    Sodium/Creatinine Ratio 25 Not established. mmol/g Creat   Chloride, Urine Random   Result Value Ref Range    Chloride, Urine Random 18 mmol/L    Creatinine, Urine Random 79.7 20.0 - 370.0 mg/dL    Chloride/Creatinine Ratio 23 23 - 275 mmol/g creat   Microscopic Only, Urine   Result Value Ref Range    WBC, Urine 11-20 (A) 1-5, NONE /HPF    WBC Clumps, Urine FEW Reference range not established. /HPF    RBC, Urine 1-2 NONE, 1-2, 3-5 /HPF    Squamous Epithelial Cells, Urine 10-25 (FEW) Reference range not established. /HPF    Bacteria, Urine 1+ (A) NONE SEEN /HPF    Budding Yeast, Urine PRESENT (A) NONE /HPF    Mucus, Urine FEW Reference range not established. /LPF   Uric Acid   Result Value Ref Range    Uric Acid 3.7 (L) 4.0 - 7.5 mg/dL   TSH with reflex to Free T4 if abnormal   Result Value Ref Range    Thyroid Stimulating Hormone 1.12 0.44 - 3.98 mIU/L   POCT GLUCOSE   Result Value Ref Range    POCT Glucose 160 (H) 74 - 99 mg/dL      Lab Results   Component Value Date    HGBA1C 8.7 (H) 06/06/2024      Lab Results   Component Value Date    CRP 28.95 (H) 06/05/2024      Lab Results   Component Value Date    SEDRATE 93 (H) 06/05/2024        Results from last 7 days   Lab Units 06/07/24  0438   WBC AUTO x10*3/uL 18.0*   RBC AUTO x10*6/uL 4.26*    HEMOGLOBIN g/dL 13.2*   HEMATOCRIT % 40.0*     Results from last 7 days   Lab Units 06/07/24  0438 06/05/24  1305 06/05/24  1136   SODIUM mmol/L 126*   < > 131*   POTASSIUM mmol/L 3.5   < > 4.7   CHLORIDE mmol/L 94*   < > 95*   CO2 mmol/L 24   < > 23   BUN mg/dL 32*   < > 30*   CREATININE mg/dL 0.98   < > 1.32*   CALCIUM mg/dL 8.4*   < > 9.1   BILIRUBIN TOTAL mg/dL  --   --  0.8   ALT U/L  --   --  19   AST U/L  --   --  15    < > = values in this interval not displayed.     Results from last 7 days   Lab Units 06/07/24  0943   COLOR U  Yellow   APPEARANCE U  Turbid*   PH U  5.5   SPEC GRAV UR  1.035   PROTEIN U mg/dL 50 (1+)*   BLOOD UR  0.03 (TRACE)*   NITRITE U  NEGATIVE   WBC UR /HPF 11-20*   BACTERIA UR /HPF 1+*       IMAGING REVIEW:  Vascular US PVR Without Exercise    Result Date: 6/6/2024             [place] [id number]   Tel [phone] and Fax [phone]  Vascular Lab Report VASC US PVR WITHOUT EXERCISE  Patient Name:      MAYELIN OLSON    Reading Physician:  [id number] Sylvester Nguyen DO Study Date:        6/6/2024            Ordering Physician: [id number] GERALD HANKS MRN/PID:           [id number]            Technologist:       Benjamin Godinez T Accession#:        [id number]        Technologist 2: Date of Birth/Age: 11/8/1934 / 89      Encounter#:         [id number]                    years Gender:            M Admission Status:  Outpatient          Location Performed: [facility]  Diagnosis/ICD: Peripheral vascular disease, unspecified-I73.9; Stricture of                artery-I77.1 CPT Codes:     62519 Peripheral artery PVR (multi segmental pressure  CONCLUSIONS: Right Lower PVR: Right pressures of >220 mmHg suggest no compressibility of vessels and may make absolute Segmental Limb Pressures (SLP) unreliable. There is evidence of mild multi vessel disease. Monophasic flow is noted in the right  posterior tibial artery, right dorsalis pedis artery and right popliteal artery. Multiphasic flow is noted in the right common femoral artery. Right leg results called per waveforms due to calcified and partially calcified arterial pressures. Right trasmetatarsal amputation. Left Lower PVR: Left pressures of >220 mmHg suggest no compressibility of vessels and may make absolute Segmental Limb Pressures (SLP) unreliable. There is evidence of moderate multi-vessel disease. Decreased digital perfusion noted. Monophasic flow is noted in the left posterior tibial artery and left dorsalis pedis artery. Multiphasic flow is noted in the left common femoral artery and left popliteal artery. Left leg results called per waveforms due to calcified and partially calcified arterial pressures.  Comparison: Compared with study from 6/12/2023, On todays exam the left leg demonstates moderate mutivessel arterial occlusive disease.  Imaging & Doppler Findings:  RIGHT Lower PVR                Pressures Ratios Right High Thigh               255 mmHg  2.11 Right Low Thigh                188 mmHg  1.55 Right Calf                     137 mmHg  1.13 Right Posterior Tibial (Ankle) 43 mmHg   0.36 Right Dorsalis Pedis (Ankle)   178 mmHg  1.47   LEFT Lower PVR                Pressures Ratios Left High Thigh               184 mmHg  1.52 Left Low Thigh                176 mmHg  1.45 Left Calf                     142 mmHg  1.17 Left Posterior Tibial (Ankle) 46 mmHg   0.38 Left Dorsalis Pedis (Ankle)   255 mmHg  2.11 Left Digit (Great Toe)        32 mmHg   0.26                     Right     Left Brachial Pressure 121 mmHg 108 mmHg   [id number] Sylvester Nguyen DO Electronically signed by [id number] Sylvester Nguyen DO on 6/6/2024 at 2:30:19 PM  ** Final **     XR hand left 3+ views    Result Date: 6/6/2024  Interpreted By:  Lara Severino, STUDY: XR HAND LEFT 3+ VIEWS;  6/6/2024 1:42 pm   INDICATION: Signs/Symptoms:Pain post fall.   COMPARISON: None.   ACCESSION  NUMBER(S): [id number]   ORDERING CLINICIAN: JACQUELYN STROUD   FINDINGS: 3 portable views of the left hand obtained.   No acute fracture or osseous displacement. Radiocarpal joint space narrowing and chondrocalcinosis in the ulnocarpal joint space. Multifocal degenerative changes including joint space narrowing and spurring including the 1st CMC, 1st through 5th MCP and multiple interphalangeal joints. Prominent lucent probable subchondral cyst/geode in the trapezium. Soft tissue swelling in the 2nd digit. Multifocal soft tissue presumed vascular calcifications.       No acute fracture. Soft tissue swelling in the 2nd digit. Multifocal productive arthritic changes noted.   MACRO: None.   Signed by: Lara Severino 6/6/2024 1:50 PM Dictation workstation:   [id number]    CT head wo IV contrast    Result Date: 6/6/2024  Interpreted By:  Win Peres, STUDY: CT HEAD WO IV CONTRAST; ;  6/6/2024 12:16 pm   INDICATION: Signs/Symptoms:Head injury.   COMPARISON: CT head from 03/19/2024.   ACCESSION NUMBER(S): [id number]   ORDERING CLINICIAN: JACQUELYN STROUD   TECHNIQUE: Routine unenhanced CT of the head was performed.   FINDINGS: There is no acute intracranial hemorrhage or mass effect. There is no abnormal extra-axial fluid collection. The ventricles, sulci, and basilar cisterns are prominent in size due to age related diffuse cerebral volume loss.   There are regions of hypoattenuation within the bilateral cerebral hemispheric white matter which are probably sequela of chronic small vessel ischemic changes. Gray-white differentiation is maintained throughout.   There are no acute calvarial fractures. The visualized paranasal sinuses and mastoid air cells are essentially clear.   Degenerative pannus formation at the level of C1-C2.       Findings of probable chronic small vessel ischemic changes and age related diffuse cerebral volume loss. No acute intracranial abnormality or mass effect.   This study was interpreted at  [facility].   MACRO: None   Signed by: Win Peres 6/6/2024 12:39 PM Dictation workstation:   [id number]    XR cervical spine complete 4-5 views    Result Date: 6/6/2024  Interpreted By:  Franck Pascal, STUDY: XR CERVICAL SPINE COMPLETE 4-5 VIEWS; 6/6/2024 10:55 am   INDICATION: Signs/Symptoms:Pain s/p fall.   COMPARISON: None.   ACCESSION NUMBER(S): [id number]   ORDERING CLINICIAN: JACQUELYN STROUD   FINDINGS: Multiple views of the  lumbar spine are obtained. Minimal anterolisthesis of C6 and C7.. The vertebral body heights are preserved. Disc space loss from C5 through C7. Endplate sclerosis and osteophytes are seen throughout the spine. No acute fracture-dislocation.       Discogenic degenerative changes as described.     Signed by: Franck Pascal 6/6/2024 11:12 AM Dictation workstation:   [id number]    ECG 12 lead    Result Date: 6/5/2024  Sinus rhythm with 1st degree AV block Right bundle branch block Left anterior fascicular block  Bifascicular block  Abnormal ECG When compared with ECG of 19-MAR-2024 19:08, No significant change was found    XR foot right 3+ views    Result Date: 6/5/2024  Interpreted By:  Shalini Wright, STUDY: XR FOOT RIGHT 3+ VIEWS; ;  6/5/2024 11:54 am   INDICATION: Signs/Symptoms:Foot wound..   COMPARISON: Right foot x-ray series and CT 03/19/2024.   ACCESSION NUMBER(S): [id number]   ORDERING CLINICIAN: ALLAN VIGIL   FINDINGS: Bones are osteopenic. No acute fracture or dislocation.   Postoperative changes of prior transmetatarsal forefoot amputation and resection of the 5th metatarsal are again seen. There is new lucency due to osseous erosion the lateral and plantar aspect of the 4th metatarsal shaft with some cortical irregularity at the tip of the amputation margin. This underlies an irregular ulcerative soft tissue wound with adjacent soft tissue swelling.   Soft tissue swelling around the foot and ankle and vascular calcifications are again  seen.       1. Findings consistent with progressive osteomyelitis of the 4th metatarsal bone with brown osseous erosion of the lateral and plantar aspect of the shaft with overlying soft tissue swelling and ulceration consistent with persistent overlying soft tissue infection. If it would alter clinical management, an MRI may be obtained.   2. Redemonstrated transmetatarsal amputation of the forefoot and 5th metatarsal resection.   MACRO: None   Signed by: Shalini Wright 6/5/2024 12:19 PM Dictation workstation:   [id number]    XR chest 1 view    Result Date: 6/5/2024  Interpreted By:  Shalini Wright, STUDY: XR CHEST 1 VIEW;  6/5/2024 11:54 am   INDICATION: Signs/Symptoms:SOB.   COMPARISON: Chest x-ray 03/19/2024 and 06/17/2023. CT chest 06/07/2023..   ACCESSION NUMBER(S): [id number]   ORDERING CLINICIAN: ALLAN VIGIL   FINDINGS: AP radiograph of the chest was provided.   Median sternotomy wires are present.   CARDIOMEDIASTINAL SILHOUETTE: Cardiomediastinal silhouette is normal in size and configuration.   LUNGS and PLEURA: No focal consolidation or other airspace opacity. No pleural effusion or pneumothorax.   ABDOMEN: No remarkable upper abdominal findings.   BONES: No acute osseous changes.       1.  No evidence of acute cardiopulmonary process.       MACRO: None   Signed by: Shalini Wright 6/5/2024 12:09 PM Dictation workstation:   [id number]            Assessment/Plan   ASSESSMENT & PLAN:    #Osteomyelitis right foot  #Sepsis  #PVD  #T2DM  #HTN  - Patient was seen and evaluated; all findings were discussed and all questions were answered to patient's satisfaction.  - Charts, labs, vitals and imaging all reviewed.   - Imaging: Xray Right foot: Possible OM to 4th metatarsal  CT Pending  - Labs: WBC: 19.5,   - PVRs: Left leg moderate PAD Right leg Mild PVD  - Wound culture: Pending  - Blood culture: Pending    Plan:  - Abx: Per Primary  - Pain Regimen: Per Primary  - Bowel Regimen: Per Primary  - Discussed PVR  results with patient and Wife  - Likely to require surgical intervention with debridement of soft tissue and bone this hospital visit. Discussed this in detail with patient as well as risks and benefits   - Will review CT and discuss with family moving forward  - Patient will either need a proximal Right TMA amputation or a Right BKA amputation, pending CT results and patients wishes.  - Dressings: Betadine soaked 4x4's, 4x4's, Krelix, ACE wrap.  - Nursing staff is able to change/reinforce dressing if & as necessary until next day’s dressing change. Thank you.  - Podiatry will continue to follow while in house.      Weightbearing: WBAT      Case to be discussed with attending, A&P above reflects a tentative plan. Please await for the final signature from the attending physician on service.    Eladio Chaves  PGY-2  Podiatric Medicine & Surgery  Please Mesfinku message me with any questions or concerns.            SIGNATURE: Eladio Chaves DPM PATIENT NAME: Rene Ballard   DATE: June 7, 2024 MRN: [id number]   TIME: 12:55 PM CONTACT: Haiku message     I saw and evaluated the patient. I personally obtained the key and critical portions of the history and physical exam or was physically present for key and critical portions performed by the resident/fellow. I reviewed the resident/fellow's documentation and discussed the patient with the resident/fellow. I agree with the resident/fellow's medical decision making as documented in the note.    Please refer to prior note with plan of care for this patient. Awaiting family decision regarding surgical vs surgical care.    I spent 45 minutes in the professional and overall care of this patient.    Robbie Islas DPM

## 2024-06-07 NOTE — PROGRESS NOTES
Rene Ballard is a 89 y.o. male     Patient has redness to his left hand that developed this morning  Also complains of pain around the neck area  Tramadol not helping  More lethargic today  Family good friend of his passed away this morning    Review of Systems     Constitutional: Feeling poorly  Cardiovascular: no chest pain   Respiratory: no cough, wheezing or shortness of breath a  Gastrointestinal: no abdominal pain, no constipation, no melena, no nausea, no diarrhea, no vomiting and no blood in stools.   Neurological: no headache,   All other systems have been reviewed and are negative for complaint.       Vitals:    06/07/24 1526   BP: 108/56   Pulse:    Resp: 14   Temp: 36.5 °C (97.7 °F)   SpO2: 93%        Scheduled medications  ceFAZolin, 2 g, intravenous, q8h  clopidogrel, 75 mg, oral, Daily  cyclobenzaprine, 5 mg, oral, TID  docusate sodium, 100 mg, oral, BID  dorzolamide-timoloL, 1 drop, Left Eye, BID  enoxaparin, 40 mg, subcutaneous, q24h  insulin glargine, 10 Units, subcutaneous, Once  insulin glargine, 25 Units, subcutaneous, q24h  insulin lispro, 0-10 Units, subcutaneous, TID  latanoprost, 1 drop, Both Eyes, Nightly  lidocaine, 2 patch, transdermal, Daily  metoprolol tartrate, 12.5 mg, oral, BID  pantoprazole, 40 mg, oral, Daily before breakfast  tamsulosin, 0.4 mg, oral, Daily      Continuous medications  sodium chloride 0.9%, 75 mL/hr, Last Rate: 75 mL/hr (06/07/24 0830)      PRN medications  PRN medications: acetaminophen, alum-mag hydroxide-simeth, dextrose, dextrose, glucagon, glucagon, melatonin, morphine, ondansetron, polyethylene glycol, traMADol    Lab Review   Results from last 7 days   Lab Units 06/07/24  0438 06/06/24  0442 06/05/24  1136   WBC AUTO x10*3/uL 18.0* 19.5* 17.8*   HEMOGLOBIN g/dL 13.2* 12.5* 13.0*   HEMATOCRIT % 40.0* 37.9* 40.0*   PLATELETS AUTO x10*3/uL 218 195 201     Results from last 7 days   Lab Units 06/07/24  0438 06/06/24  1503 06/05/24  1305 06/05/24  1132    SODIUM mmol/L 126* 129* 133* 131*   POTASSIUM mmol/L 3.5 4.3 4.5 4.7   CHLORIDE mmol/L 94* 94* 97* 95*   CO2 mmol/L 24 21 21 23   BUN mg/dL 32* 33* 32* 30*   CREATININE mg/dL 0.98 1.14 1.33* 1.32*   CALCIUM mg/dL 8.4* 8.5* 8.9 9.1   PROTEIN TOTAL g/dL  --   --   --  7.0   BILIRUBIN TOTAL mg/dL  --   --   --  0.8   ALK PHOS U/L  --   --   --  204*   ALT U/L  --   --   --  19   AST U/L  --   --   --  15   GLUCOSE mg/dL 186* 291* 463* 524*            CT foot right wo IV contrast   Final Result   1. Findings compatible with osteomyelitis involving the remaining 4th   metatarsal. Other regions of osteomyelitis are not entirely excluded   on the basis of this exam and MRI may be helpful for further   assessment.   2. Wound/ulcer on the distal lateral side of the remaining foot.   3. [place] increased density in the subcutaneous tissues of the   distal remaining foot greatest on the lateral side which may   represent confluence cellulitis. Underlying focal collection is not   excluded on the basis of this noncontrast examination. There are some   bubbles of gas in the soft tissues which may be due to open nature of   process although gas-forming infection is not entirely excluded.   4. Degree of more generalized reticular edema in the subcutaneous   tissues which may represent lymphedema and/or cellulitis.        MACRO:   none        Signed by: Noah Ladd 6/7/2024 12:54 PM   Dictation workstation:   [id number]      XR hand left 3+ views   Final Result   No acute fracture. Soft tissue swelling in the 2nd digit. Multifocal   productive arthritic changes noted.        MACRO:   None.        Signed by: Lara Severino 6/6/2024 1:50 PM   Dictation workstation:   [id number]      CT head wo IV contrast   Final Result   Findings of probable chronic small vessel ischemic changes and age   related diffuse cerebral volume loss. No acute intracranial   abnormality or mass effect.        This study was interpreted at  [facility].        MACRO:   None        Signed by: Win Peres 6/6/2024 12:39 PM   Dictation workstation:   [id number]      XR cervical spine complete 4-5 views   Final Result   Discogenic degenerative changes as described.             Signed by: Franck Pascal 6/6/2024 11:12 AM   Dictation workstation:   [id number]      Vascular US PVR Without Exercise   Final Result      XR foot right 3+ views   Final Result   1. Findings consistent with progressive osteomyelitis of the 4th   metatarsal bone with brown osseous erosion of the lateral and plantar   aspect of the shaft with overlying soft tissue swelling and   ulceration consistent with persistent overlying soft tissue   infection. If it would alter clinical management, an MRI may be   obtained.        2. 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        MACRO:   None        Signed by: Shalini Wright 6/5/2024 12:19 PM   Dictation workstation:   [id number]      XR chest 1 view   Final Result   1.  No evidence of acute cardiopulmonary process.                  MACRO:   None        Signed by: Shalini Wright 6/5/2024 12:09 PM   Dictation workstation:   [id number]      Transthoracic Echo (TTE) Limited    (Results Pending)         Physical Exam     Constitutional   General appearance: Appears tired    Pulmonary   Respiratory assessment: No respiratory distress, normal respiratory rhythm and effort.    Auscultation of Lungs: Clear bilateral breath sounds.   Cardiovascular   Auscultation of heart: Apical pulse normal, heart rate and rhythm normal, normal S1 and S2, no murmurs and no pericardial rub.    Exam for edema: No peripheral edema.   Abdomen   Abdominal Exam: No bruits, normal bowel sounds, soft, non-tender, no abdominal mass palpated.    Liver and Spleen exam: No hepato-splenomegaly.   Musculoskeletal     Skin inspection: Left hand redness    Neurologic   Cranial nerves: Nerves 2-12  were intact, no focal neuro defects.     Assessment/Plan      #Sepsis  #Osteomyelitis  On IV cefazolin  Discussed with the patient and family about the need for an amputation for source control and treating the infection    #Left hand cellulitis  Will monitor  If redness worsens or does not improve by tomorrow we will need to add coverage for MRSA     #Poorly controlled diabetes mellitus  Sliding scale insulin    #Peripheral artery disease       #Dyslipidemia  Continue statins and maintain LDL less than 70     #Hypertension  Continue home medications     #Fall with head and neck injury  CT head negative  CT cervical spine negative  X-ray of the wrist and hand negative for fractures  Pain control  Flexeril  Heating pads

## 2024-06-07 NOTE — PROGRESS NOTES
06/07/24 1229   Discharge Planning   Patient expects to be discharged to: C vs SNF     Met with patient and daughter at bedside. Per daughter, if surgery (amputation) happens then patient will need SNF. Daughter requested referral be sent to [facility], patient has been there previously and had a positive experience. If surgery does not happen, patient will go home with home healthcare. SW will continue to follow for discharge planning needs.

## 2024-06-08 ENCOUNTER — PHARMACY VISIT (OUTPATIENT)
Dept: PHARMACY | Facility: CLINIC | Age: 89
End: 2024-06-08
Payer: COMMERCIAL

## 2024-06-08 LAB
ANION GAP SERPL CALC-SCNC: 10 MMOL/L (ref 10–20)
BACTERIA BLD AEROBE CULT: ABNORMAL
BACTERIA BLD AEROBE CULT: ABNORMAL
BACTERIA BLD CULT: ABNORMAL
BACTERIA BLD CULT: ABNORMAL
BUN SERPL-MCNC: 25 MG/DL (ref 6–23)
CALCIUM SERPL-MCNC: 8.1 MG/DL (ref 8.6–10.3)
CHLORIDE SERPL-SCNC: 98 MMOL/L (ref 98–107)
CO2 SERPL-SCNC: 25 MMOL/L (ref 21–32)
CREAT SERPL-MCNC: 0.93 MG/DL (ref 0.5–1.3)
EGFRCR SERPLBLD CKD-EPI 2021: 78 ML/MIN/1.73M*2
ERYTHROCYTE [DISTWIDTH] IN BLOOD BY AUTOMATED COUNT: 12.7 % (ref 11.5–14.5)
GLUCOSE BLD MANUAL STRIP-MCNC: 145 MG/DL (ref 74–99)
GLUCOSE BLD MANUAL STRIP-MCNC: 179 MG/DL (ref 74–99)
GLUCOSE BLD MANUAL STRIP-MCNC: 181 MG/DL (ref 74–99)
GLUCOSE BLD MANUAL STRIP-MCNC: 263 MG/DL (ref 74–99)
GLUCOSE SERPL-MCNC: 174 MG/DL (ref 74–99)
GRAM STN SPEC: ABNORMAL
HCT VFR BLD AUTO: 39.7 % (ref 41–52)
HGB BLD-MCNC: 12.9 G/DL (ref 13.5–17.5)
HOLD SPECIMEN: NORMAL
MCH RBC QN AUTO: 30.7 PG (ref 26–34)
MCHC RBC AUTO-ENTMCNC: 32.5 G/DL (ref 32–36)
MCV RBC AUTO: 95 FL (ref 80–100)
NRBC BLD-RTO: 0 /100 WBCS (ref 0–0)
PLATELET # BLD AUTO: 218 X10*3/UL (ref 150–450)
POTASSIUM SERPL-SCNC: 3 MMOL/L (ref 3.5–5.3)
RBC # BLD AUTO: 4.2 X10*6/UL (ref 4.5–5.9)
SODIUM SERPL-SCNC: 130 MMOL/L (ref 136–145)
WBC # BLD AUTO: 13.1 X10*3/UL (ref 4.4–11.3)

## 2024-06-08 PROCEDURE — 2060000001 HC INTERMEDIATE ICU ROOM DAILY

## 2024-06-08 PROCEDURE — 2500000001 HC RX 250 WO HCPCS SELF ADMINISTERED DRUGS (ALT 637 FOR MEDICARE OP)

## 2024-06-08 PROCEDURE — 2500000002 HC RX 250 W HCPCS SELF ADMINISTERED DRUGS (ALT 637 FOR MEDICARE OP, ALT 636 FOR OP/ED): Performed by: FAMILY MEDICINE

## 2024-06-08 PROCEDURE — 2500000001 HC RX 250 WO HCPCS SELF ADMINISTERED DRUGS (ALT 637 FOR MEDICARE OP): Performed by: INTERNAL MEDICINE

## 2024-06-08 PROCEDURE — 85027 COMPLETE CBC AUTOMATED: CPT

## 2024-06-08 PROCEDURE — 87040 BLOOD CULTURE FOR BACTERIA: CPT | Mod: AHULAB | Performed by: INTERNAL MEDICINE

## 2024-06-08 PROCEDURE — 2500000004 HC RX 250 GENERAL PHARMACY W/ HCPCS (ALT 636 FOR OP/ED): Performed by: INTERNAL MEDICINE

## 2024-06-08 PROCEDURE — 80048 BASIC METABOLIC PNL TOTAL CA: CPT

## 2024-06-08 PROCEDURE — 2500000004 HC RX 250 GENERAL PHARMACY W/ HCPCS (ALT 636 FOR OP/ED)

## 2024-06-08 PROCEDURE — 2500000005 HC RX 250 GENERAL PHARMACY W/O HCPCS

## 2024-06-08 PROCEDURE — 82947 ASSAY GLUCOSE BLOOD QUANT: CPT | Mod: 91

## 2024-06-08 PROCEDURE — 36415 COLL VENOUS BLD VENIPUNCTURE: CPT | Performed by: INTERNAL MEDICINE

## 2024-06-08 PROCEDURE — 2500000002 HC RX 250 W HCPCS SELF ADMINISTERED DRUGS (ALT 637 FOR MEDICARE OP, ALT 636 FOR OP/ED)

## 2024-06-08 PROCEDURE — 2500000004 HC RX 250 GENERAL PHARMACY W/ HCPCS (ALT 636 FOR OP/ED): Mod: JZ | Performed by: INTERNAL MEDICINE

## 2024-06-08 PROCEDURE — 36415 COLL VENOUS BLD VENIPUNCTURE: CPT

## 2024-06-08 RX ORDER — POTASSIUM CHLORIDE 20 MEQ/1
40 TABLET, EXTENDED RELEASE ORAL EVERY 4 HOURS
Status: COMPLETED | OUTPATIENT
Start: 2024-06-08 | End: 2024-06-08

## 2024-06-08 RX ADMIN — INSULIN LISPRO 2 UNITS: 100 INJECTION, SOLUTION INTRAVENOUS; SUBCUTANEOUS at 10:18

## 2024-06-08 RX ADMIN — DORZOLAMIDE HYDROCHLORIDE AND TIMOLOL MALEATE 1 DROP: 22.3; 6.8 SOLUTION/ DROPS OPHTHALMIC at 20:31

## 2024-06-08 RX ADMIN — CEFAZOLIN SODIUM 2 G: 2 INJECTION, SOLUTION INTRAVENOUS at 20:31

## 2024-06-08 RX ADMIN — INSULIN GLARGINE 25 UNITS: 100 INJECTION, SOLUTION SUBCUTANEOUS at 21:27

## 2024-06-08 RX ADMIN — TAMSULOSIN HYDROCHLORIDE 0.4 MG: 0.4 CAPSULE ORAL at 10:20

## 2024-06-08 RX ADMIN — INSULIN LISPRO 6 UNITS: 100 INJECTION, SOLUTION INTRAVENOUS; SUBCUTANEOUS at 13:01

## 2024-06-08 RX ADMIN — ACETAMINOPHEN 650 MG: 325 TABLET ORAL at 00:48

## 2024-06-08 RX ADMIN — CLOPIDOGREL 75 MG: 75 TABLET ORAL at 10:20

## 2024-06-08 RX ADMIN — DORZOLAMIDE HYDROCHLORIDE AND TIMOLOL MALEATE 1 DROP: 22.3; 6.8 SOLUTION/ DROPS OPHTHALMIC at 10:27

## 2024-06-08 RX ADMIN — MORPHINE SULFATE 2 MG: 2 INJECTION, SOLUTION INTRAMUSCULAR; INTRAVENOUS at 13:09

## 2024-06-08 RX ADMIN — PANTOPRAZOLE SODIUM 40 MG: 40 TABLET, DELAYED RELEASE ORAL at 06:04

## 2024-06-08 RX ADMIN — CEFAZOLIN SODIUM 2 G: 2 INJECTION, SOLUTION INTRAVENOUS at 13:02

## 2024-06-08 RX ADMIN — METOPROLOL TARTRATE 12.5 MG: 25 TABLET, FILM COATED ORAL at 10:21

## 2024-06-08 RX ADMIN — TRAMADOL HYDROCHLORIDE 50 MG: 50 TABLET, COATED ORAL at 06:11

## 2024-06-08 RX ADMIN — DOCUSATE SODIUM 100 MG: 100 CAPSULE, LIQUID FILLED ORAL at 10:20

## 2024-06-08 RX ADMIN — POTASSIUM CHLORIDE 40 MEQ: 1500 TABLET, EXTENDED RELEASE ORAL at 10:21

## 2024-06-08 RX ADMIN — POTASSIUM CHLORIDE 40 MEQ: 1500 TABLET, EXTENDED RELEASE ORAL at 13:01

## 2024-06-08 RX ADMIN — TRAMADOL HYDROCHLORIDE 50 MG: 50 TABLET, COATED ORAL at 16:10

## 2024-06-08 RX ADMIN — DOCUSATE SODIUM 100 MG: 100 CAPSULE, LIQUID FILLED ORAL at 20:31

## 2024-06-08 RX ADMIN — INSULIN LISPRO 2 UNITS: 100 INJECTION, SOLUTION INTRAVENOUS; SUBCUTANEOUS at 16:15

## 2024-06-08 RX ADMIN — LATANOPROST 1 DROP: 50 SOLUTION/ DROPS OPHTHALMIC at 20:32

## 2024-06-08 RX ADMIN — METOPROLOL TARTRATE 12.5 MG: 25 TABLET, FILM COATED ORAL at 20:31

## 2024-06-08 RX ADMIN — SODIUM CHLORIDE 75 ML/HR: 9 INJECTION, SOLUTION INTRAVENOUS at 00:43

## 2024-06-08 RX ADMIN — CEFAZOLIN SODIUM 2 G: 2 INJECTION, SOLUTION INTRAVENOUS at 04:35

## 2024-06-08 RX ADMIN — LIDOCAINE 4% 2 PATCH: 40 PATCH TOPICAL at 10:19

## 2024-06-08 RX ADMIN — ENOXAPARIN SODIUM 40 MG: 40 INJECTION SUBCUTANEOUS at 20:31

## 2024-06-08 ASSESSMENT — COGNITIVE AND FUNCTIONAL STATUS - GENERAL
EATING MEALS: A LITTLE
MOVING TO AND FROM BED TO CHAIR: A LOT
DAILY ACTIVITIY SCORE: 15
CLIMB 3 TO 5 STEPS WITH RAILING: A LOT
DRESSING REGULAR UPPER BODY CLOTHING: A LITTLE
DRESSING REGULAR UPPER BODY CLOTHING: A LITTLE
DAILY ACTIVITIY SCORE: 21
DRESSING REGULAR UPPER BODY CLOTHING: A LITTLE
HELP NEEDED FOR BATHING: A LOT
MOVING FROM LYING ON BACK TO SITTING ON SIDE OF FLAT BED WITH BEDRAILS: A LITTLE
MOBILITY SCORE: 14
STANDING UP FROM CHAIR USING ARMS: A LOT
DRESSING REGULAR LOWER BODY CLOTHING: A LOT
PERSONAL GROOMING: A LITTLE
TURNING FROM BACK TO SIDE WHILE IN FLAT BAD: A LITTLE
WALKING IN HOSPITAL ROOM: A LOT
TOILETING: A LOT
DAILY ACTIVITIY SCORE: 21
STANDING UP FROM CHAIR USING ARMS: A LOT
WALKING IN HOSPITAL ROOM: A LOT
STANDING UP FROM CHAIR USING ARMS: A LOT
MOBILITY SCORE: 14
MOBILITY SCORE: 14
PERSONAL GROOMING: A LITTLE
MOVING TO AND FROM BED TO CHAIR: A LOT
TURNING FROM BACK TO SIDE WHILE IN FLAT BAD: A LITTLE
EATING MEALS: A LITTLE
EATING MEALS: A LITTLE
CLIMB 3 TO 5 STEPS WITH RAILING: TOTAL
TURNING FROM BACK TO SIDE WHILE IN FLAT BAD: A LITTLE
WALKING IN HOSPITAL ROOM: A LOT
CLIMB 3 TO 5 STEPS WITH RAILING: A LOT
MOVING TO AND FROM BED TO CHAIR: A LOT
PERSONAL GROOMING: A LITTLE
MOVING FROM LYING ON BACK TO SITTING ON SIDE OF FLAT BED WITH BEDRAILS: A LITTLE

## 2024-06-08 ASSESSMENT — PAIN SCALES - GENERAL
PAINLEVEL_OUTOF10: 7
PAINLEVEL_OUTOF10: 7
PAINLEVEL_OUTOF10: 4
PAINLEVEL_OUTOF10: 8
PAINLEVEL_OUTOF10: 4
PAINLEVEL_OUTOF10: 5 - MODERATE PAIN
PAINLEVEL_OUTOF10: 0 - NO PAIN
PAINLEVEL_OUTOF10: 4
PAINLEVEL_OUTOF10: 3

## 2024-06-08 ASSESSMENT — PAIN - FUNCTIONAL ASSESSMENT
PAIN_FUNCTIONAL_ASSESSMENT: 0-10

## 2024-06-08 NOTE — PROGRESS NOTES
PODIATRY PROGRESS NOTE    SERVICE DATE: 6/8/2024   SERVICE TIME:  10:53 AM      REASON FOR CONSULT: right 4th toe osteomyelitis   REQUESTING PHYSICIAN: Darnell Yip MD   PRIMARY CARE PHYSICIAN: Olesya Daniel MD    Subjective   HPI:  Pt seen at bedside.  No overnight events.  Pt admits to some pain to operative extremity.  Has been elevating foot on pillow.  Pt denies any constitutional symptoms.   No other pedal complaints at this time.   Discussed CT Results With Patient and his wife    ROS: 10-point review of systems was performed and is otherwise negative except as noted in HPI.  PMH: Reviewed/documented below.  PSH:  Noncontributory except per HPI   FH: Reviewed and noncontributory   SOCIAL:  Reviewed/documented below.  ALLERGIES: Reviewed/documented below.  MEDS: Reviewed/documented below.  VS: Reviewed/documented below.    Past Medical History:   Diagnosis Date    Abnormal EKG 05/01/2023    BPH (benign prostatic hyperplasia)     Chronic systolic heart failure (Multi)     COVID-19 05/01/2023    Essential hypertension     GERD (gastroesophageal reflux disease)     Hypertensive heart disease with heart failure (Multi)     Osteoarthritis     Osteomyelitis (Multi)     Peripheral vascular disease, unspecified (CMS-[facility]) 11/28/2022    PVD (peripheral vascular disease)    Personal history of malignant neoplasm of bladder     History of carcinoma of bladder    Personal history of other diseases of the musculoskeletal system and connective tissue     History of arthritis    Personal history of other endocrine, nutritional and metabolic disease     History of diabetes mellitus    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    Controlled diabetes mellitus with circulatory complication     Past Surgical History:   Procedure Laterality Date    CARDIAC SURGERY      IR  BLADDER ASPIRATION  06/23/2017    IR   BLADDER ASPIRATION 6/23/2017 [facility]    OTHER SURGICAL HISTORY  06/19/2017    Surgery Penile Prosthetic Device    WRIST SURGERY Right      Family History   Problem Relation Name Age of Onset    No Known Problems Mother      Heart disease Father      Tuberculosis Maternal Grandmother       Social History     Tobacco Use    Smoking status: Never    Smokeless tobacco: Never   Substance Use Topics    Alcohol use: Not Currently    Drug use: Never      Medications Prior to Admission   Medication Sig Dispense Refill Last Dose    clopidogrel (Plavix) 75 mg tablet TAKE ONE TABLET BY MOUTH EVERY DAY 90 tablet 0 6/4/2024    cyanocobalamin (Vitamin B-12) 500 mcg tablet 1 tablet DAILY (route: oral)   6/4/2024    dapagliflozin propanediol (Farxiga) 10 mg Take 1 tablet (10 mg) by mouth once daily. 100 tablet 1 6/4/2024    docusate sodium (Colace) 100 mg capsule Take 1 capsule (100 mg) by mouth 2 times a day as needed for constipation.   Past Week    dorzolamide-timoloL (Cosopt) 22.3-6.8 mg/mL ophthalmic solution Administer 1 drop into the left eye 2 times a day.   6/4/2024    enalapril (Vasotec) 2.5 mg tablet Take 1 tablet (2.5 mg) by mouth once daily. 90 tablet 1 6/4/2024    insulin glargine (Lantus) 100 unit/mL injection Inject 25 Units under the skin once every 24 hours. Take as directed per insulin instructions. 5 mL 12 6/4/2024    latanoprost (Xalatan) 0.005 % ophthalmic solution instill 1 drop into each eye every evening   6/4/2024    lidocaine 4 % patch Place 2 patches over 12 hours on the skin once daily. Remove & discard patch within 12 hours or as directed by MD.   Past Week    metoprolol tartrate (Lopressor) 25 mg tablet Take 0.5 tablets (12.5 mg) by mouth 2 times a day. 90 tablet 3 6/4/2024    multivitamin tablet Take 1 tablet by mouth once daily.   6/4/2024    niacin 500 mg tablet Take 1 tablet (500 mg) by mouth once daily.   Past Week    omeprazole (PriLOSEC) 20 mg DR capsule Take 1 capsule (20 mg) by  "mouth once daily. 90 capsule 1 6/4/2024    pen needle, diabetic (UltiCare Pen Needle) 31 gauge x 1/4\" needle USE 1 PEN NEEDLE SUBCUTANEOUSLY 3 TIMES DAILY BEFORE MEALS 100 each 1 Past Week    polyethylene glycol (Glycolax, Miralax) 17 gram packet Take 17 g by mouth once daily as needed (constipation).   Past Week    tamsulosin (Flomax) 0.4 mg 24 hr capsule 1 capsule (0.4 mg).   6/4/2024    alpha tocopherol (Vitamin E) 268 mg (400 unit) capsule Take 1 capsule (400 Units) by mouth once daily.       alum-mag hydroxide-simeth (Mylanta) 200-200-20 mg/5 mL oral suspension Take 30 mL by mouth every 4 hours if needed.       ampicillin-sulbactam 3 gram injection        ascorbic acid (Vitamin C) 1,000 mg tablet Take 1 tablet (1,000 mg) by mouth once daily.       blood sugar diagnostic (OneTouch Ultra Test) strip Check glucose 4 x per day 400 each 3     cholecalciferol (Vitamin D-3) 125 MCG (5000 UT) capsule Take 1 capsule (125 mcg) by mouth once daily.       [START ON 6/9/2024] dulaglutide (Trulicity) 0.75 mg/0.5 mL pen injector Inject 0.75 mg under the skin 1 (one) time per week. 2 mL 1         Medications:  Scheduled Meds: ceFAZolin, 2 g, intravenous, q8h  clopidogrel, 75 mg, oral, Daily  cyclobenzaprine, 5 mg, oral, TID  docusate sodium, 100 mg, oral, BID  dorzolamide-timoloL, 1 drop, Left Eye, BID  enoxaparin, 40 mg, subcutaneous, q24h  insulin glargine, 10 Units, subcutaneous, Once  insulin glargine, 25 Units, subcutaneous, q24h  insulin lispro, 0-10 Units, subcutaneous, TID  latanoprost, 1 drop, Both Eyes, Nightly  lidocaine, 2 patch, transdermal, Daily  metoprolol tartrate, 12.5 mg, oral, BID  pantoprazole, 40 mg, oral, Daily before breakfast  potassium chloride CR, 40 mEq, oral, q4h  tamsulosin, 0.4 mg, oral, Daily      Continuous Infusions: sodium chloride 0.9%, 75 mL/hr, Last Rate: 75 mL/hr (06/08/24 0043)      PRN Meds: PRN medications: acetaminophen, alum-mag hydroxide-simeth, dextrose, dextrose, glucagon, " glucagon, melatonin, morphine, ondansetron, polyethylene glycol, traMADol    Allergies as of 06/05/2024 - Reviewed 06/05/2024   Allergen Reaction Noted    Glimepiride Unknown 08/11/2006    Lisinopril Unknown 02/22/2011    Metformin Unknown 08/11/2006    Pioglitazone Unknown 08/11/2006    Statins-hmg-coa reductase inhibitors Unknown 05/01/2023            Objective   PHYSICAL EXAM:  Physical Exam Performed:  Vitals:    06/08/24 0740   BP: 142/67   Pulse: 66   Resp: 16   Temp: 36.3 °C (97.3 °F)   SpO2: 95%     Body mass index is 20.34 kg/m².    Patient is AOx3 and in no acute distress. Patient is alert and cooperative. Sitting comfortably in bed with dressing clean, dry and intact. Heel off-loading boots in place B/L.    Vascular: Faintly palpable DP/PT pulses B/L. Moderate non-pitting edema noted B/L. Hair growth absent B/L. CFT<5 to B/L hallux. Temperature is warm to cool from tibial tuberosity to distal digits B/L. No lymphatic streaking noted B/L.    Musculoskeletal: Gross active and passive ROM intact to age and activity level. Moves all extremities spontaneously. pain to palpation to right foot [place]-wound area  Neurological: Intact light touch sensation B/L. Pain stimuli diminished B/L. Denies any numbness, burning or tingling.    Dermatologic: Nails 1-5 are within normal limits for thickness and length B/L. Skin appears diffusely xerotic B/L. Web spaces 1-4 B/L are clean, dry and intact. No rashes or nodules noted B/L. No hyperkeratotic tissue noted B/L.     Wound:  Measurements: 3.8cm x 4.1cm x 0.4cm  Mixed wound base of Fibrogranular tissue with bone in the wound base tissue.   Moderate sPurulent drainage with fibrotic slough.   Moderate [place]-wound maceration.   Moderate [place]-wound erythema.   Minimal evidence of ascending cellulitis   Mild palpable fluctuance. Moderate malodor. Mild increased warmth.   Significant probe to bone or deep structures within the wound base.   Moderate tunneling or tracking.    Minimal undermining. Skin edges irregular but intact.    LABS:   Results for orders placed or performed during the hospital encounter of 06/05/24 (from the past 24 hour(s))   POCT GLUCOSE   Result Value Ref Range    POCT Glucose 160 (H) 74 - 99 mg/dL   POCT GLUCOSE   Result Value Ref Range    POCT Glucose 177 (H) 74 - 99 mg/dL   POCT GLUCOSE   Result Value Ref Range    POCT Glucose 98 74 - 99 mg/dL   SST TOP   Result Value Ref Range    Extra Tube Hold for add-ons.    Basic metabolic panel   Result Value Ref Range    Glucose 174 (H) 74 - 99 mg/dL    Sodium 130 (L) 136 - 145 mmol/L    Potassium 3.0 (L) 3.5 - 5.3 mmol/L    Chloride 98 98 - 107 mmol/L    Bicarbonate 25 21 - 32 mmol/L    Anion Gap 10 10 - 20 mmol/L    Urea Nitrogen 25 (H) 6 - 23 mg/dL    Creatinine 0.93 0.50 - 1.30 mg/dL    eGFR 78 >60 mL/min/1.73m*2    Calcium 8.1 (L) 8.6 - 10.3 mg/dL   CBC   Result Value Ref Range    WBC 13.1 (H) 4.4 - 11.3 x10*3/uL    nRBC 0.0 0.0 - 0.0 /100 WBCs    RBC 4.20 (L) 4.50 - 5.90 x10*6/uL    Hemoglobin 12.9 (L) 13.5 - 17.5 g/dL    Hematocrit 39.7 (L) 41.0 - 52.0 %    MCV 95 80 - 100 fL    MCH 30.7 26.0 - 34.0 pg    MCHC 32.5 32.0 - 36.0 g/dL    RDW 12.7 11.5 - 14.5 %    Platelets 218 150 - 450 x10*3/uL   POCT GLUCOSE   Result Value Ref Range    POCT Glucose 179 (H) 74 - 99 mg/dL      Lab Results   Component Value Date    HGBA1C 8.7 (H) 06/06/2024      Lab Results   Component Value Date    CRP 28.95 (H) 06/05/2024      Lab Results   Component Value Date    SEDRATE 93 (H) 06/05/2024        Results from last 7 days   Lab Units 06/08/24  0659   WBC AUTO x10*3/uL 13.1*   RBC AUTO x10*6/uL 4.20*   HEMOGLOBIN g/dL 12.9*   HEMATOCRIT % 39.7*     Results from last 7 days   Lab Units 06/08/24  0659 06/05/24  1305 06/05/24  1136   SODIUM mmol/L 130*   < > 131*   POTASSIUM mmol/L 3.0*   < > 4.7   CHLORIDE mmol/L 98   < > 95*   CO2 mmol/L 25   < > 23   BUN mg/dL 25*   < > 30*   CREATININE mg/dL 0.93   < > 1.32*   CALCIUM mg/dL 8.1*    < > 9.1   BILIRUBIN TOTAL mg/dL  --   --  0.8   ALT U/L  --   --  19   AST U/L  --   --  15    < > = values in this interval not displayed.     Results from last 7 days   Lab Units 06/07/24  0943   COLOR U  Yellow   APPEARANCE U  Turbid*   PH U  5.5   SPEC GRAV UR  1.035   PROTEIN U mg/dL 50 (1+)*   BLOOD UR  0.03 (TRACE)*   NITRITE U  NEGATIVE   WBC UR /HPF 11-20*   BACTERIA UR /HPF 1+*       IMAGING REVIEW:  Vascular US PVR Without Exercise    Result Date: 6/6/2024             [place] [id number]   Tel [phone] and Fax [phone]  Vascular Lab Report [facility] US PVR WITHOUT EXERCISE  Patient Name:      MAYELIN OLSON    Reading Physician:  [id number] Sylvester Nguyen DO Study Date:        6/6/2024            Ordering Physician: [id number] GERALD HANKS MRN/PID:           [id number]            Technologist:       Benjamin Godinez RVT Accession#:        [id number]        Technologist 2: Date of Birth/Age: 11/8/1934 / 89      Encounter#:         [id number]                    years Gender:            M Admission Status:  Outpatient          Location Performed: [facility]  Diagnosis/ICD: Peripheral vascular disease, unspecified-I73.9; Stricture of                artery-I77.1 CPT Codes:     51879 Peripheral artery PVR (multi segmental pressure  CONCLUSIONS: Right Lower PVR: Right pressures of >220 mmHg suggest no compressibility of vessels and may make absolute Segmental Limb Pressures (SLP) unreliable. There is evidence of mild multi vessel disease. Monophasic flow is noted in the right posterior tibial artery, right dorsalis pedis artery and right popliteal artery. Multiphasic flow is noted in the right common femoral artery. Right leg results called per waveforms due to calcified and partially calcified arterial pressures. Right trasmetatarsal amputation. Left Lower PVR: Left pressures of >220 mmHg suggest no  compressibility of vessels and may make absolute Segmental Limb Pressures (SLP) unreliable. There is evidence of moderate multi-vessel disease. Decreased digital perfusion noted. Monophasic flow is noted in the left posterior tibial artery and left dorsalis pedis artery. Multiphasic flow is noted in the left common femoral artery and left popliteal artery. Left leg results called per waveforms due to calcified and partially calcified arterial pressures.  Comparison: Compared with study from 6/12/2023, On todays exam the left leg demonstates moderate mutivessel arterial occlusive disease.  Imaging & Doppler Findings:  RIGHT Lower PVR                Pressures Ratios Right High Thigh               255 mmHg  2.11 Right Low Thigh                188 mmHg  1.55 Right Calf                     137 mmHg  1.13 Right Posterior Tibial (Ankle) 43 mmHg   0.36 Right Dorsalis Pedis (Ankle)   178 mmHg  1.47   LEFT Lower PVR                Pressures Ratios Left High Thigh               184 mmHg  1.52 Left Low Thigh                176 mmHg  1.45 Left Calf                     142 mmHg  1.17 Left Posterior Tibial (Ankle) 46 mmHg   0.38 Left Dorsalis Pedis (Ankle)   255 mmHg  2.11 Left Digit (Great Toe)        32 mmHg   0.26                     Right     Left Brachial Pressure 121 mmHg 108 mmHg   [id number] Sylvester Nguyen DO Electronically signed by [id number] Sylvester Nguyen DO on 6/6/2024 at 2:30:19 PM  ** Final **     XR hand left 3+ views    Result Date: 6/6/2024  Interpreted By:  Lara Severino, STUDY: XR HAND LEFT 3+ VIEWS;  6/6/2024 1:42 pm   INDICATION: Signs/Symptoms:Pain post fall.   COMPARISON: None.   ACCESSION NUMBER(S): [id number]   ORDERING CLINICIAN: JACQUELYN STROUD   FINDINGS: 3 portable views of the left hand obtained.   No acute fracture or osseous displacement. Radiocarpal joint space narrowing and chondrocalcinosis in the ulnocarpal joint space. Multifocal degenerative changes including joint space narrowing and spurring including the  1st CMC, 1st through 5th MCP and multiple interphalangeal joints. Prominent lucent probable subchondral cyst/geode in the trapezium. Soft tissue swelling in the 2nd digit. Multifocal soft tissue presumed vascular calcifications.       No acute fracture. Soft tissue swelling in the 2nd digit. Multifocal productive arthritic changes noted.   MACRO: None.   Signed by: Lara Severino 6/6/2024 1:50 PM Dictation workstation:   [id number]    CT head wo IV contrast    Result Date: 6/6/2024  Interpreted By:  Win Peres, STUDY: CT HEAD WO IV CONTRAST; ;  6/6/2024 12:16 pm   INDICATION: Signs/Symptoms:Head injury.   COMPARISON: CT head from 03/19/2024.   ACCESSION NUMBER(S): [id number]   ORDERING CLINICIAN: JACQUELYN STROUD   TECHNIQUE: Routine unenhanced CT of the head was performed.   FINDINGS: There is no acute intracranial hemorrhage or mass effect. There is no abnormal extra-axial fluid collection. The ventricles, sulci, and basilar cisterns are prominent in size due to age related diffuse cerebral volume loss.   There are regions of hypoattenuation within the bilateral cerebral hemispheric white matter which are probably sequela of chronic small vessel ischemic changes. Gray-white differentiation is maintained throughout.   There are no acute calvarial fractures. The visualized paranasal sinuses and mastoid air cells are essentially clear.   Degenerative pannus formation at the level of C1-C2.       Findings of probable chronic small vessel ischemic changes and age related diffuse cerebral volume loss. No acute intracranial abnormality or mass effect.   This study was interpreted at [facility].   MACRO: None   Signed by: Win Peres 6/6/2024 12:39 PM Dictation workstation:   [id number]    XR cervical spine complete 4-5 views    Result Date: 6/6/2024  Interpreted By:  Franck Pascal, STUDY: XR CERVICAL SPINE COMPLETE 4-5 VIEWS; 6/6/2024 10:55 am   INDICATION: Signs/Symptoms:Pain s/p  fall.   COMPARISON: None.   ACCESSION NUMBER(S): [id number]   ORDERING CLINICIAN: JACQUELYN STROUD   FINDINGS: Multiple views of the  lumbar spine are obtained. Minimal anterolisthesis of C6 and C7.. The vertebral body heights are preserved. Disc space loss from C5 through C7. Endplate sclerosis and osteophytes are seen throughout the spine. No acute fracture-dislocation.       Discogenic degenerative changes as described.     Signed by: Franck Pascal 6/6/2024 11:12 AM Dictation workstation:   [id number]    ECG 12 lead    Result Date: 6/5/2024  Sinus rhythm with 1st degree AV block Right bundle branch block Left anterior fascicular block  Bifascicular block  Abnormal ECG When compared with ECG of 19-MAR-2024 19:08, No significant change was found    XR foot right 3+ views    Result Date: 6/5/2024  Interpreted By:  Shalini Wright, STUDY: XR FOOT RIGHT 3+ VIEWS; ;  6/5/2024 11:54 am   INDICATION: Signs/Symptoms:Foot wound..   COMPARISON: Right foot x-ray series and CT 03/19/2024.   ACCESSION NUMBER(S): [id number]   ORDERING CLINICIAN: ALLAN VIGIL   FINDINGS: Bones are osteopenic. No acute fracture or dislocation.   Postoperative changes of prior transmetatarsal forefoot amputation and resection of the 5th metatarsal are again seen. There is new lucency due to osseous erosion the lateral and plantar aspect of the 4th metatarsal shaft with some cortical irregularity at the tip of the amputation margin. This underlies an irregular ulcerative soft tissue wound with adjacent soft tissue swelling.   Soft tissue swelling around the foot and ankle and vascular calcifications are again seen.       1. Findings consistent with progressive osteomyelitis of the 4th metatarsal bone with brown osseous erosion of the lateral and plantar aspect of the shaft with overlying soft tissue swelling and ulceration consistent with persistent overlying soft tissue infection. If it would alter clinical management, an MRI may be obtained.   2.  Redemonstrated transmetatarsal amputation of the forefoot and 5th metatarsal resection.   MACRO: None   Signed by: Shalini Wright 6/5/2024 12:19 PM Dictation workstation:   [id number]    XR chest 1 view    Result Date: 6/5/2024  Interpreted By:  Shalini Wright, STUDY: XR CHEST 1 VIEW;  6/5/2024 11:54 am   INDICATION: Signs/Symptoms:SOB.   COMPARISON: Chest x-ray 03/19/2024 and 06/17/2023. CT chest 06/07/2023..   ACCESSION NUMBER(S): [id number]   ORDERING CLINICIAN: ALLAN VIGIL   FINDINGS: AP radiograph of the chest was provided.   Median sternotomy wires are present.   CARDIOMEDIASTINAL SILHOUETTE: Cardiomediastinal silhouette is normal in size and configuration.   LUNGS and PLEURA: No focal consolidation or other airspace opacity. No pleural effusion or pneumothorax.   ABDOMEN: No remarkable upper abdominal findings.   BONES: No acute osseous changes.       1.  No evidence of acute cardiopulmonary process.       MACRO: None   Signed by: Shalini Wright 6/5/2024 12:09 PM Dictation workstation:   [id number]            Assessment/Plan   ASSESSMENT & PLAN:    #Osteomyelitis right foot  #Sepsis  #PVD  #T2DM  #HTN  - Patient was seen and evaluated; all findings were discussed and all questions were answered to patient's satisfaction.  - Charts, labs, vitals and imaging all reviewed.   - Imaging: Xray Right foot: Possible OM to 4th metatarsal  CT Pending  - Labs: WBC: 19.5,   - PVRs: Left leg moderate PAD Right leg Mild PVD  - Wound culture: Pending  - Blood culture: Pending    Plan:  - Abx: Per Primary  - Pain Regimen: Per Primary  - Bowel Regimen: Per Primary  - Discussed PVR results with patient and Wife  - Likely to require surgical intervention with debridement of soft tissue and bone this hospital visit. Discussed this in detail with patient as well as risks and benefits   - Discussed CT a with family   - Recommending Right Chopart's amputation or a Right BKA amputation, Depending on patients wishes.  - Dressings:  Betadine soaked 4x4's, 4x4's, Krelix, ACE wrap.  - Nursing staff is able to change/reinforce dressing if & as necessary until next day’s dressing change. Thank you.  - Podiatry will continue to follow while in house.      Weightbearing: WBAT      Case to be discussed with attending, A&P above reflects a tentative plan. Please await for the final signature from the attending physician on service.    Eladio Chaves  PGY-2  Podiatric Medicine & Surgery  Please Haiku message me with any questions or concerns.            SIGNATURE: Eladio Chaves DPM PATIENT NAME: Rene Ballard   DATE: June 8, 2024 MRN: [id number]   TIME: 10:53 AM CONTACT: Haiku message     I reviewed the resident/fellow's documentation and discussed the patient with the resident/fellow. I agree with the resident/fellow's medical decision making as documented in the note.     Please refer to prior notes for plan of care. No changes from podiatry standpoint.    Robbie Islas DPM

## 2024-06-08 NOTE — CARE PLAN
Problem: Pain  Goal: My pain/discomfort is manageable  Outcome: Progressing     Problem: Safety  Goal: Patient will be injury free during hospitalization  Outcome: Progressing  Goal: I will remain free of falls  Outcome: Progressing     Problem: Daily Care  Goal: Daily care needs are met  Outcome: Progressing   The patient's goals for the shift include      The clinical goals for the shift include manage pain      Problem: Psychosocial Needs  Goal: Demonstrates ability to cope with hospitalization/illness  Outcome: Progressing  Goal: Collaborate with me, my family, and caregiver to identify my specific goals  Outcome: Progressing     Problem: Discharge Barriers  Goal: My discharge needs are met  Outcome: Progressing     Problem: Skin  Goal: Decreased wound size/increased tissue granulation at next dressing change  Outcome: Progressing  Goal: Participates in plan/prevention/treatment measures  Outcome: Progressing  Goal: Prevent/manage excess moisture  Outcome: Progressing  Flowsheets (Taken 6/8/2024 0116)  Prevent/manage excess moisture: Cleanse incontinence/protect with barrier cream  Goal: Prevent/minimize sheer/friction injuries  Outcome: Progressing  Flowsheets (Taken 6/8/2024 0116)  Prevent/minimize sheer/friction injuries:   Use pull sheet   HOB 30 degrees or less  Goal: Promote/optimize nutrition  Outcome: Progressing  Goal: Promote skin healing  Outcome: Progressing     Problem: Fall/Injury  Goal: Not fall by end of shift  Outcome: Progressing  Goal: Be free from injury by end of the shift  Outcome: Progressing  Goal: Verbalize understanding of personal risk factors for fall in the hospital  Outcome: Progressing  Goal: Verbalize understanding of risk factor reduction measures to prevent injury from fall in the home  Outcome: Progressing  Goal: Use assistive devices by end of the shift  Outcome: Progressing  Goal: Pace activities to prevent fatigue by end of the shift  Outcome: Progressing

## 2024-06-08 NOTE — PROGRESS NOTES
Speech-Language Pathology                 Therapy Communication Note    Patient Name: Rene Ballard  MRN: [id number]  Today's Date: 6/8/2024     Discipline: Speech Language Pathology    Missed Visit Reason: Missed Time Reason: Pain, Patient fatigue    Missed Time: Attempt    Comment:  Pt experiencing severe neck pain and had just received morphine for pain management.  Pt reports that he is unable to participate at this time.  Will reattempt when Pt is able to tolerate evaluation.

## 2024-06-08 NOTE — PROGRESS NOTES
Rene Ballard is a 89 y.o. male     Pt seen this morning he is feelingok  Did sleep ok  No pain in hand   No chest pain   No shortness of breath     Review of Systems       Cardiovascular: no chest pain   Respiratory: no cough, wheezing or shortness of breath a  Gastrointestinal: no abdominal pain, no constipation, no melena, no nausea, no diarrhea, no vomiting and no blood in stools.   Neurological: no headache,   All other systems have been reviewed and are negative for complaint.       Vitals:    06/08/24 1145   BP: 124/66   Pulse: 65   Resp: 16   Temp: 36.4 °C (97.5 °F)   SpO2: 96%        Scheduled medications  ceFAZolin, 2 g, intravenous, q8h  clopidogrel, 75 mg, oral, Daily  cyclobenzaprine, 5 mg, oral, TID  docusate sodium, 100 mg, oral, BID  dorzolamide-timoloL, 1 drop, Left Eye, BID  enoxaparin, 40 mg, subcutaneous, q24h  insulin glargine, 10 Units, subcutaneous, Once  insulin glargine, 25 Units, subcutaneous, q24h  insulin lispro, 0-10 Units, subcutaneous, TID  latanoprost, 1 drop, Both Eyes, Nightly  lidocaine, 2 patch, transdermal, Daily  metoprolol tartrate, 12.5 mg, oral, BID  pantoprazole, 40 mg, oral, Daily before breakfast  tamsulosin, 0.4 mg, oral, Daily      Continuous medications  sodium chloride 0.9%, 75 mL/hr, Last Rate: 75 mL/hr (06/08/24 0043)      PRN medications  PRN medications: acetaminophen, alum-mag hydroxide-simeth, dextrose, dextrose, glucagon, glucagon, melatonin, morphine, ondansetron, polyethylene glycol, traMADol    Lab Review   Results from last 7 days   Lab Units 06/08/24  0659 06/07/24  0438 06/06/24  0442   WBC AUTO x10*3/uL 13.1* 18.0* 19.5*   HEMOGLOBIN g/dL 12.9* 13.2* 12.5*   HEMATOCRIT % 39.7* 40.0* 37.9*   PLATELETS AUTO x10*3/uL 218 218 195     Results from last 7 days   Lab Units 06/08/24  0659 06/07/24  0438 06/06/24  1503 06/05/24  1305 06/05/24  1136   SODIUM mmol/L 130* 126* 129*   < > 131*   POTASSIUM mmol/L 3.0* 3.5 4.3   < > 4.7   CHLORIDE mmol/L 98 94* 94*    < > 95*   CO2 mmol/L 25 24 21   < > 23   BUN mg/dL 25* 32* 33*   < > 30*   CREATININE mg/dL 0.93 0.98 1.14   < > 1.32*   CALCIUM mg/dL 8.1* 8.4* 8.5*   < > 9.1   PROTEIN TOTAL g/dL  --   --   --   --  7.0   BILIRUBIN TOTAL mg/dL  --   --   --   --  0.8   ALK PHOS U/L  --   --   --   --  204*   ALT U/L  --   --   --   --  19   AST U/L  --   --   --   --  15   GLUCOSE mg/dL 174* 186* 291*   < > 524*    < > = values in this interval not displayed.            CT foot right wo IV contrast   Final Result   1. Findings compatible with osteomyelitis involving the remaining 4th   metatarsal. Other regions of osteomyelitis are not entirely excluded   on the basis of this exam and MRI may be helpful for further   assessment.   2. Wound/ulcer on the distal lateral side of the remaining foot.   3. [place] increased density in the subcutaneous tissues of the   distal remaining foot greatest on the lateral side which may   represent confluence cellulitis. Underlying focal collection is not   excluded on the basis of this noncontrast examination. There are some   bubbles of gas in the soft tissues which may be due to open nature of   process although gas-forming infection is not entirely excluded.   4. Degree of more generalized reticular edema in the subcutaneous   tissues which may represent lymphedema and/or cellulitis.        MACRO:   none        Signed by: Noah Ladd 6/7/2024 12:54 PM   Dictation workstation:   [id number]      XR hand left 3+ views   Final Result   No acute fracture. Soft tissue swelling in the 2nd digit. Multifocal   productive arthritic changes noted.        MACRO:   None.        Signed by: aLra Severino 6/6/2024 1:50 PM   Dictation workstation:   [id number]      CT head wo IV contrast   Final Result   Findings of probable chronic small vessel ischemic changes and age   related diffuse cerebral volume loss. No acute intracranial   abnormality or mass effect.        This study was interpreted at  [facility].        MACRO:   None        Signed by: iWn Peres 6/6/2024 12:39 PM   Dictation workstation:   [id number]      XR cervical spine complete 4-5 views   Final Result   Discogenic degenerative changes as described.             Signed by: Franck Pascal 6/6/2024 11:12 AM   Dictation workstation:   [id number]      Vascular US PVR Without Exercise   Final Result      XR foot right 3+ views   Final Result   1. Findings consistent with progressive osteomyelitis of the 4th   metatarsal bone with brown osseous erosion of the lateral and plantar   aspect of the shaft with overlying soft tissue swelling and   ulceration consistent with persistent overlying soft tissue   infection. If it would alter clinical management, an MRI may be   obtained.        2. 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        MACRO:   None        Signed by: Shalini Wright 6/5/2024 12:19 PM   Dictation workstation:   [id number]      XR chest 1 view   Final Result   1.  No evidence of acute cardiopulmonary process.                  MACRO:   None        Signed by: Shalini Wright 6/5/2024 12:09 PM   Dictation workstation:   [id number]      Transthoracic Echo (TTE) Limited    (Results Pending)         Physical Exam     Constitutional   General appearance: Appears tired    Pulmonary   Respiratory assessment: No respiratory distress, normal respiratory rhythm and effort.    Auscultation of Lungs: Clear bilateral breath sounds.   Cardiovascular   Auscultation of heart: Apical pulse normal, heart rate and rhythm normal, normal S1 and S2, no murmurs and no pericardial rub.    Exam for edema: No peripheral edema.   Abdomen   Abdominal Exam: No bruits, normal bowel sounds, soft, non-tender, no abdominal mass palpated.    Liver and Spleen exam: No hepato-splenomegaly.   Musculoskeletal     Skin inspection: Left hand redness    Neurologic   Cranial nerves: Nerves 2-12  were intact, no focal neuro defects.     Assessment/Plan      #Sepsis  #Osteomyelitis  On IV cefazolin  Dr Arce Discussed with the patient and family about the need for an amputation for source control and treating the infection    #Left hand cellulitis  Will monitor  Cont with current      #Poorly controlled diabetes mellitus  Sliding scale insulin    #Peripheral artery disease       #Dyslipidemia  Continue statins and maintain LDL less than 70     #Hypertension  Continue home medications     #Fall with head and neck injury  CT head negative  CT cervical spine negative  X-ray of the wrist and hand negative for fractures  Pain control  Hold flexeril per nursing making pt very drowsy    Supplement K for hypokalemia  Heating pads

## 2024-06-09 LAB
ANION GAP SERPL CALC-SCNC: 11 MMOL/L (ref 10–20)
BUN SERPL-MCNC: 17 MG/DL (ref 6–23)
CALCIUM SERPL-MCNC: 8.1 MG/DL (ref 8.6–10.3)
CHLORIDE SERPL-SCNC: 99 MMOL/L (ref 98–107)
CO2 SERPL-SCNC: 24 MMOL/L (ref 21–32)
CREAT SERPL-MCNC: 0.74 MG/DL (ref 0.5–1.3)
EGFRCR SERPLBLD CKD-EPI 2021: 87 ML/MIN/1.73M*2
ERYTHROCYTE [DISTWIDTH] IN BLOOD BY AUTOMATED COUNT: 12.6 % (ref 11.5–14.5)
GLUCOSE BLD MANUAL STRIP-MCNC: 195 MG/DL (ref 74–99)
GLUCOSE BLD MANUAL STRIP-MCNC: 197 MG/DL (ref 74–99)
GLUCOSE BLD MANUAL STRIP-MCNC: 228 MG/DL (ref 74–99)
GLUCOSE BLD MANUAL STRIP-MCNC: 96 MG/DL (ref 74–99)
GLUCOSE SERPL-MCNC: 102 MG/DL (ref 74–99)
HCT VFR BLD AUTO: 39.5 % (ref 41–52)
HGB BLD-MCNC: 12.9 G/DL (ref 13.5–17.5)
MCH RBC QN AUTO: 30.5 PG (ref 26–34)
MCHC RBC AUTO-ENTMCNC: 32.7 G/DL (ref 32–36)
MCV RBC AUTO: 93 FL (ref 80–100)
NRBC BLD-RTO: 0 /100 WBCS (ref 0–0)
PLATELET # BLD AUTO: 246 X10*3/UL (ref 150–450)
POTASSIUM SERPL-SCNC: 3.6 MMOL/L (ref 3.5–5.3)
RBC # BLD AUTO: 4.23 X10*6/UL (ref 4.5–5.9)
SODIUM SERPL-SCNC: 130 MMOL/L (ref 136–145)
WBC # BLD AUTO: 14.5 X10*3/UL (ref 4.4–11.3)

## 2024-06-09 PROCEDURE — 36415 COLL VENOUS BLD VENIPUNCTURE: CPT

## 2024-06-09 PROCEDURE — 2500000004 HC RX 250 GENERAL PHARMACY W/ HCPCS (ALT 636 FOR OP/ED)

## 2024-06-09 PROCEDURE — 2500000001 HC RX 250 WO HCPCS SELF ADMINISTERED DRUGS (ALT 637 FOR MEDICARE OP): Performed by: INTERNAL MEDICINE

## 2024-06-09 PROCEDURE — 85027 COMPLETE CBC AUTOMATED: CPT

## 2024-06-09 PROCEDURE — 82947 ASSAY GLUCOSE BLOOD QUANT: CPT | Mod: 91

## 2024-06-09 PROCEDURE — 2500000004 HC RX 250 GENERAL PHARMACY W/ HCPCS (ALT 636 FOR OP/ED): Mod: JZ,MUE | Performed by: INTERNAL MEDICINE

## 2024-06-09 PROCEDURE — 1200000002 HC GENERAL ROOM WITH TELEMETRY DAILY

## 2024-06-09 PROCEDURE — 76937 US GUIDE VASCULAR ACCESS: CPT

## 2024-06-09 PROCEDURE — 2500000002 HC RX 250 W HCPCS SELF ADMINISTERED DRUGS (ALT 637 FOR MEDICARE OP, ALT 636 FOR OP/ED): Mod: MUE

## 2024-06-09 PROCEDURE — 2500000004 HC RX 250 GENERAL PHARMACY W/ HCPCS (ALT 636 FOR OP/ED): Performed by: INTERNAL MEDICINE

## 2024-06-09 PROCEDURE — 2500000001 HC RX 250 WO HCPCS SELF ADMINISTERED DRUGS (ALT 637 FOR MEDICARE OP)

## 2024-06-09 PROCEDURE — 80048 BASIC METABOLIC PNL TOTAL CA: CPT

## 2024-06-09 RX ADMIN — INSULIN LISPRO 2 UNITS: 100 INJECTION, SOLUTION INTRAVENOUS; SUBCUTANEOUS at 16:32

## 2024-06-09 RX ADMIN — CEFAZOLIN SODIUM 2 G: 2 INJECTION, SOLUTION INTRAVENOUS at 14:12

## 2024-06-09 RX ADMIN — TAMSULOSIN HYDROCHLORIDE 0.4 MG: 0.4 CAPSULE ORAL at 10:00

## 2024-06-09 RX ADMIN — DORZOLAMIDE HYDROCHLORIDE AND TIMOLOL MALEATE 1 DROP: 22.3; 6.8 SOLUTION/ DROPS OPHTHALMIC at 09:00

## 2024-06-09 RX ADMIN — TRAMADOL HYDROCHLORIDE 50 MG: 50 TABLET, COATED ORAL at 21:01

## 2024-06-09 RX ADMIN — CLOPIDOGREL 75 MG: 75 TABLET ORAL at 10:01

## 2024-06-09 RX ADMIN — INSULIN LISPRO 4 UNITS: 100 INJECTION, SOLUTION INTRAVENOUS; SUBCUTANEOUS at 10:01

## 2024-06-09 RX ADMIN — ACETAMINOPHEN 650 MG: 325 TABLET ORAL at 21:01

## 2024-06-09 RX ADMIN — INSULIN LISPRO 2 UNITS: 100 INJECTION, SOLUTION INTRAVENOUS; SUBCUTANEOUS at 14:12

## 2024-06-09 RX ADMIN — PANTOPRAZOLE SODIUM 40 MG: 40 TABLET, DELAYED RELEASE ORAL at 05:01

## 2024-06-09 RX ADMIN — TRAMADOL HYDROCHLORIDE 50 MG: 50 TABLET, COATED ORAL at 05:01

## 2024-06-09 RX ADMIN — Medication 3 MG: at 21:01

## 2024-06-09 RX ADMIN — CEFAZOLIN SODIUM 2 G: 2 INJECTION, SOLUTION INTRAVENOUS at 21:02

## 2024-06-09 RX ADMIN — SODIUM CHLORIDE 75 ML/HR: 9 INJECTION, SOLUTION INTRAVENOUS at 16:33

## 2024-06-09 RX ADMIN — ENOXAPARIN SODIUM 40 MG: 40 INJECTION SUBCUTANEOUS at 18:16

## 2024-06-09 RX ADMIN — CEFAZOLIN SODIUM 2 G: 2 INJECTION, SOLUTION INTRAVENOUS at 03:44

## 2024-06-09 RX ADMIN — LATANOPROST 1 DROP: 50 SOLUTION/ DROPS OPHTHALMIC at 21:12

## 2024-06-09 RX ADMIN — METOPROLOL TARTRATE 12.5 MG: 25 TABLET, FILM COATED ORAL at 21:01

## 2024-06-09 RX ADMIN — METOPROLOL TARTRATE 12.5 MG: 25 TABLET, FILM COATED ORAL at 10:01

## 2024-06-09 RX ADMIN — INSULIN GLARGINE 25 UNITS: 100 INJECTION, SOLUTION SUBCUTANEOUS at 21:11

## 2024-06-09 RX ADMIN — DORZOLAMIDE HYDROCHLORIDE AND TIMOLOL MALEATE 1 DROP: 22.3; 6.8 SOLUTION/ DROPS OPHTHALMIC at 21:12

## 2024-06-09 ASSESSMENT — COGNITIVE AND FUNCTIONAL STATUS - GENERAL
DRESSING REGULAR UPPER BODY CLOTHING: A LITTLE
DRESSING REGULAR LOWER BODY CLOTHING: A LOT
PERSONAL GROOMING: A LITTLE
WALKING IN HOSPITAL ROOM: A LOT
CLIMB 3 TO 5 STEPS WITH RAILING: TOTAL
TURNING FROM BACK TO SIDE WHILE IN FLAT BAD: A LITTLE
STANDING UP FROM CHAIR USING ARMS: A LOT
DAILY ACTIVITIY SCORE: 15
EATING MEALS: A LITTLE
MOVING TO AND FROM BED TO CHAIR: A LOT
MOBILITY SCORE: 14
TOILETING: A LOT
HELP NEEDED FOR BATHING: A LOT

## 2024-06-09 ASSESSMENT — PAIN - FUNCTIONAL ASSESSMENT
PAIN_FUNCTIONAL_ASSESSMENT: 0-10
PAIN_FUNCTIONAL_ASSESSMENT: 0-10

## 2024-06-09 ASSESSMENT — PAIN SCALES - GENERAL
PAINLEVEL_OUTOF10: 7
PAINLEVEL_OUTOF10: 0 - NO PAIN
PAINLEVEL_OUTOF10: 0 - NO PAIN

## 2024-06-09 ASSESSMENT — PAIN DESCRIPTION - DESCRIPTORS: DESCRIPTORS: SPASM

## 2024-06-09 NOTE — CARE PLAN
Problem: Pain  Goal: My pain/discomfort is manageable  Outcome: Progressing     Problem: Safety  Goal: Patient will be injury free during hospitalization  Outcome: Progressing  Goal: I will remain free of falls  Outcome: Progressing     Problem: Daily Care  Goal: Daily care needs are met  Outcome: Progressing     Problem: Psychosocial Needs  Goal: Demonstrates ability to cope with hospitalization/illness  Outcome: Progressing  Goal: Collaborate with me, my family, and caregiver to identify my specific goals  Outcome: Progressing     Problem: Discharge Barriers  Goal: My discharge needs are met  Outcome: Progressing     Problem: Skin  Goal: Decreased wound size/increased tissue granulation at next dressing change  Outcome: Progressing  Goal: Participates in plan/prevention/treatment measures  Outcome: Progressing  Goal: Prevent/manage excess moisture  Outcome: Progressing  Goal: Prevent/minimize sheer/friction injuries  Outcome: Progressing  Goal: Promote/optimize nutrition  Outcome: Progressing  Goal: Promote skin healing  Outcome: Progressing   The patient's goals for the shift include      The clinical goals for the shift include Pt will remain free of falls throughout shift.

## 2024-06-09 NOTE — PROGRESS NOTES
"Occupational Therapy                 Therapy Communication Note    Patient Name: Rene Ballard  MRN: [id number]  Today's Date: 6/9/2024     Discipline: Occupational Therapy    Missed Visit Reason: Missed Visit Reason: Patient refused. Pt not feeling well, \"in pain all over\". Pt and family member requesting therapy tomorrow.    Missed Time: Attempt    Comment:  "

## 2024-06-09 NOTE — PROGRESS NOTES
PODIATRY PROGRESS NOTE    SERVICE DATE: 6/9/2024   SERVICE TIME:  12:20 PM      REASON FOR CONSULT: right 4th toe osteomyelitis   REQUESTING PHYSICIAN: Darnell Yip MD   PRIMARY CARE PHYSICIAN: Olesya Daniel MD    Subjective   HPI:  Pt seen at bedside.  No overnight events.  Pt admits to some pain to operative extremity.  Has been elevating foot on pillow.  Pt denies any constitutional symptoms.   No other pedal complaints at this time.       ROS: 10-point review of systems was performed and is otherwise negative except as noted in HPI.  PMH: Reviewed/documented below.  PSH:  Noncontributory except per HPI   FH: Reviewed and noncontributory   SOCIAL:  Reviewed/documented below.  ALLERGIES: Reviewed/documented below.  MEDS: Reviewed/documented below.  VS: Reviewed/documented below.    Past Medical History:   Diagnosis Date    Abnormal EKG 05/01/2023    BPH (benign prostatic hyperplasia)     Chronic systolic heart failure (Multi)     COVID-19 05/01/2023    Essential hypertension     GERD (gastroesophageal reflux disease)     Hypertensive heart disease with heart failure (Multi)     Osteoarthritis     Osteomyelitis (Multi)     Peripheral vascular disease, unspecified (CMS-[facility]) 11/28/2022    PVD (peripheral vascular disease)    Personal history of malignant neoplasm of bladder     History of carcinoma of bladder    Personal history of other diseases of the musculoskeletal system and connective tissue     History of arthritis    Personal history of other endocrine, nutritional and metabolic disease     History of diabetes mellitus    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    Controlled diabetes mellitus with circulatory complication     Past Surgical History:   Procedure Laterality Date    CARDIAC SURGERY      IR  BLADDER ASPIRATION  06/23/2017    IR  BLADDER ASPIRATION 6/23/2017 [facility] INPATIENT LEGACY     OTHER SURGICAL HISTORY  06/19/2017    Surgery Penile Prosthetic Device    WRIST SURGERY Right      Family History   Problem Relation Name Age of Onset    No Known Problems Mother      Heart disease Father      Tuberculosis Maternal Grandmother       Social History     Tobacco Use    Smoking status: Never    Smokeless tobacco: Never   Substance Use Topics    Alcohol use: Not Currently    Drug use: Never      Medications Prior to Admission   Medication Sig Dispense Refill Last Dose    clopidogrel (Plavix) 75 mg tablet TAKE ONE TABLET BY MOUTH EVERY DAY 90 tablet 0 6/4/2024    cyanocobalamin (Vitamin B-12) 500 mcg tablet 1 tablet DAILY (route: oral)   6/4/2024    dapagliflozin propanediol (Farxiga) 10 mg Take 1 tablet (10 mg) by mouth once daily. 100 tablet 1 6/4/2024    docusate sodium (Colace) 100 mg capsule Take 1 capsule (100 mg) by mouth 2 times a day as needed for constipation.   Past Week    dorzolamide-timoloL (Cosopt) 22.3-6.8 mg/mL ophthalmic solution Administer 1 drop into the left eye 2 times a day.   6/4/2024    enalapril (Vasotec) 2.5 mg tablet Take 1 tablet (2.5 mg) by mouth once daily. 90 tablet 1 6/4/2024    insulin glargine (Lantus) 100 unit/mL injection Inject 25 Units under the skin once every 24 hours. Take as directed per insulin instructions. 5 mL 12 6/4/2024    latanoprost (Xalatan) 0.005 % ophthalmic solution instill 1 drop into each eye every evening   6/4/2024    lidocaine 4 % patch Place 2 patches over 12 hours on the skin once daily. Remove & discard patch within 12 hours or as directed by MD.   Past Week    metoprolol tartrate (Lopressor) 25 mg tablet Take 0.5 tablets (12.5 mg) by mouth 2 times a day. 90 tablet 3 6/4/2024    multivitamin tablet Take 1 tablet by mouth once daily.   6/4/2024    niacin 500 mg tablet Take 1 tablet (500 mg) by mouth once daily.   Past Week    omeprazole (PriLOSEC) 20 mg DR capsule Take 1 capsule (20 mg) by mouth once daily. 90 capsule 1 6/4/2024    pen  "needle, diabetic (UltiCare Pen Needle) 31 gauge x 1/4\" needle USE 1 PEN NEEDLE SUBCUTANEOUSLY 3 TIMES DAILY BEFORE MEALS 100 each 1 Past Week    polyethylene glycol (Glycolax, Miralax) 17 gram packet Take 17 g by mouth once daily as needed (constipation).   Past Week    tamsulosin (Flomax) 0.4 mg 24 hr capsule 1 capsule (0.4 mg).   6/4/2024    alpha tocopherol (Vitamin E) 268 mg (400 unit) capsule Take 1 capsule (400 Units) by mouth once daily.       alum-mag hydroxide-simeth (Mylanta) 200-200-20 mg/5 mL oral suspension Take 30 mL by mouth every 4 hours if needed.       ampicillin-sulbactam 3 gram injection        ascorbic acid (Vitamin C) 1,000 mg tablet Take 1 tablet (1,000 mg) by mouth once daily.       blood sugar diagnostic (OneTouch Ultra Test) strip Check glucose 4 x per day 400 each 3     cholecalciferol (Vitamin D-3) 125 MCG (5000 UT) capsule Take 1 capsule (125 mcg) by mouth once daily.       dulaglutide (Trulicity) 0.75 mg/0.5 mL pen injector Inject 0.75 mg under the skin 1 (one) time per week. 2 mL 1         Medications:  Scheduled Meds: ceFAZolin, 2 g, intravenous, q8h  clopidogrel, 75 mg, oral, Daily  [Held by provider] cyclobenzaprine, 5 mg, oral, TID  docusate sodium, 100 mg, oral, BID  dorzolamide-timoloL, 1 drop, Left Eye, BID  enoxaparin, 40 mg, subcutaneous, q24h  insulin glargine, 10 Units, subcutaneous, Once  insulin glargine, 25 Units, subcutaneous, q24h  insulin lispro, 0-10 Units, subcutaneous, TID  latanoprost, 1 drop, Both Eyes, Nightly  lidocaine, 2 patch, transdermal, Daily  metoprolol tartrate, 12.5 mg, oral, BID  pantoprazole, 40 mg, oral, Daily before breakfast  tamsulosin, 0.4 mg, oral, Daily      Continuous Infusions: sodium chloride 0.9%, 75 mL/hr, Last Rate: 75 mL/hr (06/08/24 2426)      PRN Meds: PRN medications: acetaminophen, alum-mag hydroxide-simeth, dextrose, dextrose, glucagon, glucagon, melatonin, morphine, ondansetron, polyethylene glycol, traMADol    Allergies as of " 06/05/2024 - Reviewed 06/05/2024   Allergen Reaction Noted    Glimepiride Unknown 08/11/2006    Lisinopril Unknown 02/22/2011    Metformin Unknown 08/11/2006    Pioglitazone Unknown 08/11/2006    Statins-hmg-coa reductase inhibitors Unknown 05/01/2023            Objective   PHYSICAL EXAM:  Physical Exam Performed:  Vitals:    06/09/24 1155   BP: 138/65   Pulse: 71   Resp: 20   Temp: 36.4 °C (97.6 °F)   SpO2: 97%     Body mass index is 21.45 kg/m².    Patient is AOx3 and in no acute distress. Patient is alert and cooperative. Sitting comfortably in bed with dressing clean, dry and intact. Heel off-loading boots in place B/L.    Vascular: Faintly palpable DP/PT pulses B/L. Moderate non-pitting edema noted B/L. Hair growth absent B/L. CFT<5 to B/L hallux. Temperature is warm to cool from tibial tuberosity to distal digits B/L. No lymphatic streaking noted B/L.    Musculoskeletal: Gross active and passive ROM intact to age and activity level. Moves all extremities spontaneously. pain to palpation to right foot [place]-wound area  Neurological: Intact light touch sensation B/L. Pain stimuli diminished B/L. Denies any numbness, burning or tingling.    Dermatologic: Nails 1-5 are within normal limits for thickness and length B/L. Skin appears diffusely xerotic B/L. Web spaces 1-4 B/L are clean, dry and intact. No rashes or nodules noted B/L. No hyperkeratotic tissue noted B/L.     Wound:  Measurements: 3.8cm x 4.1cm x 0.4cm  Mixed wound base of Fibrogranular tissue with bone in the wound base tissue.   Moderate sPurulent drainage with fibrotic slough.   Moderate salinas-wound maceration.   Moderate [place]-wound erythema.   Minimal evidence of ascending cellulitis   Mild palpable fluctuance. Moderate malodor. Mild increased warmth.   Significant probe to bone or deep structures within the wound base.   Moderate tunneling or tracking.   Minimal undermining. Skin edges irregular but intact.    LABS:   Results for orders placed or  performed during the hospital encounter of 06/05/24 (from the past 24 hour(s))   POCT GLUCOSE   Result Value Ref Range    POCT Glucose 181 (H) 74 - 99 mg/dL   POCT GLUCOSE   Result Value Ref Range    POCT Glucose 145 (H) 74 - 99 mg/dL   Basic metabolic panel   Result Value Ref Range    Glucose 102 (H) 74 - 99 mg/dL    Sodium 130 (L) 136 - 145 mmol/L    Potassium 3.6 3.5 - 5.3 mmol/L    Chloride 99 98 - 107 mmol/L    Bicarbonate 24 21 - 32 mmol/L    Anion Gap 11 10 - 20 mmol/L    Urea Nitrogen 17 6 - 23 mg/dL    Creatinine 0.74 0.50 - 1.30 mg/dL    eGFR 87 >60 mL/min/1.73m*2    Calcium 8.1 (L) 8.6 - 10.3 mg/dL   CBC   Result Value Ref Range    WBC 14.5 (H) 4.4 - 11.3 x10*3/uL    nRBC 0.0 0.0 - 0.0 /100 WBCs    RBC 4.23 (L) 4.50 - 5.90 x10*6/uL    Hemoglobin 12.9 (L) 13.5 - 17.5 g/dL    Hematocrit 39.5 (L) 41.0 - 52.0 %    MCV 93 80 - 100 fL    MCH 30.5 26.0 - 34.0 pg    MCHC 32.7 32.0 - 36.0 g/dL    RDW 12.6 11.5 - 14.5 %    Platelets 246 150 - 450 x10*3/uL   POCT GLUCOSE   Result Value Ref Range    POCT Glucose 228 (H) 74 - 99 mg/dL   POCT GLUCOSE   Result Value Ref Range    POCT Glucose 195 (H) 74 - 99 mg/dL      Lab Results   Component Value Date    HGBA1C 8.7 (H) 06/06/2024      Lab Results   Component Value Date    CRP 28.95 (H) 06/05/2024      Lab Results   Component Value Date    SEDRATE 93 (H) 06/05/2024        Results from last 7 days   Lab Units 06/09/24  0553   WBC AUTO x10*3/uL 14.5*   RBC AUTO x10*6/uL 4.23*   HEMOGLOBIN g/dL 12.9*   HEMATOCRIT % 39.5*     Results from last 7 days   Lab Units 06/09/24  0553 06/05/24  1305 06/05/24  1136   SODIUM mmol/L 130*   < > 131*   POTASSIUM mmol/L 3.6   < > 4.7   CHLORIDE mmol/L 99   < > 95*   CO2 mmol/L 24   < > 23   BUN mg/dL 17   < > 30*   CREATININE mg/dL 0.74   < > 1.32*   CALCIUM mg/dL 8.1*   < > 9.1   BILIRUBIN TOTAL mg/dL  --   --  0.8   ALT U/L  --   --  19   AST U/L  --   --  15    < > = values in this interval not displayed.     Results from last 7 days    Lab Units 06/07/24  0943   COLOR U  Yellow   APPEARANCE U  Turbid*   PH U  5.5   SPEC GRAV UR  1.035   PROTEIN U mg/dL 50 (1+)*   BLOOD UR  0.03 (TRACE)*   NITRITE U  NEGATIVE   WBC UR /HPF 11-20*   BACTERIA UR /HPF 1+*       IMAGING REVIEW:  Vascular US PVR Without Exercise    Result Date: 6/6/2024             [place] [id number]   Tel [phone] and Fax [phone]  Vascular Lab Report VASC US PVR WITHOUT EXERCISE  Patient Name:      MAYELIN KAT WHITNEY    Reading Physician:  [id number] Sylvester Nguyen DO Study Date:        6/6/2024            Ordering Physician: [id number] GERALD HANKS MRN/PID:           [id number]            Technologist:       Benjamin Godinez RVT Accession#:        [id number]        Technologist 2: Date of Birth/Age: 11/8/1934 / 89      Encounter#:         [id number]                    years Gender:            M Admission Status:  Outpatient          Location Performed: [facility]  Diagnosis/ICD: Peripheral vascular disease, unspecified-I73.9; Stricture of                artery-I77.1 CPT Codes:     77984 Peripheral artery PVR (multi segmental pressure  CONCLUSIONS: Right Lower PVR: Right pressures of >220 mmHg suggest no compressibility of vessels and may make absolute Segmental Limb Pressures (SLP) unreliable. There is evidence of mild multi vessel disease. Monophasic flow is noted in the right posterior tibial artery, right dorsalis pedis artery and right popliteal artery. Multiphasic flow is noted in the right common femoral artery. Right leg results called per waveforms due to calcified and partially calcified arterial pressures. Right trasmetatarsal amputation. Left Lower PVR: Left pressures of >220 mmHg suggest no compressibility of vessels and may make absolute Segmental Limb Pressures (SLP) unreliable. There is evidence of moderate multi-vessel disease. Decreased digital perfusion noted.  Monophasic flow is noted in the left posterior tibial artery and left dorsalis pedis artery. Multiphasic flow is noted in the left common femoral artery and left popliteal artery. Left leg results called per waveforms due to calcified and partially calcified arterial pressures.  Comparison: Compared with study from 6/12/2023, On todays exam the left leg demonstates moderate mutivessel arterial occlusive disease.  Imaging & Doppler Findings:  RIGHT Lower PVR                Pressures Ratios Right High Thigh               255 mmHg  2.11 Right Low Thigh                188 mmHg  1.55 Right Calf                     137 mmHg  1.13 Right Posterior Tibial (Ankle) 43 mmHg   0.36 Right Dorsalis Pedis (Ankle)   178 mmHg  1.47   LEFT Lower PVR                Pressures Ratios Left High Thigh               184 mmHg  1.52 Left Low Thigh                176 mmHg  1.45 Left Calf                     142 mmHg  1.17 Left Posterior Tibial (Ankle) 46 mmHg   0.38 Left Dorsalis Pedis (Ankle)   255 mmHg  2.11 Left Digit (Great Toe)        32 mmHg   0.26                     Right     Left Brachial Pressure 121 mmHg 108 mmHg   [id number] Sylvester Nguyen DO Electronically signed by [id number] Sylvester Nguyen DO on 6/6/2024 at 2:30:19 PM  ** Final **     XR hand left 3+ views    Result Date: 6/6/2024  Interpreted By:  Lara Severino, STUDY: XR HAND LEFT 3+ VIEWS;  6/6/2024 1:42 pm   INDICATION: Signs/Symptoms:Pain post fall.   COMPARISON: None.   ACCESSION NUMBER(S): [id number]   ORDERING CLINICIAN: JACQUELYN STROUD   FINDINGS: 3 portable views of the left hand obtained.   No acute fracture or osseous displacement. Radiocarpal joint space narrowing and chondrocalcinosis in the ulnocarpal joint space. Multifocal degenerative changes including joint space narrowing and spurring including the 1st CMC, 1st through 5th MCP and multiple interphalangeal joints. Prominent lucent probable subchondral cyst/geode in the trapezium. Soft tissue swelling in the 2nd digit.  Multifocal soft tissue presumed vascular calcifications.       No acute fracture. Soft tissue swelling in the 2nd digit. Multifocal productive arthritic changes noted.   MACRO: None.   Signed by: Lara Severino 6/6/2024 1:50 PM Dictation workstation:   [id number]    CT head wo IV contrast    Result Date: 6/6/2024  Interpreted By:  Win Peres, STUDY: CT HEAD WO IV CONTRAST; ;  6/6/2024 12:16 pm   INDICATION: Signs/Symptoms:Head injury.   COMPARISON: CT head from 03/19/2024.   ACCESSION NUMBER(S): [id number]   ORDERING CLINICIAN: JACQUELYN STROUD   TECHNIQUE: Routine unenhanced CT of the head was performed.   FINDINGS: There is no acute intracranial hemorrhage or mass effect. There is no abnormal extra-axial fluid collection. The ventricles, sulci, and basilar cisterns are prominent in size due to age related diffuse cerebral volume loss.   There are regions of hypoattenuation within the bilateral cerebral hemispheric white matter which are probably sequela of chronic small vessel ischemic changes. Gray-white differentiation is maintained throughout.   There are no acute calvarial fractures. The visualized paranasal sinuses and mastoid air cells are essentially clear.   Degenerative pannus formation at the level of C1-C2.       Findings of probable chronic small vessel ischemic changes and age related diffuse cerebral volume loss. No acute intracranial abnormality or mass effect.   This study was interpreted at [facility].   MACRO: None   Signed by: Win Peres 6/6/2024 12:39 PM Dictation workstation:   [id number]    XR cervical spine complete 4-5 views    Result Date: 6/6/2024  Interpreted By:  Franck Pascal, STUDY: XR CERVICAL SPINE COMPLETE 4-5 VIEWS; 6/6/2024 10:55 am   INDICATION: Signs/Symptoms:Pain s/p fall.   COMPARISON: None.   ACCESSION NUMBER(S): [id number]   ORDERING CLINICIAN: JACQUELYN STROUD   FINDINGS: Multiple views of the  lumbar spine are obtained. Minimal  anterolisthesis of C6 and C7.. The vertebral body heights are preserved. Disc space loss from C5 through C7. Endplate sclerosis and osteophytes are seen throughout the spine. No acute fracture-dislocation.       Discogenic degenerative changes as described.     Signed by: Franck Pascal 6/6/2024 11:12 AM Dictation workstation:   [id number]    ECG 12 lead    Result Date: 6/5/2024  Sinus rhythm with 1st degree AV block Right bundle branch block Left anterior fascicular block  Bifascicular block  Abnormal ECG When compared with ECG of 19-MAR-2024 19:08, No significant change was found    XR foot right 3+ views    Result Date: 6/5/2024  Interpreted By:  Shalini Wright, STUDY: XR FOOT RIGHT 3+ VIEWS; ;  6/5/2024 11:54 am   INDICATION: Signs/Symptoms:Foot wound..   COMPARISON: Right foot x-ray series and CT 03/19/2024.   ACCESSION NUMBER(S): [id number]   ORDERING CLINICIAN: ALLAN VIGIL   FINDINGS: Bones are osteopenic. No acute fracture or dislocation.   Postoperative changes of prior transmetatarsal forefoot amputation and resection of the 5th metatarsal are again seen. There is new lucency due to osseous erosion the lateral and plantar aspect of the 4th metatarsal shaft with some cortical irregularity at the tip of the amputation margin. This underlies an irregular ulcerative soft tissue wound with adjacent soft tissue swelling.   Soft tissue swelling around the foot and ankle and vascular calcifications are again seen.       1. Findings consistent with progressive osteomyelitis of the 4th metatarsal bone with brown osseous erosion of the lateral and plantar aspect of the shaft with overlying soft tissue swelling and ulceration consistent with persistent overlying soft tissue infection. If it would alter clinical management, an MRI may be obtained.   2. Redemonstrated transmetatarsal amputation of the forefoot and 5th metatarsal resection.   MACRO: None   Signed by: Shalini Wright 6/5/2024 12:19 PM Dictation workstation:    [id number]    XR chest 1 view    Result Date: 6/5/2024  Interpreted By:  Shalini Wright, STUDY: XR CHEST 1 VIEW;  6/5/2024 11:54 am   INDICATION: Signs/Symptoms:SOB.   COMPARISON: Chest x-ray 03/19/2024 and 06/17/2023. CT chest 06/07/2023..   ACCESSION NUMBER(S): [id number]   ORDERING CLINICIAN: ALLAN VIGIL   FINDINGS: AP radiograph of the chest was provided.   Median sternotomy wires are present.   CARDIOMEDIASTINAL SILHOUETTE: Cardiomediastinal silhouette is normal in size and configuration.   LUNGS and PLEURA: No focal consolidation or other airspace opacity. No pleural effusion or pneumothorax.   ABDOMEN: No remarkable upper abdominal findings.   BONES: No acute osseous changes.       1.  No evidence of acute cardiopulmonary process.       MACRO: None   Signed by: Shalini Wright 6/5/2024 12:09 PM Dictation workstation:   [id number]            Assessment/Plan   ASSESSMENT & PLAN:    #Osteomyelitis right foot  #Sepsis  #PVD  #T2DM  #HTN  - Patient was seen and evaluated; all findings were discussed and all questions were answered to patient's satisfaction.  - Charts, labs, vitals and imaging all reviewed.   - Imaging: Xray Right foot: Possible OM to 4th metatarsal  CT Pending  - Labs: WBC: 19.5,   - PVRs: Left leg moderate PAD Right leg Mild PVD  - Wound culture: Pending  - Blood culture: Pending    Plan:  - Abx: Per Primary  - Pain Regimen: Per Primary  - Bowel Regimen: Per Primary  - Discussed PVR results with patient and Wife  - Likely to require surgical intervention with debridement of soft tissue and bone this hospital visit. Discussed this in detail with patient as well as risks and benefits   - Discussed CT a with family   - Recommending Right Chopart's amputation or a Right BKA amputation, Depending on patients wishes.  - Dressings: Betadine soaked 4x4's, 4x4's, Krelix, ACE wrap.  - Nursing staff is able to change/reinforce dressing if & as necessary until next day’s dressing change. Thank you.  -  Podiatry will continue to follow while in house.      Weightbearing: WBAT      Case to be discussed with attending, A&P above reflects a tentative plan. Please await for the final signature from the attending physician on service.    Eladio Chaves  PGY-2  Podiatric Medicine & Surgery  Please Mesfinku message me with any questions or concerns.            SIGNATURE: Eladio Chaves DPM PATIENT NAME: Rene Ballard   DATE: June 9, 2024 MRN: [id number]   TIME: 12:20 PM CONTACT: Haiku message     I reviewed the resident/fellow's documentation and discussed the patient with the resident/fellow. I agree with the resident/fellow's medical decision making as documented in the note.     Robbie Islas DPM

## 2024-06-09 NOTE — CARE PLAN
Problem: Pain  Goal: My pain/discomfort is manageable  Outcome: Progressing     Problem: Safety  Goal: Patient will be injury free during hospitalization  Outcome: Progressing  Goal: I will remain free of falls  Outcome: Progressing     Problem: Daily Care  Goal: Daily care needs are met  Outcome: Progressing     Problem: Psychosocial Needs  Goal: Demonstrates ability to cope with hospitalization/illness  Outcome: Progressing  Goal: Collaborate with me, my family, and caregiver to identify my specific goals  Outcome: Progressing     Problem: Skin  Goal: Participates in plan/prevention/treatment measures  Outcome: Progressing  Flowsheets (Taken 6/9/2024 5642)  Participates in plan/prevention/treatment measures: Elevate heels

## 2024-06-09 NOTE — PROGRESS NOTES
Physical Therapy                 Therapy Communication Note    Patient Name: Rene Ballard  MRN: [id number]  Today's Date: 6/9/2024     Discipline: Physical Therapy    Missed Visit Reason: Missed Visit Reason: Patient refused    Missed Time: Attempt    Comment: Therapist went to see the patient in room 729 , refused and requested for tomorrow , stating he was tired and in pain. Nurse and family member were present.

## 2024-06-10 ENCOUNTER — APPOINTMENT (OUTPATIENT)
Dept: CARDIOLOGY | Facility: HOSPITAL | Age: 89
End: 2024-06-10
Payer: MEDICARE

## 2024-06-10 ENCOUNTER — APPOINTMENT (OUTPATIENT)
Dept: WOUND CARE | Facility: CLINIC | Age: 89
End: 2024-06-10
Payer: MEDICARE

## 2024-06-10 LAB
ANION GAP SERPL CALC-SCNC: 12 MMOL/L (ref 10–20)
BUN SERPL-MCNC: 11 MG/DL (ref 6–23)
CALCIUM SERPL-MCNC: 8.8 MG/DL (ref 8.6–10.3)
CHLORIDE SERPL-SCNC: 97 MMOL/L (ref 98–107)
CO2 SERPL-SCNC: 27 MMOL/L (ref 21–32)
CREAT SERPL-MCNC: 0.66 MG/DL (ref 0.5–1.3)
EGFRCR SERPLBLD CKD-EPI 2021: 90 ML/MIN/1.73M*2
EJECTION FRACTION APICAL 4 CHAMBER: 62.1
ERYTHROCYTE [DISTWIDTH] IN BLOOD BY AUTOMATED COUNT: 12.6 % (ref 11.5–14.5)
GLUCOSE BLD MANUAL STRIP-MCNC: 201 MG/DL (ref 74–99)
GLUCOSE BLD MANUAL STRIP-MCNC: 79 MG/DL (ref 74–99)
GLUCOSE BLD MANUAL STRIP-MCNC: 83 MG/DL (ref 74–99)
GLUCOSE BLD MANUAL STRIP-MCNC: 92 MG/DL (ref 74–99)
GLUCOSE SERPL-MCNC: 71 MG/DL (ref 74–99)
HCT VFR BLD AUTO: 45.4 % (ref 41–52)
HGB BLD-MCNC: 14.8 G/DL (ref 13.5–17.5)
LEFT VENTRICLE INTERNAL DIMENSION DIASTOLE: 4.47 CM (ref 3.5–6)
LV EJECTION FRACTION BIPLANE: 67 %
MCH RBC QN AUTO: 30.6 PG (ref 26–34)
MCHC RBC AUTO-ENTMCNC: 32.6 G/DL (ref 32–36)
MCV RBC AUTO: 94 FL (ref 80–100)
NRBC BLD-RTO: 0 /100 WBCS (ref 0–0)
PLATELET # BLD AUTO: 308 X10*3/UL (ref 150–450)
POTASSIUM SERPL-SCNC: 4.3 MMOL/L (ref 3.5–5.3)
RBC # BLD AUTO: 4.83 X10*6/UL (ref 4.5–5.9)
SODIUM SERPL-SCNC: 132 MMOL/L (ref 136–145)
TRICUSPID ANNULAR PLANE SYSTOLIC EXCURSION: 1.2 CM
WBC # BLD AUTO: 13.9 X10*3/UL (ref 4.4–11.3)

## 2024-06-10 PROCEDURE — 80048 BASIC METABOLIC PNL TOTAL CA: CPT

## 2024-06-10 PROCEDURE — 2500000004 HC RX 250 GENERAL PHARMACY W/ HCPCS (ALT 636 FOR OP/ED)

## 2024-06-10 PROCEDURE — 2500000001 HC RX 250 WO HCPCS SELF ADMINISTERED DRUGS (ALT 637 FOR MEDICARE OP)

## 2024-06-10 PROCEDURE — 93005 ELECTROCARDIOGRAM TRACING: CPT

## 2024-06-10 PROCEDURE — 2500000002 HC RX 250 W HCPCS SELF ADMINISTERED DRUGS (ALT 637 FOR MEDICARE OP, ALT 636 FOR OP/ED)

## 2024-06-10 PROCEDURE — 99233 SBSQ HOSP IP/OBS HIGH 50: CPT | Performed by: INTERNAL MEDICINE

## 2024-06-10 PROCEDURE — 97530 THERAPEUTIC ACTIVITIES: CPT | Mod: GP

## 2024-06-10 PROCEDURE — 2500000004 HC RX 250 GENERAL PHARMACY W/ HCPCS (ALT 636 FOR OP/ED): Performed by: INTERNAL MEDICINE

## 2024-06-10 PROCEDURE — 93308 TTE F-UP OR LMTD: CPT

## 2024-06-10 PROCEDURE — 1200000002 HC GENERAL ROOM WITH TELEMETRY DAILY

## 2024-06-10 PROCEDURE — 2500000004 HC RX 250 GENERAL PHARMACY W/ HCPCS (ALT 636 FOR OP/ED): Mod: JZ | Performed by: INTERNAL MEDICINE

## 2024-06-10 PROCEDURE — 97161 PT EVAL LOW COMPLEX 20 MIN: CPT | Mod: GP

## 2024-06-10 PROCEDURE — 2500000001 HC RX 250 WO HCPCS SELF ADMINISTERED DRUGS (ALT 637 FOR MEDICARE OP): Performed by: INTERNAL MEDICINE

## 2024-06-10 PROCEDURE — 36415 COLL VENOUS BLD VENIPUNCTURE: CPT

## 2024-06-10 PROCEDURE — 97165 OT EVAL LOW COMPLEX 30 MIN: CPT | Mod: GO

## 2024-06-10 PROCEDURE — 85027 COMPLETE CBC AUTOMATED: CPT

## 2024-06-10 PROCEDURE — 92610 EVALUATE SWALLOWING FUNCTION: CPT | Mod: GN

## 2024-06-10 PROCEDURE — 82947 ASSAY GLUCOSE BLOOD QUANT: CPT | Mod: 91

## 2024-06-10 RX ORDER — LORAZEPAM 0.5 MG/1
0.5 TABLET ORAL EVERY 8 HOURS PRN
Status: DISCONTINUED | OUTPATIENT
Start: 2024-06-10 | End: 2024-06-13 | Stop reason: HOSPADM

## 2024-06-10 RX ADMIN — LATANOPROST 1 DROP: 50 SOLUTION/ DROPS OPHTHALMIC at 21:46

## 2024-06-10 RX ADMIN — LORAZEPAM 0.5 MG: 0.5 TABLET ORAL at 21:46

## 2024-06-10 RX ADMIN — DOCUSATE SODIUM 100 MG: 100 CAPSULE, LIQUID FILLED ORAL at 10:49

## 2024-06-10 RX ADMIN — INSULIN LISPRO 4 UNITS: 100 INJECTION, SOLUTION INTRAVENOUS; SUBCUTANEOUS at 12:37

## 2024-06-10 RX ADMIN — LORAZEPAM 0.5 MG: 0.5 TABLET ORAL at 13:18

## 2024-06-10 RX ADMIN — TAMSULOSIN HYDROCHLORIDE 0.4 MG: 0.4 CAPSULE ORAL at 10:49

## 2024-06-10 RX ADMIN — DORZOLAMIDE HYDROCHLORIDE AND TIMOLOL MALEATE 1 DROP: 22.3; 6.8 SOLUTION/ DROPS OPHTHALMIC at 10:56

## 2024-06-10 RX ADMIN — DORZOLAMIDE HYDROCHLORIDE AND TIMOLOL MALEATE 1 DROP: 22.3; 6.8 SOLUTION/ DROPS OPHTHALMIC at 21:46

## 2024-06-10 RX ADMIN — CLOPIDOGREL 75 MG: 75 TABLET ORAL at 10:49

## 2024-06-10 RX ADMIN — METOPROLOL TARTRATE 12.5 MG: 25 TABLET, FILM COATED ORAL at 10:49

## 2024-06-10 RX ADMIN — ENOXAPARIN SODIUM 40 MG: 40 INJECTION SUBCUTANEOUS at 18:16

## 2024-06-10 RX ADMIN — METOPROLOL TARTRATE 12.5 MG: 25 TABLET, FILM COATED ORAL at 21:48

## 2024-06-10 RX ADMIN — CEFAZOLIN SODIUM 2 G: 2 INJECTION, SOLUTION INTRAVENOUS at 21:46

## 2024-06-10 RX ADMIN — TRAMADOL HYDROCHLORIDE 50 MG: 50 TABLET, COATED ORAL at 05:13

## 2024-06-10 RX ADMIN — CEFAZOLIN SODIUM 2 G: 2 INJECTION, SOLUTION INTRAVENOUS at 12:40

## 2024-06-10 RX ADMIN — SODIUM CHLORIDE 75 ML/HR: 9 INJECTION, SOLUTION INTRAVENOUS at 05:14

## 2024-06-10 RX ADMIN — INSULIN GLARGINE 25 UNITS: 100 INJECTION, SOLUTION SUBCUTANEOUS at 21:47

## 2024-06-10 RX ADMIN — ACETAMINOPHEN 650 MG: 325 TABLET ORAL at 21:46

## 2024-06-10 RX ADMIN — Medication 3 MG: at 21:46

## 2024-06-10 RX ADMIN — PANTOPRAZOLE SODIUM 40 MG: 40 TABLET, DELAYED RELEASE ORAL at 05:14

## 2024-06-10 RX ADMIN — CEFAZOLIN SODIUM 2 G: 2 INJECTION, SOLUTION INTRAVENOUS at 05:13

## 2024-06-10 ASSESSMENT — PAIN - FUNCTIONAL ASSESSMENT
PAIN_FUNCTIONAL_ASSESSMENT: 0-10

## 2024-06-10 ASSESSMENT — PAIN SCALES - GENERAL
PAINLEVEL_OUTOF10: 0 - NO PAIN

## 2024-06-10 ASSESSMENT — COGNITIVE AND FUNCTIONAL STATUS - GENERAL
TOILETING: A LITTLE
WALKING IN HOSPITAL ROOM: A LITTLE
CLIMB 3 TO 5 STEPS WITH RAILING: TOTAL
DRESSING REGULAR UPPER BODY CLOTHING: A LOT
DRESSING REGULAR LOWER BODY CLOTHING: A LOT
DAILY ACTIVITIY SCORE: 18
TURNING FROM BACK TO SIDE WHILE IN FLAT BAD: A LITTLE
PERSONAL GROOMING: A LITTLE
STANDING UP FROM CHAIR USING ARMS: A LITTLE
EATING MEALS: A LITTLE
HELP NEEDED FOR BATHING: A LOT
MOVING TO AND FROM BED TO CHAIR: A LITTLE
MOVING FROM LYING ON BACK TO SITTING ON SIDE OF FLAT BED WITH BEDRAILS: A LITTLE
CLIMB 3 TO 5 STEPS WITH RAILING: TOTAL
TOILETING: A LOT
TURNING FROM BACK TO SIDE WHILE IN FLAT BAD: A LOT
MOVING TO AND FROM BED TO CHAIR: A LOT
MOBILITY SCORE: 16
MOBILITY SCORE: 12
MOVING FROM LYING ON BACK TO SITTING ON SIDE OF FLAT BED WITH BEDRAILS: A LITTLE
HELP NEEDED FOR BATHING: A LITTLE
PERSONAL GROOMING: A LITTLE
DRESSING REGULAR UPPER BODY CLOTHING: A LITTLE
STANDING UP FROM CHAIR USING ARMS: A LOT
DAILY ACTIVITIY SCORE: 15
DRESSING REGULAR LOWER BODY CLOTHING: A LITTLE
WALKING IN HOSPITAL ROOM: A LOT

## 2024-06-10 ASSESSMENT — ACTIVITIES OF DAILY LIVING (ADL)
ADL_ASSISTANCE: INDEPENDENT
BATHING_ASSISTANCE: MAXIMAL
ADL_ASSISTANCE: INDEPENDENT

## 2024-06-10 NOTE — PROGRESS NOTES
Rene Ballard is a 89 y.o. male on day 5 of admission presenting with Osteomyelitis (Multi).      Subjective   Saw and examined patient. Patient alert, sitting in chair next to bed, accompanied by family, NAD. Patient says he is in less pain today. No fever, chest pain, dyspnea, nausea, vomiting, diarrhea, constipation. Family says he seems more alert since discontinuing the flexeril but is still confused and was hallucinating last night.          Objective     Last Recorded Vitals  /76   Pulse 74   Temp 37.1 °C (98.8 °F) (Temporal)   Resp 18   Wt 67.8 kg (149 lb 7.6 oz)   SpO2 98%   Intake/Output last 3 Shifts:    Intake/Output Summary (Last 24 hours) at 6/10/2024 1545  Last data filed at 6/10/2024 1225  Gross per 24 hour   Intake 300 ml   Output 1150 ml   Net -850 ml       Admission Weight  Weight: 65.8 kg (145 lb) (06/05/24 1033)    Daily Weight  06/09/24 : 67.8 kg (149 lb 7.6 oz)    Image Results  CT foot right wo IV contrast  Narrative: Interpreted By:  Noah Ladd,   STUDY:  CT of the  right foot without intravenous contrast dated  6/7/2024.      INDICATION:  Signs/Symptoms:OM      COMPARISON:  None.      ACCESSION NUMBER(S):  [id number]      ORDERING CLINICIAN:  JACQUELYN STROUD      TECHNIQUE:  Axial CT of the   right foot was performed  without intravenous  contrast.  Sagittal and coronal 2 dimensional reformats were obtained.      FINDINGS:  OSSEOUS STRUCTURES AND JOINTS:      The bones are demineralized. No acute fracture or dislocation is  evident. Mild degenerative changes seen of the ankle and subtalar  joint. There is calcaneal enthesophyte formation. Mild polyarticular  degenerative changes seen in the midfoot with some foci of  subchondral cystic change. There is transmetatarsal amputation of the  1st through 4th metatarsals at the level of the metatarsal diaphysis  and there appears to be amputation of the 5th ray at the level of the  metatarsal possibly a punctate bone fragment  remaining in the bed.  There is destruction of the remaining 4th metatarsal involving the  lateral side of the base and the majority of the central bone stock  of the metadiaphysis. No joint effusion is evident.      SOFT TISSUES:      Surgical changes are seen with regards to the muscles and tendons of  the foot from prior amputation. Generalized atrophy is seen in the  musculature. There is a wound/ulcer on the lateral side of the  remaining foot appearing to be centered over the region of the 4th  metatarsal although the exact margins are difficult to ascertain.  [place] increased density is seen in the subcutaneous tissues of  the distal end of the remaining foot greatest on the lateral side.  There are bubbles of gas seen adjacent to the distal end of the  remaining 4th metatarsal such as seen image 68 in the sagittal plane  and there are bubbles of gas in the soft tissues superficial to the  base of the 3rd metatarsal as seen image 65 of the sagittal and 73 of  the axial plane. There is a degree of more generalized reticular  edema in the subcutaneous tissues. Calcified atheromatous disease is  seen in the visualized arterial tree.      Impression: 1. Findings compatible with osteomyelitis involving the remaining 4th  metatarsal. Other regions of osteomyelitis are not entirely excluded  on the basis of this exam and MRI may be helpful for further  assessment.  2. Wound/ulcer on the distal lateral side of the remaining foot.  3. [place] increased density in the subcutaneous tissues of the  distal remaining foot greatest on the lateral side which may  represent confluence cellulitis. Underlying focal collection is not  excluded on the basis of this noncontrast examination. There are some  bubbles of gas in the soft tissues which may be due to open nature of  process although gas-forming infection is not entirely excluded.  4. Degree of more generalized reticular edema in the subcutaneous  tissues which may  represent lymphedema and/or cellulitis.      MACRO:  none      Signed by: Noah Ladd 6/7/2024 12:54 PM  Dictation workstation:   [id number]      Physical Exam  Constitutional: NAD  Eyes: no icterus   ENMT: mucous membranes moist  Head/Neck: supple  Resp/thorax: CTA bilat, no cough, on RA  C/V: RRR, systolic murmur auscultated throughout chest, no carotid bruit  : no Perez  GI: S/ND/NT  M/S: limited ROM due to pain in UE and   Extremities: no edema  Neurological: AOx2   Skin: Warm and dry    Relevant Results             Scheduled medications  ceFAZolin, 2 g, intravenous, q8h  clopidogrel, 75 mg, oral, Daily  [Held by provider] cyclobenzaprine, 5 mg, oral, TID  docusate sodium, 100 mg, oral, BID  dorzolamide-timoloL, 1 drop, Left Eye, BID  enoxaparin, 40 mg, subcutaneous, q24h  insulin glargine, 10 Units, subcutaneous, Once  insulin glargine, 25 Units, subcutaneous, q24h  insulin lispro, 0-10 Units, subcutaneous, TID  latanoprost, 1 drop, Both Eyes, Nightly  lidocaine, 2 patch, transdermal, Daily  metoprolol tartrate, 12.5 mg, oral, BID  pantoprazole, 40 mg, oral, Daily before breakfast  tamsulosin, 0.4 mg, oral, Daily      Continuous medications  [Held by provider] sodium chloride 0.9%, 75 mL/hr, Last Rate: 75 mL/hr (06/10/24 0514)      PRN medications  PRN medications: acetaminophen, alum-mag hydroxide-simeth, dextrose, dextrose, glucagon, glucagon, LORazepam, melatonin, [Held by provider] morphine, ondansetron, polyethylene glycol, [Held by provider] traMADol  Results for orders placed or performed during the hospital encounter of 06/05/24 (from the past 24 hour(s))   POCT GLUCOSE   Result Value Ref Range    POCT Glucose 197 (H) 74 - 99 mg/dL   POCT GLUCOSE   Result Value Ref Range    POCT Glucose 96 74 - 99 mg/dL   Basic metabolic panel   Result Value Ref Range    Glucose 71 (L) 74 - 99 mg/dL    Sodium 132 (L) 136 - 145 mmol/L    Potassium 4.3 3.5 - 5.3 mmol/L    Chloride 97 (L) 98 - 107 mmol/L     Bicarbonate 27 21 - 32 mmol/L    Anion Gap 12 10 - 20 mmol/L    Urea Nitrogen 11 6 - 23 mg/dL    Creatinine 0.66 0.50 - 1.30 mg/dL    eGFR 90 >60 mL/min/1.73m*2    Calcium 8.8 8.6 - 10.3 mg/dL   CBC   Result Value Ref Range    WBC 13.9 (H) 4.4 - 11.3 x10*3/uL    nRBC 0.0 0.0 - 0.0 /100 WBCs    RBC 4.83 4.50 - 5.90 x10*6/uL    Hemoglobin 14.8 13.5 - 17.5 g/dL    Hematocrit 45.4 41.0 - 52.0 %    MCV 94 80 - 100 fL    MCH 30.6 26.0 - 34.0 pg    MCHC 32.6 32.0 - 36.0 g/dL    RDW 12.6 11.5 - 14.5 %    Platelets 308 150 - 450 x10*3/uL   POCT GLUCOSE   Result Value Ref Range    POCT Glucose 83 74 - 99 mg/dL   POCT GLUCOSE   Result Value Ref Range    POCT Glucose 201 (H) 74 - 99 mg/dL   Transthoracic Echo (TTE) Limited   Result Value Ref Range    BSA 1.83 m2   POCT GLUCOSE   Result Value Ref Range    POCT Glucose 92 74 - 99 mg/dL       Assessment/Plan        Patient is an 89-year-old male with PMH of osteomyelitis, PAD s/p right TMA on Plavix,  CAD s/p CABGx3 3/2/2022, HFrEF, aortic stenosis, bladder cancer, T2DM, OA of shoulder and knees, BPH, HLD, HTN, GERD, and glaucoma [place] [facility] [place] where he was being treated for osteomyelitis of the right foot from previous admission in March 2024. Patient said for the past 1 week he had increasing weakness and lethargy and WBC count taken in the wound clinic remained elevated. Over the weekend he also suffered a fall and hit his head, and complains of MSK pain in his neck, shoulders, . ED workup notable for leukocytosis of 17, CRP 28, ESR 93, lactate 2.2, and XR showing progressive osteomyelitis of the 4th metatarsal with overlying soft tissue infection. Patient was started on IV abx and [place] [facility] [place].        Osteomyelitis Right 4th Metatarsal  Bacteremia with Staph Aureus  PAD s/p right TMA on Plavix  Sepsis (resolving)  -ID following, TTE pending, continue IV Ancef  -Podiatry following, PVRs show mild multivessel disease bilaterally,  CT findings consistent with osteomyelitis of right 4th metatarsal, family waiting for results of TTE before deciding to proceed with surgery  -Continue plavix for PAD    Hallucinations, Confusion  -Pt AOx2 today, discontinue PRN tramadol and morphine    Hyponatremia  -Na 130, BP trending up, urine/serum osmolalities  normal  -Hold IV saline, continue to monitor    Generalized Pain Head/Neck/Back s/p fall  -CT head/spine negative, hand XR negative for fractures  -Flexeril held due to drowsiness, continue PRN tylenol, heating pad     T2DM  -Continue SSI with 25u glargine HS  -A1c 8.7% on 6/6/2024     Glaucoma  -Continue Cosopt     Hypertension  -Continue metoprolol     BPH  -Continue Flomax     GERD  -Continue Protonix     DVT Prophylaxis  -Continue lovenox     Discharge Planning  -PT/OT rec moderate  -plan to discharge to acute rehab with PICC for IV abx if surgery deferred by family                Mauri Ruffin PA-C

## 2024-06-10 NOTE — CARE PLAN
The patient's goals for the shift include      The clinical goals for the shift include pt will remain safe throughout shift      Problem: Safety  Goal: I will remain free of falls  Outcome: Progressing     Problem: Pain  Goal: My pain/discomfort is manageable  Outcome: Progressing     Problem: Daily Care  Goal: Daily care needs are met  Outcome: Progressing

## 2024-06-10 NOTE — PROGRESS NOTES
Rene Ballard is a 89 y.o. male     Pt seen this morning he is feelingok  Did sleep ok  No pain in hand   No chest pain   No shortness of breath   Does have pain in neck   Did not tolerate flexeril as he slept whole day     Review of Systems       Cardiovascular: no chest pain   Respiratory: no cough, wheezing or shortness of breath a  Gastrointestinal: no abdominal pain, no constipation, no melena, no nausea, no diarrhea, no vomiting and no blood in stools.   Neurological: no headache,   All other systems have been reviewed and are negative for complaint.       Vitals:       BP: 142/59   Pulse: 57   Resp: 16   Temp: 36.2 °C (97.1 °F)   SpO2: 97%        Scheduled medications  ceFAZolin, 2 g, intravenous, q8h  clopidogrel, 75 mg, oral, Daily  [Held by provider] cyclobenzaprine, 5 mg, oral, TID  docusate sodium, 100 mg, oral, BID  dorzolamide-timoloL, 1 drop, Left Eye, BID  enoxaparin, 40 mg, subcutaneous, q24h  insulin glargine, 10 Units, subcutaneous, Once  insulin glargine, 25 Units, subcutaneous, q24h  insulin lispro, 0-10 Units, subcutaneous, TID  latanoprost, 1 drop, Both Eyes, Nightly  lidocaine, 2 patch, transdermal, Daily  metoprolol tartrate, 12.5 mg, oral, BID  pantoprazole, 40 mg, oral, Daily before breakfast  tamsulosin, 0.4 mg, oral, Daily      Continuous medications  sodium chloride 0.9%, 75 mL/hr, Last Rate: 75 mL/hr (06/10/24 0514)      PRN medications  PRN medications: acetaminophen, alum-mag hydroxide-simeth, dextrose, dextrose, glucagon, glucagon, melatonin, morphine, ondansetron, polyethylene glycol, traMADol    Lab Review   Results from last 7 days   Lab Units 06/09/24  0553 06/08/24  0659 06/07/24  0438   WBC AUTO x10*3/uL 14.5* 13.1* 18.0*   HEMOGLOBIN g/dL 12.9* 12.9* 13.2*   HEMATOCRIT % 39.5* 39.7* 40.0*   PLATELETS AUTO x10*3/uL 246 218 218     Results from last 7 days   Lab Units 06/09/24  0553 06/08/24  0659 06/07/24  0438 06/05/24  1305 06/05/24  1136   SODIUM mmol/L 130* 130* 126*    < > 131*   POTASSIUM mmol/L 3.6 3.0* 3.5   < > 4.7   CHLORIDE mmol/L 99 98 94*   < > 95*   CO2 mmol/L 24 25 24   < > 23   BUN mg/dL 17 25* 32*   < > 30*   CREATININE mg/dL 0.74 0.93 0.98   < > 1.32*   CALCIUM mg/dL 8.1* 8.1* 8.4*   < > 9.1   PROTEIN TOTAL g/dL  --   --   --   --  7.0   BILIRUBIN TOTAL mg/dL  --   --   --   --  0.8   ALK PHOS U/L  --   --   --   --  204*   ALT U/L  --   --   --   --  19   AST U/L  --   --   --   --  15   GLUCOSE mg/dL 102* 174* 186*   < > 524*    < > = values in this interval not displayed.            CT foot right wo IV contrast   Final Result   1. Findings compatible with osteomyelitis involving the remaining 4th   metatarsal. Other regions of osteomyelitis are not entirely excluded   on the basis of this exam and MRI may be helpful for further   assessment.   2. Wound/ulcer on the distal lateral side of the remaining foot.   3. [place] increased density in the subcutaneous tissues of the   distal remaining foot greatest on the lateral side which may   represent confluence cellulitis. Underlying focal collection is not   excluded on the basis of this noncontrast examination. There are some   bubbles of gas in the soft tissues which may be due to open nature of   process although gas-forming infection is not entirely excluded.   4. Degree of more generalized reticular edema in the subcutaneous   tissues which may represent lymphedema and/or cellulitis.        MACRO:   none        Signed by: Noah Ladd 6/7/2024 12:54 PM   Dictation workstation:   [id number]      XR hand left 3+ views   Final Result   No acute fracture. Soft tissue swelling in the 2nd digit. Multifocal   productive arthritic changes noted.        MACRO:   None.        Signed by: Lara Severino 6/6/2024 1:50 PM   Dictation workstation:   [id number]      CT head wo IV contrast   Final Result   Findings of probable chronic small vessel ischemic changes and age   related diffuse cerebral volume loss. No acute  intracranial   abnormality or mass effect.        This study was interpreted at [facility].        MACRO:   None        Signed by: Win Peres 6/6/2024 12:39 PM   Dictation workstation:   [id number]      XR cervical spine complete 4-5 views   Final Result   Discogenic degenerative changes as described.             Signed by: Franck Pascal 6/6/2024 11:12 AM   Dictation workstation:   [id number]      Vascular US PVR Without Exercise   Final Result      XR foot right 3+ views   Final Result   1. Findings consistent with progressive osteomyelitis of the 4th   metatarsal bone with brown osseous erosion of the lateral and plantar   aspect of the shaft with overlying soft tissue swelling and   ulceration consistent with persistent overlying soft tissue   infection. If it would alter clinical management, an MRI may be   obtained.        2. 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        MACRO:   None        Signed by: Shalini Wright 6/5/2024 12:19 PM   Dictation workstation:   [id number]      XR chest 1 view   Final Result   1.  No evidence of acute cardiopulmonary process.                  MACRO:   None        Signed by: Shalini Wright 6/5/2024 12:09 PM   Dictation workstation:   [id number]      Transthoracic Echo (TTE) Limited    (Results Pending)         Physical Exam     Constitutional   General appearance: Appears tired    Pulmonary   Respiratory assessment: No respiratory distress, normal respiratory rhythm and effort.    Auscultation of Lungs: Clear bilateral breath sounds.   Cardiovascular   Auscultation of heart: Apical pulse normal, heart rate and rhythm normal, normal S1 and S2, no murmurs and no pericardial rub.    Exam for edema: No peripheral edema.   Abdomen   Abdominal Exam: No bruits, normal bowel sounds, soft, non-tender, no abdominal mass palpated.    Liver and Spleen exam: No hepato-splenomegaly.   Musculoskeletal      Skin inspection: Left hand redness    Neurologic   Cranial nerves: Nerves 2-12 were intact, no focal neuro defects.     Assessment/Plan      #Sepsis  #Osteomyelitis  On IV cefazolin  Dr Arce Discussed with the patient and family about the need for an amputation for source control and treating the infection    #Left hand cellulitis  Will monitor  Cont with current      #Poorly controlled diabetes mellitus  Sliding scale insulin    #Peripheral artery disease       #Dyslipidemia  Continue statins and maintain LDL less than 70     #Hypertension  Continue home medications     #Fall with head and neck injury  CT head negative  CT cervical spine negative  X-ray of the wrist and hand negative for fractures  Pain control  Hold flexeril per nursing making pt very drowsy    Supplement K for hypokalemia  Heating pads    Dw pt and his daughter they are going to discuss further with podiatry about further surgery   At this point they are undecided

## 2024-06-10 NOTE — PROGRESS NOTES
Met with patient and his daughter at the bedside to discuss discharge plan and therapy recommendations.  Daughter is waiting to speak with podiatry, but stated rehab is going to be their choice vs going home with homecare.  Updates sent to [facility] via [facility].  Waiting to see if they will accept patient.  If no amputation, patient will need a PICC line and IV antibiotics  PLAN: family needs to make a decision about amputation, but wants to wait until the echo is completed.    ADOD: depends on whether podiatry plan and family decision  DISP: SNF(family wants [facility])  Maya Jose RN

## 2024-06-10 NOTE — PROGRESS NOTES
Physical Therapy    Physical Therapy Evaluation & Treatment    Patient Name: Rene Ballard  MRN: [id number]  Today's Date: 6/10/2024   Time Calculation  Start Time: 0951  Stop Time: 1019  Time Calculation (min): 28 min    Assessment/Plan   PT Assessment  PT Assessment Results: Decreased strength, Decreased endurance, Impaired balance, Decreased mobility, Orthopedic restrictions  Rehab Prognosis: Fair  Barriers to Discharge: none  Evaluation/Treatment Tolerance: Patient limited by fatigue  Medical Staff Made Aware: Yes  Strengths: Premorbid level of function, Support of Caregivers, Ability to acquire knowledge  Barriers to Participation: Comorbidities  End of Session Communication: Bedside nurse  Assessment Comment: Pt presenting with impaired mobilituy and would benefit from moderate intensity PT to maximize functional mobility, safety and independence  End of Session Patient Position: Bed, 3 rail up, Alarm on   IP OR SWING BED PT PLAN  Inpatient or Swing Bed: Inpatient  PT Plan  Treatment/Interventions: Bed mobility, Transfer training, Gait training, Balance training, Strengthening, Endurance training, Range of motion, Therapeutic exercise, Therapeutic activity, Positioning  PT Plan: Skilled PT  PT Frequency: 3 times per week  PT Discharge Recommendations: Moderate intensity level of continued care  PT Recommended Transfer Status: Assist x2  PT - OK to Discharge: Yes (Per POC)      Subjective     General Visit Information:  General  Reason for Referral: 89 year old male admitted with weakness and fall.  Referred By: Augustin SAN  Past Medical History Relevant to Rehab:   Past Medical History:   Diagnosis Date    Abnormal EKG 05/01/2023    BPH (benign prostatic hyperplasia)     Chronic systolic heart failure (Multi)     COVID-19 05/01/2023    Essential hypertension     GERD (gastroesophageal reflux disease)     Hypertensive heart disease with heart failure (Multi)     Osteoarthritis     Osteomyelitis (Multi)      Peripheral vascular disease, unspecified ([facility]) 11/28/2022    PVD (peripheral vascular disease)    Personal history of malignant neoplasm of bladder     History of carcinoma of bladder    Personal history of other diseases of the musculoskeletal system and connective tissue     History of arthritis    Personal history of other endocrine, nutritional and metabolic disease     History of diabetes mellitus    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    Controlled diabetes mellitus with circulatory complication      Missed Visit: No  Family/Caregiver Present: Yes  Caregiver Feedback: brother and son  Co-Treatment: OT  Co-Treatment Reason: to maximize pt participation and safety  Prior to Session Communication: Bedside nurse  Patient Position Received: Bed, 3 rail up, Alarm on  Preferred Learning Style: auditory, verbal, visual  General Comment: Pt agreeable to PT  Home Living:  Home Living  Type of Home: House  Lives With: Alone  Home Adaptive Equipment: Walker rolling or standard, Cane  Home Layout: One level  Home Access: Level entry  Bathroom Shower/Tub: Walk-in shower  Bathroom Toilet: Handicapped height  Bathroom Equipment: Grab bars in shower, Grab bars around toilet, Built-in shower seat  Prior Level of Function:  Prior Function Per Pt/Caregiver Report  Level of [place]: Independent with ADLs and functional transfers, Independent with homemaking with ambulation  Receives Help From: Family (PRN)  ADL Assistance: Independent  Homemaking Assistance: Independent  Ambulatory Assistance: Independent  Prior Function Comments: Pt states he is independent with transfering and ambulation using either a cane or wheeled walker. Pt is independent with bathing, dressing, and laundry. Pt states he can do some meal preparation is its easy. Pt has PRN help  from brother and his  kids.  Precautions:  Precautions  LE Weight Bearing Status: Weight Bearing as Tolerated (RLE)  Medical Precautions: Fall precautions  Vital Signs:       Objective   Pain:  Pain Assessment  Pain Assessment: 0-10  Pain Score: 0 - No pain  Cognition:  Cognition  Overall Cognitive Status: Within Functional Limits  Orientation Level: Oriented X4  Insight: Within function limits  Impulsive: Within functional limits  Processing Speed: Within funtional limits    General Assessments:  General Observation  General Observation: digit amputation right foot. ace wrap right foot. Pt appears drowsy but no apparent distress               Activity Tolerance  Endurance: Tolerates less than 10 min exercise, no significant change in vital signs    Sensation  Light Touch: Not tested    Strength  Strength Comments: bilateral LE strength grossly 3/5 as evidenced by ability to move through antigravity ROM and function.  Strength  Strength Comments: bilateral LE strength grossly 3/5 as evidenced by ability to move through antigravity ROM and function.    Perception  Inattention/Neglect: Appears intact      Coordination  Movements are Fluid and Coordinated: Yes    Postural Control  Postural Control: Impaired  Posture Comment: thoracic kyphosis, rounded shoulders and forward head posture    Static Sitting Balance  Static Sitting-Balance Support: Feet supported  Static Sitting-Level of Assistance: Minimum assistance  Static Sitting-Comment/Number of Minutes: 5 minutes. retro and right leaning  Dynamic Sitting Balance  Dynamic Sitting-Balance Support: Feet supported  Dynamic Sitting-Comments: moderate assist       Functional Assessments:  Bed Mobility  Bed Mobility: Yes  Bed Mobility 1  Bed Mobility 1: Supine to sitting  Level of Assistance 1: Moderate assistance, Moderate verbal cues  Bed Mobility Comments 1: from elevated HOB. Assist for trunk and legs  Bed Mobility 2  Bed Mobility  2: Sitting to supine  Level of Assistance 2: Maximum  assistance (x2)  Bed Mobility Comments 2: assist for trunk and legs    Transfers  Transfer: Yes  Transfer 1  Technique 1: Sit to stand  Transfer Device 1: Walker, Gait belt  Transfer Level of Assistance 1: Minimum assistance, Moderate verbal cues  Trials/Comments 1: cueing for hand placement on bed. foot block required  Transfers 2  Technique 2: Stand to sit  Transfer Device 2: Walker, Gait belt  Transfer Level of Assistance 2: Minimum assistance (x2)    Ambulation/Gait Training  Ambulation/Gait Training Performed: Yes  Ambulation/Gait Training 1  Surface 1: Level tile  Device 1: Rolling walker  Gait Support Devices: Gait belt  Assistance 1: Moderate assistance (x2)  Quality of Gait 1: Shuffling gait, Decreased step length  Comments/Distance (ft) 1: Poor balance with static and dynamic standind. Pt requiring mod assist x2 for side stepping right at EOB 2 feet.  Extremity/Trunk Assessments:  RLE   RLE : Within Functional Limits  LLE   LLE : Within Functional Limits  Treatments:                                Bed Mobility  Bed Mobility: Yes  Bed Mobility 1  Bed Mobility 1: Supine to sitting  Level of Assistance 1: Moderate assistance, Moderate verbal cues  Bed Mobility Comments 1: from elevated HOB. Assist for trunk and legs  Bed Mobility 2  Bed Mobility  2: Sitting to supine  Level of Assistance 2: Maximum assistance (x2)  Bed Mobility Comments 2: assist for trunk and legs    Ambulation/Gait Training  Ambulation/Gait Training Performed: Yes  Ambulation/Gait Training 1  Surface 1: Level tile  Device 1: Rolling walker  Gait Support Devices: Gait belt  Assistance 1: Moderate assistance (x2)  Quality of Gait 1: Shuffling gait, Decreased step length  Comments/Distance (ft) 1: Poor balance with static and dynamic standind. Pt requiring mod assist x2 for side stepping right at EOB 2 feet.  Transfers  Transfer: Yes  Transfer 1  Technique 1: Sit to stand  Transfer Device 1: Walker, Gait belt  Transfer Level of Assistance 1:  Minimum assistance, Moderate verbal cues  Trials/Comments 1: cueing for hand placement on bed. foot block required  Transfers 2  Technique 2: Stand to sit  Transfer Device 2: Walker, Gait belt  Transfer Level of Assistance 2: Minimum assistance (x2)                        Outcome Measures:  [facility] Basic Mobility  Turning from your back to your side while in a flat bed without using bedrails: A little  Moving from lying on your back to sitting on the side of a flat bed without using bedrails: A lot  Moving to and from bed to chair (including a wheelchair): A lot  Standing up from a chair using your arms (e.g. wheelchair or bedside chair): A lot  To walk in hospital room: A lot  Climbing 3-5 steps with railing: Total  Basic Mobility - Total Score: 12    Encounter Problems       Encounter Problems (Active)       Mobility       STG - Patient will ambulate 10 feet with mod assist x1 and FWW       Start:  06/10/24    Expected End:  06/24/24               PT Transfers       STG - Transfer from bed to chair with min assist x1 and FWW       Start:  06/10/24    Expected End:  06/24/24            STG - Patient to transfer to and from sit to supine with contact guard       Start:  06/10/24    Expected End:  06/24/24            STG - Patient will perform bed mobility with CG       Start:  06/10/24    Expected End:  06/24/24            STG - Patient will transfer sit to and from stand with min assist x1 and FWW       Start:  06/10/24    Expected End:  06/24/24                   Education Documentation  Precautions, taught by Libia Mendes, PT at 6/10/2024 11:00 AM.  Learner: Family, Patient  Readiness: Acceptance  Method: Explanation, Demonstration  Response: Verbalizes Understanding, Needs Reinforcement    Body Mechanics, taught by Libia Mendes, PT at 6/10/2024 11:00 AM.  Learner: Family, Patient  Readiness: Acceptance  Method: Explanation, Demonstration  Response: Verbalizes Understanding, Needs  Reinforcement    Mobility Training, taught by Libia Mendes, PT at 6/10/2024 11:00 AM.  Learner: Family, Patient  Readiness: Acceptance  Method: Explanation, Demonstration  Response: Verbalizes Understanding, Needs Reinforcement    Education Comments  No comments found.

## 2024-06-10 NOTE — PROGRESS NOTES
"Rene Ballard is a 89 y.o. male on day 5 of admission presenting with Osteomyelitis (Multi).    Subjective   Patient was seen at bedside, with family present in room. He is restless and mildly agitated. Family is still debating on hospice vs non-op treatment with IV antibiotics vs amputation. They are awaiting the echo to see if infection has spread to his heart.       Objective     Physical Exam:     Dressing was left intact today. Patient is AAOx0, in mild distress and mildly agitated. Having hallucinations and speaking with  relatives.    Last Recorded Vitals  Blood pressure 139/69, pulse 89, temperature 36.4 °C (97.5 °F), temperature source Temporal, resp. rate 18, height 1.778 m (5' 10\"), weight 67.8 kg (149 lb 7.6 oz), SpO2 97%.    Relevant Results      Results for orders placed or performed during the hospital encounter of 24 (from the past 24 hour(s))   POCT GLUCOSE   Result Value Ref Range    POCT Glucose 197 (H) 74 - 99 mg/dL   POCT GLUCOSE   Result Value Ref Range    POCT Glucose 96 74 - 99 mg/dL   Basic metabolic panel   Result Value Ref Range    Glucose 71 (L) 74 - 99 mg/dL    Sodium 132 (L) 136 - 145 mmol/L    Potassium 4.3 3.5 - 5.3 mmol/L    Chloride 97 (L) 98 - 107 mmol/L    Bicarbonate 27 21 - 32 mmol/L    Anion Gap 12 10 - 20 mmol/L    Urea Nitrogen 11 6 - 23 mg/dL    Creatinine 0.66 0.50 - 1.30 mg/dL    eGFR 90 >60 mL/min/1.73m*2    Calcium 8.8 8.6 - 10.3 mg/dL   CBC   Result Value Ref Range    WBC 13.9 (H) 4.4 - 11.3 x10*3/uL    nRBC 0.0 0.0 - 0.0 /100 WBCs    RBC 4.83 4.50 - 5.90 x10*6/uL    Hemoglobin 14.8 13.5 - 17.5 g/dL    Hematocrit 45.4 41.0 - 52.0 %    MCV 94 80 - 100 fL    MCH 30.6 26.0 - 34.0 pg    MCHC 32.6 32.0 - 36.0 g/dL    RDW 12.6 11.5 - 14.5 %    Platelets 308 150 - 450 x10*3/uL   POCT GLUCOSE   Result Value Ref Range    POCT Glucose 83 74 - 99 mg/dL   POCT GLUCOSE   Result Value Ref Range    POCT Glucose 201 (H) 74 - 99 mg/dL   Transthoracic Echo (TTE) Limited "   Result Value Ref Range    BSA 1.83 m2     CT foot right wo IV contrast    Result Date: 6/7/2024  Interpreted By:  Noah Ladd, STUDY: CT of the  right foot without intravenous contrast dated  6/7/2024.   INDICATION: Signs/Symptoms:OM   COMPARISON: None.   ACCESSION NUMBER(S): [id number]   ORDERING CLINICIAN: JACQUELYN STROUD   TECHNIQUE: Axial CT of the   right foot was performed  without intravenous contrast.  Sagittal and coronal 2 dimensional reformats were obtained.   FINDINGS: OSSEOUS STRUCTURES AND JOINTS:   The bones are demineralized. No acute fracture or dislocation is evident. Mild degenerative changes seen of the ankle and subtalar joint. There is calcaneal enthesophyte formation. Mild polyarticular degenerative changes seen in the midfoot with some foci of subchondral cystic change. There is transmetatarsal amputation of the 1st through 4th metatarsals at the level of the metatarsal diaphysis and there appears to be amputation of the 5th ray at the level of the metatarsal possibly a punctate bone fragment remaining in the bed. There is destruction of the remaining 4th metatarsal involving the lateral side of the base and the majority of the central bone stock of the metadiaphysis. No joint effusion is evident.   SOFT TISSUES:   Surgical changes are seen with regards to the muscles and tendons of the foot from prior amputation. Generalized atrophy is seen in the musculature. There is a wound/ulcer on the lateral side of the remaining foot appearing to be centered over the region of the 4th metatarsal although the exact margins are difficult to ascertain. [place] increased density is seen in the subcutaneous tissues of the distal end of the remaining foot greatest on the lateral side. There are bubbles of gas seen adjacent to the distal end of the remaining 4th metatarsal such as seen image 68 in the sagittal plane and there are bubbles of gas in the soft tissues superficial to the base of the 3rd  metatarsal as seen image 65 of the sagittal and 73 of the axial plane. There is a degree of more generalized reticular edema in the subcutaneous tissues. Calcified atheromatous disease is seen in the visualized arterial tree.       1. Findings compatible with osteomyelitis involving the remaining 4th metatarsal. Other regions of osteomyelitis are not entirely excluded on the basis of this exam and MRI may be helpful for further assessment. 2. Wound/ulcer on the distal lateral side of the remaining foot. 3. [place] increased density in the subcutaneous tissues of the distal remaining foot greatest on the lateral side which may represent confluence cellulitis. Underlying focal collection is not excluded on the basis of this noncontrast examination. There are some bubbles of gas in the soft tissues which may be due to open nature of process although gas-forming infection is not entirely excluded. 4. Degree of more generalized reticular edema in the subcutaneous tissues which may represent lymphedema and/or cellulitis.   MACRO: none   Signed by: Noah Ladd 6/7/2024 12:54 PM Dictation workstation:   [id number]    Vascular US PVR Without Exercise    Result Date: 6/6/2024             [place] [address]   Tel [phone] and Fax [phone]  Vascular Lab Report VASC US PVR WITHOUT EXERCISE  Patient Name:      MAYELIN OLSON    Reading Physician:  [id number] Sylvester Nguyen DO Study Date:        6/6/2024            Ordering Physician: [id number] GERALD HANKS MRN/PID:           [id number]            Technologist:       Benjamin Godinez RVT Accession#:        [id number]        Technologist 2: Date of Birth/Age: 11/8/1934 / 89      Encounter#:         [id number]                    years Gender:            M Admission Status:  Outpatient          Location Performed: [facility]  Diagnosis/ICD: Peripheral vascular  disease, unspecified-I73.9; Stricture of                artery-I77.1 CPT Codes:     63428 Peripheral artery PVR (multi segmental pressure  CONCLUSIONS: Right Lower PVR: Right pressures of >220 mmHg suggest no compressibility of vessels and may make absolute Segmental Limb Pressures (SLP) unreliable. There is evidence of mild multi vessel disease. Monophasic flow is noted in the right posterior tibial artery, right dorsalis pedis artery and right popliteal artery. Multiphasic flow is noted in the right common femoral artery. Right leg results called per waveforms due to calcified and partially calcified arterial pressures. Right trasmetatarsal amputation. Left Lower PVR: Left pressures of >220 mmHg suggest no compressibility of vessels and may make absolute Segmental Limb Pressures (SLP) unreliable. There is evidence of moderate multi-vessel disease. Decreased digital perfusion noted. Monophasic flow is noted in the left posterior tibial artery and left dorsalis pedis artery. Multiphasic flow is noted in the left common femoral artery and left popliteal artery. Left leg results called per waveforms due to calcified and partially calcified arterial pressures.  Comparison: Compared with study from 6/12/2023, On todays exam the left leg demonstates moderate mutivessel arterial occlusive disease.  Imaging & Doppler Findings:  RIGHT Lower PVR                Pressures Ratios Right High Thigh               255 mmHg  2.11 Right Low Thigh                188 mmHg  1.55 Right Calf                     137 mmHg  1.13 Right Posterior Tibial (Ankle) 43 mmHg   0.36 Right Dorsalis Pedis (Ankle)   178 mmHg  1.47   LEFT Lower PVR                Pressures Ratios Left High Thigh               184 mmHg  1.52 Left Low Thigh                176 mmHg  1.45 Left Calf                     142 mmHg  1.17 Left Posterior Tibial (Ankle) 46 mmHg   0.38 Left Dorsalis Pedis (Ankle)   255 mmHg  2.11 Left Digit (Great Toe)        32 mmHg   0.26                      Right     Left Brachial Pressure 121 mmHg 108 mmHg   [id number] Sylvester Nguyen DO Electronically signed by [id number] Sylvester Nguyen DO on 6/6/2024 at 2:30:19 PM  ** Final **     XR hand left 3+ views    Result Date: 6/6/2024  Interpreted By:  Lara Severino, STUDY: XR HAND LEFT 3+ VIEWS;  6/6/2024 1:42 pm   INDICATION: Signs/Symptoms:Pain post fall.   COMPARISON: None.   ACCESSION NUMBER(S): [id number]   ORDERING CLINICIAN: JACQUELYN STROUD   FINDINGS: 3 portable views of the left hand obtained.   No acute fracture or osseous displacement. Radiocarpal joint space narrowing and chondrocalcinosis in the ulnocarpal joint space. Multifocal degenerative changes including joint space narrowing and spurring including the 1st CMC, 1st through 5th MCP and multiple interphalangeal joints. Prominent lucent probable subchondral cyst/geode in the trapezium. Soft tissue swelling in the 2nd digit. Multifocal soft tissue presumed vascular calcifications.       No acute fracture. Soft tissue swelling in the 2nd digit. Multifocal productive arthritic changes noted.   MACRO: None.   Signed by: Lara Severino 6/6/2024 1:50 PM Dictation workstation:   [id number]    CT head wo IV contrast    Result Date: 6/6/2024  Interpreted By:  Win Peres, STUDY: CT HEAD WO IV CONTRAST; ;  6/6/2024 12:16 pm   INDICATION: Signs/Symptoms:Head injury.   COMPARISON: CT head from 03/19/2024.   ACCESSION NUMBER(S): [id number]   ORDERING CLINICIAN: JACQUELYN STROUD   TECHNIQUE: Routine unenhanced CT of the head was performed.   FINDINGS: There is no acute intracranial hemorrhage or mass effect. There is no abnormal extra-axial fluid collection. The ventricles, sulci, and basilar cisterns are prominent in size due to age related diffuse cerebral volume loss.   There are regions of hypoattenuation within the bilateral cerebral hemispheric white matter which are probably sequela of chronic small vessel ischemic changes. Gray-white differentiation is maintained  throughout.   There are no acute calvarial fractures. The visualized paranasal sinuses and mastoid air cells are essentially clear.   Degenerative pannus formation at the level of C1-C2.       Findings of probable chronic small vessel ischemic changes and age related diffuse cerebral volume loss. No acute intracranial abnormality or mass effect.   This study was interpreted at [facility].   MACRO: None   Signed by: Win Peres 6/6/2024 12:39 PM Dictation workstation:   [id number]    XR cervical spine complete 4-5 views    Result Date: 6/6/2024  Interpreted By:  Franck Pascal, STUDY: XR CERVICAL SPINE COMPLETE 4-5 VIEWS; 6/6/2024 10:55 am   INDICATION: Signs/Symptoms:Pain s/p fall.   COMPARISON: None.   ACCESSION NUMBER(S): [id number]   ORDERING CLINICIAN: JACQUELYN STROUD   FINDINGS: Multiple views of the  lumbar spine are obtained. Minimal anterolisthesis of C6 and C7.. The vertebral body heights are preserved. Disc space loss from C5 through C7. Endplate sclerosis and osteophytes are seen throughout the spine. No acute fracture-dislocation.       Discogenic degenerative changes as described.     Signed by: Franck Pascal 6/6/2024 11:12 AM Dictation workstation:   [id number]    ECG 12 lead    Result Date: 6/5/2024  Sinus rhythm with 1st degree AV block Right bundle branch block Left anterior fascicular block  Bifascicular block  Abnormal ECG When compared with ECG of 19-MAR-2024 19:08, No significant change was found    XR foot right 3+ views    Result Date: 6/5/2024  Interpreted By:  Shalini Wright, STUDY: XR FOOT RIGHT 3+ VIEWS; ;  6/5/2024 11:54 am   INDICATION: Signs/Symptoms:Foot wound..   COMPARISON: Right foot x-ray series and CT 03/19/2024.   ACCESSION NUMBER(S): [id number]   ORDERING CLINICIAN: ALLAN VIGIL   FINDINGS: Bones are osteopenic. No acute fracture or dislocation.   Postoperative changes of prior transmetatarsal forefoot amputation and resection of the 5th  metatarsal are again seen. There is new lucency due to osseous erosion the lateral and plantar aspect of the 4th metatarsal shaft with some cortical irregularity at the tip of the amputation margin. This underlies an irregular ulcerative soft tissue wound with adjacent soft tissue swelling.   Soft tissue swelling around the foot and ankle and vascular calcifications are again seen.       1. Findings consistent with progressive osteomyelitis of the 4th metatarsal bone with brown osseous erosion of the lateral and plantar aspect of the shaft with overlying soft tissue swelling and ulceration consistent with persistent overlying soft tissue infection. If it would alter clinical management, an MRI may be obtained.   2. Redemonstrated transmetatarsal amputation of the forefoot and 5th metatarsal resection.   MACRO: None   Signed by: Shalini Wright 6/5/2024 12:19 PM Dictation workstation:   [id number]    XR chest 1 view    Result Date: 6/5/2024  Interpreted By:  Shalini Wright, STUDY: XR CHEST 1 VIEW;  6/5/2024 11:54 am   INDICATION: Signs/Symptoms:SOB.   COMPARISON: Chest x-ray 03/19/2024 and 06/17/2023. CT chest 06/07/2023..   ACCESSION NUMBER(S): [id number]   ORDERING CLINICIAN: ALLAN VIGIL   FINDINGS: AP radiograph of the chest was provided.   Median sternotomy wires are present.   CARDIOMEDIASTINAL SILHOUETTE: Cardiomediastinal silhouette is normal in size and configuration.   LUNGS and PLEURA: No focal consolidation or other airspace opacity. No pleural effusion or pneumothorax.   ABDOMEN: No remarkable upper abdominal findings.   BONES: No acute osseous changes.       1.  No evidence of acute cardiopulmonary process.       MACRO: None   Signed by: Shalini Wright 6/5/2024 12:09 PM Dictation workstation:   [id number]         Assessment/Plan   Principal Problem:    Osteomyelitis (Multi)      #Osteomyelitis right foot  #Sepsis  #PVD  #T2DM  #HTN    -Patient was seen at bedside with family  -Chart, labs, and imaging  were reviewed   -Labs show WBC 13.9  -Family waiting on echo to see if infection has spread to heart  -They would like to speak with Dr. Islas and wait for echo before making any decisions  -Patient was discussed with attending, Dr. Islas  -Dr. Islas to see patient and family today and will update the plan    Jarred Bledsoe DPM PGY-2    I saw and evaluated the patient. I personally obtained the key and critical portions of the history and physical exam or was physically present for key and critical portions performed by the resident/fellow. I reviewed the resident/fellow's documentation and discussed the patient with the resident/fellow. I agree with the resident/fellow's medical decision making as documented in the note.    No real change in plan. Patient's family is leaning towards hospice. I don't believe surgical intervention from my standpoint will really offer much for the patient. Again, I don't know if hospice is absolutely necessary as the patient can be treated with local wound care and watching the wound closely, knowing that it is unlikely to heal. Hospice will limit these options. However, a decision to go to hospice is not unreasonable either. Will defer to patient's family for final decision, but it appears as though hospice is the likely route.    I spent 60 minutes in the professional and overall care of this patient.    Robbie Islas DPM

## 2024-06-10 NOTE — PROGRESS NOTES
Rene Ballard is a 89 y.o. male     Patient has been confused having visual hallucinations    Had a long discussion with the patient's family  Options discussed  Will await podiatry's input as to what level of amputation they recommend  If the level of amputation is disagreeable to patient and family then would discharge on antibiotics with a PICC line to rehab facility    Review of Systems     Constitutional: Feeling poorly  Cardiovascular: no chest pain   Respiratory: no cough, wheezing or shortness of breath a  Gastrointestinal: no abdominal pain, no constipation, no melena, no nausea, no diarrhea, no vomiting and no blood in stools.   Neurological: no headache,   All other systems have been reviewed and are negative for complaint.       Vitals:    06/10/24 1123   BP: 139/69   Pulse: 89   Resp: 18   Temp: 36.4 °C (97.5 °F)   SpO2: 97%        Scheduled medications  ceFAZolin, 2 g, intravenous, q8h  clopidogrel, 75 mg, oral, Daily  [Held by provider] cyclobenzaprine, 5 mg, oral, TID  docusate sodium, 100 mg, oral, BID  dorzolamide-timoloL, 1 drop, Left Eye, BID  enoxaparin, 40 mg, subcutaneous, q24h  insulin glargine, 10 Units, subcutaneous, Once  insulin glargine, 25 Units, subcutaneous, q24h  insulin lispro, 0-10 Units, subcutaneous, TID  latanoprost, 1 drop, Both Eyes, Nightly  lidocaine, 2 patch, transdermal, Daily  metoprolol tartrate, 12.5 mg, oral, BID  pantoprazole, 40 mg, oral, Daily before breakfast  tamsulosin, 0.4 mg, oral, Daily      Continuous medications  [Held by provider] sodium chloride 0.9%, 75 mL/hr, Last Rate: 75 mL/hr (06/10/24 0514)      PRN medications  PRN medications: acetaminophen, alum-mag hydroxide-simeth, dextrose, dextrose, glucagon, glucagon, LORazepam, melatonin, [Held by provider] morphine, ondansetron, polyethylene glycol, [Held by provider] traMADol    Lab Review   Results from last 7 days   Lab Units 06/10/24  0557 06/09/24  0553 06/08/24  0659   WBC AUTO x10*3/uL 13.9* 14.5*  13.1*   HEMOGLOBIN g/dL 14.8 12.9* 12.9*   HEMATOCRIT % 45.4 39.5* 39.7*   PLATELETS AUTO x10*3/uL 308 246 218     Results from last 7 days   Lab Units 06/10/24  0557 06/09/24  0553 06/08/24  0659 06/05/24  1305 06/05/24  1136   SODIUM mmol/L 132* 130* 130*   < > 131*   POTASSIUM mmol/L 4.3 3.6 3.0*   < > 4.7   CHLORIDE mmol/L 97* 99 98   < > 95*   CO2 mmol/L 27 24 25   < > 23   BUN mg/dL 11 17 25*   < > 30*   CREATININE mg/dL 0.66 0.74 0.93   < > 1.32*   CALCIUM mg/dL 8.8 8.1* 8.1*   < > 9.1   PROTEIN TOTAL g/dL  --   --   --   --  7.0   BILIRUBIN TOTAL mg/dL  --   --   --   --  0.8   ALK PHOS U/L  --   --   --   --  204*   ALT U/L  --   --   --   --  19   AST U/L  --   --   --   --  15   GLUCOSE mg/dL 71* 102* 174*   < > 524*    < > = values in this interval not displayed.            Transthoracic Echo (TTE) Limited         CT foot right wo IV contrast   Final Result   1. Findings compatible with osteomyelitis involving the remaining 4th   metatarsal. Other regions of osteomyelitis are not entirely excluded   on the basis of this exam and MRI may be helpful for further   assessment.   2. Wound/ulcer on the distal lateral side of the remaining foot.   3. [place] increased density in the subcutaneous tissues of the   distal remaining foot greatest on the lateral side which may   represent confluence cellulitis. Underlying focal collection is not   excluded on the basis of this noncontrast examination. There are some   bubbles of gas in the soft tissues which may be due to open nature of   process although gas-forming infection is not entirely excluded.   4. Degree of more generalized reticular edema in the subcutaneous   tissues which may represent lymphedema and/or cellulitis.        MACRO:   none        Signed by: Noah Ladd 6/7/2024 12:54 PM   Dictation workstation:   [id number]      XR hand left 3+ views   Final Result   No acute fracture. Soft tissue swelling in the 2nd digit. Multifocal   productive  arthritic changes noted.        MACRO:   None.        Signed by: Lara Severino 6/6/2024 1:50 PM   Dictation workstation:   [id number]      CT head wo IV contrast   Final Result   Findings of probable chronic small vessel ischemic changes and age   related diffuse cerebral volume loss. No acute intracranial   abnormality or mass effect.        This study was interpreted at [facility].        MACRO:   None        Signed by: Win Peres 6/6/2024 12:39 PM   Dictation workstation:   [id number]      XR cervical spine complete 4-5 views   Final Result   Discogenic degenerative changes as described.             Signed by: Franck Pascal 6/6/2024 11:12 AM   Dictation workstation:   [id number]      Vascular US PVR Without Exercise   Final Result      XR foot right 3+ views   Final Result   1. Findings consistent with progressive osteomyelitis of the 4th   metatarsal bone with brown osseous erosion of the lateral and plantar   aspect of the shaft with overlying soft tissue swelling and   ulceration consistent with persistent overlying soft tissue   infection. If it would alter clinical management, an MRI may be   obtained.        2. 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        MACRO:   None        Signed by: Shalini Wright 6/5/2024 12:19 PM   Dictation workstation:   [id number]      XR chest 1 view   Final Result   1.  No evidence of acute cardiopulmonary process.                  MACRO:   None        Signed by: Shalini Wright 6/5/2024 12:09 PM   Dictation workstation:   [id number]            Physical Exam     Constitutional   General appearance: Appears tired    Pulmonary   Respiratory assessment: No respiratory distress, normal respiratory rhythm and effort.    Auscultation of Lungs: Clear bilateral breath sounds.   Cardiovascular   Auscultation of heart: Apical pulse normal, heart rate and rhythm normal, normal S1 and S2, no murmurs  and no pericardial rub.    Exam for edema: No peripheral edema.   Abdomen   Abdominal Exam: No bruits, normal bowel sounds, soft, non-tender, no abdominal mass palpated.    Liver and Spleen exam: No hepato-splenomegaly.   Musculoskeletal     Skin inspection: Left hand redness    Neurologic   Cranial nerves: Nerves 2-12 were intact, no focal neuro defects.     Assessment/Plan      #Sepsis  #Osteomyelitis  On IV cefazolin  Family make a decision after talking to podiatry regarding amputation versus 6 weeks of antibiotics    #Visual hallucinations  Hold all pain medications  Use Tylenol as needed    #Left hand cellulitis  Will monitor  If redness worsens or does not improve by tomorrow we will need to add coverage for MRSA     #Poorly controlled diabetes mellitus  Sliding scale insulin    #Peripheral artery disease       #Dyslipidemia  Continue statins and maintain LDL less than 70     #Hypertension  Continue home medications     #Fall with head and neck injury  CT head negative  CT cervical spine negative  X-ray of the wrist and hand negative for fractures  Pain control  Flexeril  Heating pads

## 2024-06-10 NOTE — PROGRESS NOTES
"  INFECTIOUS DISEASE DAILY PROGRESS NOTE    SUBJECTIVE:    Having some confusion at night. He does not realize he is at [facility] this morning. Daughter is here and helping to reorient patient. Afebrile. No rash/itching/diarrhea.     OBJECTIVE:  VITALS (Last 24 Hours)  /59 (BP Location: Right arm, Patient Position: Lying)   Pulse 57   Temp 36.2 °C (97.1 °F) (Temporal)   Resp 16   Ht 1.778 m (5' 10\")   Wt 67.8 kg (149 lb 7.6 oz)   SpO2 97%   BMI 21.45 kg/m²     PHYSICAL EXAM:  Gen - NAD, mild confusion this morning, not agitated  Lungs - no wh  Abd - soft, BS present  Right Foot - dressing present  Skin - no rash    ABX: IV Cefazolin    LABS:  Lab Results   Component Value Date    WBC 14.5 (H) 06/09/2024    HGB 12.9 (L) 06/09/2024    HCT 39.5 (L) 06/09/2024    MCV 93 06/09/2024     06/09/2024     Lab Results   Component Value Date    GLUCOSE 102 (H) 06/09/2024    CALCIUM 8.1 (L) 06/09/2024     (L) 06/09/2024    K 3.6 06/09/2024    CO2 24 06/09/2024    CL 99 06/09/2024    BUN 17 06/09/2024    CREATININE 0.74 06/09/2024           Estimated Creatinine Clearance: 64.9 mL/min (by C-G formula based on SCr of 0.74 mg/dL).    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    Continue on IV Cefazolin 2g Q8H. Repeat blood cx x2 6/8 NGTD. TTE pending for endocarditis evaluation.     Monitoring for adverse effects of abx such as rash/itching/diarrhea - none.     Will follow. Thanks!    Berny Valenzuela, MD  [facility]  Office " #[phone]

## 2024-06-10 NOTE — PROGRESS NOTES
Speech-Language Pathology    SLP Adult Inpatient Speech-Language Pathology Clinical Swallow Evaluation    Patient Name: Rene Ballard  MRN: [id number]  Today's Date: 6/10/2024   Time Calculation  Start Time: 0835  Stop Time: 0900  Time Calculation (min): 25 min         Current Problem:   1. Bacteremia  Transthoracic Echo (TTE) Limited    Transthoracic Echo (TTE) Limited      2. Osteomyelitis (Multi)        3. Pressure ulcer of other site, stage 2 (Multi)  Vascular US PVR Without Exercise    Vascular US PVR Without Exercise      4. PAD (peripheral artery disease) (CMS-HCC)  Vascular US PVR Without Exercise    Vascular US PVR Without Exercise      5. Stricture of artery (CMS-HCC)  Vascular US PVR Without Exercise      6. Oral phase dysphagia              Recommendations:  Risk for Aspiration: Yes  Additional Recommendations: Dysphagia treatment  Solid Diet Recommendations :  (Easy to Chew (IDDSI 7))  Liquid Diet Recommendations: Thin (IDDSI Level 0)  Compensatory Swallowing Strategies: Upright 90 degrees as possible for all oral intake, Remain upright for 20-30 minutes after meals, Alternate solids and liquids  Dysphagia Goals: Patient will tolerate recommended diet without observed clinical signs of aspiration, Patient will progress to advanced diet      Assessment:  Assessment Results: Pt presents with mild-moderate oral dysphagia, no overt s/s aspiration noted.  Medical Staff Made Aware: Yes      Plan:  Inpatient/Swing Bed or Outpatient: Inpatient  Treatment/Interventions: Assess diet tolerance, Diet recommendations  SLP Plan: Skilled SLP  SLP Frequency: 2x per week  Duration: Current admission  SLP Discharge Recommendations: Continue home program upon D/C  Discussed POC: Patient, Caregiver/family  Discussed Risks/Benefits: Yes  Patient/Caregiver Agreeable: Yes  SLP - OK to Discharge: Yes      Subjective   Current Problem:  Pt pleasant, confused. Pt already ate approx 75% of breakfast from tray at bedside, but  agreeable to trials with SLP.  Pt reports difficulty chewing some foods, and that upper denture's a little loose, however declined offer of denture adhesive.      General Visit Information:  Patient Class: Inpatient  Living Environment: Home  Caregiver Feedback: two family members present; unknown relation to Pt  Ordering Physician:   Reason for Referral: Swallow Eval  Referred By: Mica  Past Medical History Relevant to Rehab: DM, PAD, HTN  Patient Seen During This Visit: Yes  Missed Time Reason: Pain, Patient fatigue  Prior to Session Communication: Bedside nurse  Date of Order: 06/08/24  Base[employer] Diet: Regular  Current Diet : Regular      Objective     Baseline Assessment:  Respiratory Status: Room air  Behavior/Cognition: Cooperative, Pleasant mood, Lethargic, Distractible, Requires cueing  Vision: Functional for self-feeding  Hearing: Within Functional Limits  Patient Positioning: Upright in Chair  Baseline Vocal Quality: Normal      Pain:  Pain Assessment: 0-10  Pain Score: 0 - No pain       Oral/Motor Assessment:  Oral Hygiene: WFL  Dentition: Complete Dentures  Oral Motor: Within Functional Limits  Vocal Quality: Within Functional Limits  Intelligibility: Intelligible  Breath Support: Adequate for speech  Hearing: Within Functional Limits      Consistencies Trialed:  Consistencies Trialed: Yes  Pt fed himself all PO trials, including: 3x 1 item from fresh fruit cup (e.g. pineapple chunk, melon chunk, grape), 2oz pudding, approx 3oz thin by cup (Pt does not like to drink from straws)      Clinical Observations:  Management of Oral Secretions: Adequate  Prolonged Oral Manipulation: Regular (IDDSI Level 7)  Impaired Mastication: Regular (IDDSI Level 7)  Oral Residue: All consistencies Trialed

## 2024-06-10 NOTE — PROGRESS NOTES
Occupational Therapy    Evaluation    Patient Name: Rene Ballard  MRN: [id number]  Today's Date: 6/10/2024       Assessment  IP OT Assessment  OT Assessment: Pt. presents with chief complaints of generalized weakness, fatigue, fever, chills, and R foot drainiage. Pt. with decreased endurance, sitting and standing balance, trunk control, L UE ROM, B UE strength, coordination, activity tolerance and endurance. Pt. requires increased time and rest breaks to complete tasks, and verbal cues for safe hand placement prior to and during transfers with walker. Pt. would benefit from MOD intensity therapy to increase safe functional participation in ADLs, transfers, and mobility.  Prognosis: Good  Evaluation/Treatment Tolerance: Patient limited by fatigue  Medical Staff Made Aware: Yes  End of Session Communication: Bedside nurse  End of Session Patient Position: Bed, 3 rail up, Alarm on    Plan:  Treatment Interventions: ADL retraining, Functional transfer training, UE strengthening/ROM, Endurance training, Equipment evaluation/education, Patient/family training, Neuromuscular reeducation, Fine motor coordination activities  OT Frequency: 3 times per week  OT Discharge Recommendations: Moderate intensity level of continued care  Equipment Recommended upon Discharge:  (hip kit)  OT - OK to Discharge: Yes (Per OT POC)    Subjective   Current Problem:  1. Bacteremia  Transthoracic Echo (TTE) Limited    Transthoracic Echo (TTE) Limited      2. Osteomyelitis (Multi)        3. Pressure ulcer of other site, stage 2 (Multi)  Vascular US PVR Without Exercise    Vascular US PVR Without Exercise      4. PAD (peripheral artery disease) (CMS-HCC)  Vascular US PVR Without Exercise    Vascular US PVR Without Exercise      5. Stricture of artery (CMS-HCC)  Vascular US PVR Without Exercise        General:  General  Reason for Referral: Pt. 89 y.o. chief complaint of generalized weakness and elevated WBC count. Presents with fatigue, fever,  chills, and R foot drainage.  Referred By: Franco Arce MD  Past Medical History Relevant to Rehab:   Past Medical History:   Diagnosis Date    Abnormal EKG 05/01/2023    BPH (benign prostatic hyperplasia)     Chronic systolic heart failure (Multi)     COVID-19 05/01/2023    Essential hypertension     GERD (gastroesophageal reflux disease)     Hypertensive heart disease with heart failure (Multi)     Osteoarthritis     Osteomyelitis (Multi)     Peripheral vascular disease, unspecified (CMS-HCC) 11/28/2022    PVD (peripheral vascular disease)    Personal history of malignant neoplasm of bladder     History of carcinoma of bladder    Personal history of other diseases of the musculoskeletal system and connective tissue     History of arthritis    Personal history of other endocrine, nutritional and metabolic disease     History of diabetes mellitus    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 safety and participation  Prior to Session Communication: Bedside nurse  Patient Position Received: Bed, 3 rail up, Alarm on  General Comment: Pt. with decreased endurance requiring increased time and rest breaks to complete tasks.    Precautions:  Medical Precautions: Fall precautions (High)    Pain:  Pain Assessment  Pain Assessment: 0-10  Pain Score: 0 - No pain    Objective   Cognition:  Overall Cognitive Status: Within Functional Limits  Orientation Level: Oriented X4     Home Living:  Type of Home:  (1 level house [address], bedroom bathroom same floor, walk in shower, raised toilet seat, corner shower seat, hand held shower head, grab bars)  Lives With: Alone  Home Adaptive Equipment:  (Long handled shoehorn, wheeled walker and cane)     Prior Function:  Level of  [place]: Independent with ADLs, Independent with homemaking   Receives Help From: Family (Brothers and brothers' children). Reported provides assistance for sit to stand transfers.   ADL Assistance: Independent  Homemaking Assistance: Independent  Ambulatory Assistance: Independent (Use of walker in home and cane outside of house.)  Hand Dominance: Right  Prior Function Comments: Reports 5-6 falls in past 6 months    IADL History:  Homemaking Responsibilities: Yes  Meal Prep Responsibility:  (Independent heated meals)  Laundry Responsibility:  (Independent)    ADL:  Eating Assistance:  (CGA)  Grooming Assistance: Minimal  Bathing Assistance: Maximal  UE Dressing Assistance: Maximal  LE Dressing Assistance: Maximal  Toileting Assistance with Device: Maximal  Functional Assistance: Maximal    Activity Tolerance:  Endurance: Tolerates 10 - 20 min exercise with multiple rests    Bed Mobility/Transfers: Bed Mobility  Bed Mobility: Yes  Bed Mobility 1  Bed Mobility 1: Supine to sitting  Level of Assistance 1: Moderate assistance  Bed Mobility Comments 1: HOB elevated  Bed Mobility 2  Bed Mobility  2: Sitting to supine  Level of Assistance 2: Maximum assistance  Bed Mobility Comments 2: MaxA to manage upper trunk and B LEs    Transfers  Transfer: Yes  Transfer 1  Transfer From 1: Sit to  Transfer to 1: Stand  Technique 1: Sit to stand, Stand to sit  Transfer Device 1: Walker, Gait belt  Transfer Level of Assistance 1: Moderate assistance (x2)  Trials/Comments 1: Bed elevated. Side stepped toward HOB x2 with modAx2 and walker.    Sitting Balance:  Static Sitting Balance  Static Sitting-Balance Support: Feet supported  Static Sitting-Level of Assistance: Contact guard  Dynamic Sitting Balance  Dynamic Sitting-Balance Support: Feet supported  Dynamic Sitting-Balance: Forward lean  Dynamic Sitting-Comments: Daya to maintain dynamic sitting balance during LB dressing. Pt. with posterior R lateral lean.    Standing  Balance:  Static Standing Balance  Static Standing-Balance Support: Bilateral upper extremity supported (walker)  Static Standing-Level of Assistance: Moderate assistance (x2)    IADL's:   Homemaking Responsibilities: Yes  Meal Prep Responsibility:  (Independent heated meals)  Laundry Responsibility:  (Independent)  Vision: Vision - Basic Assessment  Current Vision:  (Wears glasses half of time per patient report)    Strength:  Strength Comments: RUE: 3+/5 shoulder flexion, 4-/5 elbow distally to digits. LUE: 2+/5 shoulder flexion, 4-/5 elbow flexion, 3+/5 wrist distally to digits.    Coordination:  Movements are Fluid and Coordinated: No     Hand Function:  Hand Function  Gross Grasp: Functional  Coordination: Impaired    Extremities:  RUE : Within Functional Limits    LUE:  (Shoulder flexion to 40 degrees, WFL from elbows distally to digits.)    Outcome Measures: [facility] Daily Activity  Putting on and taking off regular lower body clothing: A lot  Bathing (including washing, rinsing, drying): A lot  Putting on and taking off regular upper body clothing: A lot  Toileting, which includes using toilet, bedpan or urinal: A lot  Taking care of personal grooming such as brushing teeth: A little  Eating Meals: None  Daily Activity - Total Score: 15      Education Documentation  ADL Training, taught by GEMINI Friedman at 6/10/2024 10:53 AM.  Learner: Patient  Readiness: Acceptance  Method: Explanation, Demonstration  Response: Verbalizes Understanding, Needs Reinforcement    Education Comments  No comments found.      Note written by Lauren SINGLETARY, under supervision of EDWARD De   Goals:   Encounter Problems       Encounter Problems (Active)       ADLs       Patient will perform UB and LB bathing with minimal assist  level of assistance and raised toilet seat, shower chair, and long-handled sponge.       Start:  06/10/24    Expected End:  06/24/24            Patient with complete upper body dressing with minimal  assist  level of assistance donning and doffing all UE clothes with PRN adaptive equipment while edge of bed        Start:  06/10/24    Expected End:  06/24/24            Patient with complete lower body dressing with contact guard assist level of assistance donning and doffing all LE clothes  with reacher, shoe horn, sock-aid, and dressing stick  while edge of bed        Start:  06/10/24    Expected End:  06/24/24            Patient will complete daily grooming tasks brushing teeth and washing face/hair with supervision level of assistance and PRN adaptive equipment while edge of bed .       Start:  06/10/24    Expected End:  06/24/24            Patient will complete toileting including hygiene clothing management/hygiene with minimal assist  level of assistance and raised toilet seat and grab bars.       Start:  06/10/24    Expected End:  06/24/24               BALANCE       Pt will maintain dynamic standing balance during ADL task with CGA in order to demonstrate decreased risk of falling and improved postural control.       Start:  06/10/24    Expected End:  06/24/24            Patient will tolerate standing for 5 minutes to contact guard assist level of assistance with front wheeled walker in order to improve functional activity tolerance for ADL tasks.       Start:  06/10/24    Expected End:  06/24/24               EXERCISE/STRENGTHENING       Patient to increase LUE to 4/5 strength from shoulders distally to digits.        Start:  06/10/24    Expected End:  06/24/24               TRANSFERS       Patient will perform bed mobility contact guard assist level of assistance and bed rails in order to improve safety and independence with mobility       Start:  06/10/24    Expected End:  06/24/24                   COORDINATION          Patient will improve B UE coordination to WFL in order to increase functional participation in ADLs.       Start 06/10/24    Expected End 06/24/24    ROM         Patient will improve L  UE ROM to WFL in order to increase functional participation in ADLs.       Start 06/10/24    Expected End 06/24/24

## 2024-06-11 ENCOUNTER — APPOINTMENT (OUTPATIENT)
Dept: WOUND CARE | Facility: CLINIC | Age: 89
End: 2024-06-11
Payer: MEDICARE

## 2024-06-11 VITALS
OXYGEN SATURATION: 96 % | TEMPERATURE: 97.5 F | HEIGHT: 70 IN | SYSTOLIC BLOOD PRESSURE: 152 MMHG | BODY MASS INDEX: 21.4 KG/M2 | WEIGHT: 149.47 LBS | DIASTOLIC BLOOD PRESSURE: 73 MMHG | HEART RATE: 67 BPM | RESPIRATION RATE: 16 BRPM

## 2024-06-11 LAB
ANION GAP SERPL CALC-SCNC: 11 MMOL/L (ref 10–20)
BACTERIA UR CULT: ABNORMAL
BUN SERPL-MCNC: 10 MG/DL (ref 6–23)
CALCIUM SERPL-MCNC: 8.5 MG/DL (ref 8.6–10.3)
CHLORIDE SERPL-SCNC: 100 MMOL/L (ref 98–107)
CO2 SERPL-SCNC: 27 MMOL/L (ref 21–32)
CREAT SERPL-MCNC: 0.64 MG/DL (ref 0.5–1.3)
EGFRCR SERPLBLD CKD-EPI 2021: 90 ML/MIN/1.73M*2
ERYTHROCYTE [DISTWIDTH] IN BLOOD BY AUTOMATED COUNT: 12.4 % (ref 11.5–14.5)
GLUCOSE BLD MANUAL STRIP-MCNC: 74 MG/DL (ref 74–99)
GLUCOSE BLD MANUAL STRIP-MCNC: 75 MG/DL (ref 74–99)
GLUCOSE BLD MANUAL STRIP-MCNC: 95 MG/DL (ref 74–99)
GLUCOSE BLD MANUAL STRIP-MCNC: 97 MG/DL (ref 74–99)
GLUCOSE SERPL-MCNC: 66 MG/DL (ref 74–99)
HCT VFR BLD AUTO: 40.6 % (ref 41–52)
HGB BLD-MCNC: 13.5 G/DL (ref 13.5–17.5)
MCH RBC QN AUTO: 30.8 PG (ref 26–34)
MCHC RBC AUTO-ENTMCNC: 33.3 G/DL (ref 32–36)
MCV RBC AUTO: 93 FL (ref 80–100)
NRBC BLD-RTO: 0 /100 WBCS (ref 0–0)
PLATELET # BLD AUTO: 252 X10*3/UL (ref 150–450)
POTASSIUM SERPL-SCNC: 3.6 MMOL/L (ref 3.5–5.3)
RBC # BLD AUTO: 4.38 X10*6/UL (ref 4.5–5.9)
SODIUM SERPL-SCNC: 134 MMOL/L (ref 136–145)
WBC # BLD AUTO: 11.3 X10*3/UL (ref 4.4–11.3)

## 2024-06-11 PROCEDURE — 2500000004 HC RX 250 GENERAL PHARMACY W/ HCPCS (ALT 636 FOR OP/ED)

## 2024-06-11 PROCEDURE — 2500000004 HC RX 250 GENERAL PHARMACY W/ HCPCS (ALT 636 FOR OP/ED): Mod: JZ | Performed by: INTERNAL MEDICINE

## 2024-06-11 PROCEDURE — 80048 BASIC METABOLIC PNL TOTAL CA: CPT

## 2024-06-11 PROCEDURE — 82947 ASSAY GLUCOSE BLOOD QUANT: CPT | Mod: 91

## 2024-06-11 PROCEDURE — 2500000002 HC RX 250 W HCPCS SELF ADMINISTERED DRUGS (ALT 637 FOR MEDICARE OP, ALT 636 FOR OP/ED): Mod: MUE

## 2024-06-11 PROCEDURE — 1200000002 HC GENERAL ROOM WITH TELEMETRY DAILY

## 2024-06-11 PROCEDURE — 85027 COMPLETE CBC AUTOMATED: CPT

## 2024-06-11 PROCEDURE — 36415 COLL VENOUS BLD VENIPUNCTURE: CPT

## 2024-06-11 PROCEDURE — 2500000001 HC RX 250 WO HCPCS SELF ADMINISTERED DRUGS (ALT 637 FOR MEDICARE OP)

## 2024-06-11 PROCEDURE — 99232 SBSQ HOSP IP/OBS MODERATE 35: CPT | Performed by: INTERNAL MEDICINE

## 2024-06-11 RX ORDER — WARFARIN 1 MG/1
TABLET ORAL DAILY PRN
Status: DISCONTINUED | OUTPATIENT
Start: 2024-06-11 | End: 2024-06-11

## 2024-06-11 RX ADMIN — ENOXAPARIN SODIUM 40 MG: 40 INJECTION SUBCUTANEOUS at 21:22

## 2024-06-11 RX ADMIN — ACETAMINOPHEN 650 MG: 325 TABLET ORAL at 11:24

## 2024-06-11 RX ADMIN — METOPROLOL TARTRATE 12.5 MG: 25 TABLET, FILM COATED ORAL at 11:24

## 2024-06-11 RX ADMIN — DOCUSATE SODIUM 100 MG: 100 CAPSULE, LIQUID FILLED ORAL at 21:18

## 2024-06-11 RX ADMIN — CEFAZOLIN SODIUM 2 G: 2 INJECTION, SOLUTION INTRAVENOUS at 06:18

## 2024-06-11 RX ADMIN — CEFAZOLIN SODIUM 2 G: 2 INJECTION, SOLUTION INTRAVENOUS at 21:18

## 2024-06-11 RX ADMIN — DORZOLAMIDE HYDROCHLORIDE AND TIMOLOL MALEATE 1 DROP: 22.3; 6.8 SOLUTION/ DROPS OPHTHALMIC at 09:00

## 2024-06-11 RX ADMIN — CLOPIDOGREL 75 MG: 75 TABLET ORAL at 11:24

## 2024-06-11 RX ADMIN — PANTOPRAZOLE SODIUM 40 MG: 40 TABLET, DELAYED RELEASE ORAL at 06:18

## 2024-06-11 RX ADMIN — ACETAMINOPHEN 650 MG: 325 TABLET ORAL at 21:35

## 2024-06-11 RX ADMIN — DORZOLAMIDE HYDROCHLORIDE AND TIMOLOL MALEATE 1 DROP: 22.3; 6.8 SOLUTION/ DROPS OPHTHALMIC at 21:22

## 2024-06-11 RX ADMIN — CEFAZOLIN SODIUM 2 G: 2 INJECTION, SOLUTION INTRAVENOUS at 11:30

## 2024-06-11 RX ADMIN — METOPROLOL TARTRATE 12.5 MG: 25 TABLET, FILM COATED ORAL at 21:18

## 2024-06-11 RX ADMIN — TAMSULOSIN HYDROCHLORIDE 0.4 MG: 0.4 CAPSULE ORAL at 11:24

## 2024-06-11 RX ADMIN — LATANOPROST 1 DROP: 50 SOLUTION/ DROPS OPHTHALMIC at 21:22

## 2024-06-11 ASSESSMENT — COGNITIVE AND FUNCTIONAL STATUS - GENERAL
STANDING UP FROM CHAIR USING ARMS: A LOT
EATING MEALS: A LITTLE
HELP NEEDED FOR BATHING: A LOT
DRESSING REGULAR LOWER BODY CLOTHING: A LITTLE
STANDING UP FROM CHAIR USING ARMS: A LOT
MOVING FROM LYING ON BACK TO SITTING ON SIDE OF FLAT BED WITH BEDRAILS: A LITTLE
WALKING IN HOSPITAL ROOM: A LOT
TOILETING: A LOT
WALKING IN HOSPITAL ROOM: A LOT
CLIMB 3 TO 5 STEPS WITH RAILING: TOTAL
MOBILITY SCORE: 16
MOVING TO AND FROM BED TO CHAIR: A LOT
PERSONAL GROOMING: A LITTLE
DAILY ACTIVITIY SCORE: 16
MOBILITY SCORE: 12
MOVING TO AND FROM BED TO CHAIR: A LITTLE
TURNING FROM BACK TO SIDE WHILE IN FLAT BAD: A LOT
DRESSING REGULAR UPPER BODY CLOTHING: A LITTLE
CLIMB 3 TO 5 STEPS WITH RAILING: TOTAL

## 2024-06-11 ASSESSMENT — PAIN SCALES - GENERAL
PAINLEVEL_OUTOF10: 0 - NO PAIN
PAINLEVEL_OUTOF10: 4
PAINLEVEL_OUTOF10: 0 - NO PAIN

## 2024-06-11 ASSESSMENT — PAIN DESCRIPTION - LOCATION: LOCATION: HEAD

## 2024-06-11 NOTE — CONSULTS
Nutrition Assessment Note    Reason for Assessment  Reason for Assessment: Admission nursing screening    Pt admitted for:  Osteomyelitis (Multi) [M86.9]  PAD (peripheral artery disease) (CMS-HCC) [I73.9]  Pressure ulcer of other site, stage 2 (Multi) [L89.892]    MST triggered for weight loss and eating poorly due to decreased appetite.  Pt known to nutrition services.  Chart reviewed and pt visited, family at bedside.  Report of decreased intake x 1 week prior to admission.  When well typically eats 2 meals a day along with 2-3 Ensures.    Past Medical History:   Diagnosis Date    Abnormal EKG 05/01/2023    BPH (benign prostatic hyperplasia)     Chronic systolic heart failure (Multi)     COVID-19 05/01/2023    Essential hypertension     GERD (gastroesophageal reflux disease)     Hypertensive heart disease with heart failure (Multi)     Osteoarthritis     Osteomyelitis (Multi)     Peripheral vascular disease, unspecified (CMS-HCC) 11/28/2022    PVD (peripheral vascular disease)    Personal history of malignant neoplasm of bladder     History of carcinoma of bladder    Personal history of other diseases of the musculoskeletal system and connective tissue     History of arthritis    Personal history of other endocrine, nutritional and metabolic disease     History of diabetes mellitus    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    Controlled diabetes mellitus with circulatory complication     Results for orders placed or performed during the hospital encounter of 06/05/24 (from the past 24 hour(s))   POCT GLUCOSE   Result Value Ref Range    POCT Glucose 79 74 - 99 mg/dL   Basic metabolic panel   Result Value Ref Range    Glucose 66 (L) 74 - 99 mg/dL    Sodium 134 (L) 136 - 145 mmol/L    Potassium 3.6 3.5 - 5.3 mmol/L    Chloride 100 98 - 107 mmol/L    Bicarbonate 27 21 - 32 mmol/L    Anion Gap 11 10 - 20  mmol/L    Urea Nitrogen 10 6 - 23 mg/dL    Creatinine 0.64 0.50 - 1.30 mg/dL    eGFR 90 >60 mL/min/1.73m*2    Calcium 8.5 (L) 8.6 - 10.3 mg/dL   CBC   Result Value Ref Range    WBC 11.3 4.4 - 11.3 x10*3/uL    nRBC 0.0 0.0 - 0.0 /100 WBCs    RBC 4.38 (L) 4.50 - 5.90 x10*6/uL    Hemoglobin 13.5 13.5 - 17.5 g/dL    Hematocrit 40.6 (L) 41.0 - 52.0 %    MCV 93 80 - 100 fL    MCH 30.8 26.0 - 34.0 pg    MCHC 33.3 32.0 - 36.0 g/dL    RDW 12.4 11.5 - 14.5 %    Platelets 252 150 - 450 x10*3/uL   POCT GLUCOSE   Result Value Ref Range    POCT Glucose 75 74 - 99 mg/dL   POCT GLUCOSE   Result Value Ref Range    POCT Glucose 95 74 - 99 mg/dL   POCT GLUCOSE   Result Value Ref Range    POCT Glucose 97 74 - 99 mg/dL     Scheduled medications  ceFAZolin, 2 g, intravenous, q8h  clopidogrel, 75 mg, oral, Daily  [Held by provider] cyclobenzaprine, 5 mg, oral, TID  docusate sodium, 100 mg, oral, BID  dorzolamide-timoloL, 1 drop, Left Eye, BID  enoxaparin, 40 mg, subcutaneous, q24h  insulin glargine, 10 Units, subcutaneous, Once  insulin glargine, 25 Units, subcutaneous, q24h  insulin lispro, 0-10 Units, subcutaneous, TID  latanoprost, 1 drop, Both Eyes, Nightly  lidocaine, 2 patch, transdermal, Daily  metoprolol tartrate, 12.5 mg, oral, BID  pantoprazole, 40 mg, oral, Daily before breakfast  tamsulosin, 0.4 mg, oral, Daily      Continuous medications  [Held by provider] sodium chloride 0.9%, 75 mL/hr, Last Rate: 75 mL/hr (06/10/24 0514)      PRN medications  PRN medications: acetaminophen, alum-mag hydroxide-simeth, dextrose, dextrose, glucagon, glucagon, LORazepam, melatonin, [Held by provider] morphine, ondansetron, polyethylene glycol, [Held by provider] traMADol  Dietary Orders (From admission, onward)       Start     Ordered    06/12/24 0001  NPO Diet Except: Sips with meds; Effective midnight  Diet effective midnight        Question:  Except:  Answer:  Sips with meds    06/11/24 0715    06/11/24 1543  Oral nutritional supplements   "Until discontinued        Question Answer Comment   Deliver with All meals    Select supplement: Glucerantoinette Lanke        06/11/24 1542    06/10/24 1218  Adult diet Regular, Carb Controlled; 45 gram carb/meal, 15 gram Carb evening snack; Easy to chew  Diet effective now        Question Answer Comment   Diet type Regular    Diet type Carb Controlled    Carb diet selection: 45 gram carb/meal, 15 gram Carb evening snack    Texture Easy to chew        06/10/24 1217                  History:  Food and Nutrient History  Energy Intake: Fair 50-75 %    Anthropometrics:  Height: 177.8 cm (5' 10\")  Weight: 67.8 kg (149 lb 7.6 oz)  BMI (Calculated): 21.45    Wt Readings from Last 8 Encounters:   06/09/24 67.8 kg (149 lb 7.6 oz)   05/01/24 71.7 kg (158 lb)   04/25/24 74.4 kg (164 lb)   03/20/24 72.6 kg (160 lb)   11/20/23 73.4 kg (161 lb 14.4 oz)   11/06/23 72.6 kg (160 lb)   09/08/23 77.1 kg (170 lb)   08/02/23 72.1 kg (159 lb)     Weight Change  Significant Weight Loss: No    Estimated Energy Needs  Total Energy Estimated Needs (kCal): 2035 kCal  Total Estimated Energy Need per Day (kCal/kg): 2375 kCal/kg  Method for Estimating Needs: 30-35    Estimated Protein Needs  Total Protein Estimated Needs (g): 70 g  Total Protein Estimated Needs (g/kg): 100 g/kg  Method for Estimating Needs: 1.0-1.5    Estimated Fluid Needs  Method for Estimating Needs: 1ml/kcal or per MD    Nutrition Focused Physical Findings:  Subcutaneous Fat Loss  Orbital Fat Pads: Mild-Moderate (slight dark circles and slight hollowing)  Buccal Fat Pads: Well nourished (full, rounded cheeks)    Muscle Wasting  Temporalis: Mild-Moderate (slight depression)    Edema  Edema Location: LLE non pitting    Physical Findings (Nutrition Deficiency/Toxicity)  Skin: Positive (right heel wound)     Nutrition Diagnosis   Malnutrition Diagnosis  Patient has Malnutrition Diagnosis: Yes  Diagnosis Status: New  Malnutrition Diagnosis: Severe malnutrition related to chronic disease " or condition  As Evidenced by: moderate subcutaneous fat loss, moderate muscle wasting and fluid accumulaton present    Patient has Nutrition Diagnosis: Yes  Nutrition Diagnosis 1: Increased nutrient needs  Diagnosis Status (1): New  Related to (1): wound  As Evidenced by (1): right heel wound      Nutrition Interventions/Recommendations   Food and/or Nutrient Delivery Interventions  Meals and Snacks: Carbohydrate-modified diet, Texture-modified diet  Goal: > 75% of meals consumed     Medical Food Supplement: Commercial beverage  Goal: Glucerna TID for encouraged intake    Additional Interventions: Consider appetite stimulant    Education Documentation  No documentation found.      Nutrition Monitoring and Evaluation   Food and Nutrient Related History  Energy Intake: Estimated energy intake    Fluid Intake: Estimated fluid intake    Amount of Food: Estimated amout of food, Medical food intake    Mealtime Behavior: Limited number of accepted foods    Anthropometrics: Body Composition/Growth/Weight History  Weight Change: Weight gain, Weight loss    Biochemical Data, Medical Tests and Procedures  Electrolyte and Renal Panel: Other (Comment)  Criteria: as clinically indicated    Gastrointestinal Profile: Other (Comment)  Criteria: as clinically indicated    Glucose/Endocrine Profile: Other (Comment)  Criteria: as clinically indicated    Nutritional Anemia Profile: Other (Comment)  Criteria: as clinically indicated    Vitamin Profile: Other (Comment)  Criteria: as clinically indicated    Nutrition Focused Physical Findings  Adipose: Loss of subcutaneous fat    Digestive System: Decrease in appetite    Muscles: Muscle atrophy    Skin: Impaired wound healing    Other: Fluid Accumulation, Stool output, Urine volume, Overall appearance    Follow Up  Time Spent (min): 60 minutes  Last Date of Nutrition Visit: 06/11/24  Nutrition Follow-Up Needed?: Dietitian to reassess per policy  Follow up Comment: IVANA BULL

## 2024-06-11 NOTE — PROGRESS NOTES
Rene Ballard is a 89 y.o. male     Patient resting comfortably  Discussed with infectious disease  Discussed with family member  Will need a ELIEL tomorrow    Review of Systems     Constitutional: Feeling poorly  Cardiovascular: no chest pain   Respiratory: no cough, wheezing or shortness of breath a  Gastrointestinal: no abdominal pain, no constipation, no melena, no nausea, no diarrhea, no vomiting and no blood in stools.   Neurological: no headache,   All other systems have been reviewed and are negative for complaint.       Vitals:    06/11/24 1102   BP: 150/57   Pulse: 67   Resp: 18   Temp: 36.7 °C (98.1 °F)   SpO2: 96%        Scheduled medications  ceFAZolin, 2 g, intravenous, q8h  clopidogrel, 75 mg, oral, Daily  [Held by provider] cyclobenzaprine, 5 mg, oral, TID  docusate sodium, 100 mg, oral, BID  dorzolamide-timoloL, 1 drop, Left Eye, BID  enoxaparin, 40 mg, subcutaneous, q24h  insulin glargine, 10 Units, subcutaneous, Once  insulin glargine, 25 Units, subcutaneous, q24h  insulin lispro, 0-10 Units, subcutaneous, TID  latanoprost, 1 drop, Both Eyes, Nightly  lidocaine, 2 patch, transdermal, Daily  metoprolol tartrate, 12.5 mg, oral, BID  pantoprazole, 40 mg, oral, Daily before breakfast  tamsulosin, 0.4 mg, oral, Daily      Continuous medications  [Held by provider] sodium chloride 0.9%, 75 mL/hr, Last Rate: 75 mL/hr (06/10/24 0514)      PRN medications  PRN medications: acetaminophen, alum-mag hydroxide-simeth, dextrose, dextrose, glucagon, glucagon, LORazepam, melatonin, [Held by provider] morphine, ondansetron, polyethylene glycol, [Held by provider] traMADol    Lab Review   Results from last 7 days   Lab Units 06/11/24  0558 06/10/24  0557 06/09/24  0553   WBC AUTO x10*3/uL 11.3 13.9* 14.5*   HEMOGLOBIN g/dL 13.5 14.8 12.9*   HEMATOCRIT % 40.6* 45.4 39.5*   PLATELETS AUTO x10*3/uL 252 308 246     Results from last 7 days   Lab Units 06/11/24  0558 06/10/24  0557 06/09/24  0553 06/05/24  1300  06/05/24  1136   SODIUM mmol/L 134* 132* 130*   < > 131*   POTASSIUM mmol/L 3.6 4.3 3.6   < > 4.7   CHLORIDE mmol/L 100 97* 99   < > 95*   CO2 mmol/L 27 27 24   < > 23   BUN mg/dL 10 11 17   < > 30*   CREATININE mg/dL 0.64 0.66 0.74   < > 1.32*   CALCIUM mg/dL 8.5* 8.8 8.1*   < > 9.1   PROTEIN TOTAL g/dL  --   --   --   --  7.0   BILIRUBIN TOTAL mg/dL  --   --   --   --  0.8   ALK PHOS U/L  --   --   --   --  204*   ALT U/L  --   --   --   --  19   AST U/L  --   --   --   --  15   GLUCOSE mg/dL 66* 71* 102*   < > 524*    < > = values in this interval not displayed.            Transthoracic Echo (TTE) Limited   Final Result      CT foot right wo IV contrast   Final Result   1. Findings compatible with osteomyelitis involving the remaining 4th   metatarsal. Other regions of osteomyelitis are not entirely excluded   on the basis of this exam and MRI may be helpful for further   assessment.   2. Wound/ulcer on the distal lateral side of the remaining foot.   3. [place] increased density in the subcutaneous tissues of the   distal remaining foot greatest on the lateral side which may   represent confluence cellulitis. Underlying focal collection is not   excluded on the basis of this noncontrast examination. There are some   bubbles of gas in the soft tissues which may be due to open nature of   process although gas-forming infection is not entirely excluded.   4. Degree of more generalized reticular edema in the subcutaneous   tissues which may represent lymphedema and/or cellulitis.        MACRO:   none        Signed by: Noah Ladd 6/7/2024 12:54 PM   Dictation workstation:   [id number]      XR hand left 3+ views   Final Result   No acute fracture. Soft tissue swelling in the 2nd digit. Multifocal   productive arthritic changes noted.        MACRO:   None.        Signed by: Lara Severino 6/6/2024 1:50 PM   Dictation workstation:   [id number]      CT head wo IV contrast   Final Result   Findings of probable  chronic small vessel ischemic changes and age   related diffuse cerebral volume loss. No acute intracranial   abnormality or mass effect.        This study was interpreted at [facility].        MACRO:   None        Signed by: Win Peres 6/6/2024 12:39 PM   Dictation workstation:   [id number]      XR cervical spine complete 4-5 views   Final Result   Discogenic degenerative changes as described.             Signed by: Franck Pascal 6/6/2024 11:12 AM   Dictation workstation:   [id number]      Vascular US PVR Without Exercise   Final Result      XR foot right 3+ views   Final Result   1. Findings consistent with progressive osteomyelitis of the 4th   metatarsal bone with brown osseous erosion of the lateral and plantar   aspect of the shaft with overlying soft tissue swelling and   ulceration consistent with persistent overlying soft tissue   infection. If it would alter clinical management, an MRI may be   obtained.        2. 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        MACRO:   None        Signed by: Shalini Wright 6/5/2024 12:19 PM   Dictation workstation:   [id number]      XR chest 1 view   Final Result   1.  No evidence of acute cardiopulmonary process.                  MACRO:   None        Signed by: Shalini Wright 6/5/2024 12:09 PM   Dictation workstation:   [id number]      Transesophageal Echo (ELIEL)    (Results Pending)         Physical Exam     Constitutional   General appearance: Appears tired    Pulmonary   Respiratory assessment: No respiratory distress, normal respiratory rhythm and effort.    Auscultation of Lungs: Clear bilateral breath sounds.   Cardiovascular   Auscultation of heart: Apical pulse normal, heart rate and rhythm normal, normal S1 and S2, no murmurs and no pericardial rub.    Exam for edema: No peripheral edema.   Abdomen   Abdominal Exam: No bruits, normal bowel sounds, soft, non-tender, no  abdominal mass palpated.    Liver and Spleen exam: No hepato-splenomegaly.   Musculoskeletal     Skin inspection: Left hand redness    Neurologic   Cranial nerves: Nerves 2-12 were intact, no focal neuro defects.     Assessment/Plan      #Sepsis  #Osteomyelitis  On IV cefazolin  TTE concerning for endocarditis  Infectious disease has ordered a ELIEL    #Visual hallucinations  Hold all pain medications  Use Tylenol as needed    #Left hand cellulitis  Will monitor  If redness worsens or does not improve by tomorrow we will need to add coverage for MRSA     #Poorly controlled diabetes mellitus  Sliding scale insulin    #Peripheral artery disease       #Dyslipidemia  Continue statins and maintain LDL less than 70     #Hypertension  Continue home medications     #Fall with head and neck injury  Fall risk  PT OT

## 2024-06-11 NOTE — PROGRESS NOTES
06/11/24 0925   Current Planned Discharge Disposition   Current Planned Discharge Disposition SNF       [facility]  accept[facility]  No  precert  needed  Pt is  waiting  to  on  echo  to see if  infection has  spread  to  heart,  still being  followed by  ID  and podiatry    Jennifer Vasquez, MSW, LSW

## 2024-06-11 NOTE — PROGRESS NOTES
Speech-Language Pathology                 Therapy Communication Note    Patient Name: Rene Ballard  MRN: [id number]  Today's Date: 6/11/2024     Discipline: Speech Language Pathology    Missed Visit Reason:  Pt asleep - did not wake to allow Pt to rest    Missed Time: Attempt x1 at 1045    Comment: Sitter and dtr at bedside. Sitter reports Pt refused breakfast, however did take some orange juice this morning d/t low blood sugars (no reported difficulties drinking were noted). Sitter reports Pt has not slept/rested much recently until this morning, ie did not attempt to wake as rest promotes healing. Will attempt tx again at future time as able.

## 2024-06-11 NOTE — PROGRESS NOTES
Care Coordinator Note:    Plan: patient in with R TMA site infection and 4th toe OM with MSSA. ID following. IV cefazolin 2gm every 8. Echo done showed lesions. ELIEL pending. Podiatry plans waiting on ELIEL results.  Family still considering hospice pending ELIEL final results as well.     Status: inpatient  Payor: Medicare  Disposition: [facility]- no auth needed  Barrier: ELIEL and podiatry recs, ? Hospice plans  ADOD: few days    Cindy Sorto [facility]      06/11/24 1214   Discharge Planning   Patient expects to be discharged to: [facility]- no auth needed

## 2024-06-11 NOTE — PROGRESS NOTES
"Rene Ballard is a 89 y.o. male on day 6 of admission presenting with Osteomyelitis (Multi).    Subjective   Patient was seen at bedside, family present in room. Family states that they are waiting on the new echo and are also waiting to speak with the doctor who would perform the knee-level amputation.       Objective     Physical Exam:   Dressing was left intact today. Patient is AAOx0, in mild distress and mildly agitated. Having hallucinations and speaking with  relatives.     Last Recorded Vitals  Blood pressure 150/57, pulse 67, temperature 36.7 °C (98.1 °F), resp. rate 18, height 1.778 m (5' 10\"), weight 67.8 kg (149 lb 7.6 oz), SpO2 96%.    Relevant Results    Results for orders placed or performed during the hospital encounter of 24 (from the past 24 hour(s))   POCT GLUCOSE   Result Value Ref Range    POCT Glucose 92 74 - 99 mg/dL   POCT GLUCOSE   Result Value Ref Range    POCT Glucose 79 74 - 99 mg/dL   Basic metabolic panel   Result Value Ref Range    Glucose 66 (L) 74 - 99 mg/dL    Sodium 134 (L) 136 - 145 mmol/L    Potassium 3.6 3.5 - 5.3 mmol/L    Chloride 100 98 - 107 mmol/L    Bicarbonate 27 21 - 32 mmol/L    Anion Gap 11 10 - 20 mmol/L    Urea Nitrogen 10 6 - 23 mg/dL    Creatinine 0.64 0.50 - 1.30 mg/dL    eGFR 90 >60 mL/min/1.73m*2    Calcium 8.5 (L) 8.6 - 10.3 mg/dL   CBC   Result Value Ref Range    WBC 11.3 4.4 - 11.3 x10*3/uL    nRBC 0.0 0.0 - 0.0 /100 WBCs    RBC 4.38 (L) 4.50 - 5.90 x10*6/uL    Hemoglobin 13.5 13.5 - 17.5 g/dL    Hematocrit 40.6 (L) 41.0 - 52.0 %    MCV 93 80 - 100 fL    MCH 30.8 26.0 - 34.0 pg    MCHC 33.3 32.0 - 36.0 g/dL    RDW 12.4 11.5 - 14.5 %    Platelets 252 150 - 450 x10*3/uL   POCT GLUCOSE   Result Value Ref Range    POCT Glucose 75 74 - 99 mg/dL   POCT GLUCOSE   Result Value Ref Range    POCT Glucose 95 74 - 99 mg/dL     Transthoracic Echo (TTE) Limited    Result Date: 6/10/2024   [facility], [address] [place] [address]      "         Tel [phone] and Fax [phone] TRANSTHORACIC ECHOCARDIOGRAM REPORT  Patient Name:      MAYELIN OLSON     Reading Physician:    [id number] Nicholas Medeiros MD Study Date:        6/10/2024            Ordering Provider:    [id number] GERALD HANKS MRN/PID:           [id number]             Fellow: Accession#:        [id number]         Nurse: Date of Birth/Age: 11/8/1934 / 89 years Sonographer:          Consuelo Mosquera RDCS Gender:            M                    Additional Staff: Height:            178.00 cm            Admit Date:           6/10/2024 Weight:            68.00 kg             Admission Status:     Inpatient -                                                               Routine BSA / BMI:         1.85 m2 / 21.46      Encounter#:           [id number]                    kg/m2                                         Department Location:  [facility] Non                                                               Invasive Blood Pressure: 163 /68 mmHg Study Type:    TRANSTHORACIC ECHO (TTE) LIMITED Diagnosis/ICD: Endocarditis, valve unspecified-I38 Indication:    R/O endocarditis CPT Code:      Echo Limited-87109; Doppler Limited-91313; Color Doppler-94478 Patient History: Diabetes:          Yes Pertinent History: CAD and Hyperlipidemia. GERD. Study Detail: The following Echo studies were performed: 2D, M-Mode, Doppler and               color flow. Image quality for this study is suboptimal.               Technically challenging study due to poor acoustic windows, body               habitus, patient lying in supine position and the patient's lack               of cooperation. Unable to obtain subcostal and suprasternal notch               view.  PHYSICIAN INTERPRETATION: Left Ventricle: The left ventricular systolic function is normal, with an estimated ejection fraction of 70%.  Estimated left ventricular mass is normal. There are no regional wall motion abnormalities. The left ventricular cavity size is normal. The left ventricular septal wall thickness is mildly increased. There is normal left ventricular posterior wall thickness. Left ventricular diastolic filling was indeterminate. Left Atrium: The left atrium is mildly dilated. Right Ventricle: The right ventricle was not assessed. There is reduced right ventricular systolic function. Right Atrium: The right atrium is normal in size. Aortic Valve: The aortic valve is trileaflet. The aortic valve appears tricuspid with restriction. There is moderate aortic valve cusp calcification. There is mild to moderate aortic valve thickening. There is trace to mild aortic valve regurgitation. There are two filamentous and mobile images, the longest measuring 6.9 mm, at the tips of the non-coronary cusp (NCC) and left coronary cusp (LCC). The differential diagnosis includes degenerative changes or vegetations. Mitral Valve: The mitral valve is mildly thickened. There is moderate mitral annular calcification. There is trace to mild mitral valve regurgitation. Tricuspid Valve: The tricuspid valve is structurally normal. No evidence of tricuspid regurgitation. The right ventricular systolic pressure is unable to be estimated. Pulmonic Valve: The pulmonic valve is structurally normal. There is no indication of pulmonic valve regurgitation. Pericardium: There is no pericardial effusion noted. Aorta: The aortic root is normal. In comparison to the previous echocardiogram(s): Compared with study from 3/21/2024,. In relation to the prior study, the images described in the aortic valve were not visualized.  CONCLUSIONS:  1. Limited study.  2. Left ventricular systolic function is normal with a 70% estimated ejection fraction.  3. There is reduced right ventricular systolic function.  4. Aortic stenosis is present, but the gradients were not assessed.  5.  There is moderate aortic valve cusp calcification.  6. There is moderate mitral annular calcification.  7. The aortic valve appears tricuspid with restriction.  8. There are two filamentous and mobile images, the longest measuring 6.9 mm, at the tips of the non-coronary cusp (NCC) and left coronary cusp (LCC). The differential diagnosis includes degenerative changes or vegetations.  9. In relation to the prior study, the images described in the aortic valve were not visualized. QUANTITATIVE DATA SUMMARY: 2D MEASUREMENTS:                          Normal Ranges: IVSd:          1.13 cm   (0.6-1.1cm) LVPWd:         0.86 cm   (0.6-1.1cm) LVIDd:         4.47 cm   (3.9-5.9cm) LVIDs:         2.17 cm LV Mass Index: 81.3 g/m2 LV % FS        51.4 % M-MODE MEASUREMENTS:                  Normal Ranges: Ao Root: 3.40 cm (2.0-3.7cm) LV SYSTOLIC FUNCTION BY 2D PLANIMETRY (MOD):                     Normal Ranges: EF-A4C View: 62.1 % (>=55%) EF-A2C View: 62.7 % EF-Biplane:  66.8 %  RIGHT VENTRICLE: TAPSE: 12.0 mm PULMONIC VALVE:                          Normal Ranges: PV Accel Time: 44 msec   (>120ms) PV Max Simon:    1.7 m/s   (0.6-0.9m/s) PV Max P.8 mmHg  [id number] Nicholas Medeiros MD Electronically signed on 6/10/2024 at 6:04:25 PM  ** Final **     CT foot right wo IV contrast    Result Date: 2024  Interpreted By:  Noah Ladd, STUDY: CT of the  right foot without intravenous contrast dated  2024.   INDICATION: Signs/Symptoms:OM   COMPARISON: None.   ACCESSION NUMBER(S): [id number]   ORDERING CLINICIAN: JACQUELYN STROUD   TECHNIQUE: Axial CT of the   right foot was performed  without intravenous contrast.  Sagittal and coronal 2 dimensional reformats were obtained.   FINDINGS: OSSEOUS STRUCTURES AND JOINTS:   The bones are demineralized. No acute fracture or dislocation is evident. Mild degenerative changes seen of the ankle and subtalar joint. There is calcaneal enthesophyte formation. Mild polyarticular  degenerative changes seen in the midfoot with some foci of subchondral cystic change. There is transmetatarsal amputation of the 1st through 4th metatarsals at the level of the metatarsal diaphysis and there appears to be amputation of the 5th ray at the level of the metatarsal possibly a punctate bone fragment remaining in the bed. There is destruction of the remaining 4th metatarsal involving the lateral side of the base and the majority of the central bone stock of the metadiaphysis. No joint effusion is evident.   SOFT TISSUES:   Surgical changes are seen with regards to the muscles and tendons of the foot from prior amputation. Generalized atrophy is seen in the musculature. There is a wound/ulcer on the lateral side of the remaining foot appearing to be centered over the region of the 4th metatarsal although the exact margins are difficult to ascertain. [place] increased density is seen in the subcutaneous tissues of the distal end of the remaining foot greatest on the lateral side. There are bubbles of gas seen adjacent to the distal end of the remaining 4th metatarsal such as seen image 68 in the sagittal plane and there are bubbles of gas in the soft tissues superficial to the base of the 3rd metatarsal as seen image 65 of the sagittal and 73 of the axial plane. There is a degree of more generalized reticular edema in the subcutaneous tissues. Calcified atheromatous disease is seen in the visualized arterial tree.       1. Findings compatible with osteomyelitis involving the remaining 4th metatarsal. Other regions of osteomyelitis are not entirely excluded on the basis of this exam and MRI may be helpful for further assessment. 2. Wound/ulcer on the distal lateral side of the remaining foot. 3. [place] increased density in the subcutaneous tissues of the distal remaining foot greatest on the lateral side which may represent confluence cellulitis. Underlying focal collection is not excluded on the  basis of this noncontrast examination. There are some bubbles of gas in the soft tissues which may be due to open nature of process although gas-forming infection is not entirely excluded. 4. Degree of more generalized reticular edema in the subcutaneous tissues which may represent lymphedema and/or cellulitis.   MACRO: none   Signed by: Noah Ladd 6/7/2024 12:54 PM Dictation workstation:   [id number]    Vascular US PVR Without Exercise    Result Date: 6/6/2024             [place] [address]   Tel [phone] and Fax [phone]  Vascular Lab Report VASC US PVR WITHOUT EXERCISE  Patient Name:      MAYELIN KAT WHITNEY    Reading Physician:  [id number] Sylvester Nguyen DO Study Date:        6/6/2024            Ordering Physician: [id number] GERALD HANKS MRN/PID:           [id number]            Technologist:       Benjamin Godinez RVT Accession#:        [id number]        Technologist 2: Date of Birth/Age: 11/8/1934 / 89      Encounter#:         [id number]                    years Gender:            M Admission Status:  Outpatient          Location Performed: [facility]  Diagnosis/ICD: Peripheral vascular disease, unspecified-I73.9; Stricture of                artery-I77.1 CPT Codes:     94822 Peripheral artery PVR (multi segmental pressure  CONCLUSIONS: Right Lower PVR: Right pressures of >220 mmHg suggest no compressibility of vessels and may make absolute Segmental Limb Pressures (SLP) unreliable. There is evidence of mild multi vessel disease. Monophasic flow is noted in the right posterior tibial artery, right dorsalis pedis artery and right popliteal artery. Multiphasic flow is noted in the right common femoral artery. Right leg results called per waveforms due to calcified and partially calcified arterial pressures. Right trasmetatarsal amputation. Left Lower PVR: Left pressures of >220 mmHg suggest no  compressibility of vessels and may make absolute Segmental Limb Pressures (SLP) unreliable. There is evidence of moderate multi-vessel disease. Decreased digital perfusion noted. Monophasic flow is noted in the left posterior tibial artery and left dorsalis pedis artery. Multiphasic flow is noted in the left common femoral artery and left popliteal artery. Left leg results called per waveforms due to calcified and partially calcified arterial pressures.  Comparison: Compared with study from 6/12/2023, On todays exam the left leg demonstates moderate mutivessel arterial occlusive disease.  Imaging & Doppler Findings:  RIGHT Lower PVR                Pressures Ratios Right High Thigh               255 mmHg  2.11 Right Low Thigh                188 mmHg  1.55 Right Calf                     137 mmHg  1.13 Right Posterior Tibial (Ankle) 43 mmHg   0.36 Right Dorsalis Pedis (Ankle)   178 mmHg  1.47   LEFT Lower PVR                Pressures Ratios Left High Thigh               184 mmHg  1.52 Left Low Thigh                176 mmHg  1.45 Left Calf                     142 mmHg  1.17 Left Posterior Tibial (Ankle) 46 mmHg   0.38 Left Dorsalis Pedis (Ankle)   255 mmHg  2.11 Left Digit (Great Toe)        32 mmHg   0.26                     Right     Left Brachial Pressure 121 mmHg 108 mmHg   [id number] Sylvester Nguyen DO Electronically signed by [id number] Sylvester Nguyen DO on 6/6/2024 at 2:30:19 PM  ** Final **     XR hand left 3+ views    Result Date: 6/6/2024  Interpreted By:  Lara Severino, STUDY: XR HAND LEFT 3+ VIEWS;  6/6/2024 1:42 pm   INDICATION: Signs/Symptoms:Pain post fall.   COMPARISON: None.   ACCESSION NUMBER(S): [id number]   ORDERING CLINICIAN: JACQUELYN STROUD   FINDINGS: 3 portable views of the left hand obtained.   No acute fracture or osseous displacement. Radiocarpal joint space narrowing and chondrocalcinosis in the ulnocarpal joint space. Multifocal degenerative changes including joint space narrowing and spurring including the  1st CMC, 1st through 5th MCP and multiple interphalangeal joints. Prominent lucent probable subchondral cyst/geode in the trapezium. Soft tissue swelling in the 2nd digit. Multifocal soft tissue presumed vascular calcifications.       No acute fracture. Soft tissue swelling in the 2nd digit. Multifocal productive arthritic changes noted.   MACRO: None.   Signed by: Lara Severino 6/6/2024 1:50 PM Dictation workstation:   [id number]    CT head wo IV contrast    Result Date: 6/6/2024  Interpreted By:  Win Peres, STUDY: CT HEAD WO IV CONTRAST; ;  6/6/2024 12:16 pm   INDICATION: Signs/Symptoms:Head injury.   COMPARISON: CT head from 03/19/2024.   ACCESSION NUMBER(S): [id number]   ORDERING CLINICIAN: JACQUELYN STROUD   TECHNIQUE: Routine unenhanced CT of the head was performed.   FINDINGS: There is no acute intracranial hemorrhage or mass effect. There is no abnormal extra-axial fluid collection. The ventricles, sulci, and basilar cisterns are prominent in size due to age related diffuse cerebral volume loss.   There are regions of hypoattenuation within the bilateral cerebral hemispheric white matter which are probably sequela of chronic small vessel ischemic changes. Gray-white differentiation is maintained throughout.   There are no acute calvarial fractures. The visualized paranasal sinuses and mastoid air cells are essentially clear.   Degenerative pannus formation at the level of C1-C2.       Findings of probable chronic small vessel ischemic changes and age related diffuse cerebral volume loss. No acute intracranial abnormality or mass effect.   This study was interpreted at [facility].   MACRO: None   Signed by: Win Peres 6/6/2024 12:39 PM Dictation workstation:   [id number]    XR cervical spine complete 4-5 views    Result Date: 6/6/2024  Interpreted By:  Franck Pascal, STUDY: XR CERVICAL SPINE COMPLETE 4-5 VIEWS; 6/6/2024 10:55 am   INDICATION: Signs/Symptoms:Pain s/p  fall.   COMPARISON: None.   ACCESSION NUMBER(S): [id number]   ORDERING CLINICIAN: JACQUELYN STROUD   FINDINGS: Multiple views of the  lumbar spine are obtained. Minimal anterolisthesis of C6 and C7.. The vertebral body heights are preserved. Disc space loss from C5 through C7. Endplate sclerosis and osteophytes are seen throughout the spine. No acute fracture-dislocation.       Discogenic degenerative changes as described.     Signed by: Franck Pascal 6/6/2024 11:12 AM Dictation workstation:   [id number]    ECG 12 lead    Result Date: 6/5/2024  Sinus rhythm with 1st degree AV block Right bundle branch block Left anterior fascicular block  Bifascicular block  Abnormal ECG When compared with ECG of 19-MAR-2024 19:08, No significant change was found    XR foot right 3+ views    Result Date: 6/5/2024  Interpreted By:  Shalini Wright, STUDY: XR FOOT RIGHT 3+ VIEWS; ;  6/5/2024 11:54 am   INDICATION: Signs/Symptoms:Foot wound..   COMPARISON: Right foot x-ray series and CT 03/19/2024.   ACCESSION NUMBER(S): [id number]   ORDERING CLINICIAN: ALLAN VIGIL   FINDINGS: Bones are osteopenic. No acute fracture or dislocation.   Postoperative changes of prior transmetatarsal forefoot amputation and resection of the 5th metatarsal are again seen. There is new lucency due to osseous erosion the lateral and plantar aspect of the 4th metatarsal shaft with some cortical irregularity at the tip of the amputation margin. This underlies an irregular ulcerative soft tissue wound with adjacent soft tissue swelling.   Soft tissue swelling around the foot and ankle and vascular calcifications are again seen.       1. Findings consistent with progressive osteomyelitis of the 4th metatarsal bone with brown osseous erosion of the lateral and plantar aspect of the shaft with overlying soft tissue swelling and ulceration consistent with persistent overlying soft tissue infection. If it would alter clinical management, an MRI may be obtained.   2.  Redemonstrated transmetatarsal amputation of the forefoot and 5th metatarsal resection.   MACRO: None   Signed by: Shalini Wright 6/5/2024 12:19 PM Dictation workstation:   [id number]    XR chest 1 view    Result Date: 6/5/2024  Interpreted By:  Shalini Wright, STUDY: XR CHEST 1 VIEW;  6/5/2024 11:54 am   INDICATION: Signs/Symptoms:SOB.   COMPARISON: Chest x-ray 03/19/2024 and 06/17/2023. CT chest 06/07/2023..   ACCESSION NUMBER(S): [id number]   ORDERING CLINICIAN: ALLAN VIGIL   FINDINGS: AP radiograph of the chest was provided.   Median sternotomy wires are present.   CARDIOMEDIASTINAL SILHOUETTE: Cardiomediastinal silhouette is normal in size and configuration.   LUNGS and PLEURA: No focal consolidation or other airspace opacity. No pleural effusion or pneumothorax.   ABDOMEN: No remarkable upper abdominal findings.   BONES: No acute osseous changes.       1.  No evidence of acute cardiopulmonary process.       MACRO: None   Signed by: Shalini Wright 6/5/2024 12:09 PM Dictation workstation:   [id number]         Assessment/Plan   Principal Problem:    Osteomyelitis (Multi)      #Osteomyelitis right foot  #Sepsis  #PVD  #T2DM  #HTN     -Patient was seen at bedside with family  -Chart, labs, and imaging were reviewed   -Labs show WBC 11.3  -Family waiting for next echo  -Family would like to speak with doctor about knee-level amputation  -Recommend consulting vascular to discuss BKA  -Patient was discussed with attending, Dr. Roshan Bledsoe, DPM PGY-2     I saw and evaluated the patient. I personally obtained the key and critical portions of the history and physical exam or was physically present for key and critical portions performed by the resident/fellow. I reviewed the resident/fellow's documentation and discussed the patient with the resident/fellow. I agree with the resident/fellow's medical decision making as documented in the note.    Please see prior note for care plan.    I spent 30 minutes in  the professional and overall care of this patient.    Robbie Islas DPM

## 2024-06-11 NOTE — PROGRESS NOTES
"  INFECTIOUS DISEASE DAILY PROGRESS NOTE    SUBJECTIVE:    Has sitter in room. He seems fairly lucid this morning. No specific complaints. I discussed TTE results with him. He is afebrile. No rash/itching/diarrhea.    OBJECTIVE:  VITALS (Last 24 Hours)  /71   Pulse 60   Temp 36.2 °C (97.1 °F) (Temporal)   Resp 20   Ht 1.778 m (5' 10\")   Wt 67.8 kg (149 lb 7.6 oz)   SpO2 96%   BMI 21.45 kg/m²     PHYSICAL EXAM:  Gen - NAD, laying in bed  Heart - systolic murmur RUSB  Lungs - no wheezing  Abd - soft, BS present  Right Foot - dressing present  Skin - no rash    ABX: IV Cefazolin    LABS:  Lab Results   Component Value Date    WBC 11.3 06/11/2024    HGB 13.5 06/11/2024    HCT 40.6 (L) 06/11/2024    MCV 93 06/11/2024     06/11/2024     Lab Results   Component Value Date    GLUCOSE 71 (L) 06/10/2024    CALCIUM 8.8 06/10/2024     (L) 06/10/2024    K 4.3 06/10/2024    CO2 27 06/10/2024    CL 97 (L) 06/10/2024    BUN 11 06/10/2024    CREATININE 0.66 06/10/2024         Estimated Creatinine Clearance: 72.8 mL/min (by C-G formula based on SCr of 0.66 mg/dL).    C-Reactive Protein   Date/Time Value Ref Range Status   06/05/2024 01:05 PM 28.95 (H) <1.00 mg/dL Final     IMAGING:  TTE 6/10  CONCLUSIONS:   1. Limited study.   2. Left ventricular systolic function is normal with a 70% estimated ejection fraction.   3. There is reduced right ventricular systolic function.   4. Aortic stenosis is present, but the gradients were not assessed.   5. There is moderate aortic valve cusp calcification.   6. There is moderate mitral annular calcification.   7. The aortic valve appears tricuspid with restriction.   8. There are two filamentous and mobile images, the longest measuring 6.9 mm, at the tips of the non-coronary cusp (NCC) and left coronary cusp (LCC). The differential diagnosis includes degenerative changes or vegetations.   9. In relation to the prior study, the images described in the aortic valve were " not visualized.    ASSESSMENT/PLAN:     Right TMA Site Infection due to MSSA  Right 4th Metatarsal OM due to MSSA  MSSA Bacteremia  Possible Aortic Valve Endocarditis - TTE 6/10 showing 2 filamentous and mobile lesions, degen changes vs vegetations  Acute Renal Failure - resolved  PAD  Sepsis - fever, leukocytosis, acute renal failure  Delirium - metabolic encephalopathy related to infection     IV Cefazolin 2g Q8H. Repeat blood cx x2 6/8 NGTD.    Will order for ELIEL tomorrow give TTE findings.     Monitoring for adverse effects of abx such as rash/itching/diarrhea - none.     Will follow. Thanks!    Berny Valenzuela MD  [facility]  Office #[phone]

## 2024-06-12 ENCOUNTER — APPOINTMENT (OUTPATIENT)
Dept: CARDIOLOGY | Facility: HOSPITAL | Age: 89
End: 2024-06-12
Payer: MEDICARE

## 2024-06-12 ENCOUNTER — APPOINTMENT (OUTPATIENT)
Dept: WOUND CARE | Facility: CLINIC | Age: 89
End: 2024-06-12
Payer: MEDICARE

## 2024-06-12 ENCOUNTER — ANESTHESIA EVENT (OUTPATIENT)
Dept: CARDIOLOGY | Facility: HOSPITAL | Age: 89
End: 2024-06-12

## 2024-06-12 ENCOUNTER — ANESTHESIA (OUTPATIENT)
Dept: CARDIOLOGY | Facility: HOSPITAL | Age: 89
End: 2024-06-12

## 2024-06-12 LAB
ANION GAP SERPL CALC-SCNC: 10 MMOL/L (ref 10–20)
BACTERIA BLD CULT: NORMAL
BACTERIA BLD CULT: NORMAL
BUN SERPL-MCNC: 10 MG/DL (ref 6–23)
CALCIUM SERPL-MCNC: 7.9 MG/DL (ref 8.6–10.3)
CHLORIDE SERPL-SCNC: 101 MMOL/L (ref 98–107)
CO2 SERPL-SCNC: 26 MMOL/L (ref 21–32)
CREAT SERPL-MCNC: 0.64 MG/DL (ref 0.5–1.3)
EGFRCR SERPLBLD CKD-EPI 2021: 90 ML/MIN/1.73M*2
ERYTHROCYTE [DISTWIDTH] IN BLOOD BY AUTOMATED COUNT: 12.7 % (ref 11.5–14.5)
GLUCOSE BLD MANUAL STRIP-MCNC: 101 MG/DL (ref 74–99)
GLUCOSE BLD MANUAL STRIP-MCNC: 180 MG/DL (ref 74–99)
GLUCOSE BLD MANUAL STRIP-MCNC: 88 MG/DL (ref 74–99)
GLUCOSE BLD MANUAL STRIP-MCNC: 94 MG/DL (ref 74–99)
GLUCOSE SERPL-MCNC: 61 MG/DL (ref 74–99)
HCT VFR BLD AUTO: 40 % (ref 41–52)
HGB BLD-MCNC: 13.3 G/DL (ref 13.5–17.5)
MCH RBC QN AUTO: 30.6 PG (ref 26–34)
MCHC RBC AUTO-ENTMCNC: 33.3 G/DL (ref 32–36)
MCV RBC AUTO: 92 FL (ref 80–100)
NRBC BLD-RTO: 0 /100 WBCS (ref 0–0)
PLATELET # BLD AUTO: 286 X10*3/UL (ref 150–450)
POTASSIUM SERPL-SCNC: 3.1 MMOL/L (ref 3.5–5.3)
RBC # BLD AUTO: 4.34 X10*6/UL (ref 4.5–5.9)
SODIUM SERPL-SCNC: 134 MMOL/L (ref 136–145)
WBC # BLD AUTO: 9.2 X10*3/UL (ref 4.4–11.3)

## 2024-06-12 PROCEDURE — 2500000002 HC RX 250 W HCPCS SELF ADMINISTERED DRUGS (ALT 637 FOR MEDICARE OP, ALT 636 FOR OP/ED): Performed by: INTERNAL MEDICINE

## 2024-06-12 PROCEDURE — 2500000004 HC RX 250 GENERAL PHARMACY W/ HCPCS (ALT 636 FOR OP/ED)

## 2024-06-12 PROCEDURE — 2500000001 HC RX 250 WO HCPCS SELF ADMINISTERED DRUGS (ALT 637 FOR MEDICARE OP)

## 2024-06-12 PROCEDURE — 97535 SELF CARE MNGMENT TRAINING: CPT | Mod: GO,CO

## 2024-06-12 PROCEDURE — 99232 SBSQ HOSP IP/OBS MODERATE 35: CPT | Performed by: INTERNAL MEDICINE

## 2024-06-12 PROCEDURE — 82947 ASSAY GLUCOSE BLOOD QUANT: CPT | Mod: 91

## 2024-06-12 PROCEDURE — 2500000004 HC RX 250 GENERAL PHARMACY W/ HCPCS (ALT 636 FOR OP/ED): Mod: JZ | Performed by: INTERNAL MEDICINE

## 2024-06-12 PROCEDURE — 80048 BASIC METABOLIC PNL TOTAL CA: CPT

## 2024-06-12 PROCEDURE — 2500000002 HC RX 250 W HCPCS SELF ADMINISTERED DRUGS (ALT 637 FOR MEDICARE OP, ALT 636 FOR OP/ED): Mod: MUE

## 2024-06-12 PROCEDURE — 1200000002 HC GENERAL ROOM WITH TELEMETRY DAILY

## 2024-06-12 PROCEDURE — 93005 ELECTROCARDIOGRAM TRACING: CPT

## 2024-06-12 PROCEDURE — 97530 THERAPEUTIC ACTIVITIES: CPT | Mod: GO,CO

## 2024-06-12 PROCEDURE — 92526 ORAL FUNCTION THERAPY: CPT | Mod: GN

## 2024-06-12 PROCEDURE — 97530 THERAPEUTIC ACTIVITIES: CPT | Mod: GP

## 2024-06-12 PROCEDURE — 36415 COLL VENOUS BLD VENIPUNCTURE: CPT

## 2024-06-12 PROCEDURE — 85027 COMPLETE CBC AUTOMATED: CPT

## 2024-06-12 RX ORDER — INSULIN GLARGINE 100 [IU]/ML
20 INJECTION, SOLUTION SUBCUTANEOUS EVERY 24 HOURS
Status: DISCONTINUED | OUTPATIENT
Start: 2024-06-12 | End: 2024-06-13 | Stop reason: HOSPADM

## 2024-06-12 ASSESSMENT — PAIN - FUNCTIONAL ASSESSMENT
PAIN_FUNCTIONAL_ASSESSMENT: 0-10

## 2024-06-12 ASSESSMENT — PAIN SCALES - GENERAL
PAINLEVEL_OUTOF10: 4
PAINLEVEL_OUTOF10: 0 - NO PAIN
PAINLEVEL_OUTOF10: 0 - NO PAIN
PAINLEVEL_OUTOF10: 10 - WORST POSSIBLE PAIN
PAINLEVEL_OUTOF10: 3

## 2024-06-12 ASSESSMENT — ACTIVITIES OF DAILY LIVING (ADL): HOME_MANAGEMENT_TIME_ENTRY: 10

## 2024-06-12 ASSESSMENT — COGNITIVE AND FUNCTIONAL STATUS - GENERAL
DRESSING REGULAR LOWER BODY CLOTHING: TOTAL
MOVING TO AND FROM BED TO CHAIR: A LOT
WALKING IN HOSPITAL ROOM: A LOT
CLIMB 3 TO 5 STEPS WITH RAILING: TOTAL
CLIMB 3 TO 5 STEPS WITH RAILING: TOTAL
EATING MEALS: A LITTLE
MOBILITY SCORE: 12
STANDING UP FROM CHAIR USING ARMS: A LOT
MOVING FROM LYING ON BACK TO SITTING ON SIDE OF FLAT BED WITH BEDRAILS: A LITTLE
TURNING FROM BACK TO SIDE WHILE IN FLAT BAD: A LOT
MOVING TO AND FROM BED TO CHAIR: A LOT
PERSONAL GROOMING: A LITTLE
WALKING IN HOSPITAL ROOM: A LOT
DRESSING REGULAR UPPER BODY CLOTHING: A LOT
MOBILITY SCORE: 24
TURNING FROM BACK TO SIDE WHILE IN FLAT BAD: A LOT
STANDING UP FROM CHAIR USING ARMS: A LOT
TOILETING: TOTAL
DAILY ACTIVITIY SCORE: 12
MOVING FROM LYING ON BACK TO SITTING ON SIDE OF FLAT BED WITH BEDRAILS: A LITTLE
HELP NEEDED FOR BATHING: A LOT
MOBILITY SCORE: 12

## 2024-06-12 NOTE — TREATMENT PLAN
Cardiology Note   -- Planned for ELIEL for assessment of Aortic Lesions   -- Now family and Mr Ballard wish to pursue hospice care.   -- Will cancel ELIEL at this time.       Jordi Vigil DO   Division of Cardiovascular Medicine  [facility]      
ambulatory

## 2024-06-12 NOTE — ANESTHESIA PREPROCEDURE EVALUATION
Patient: Rene Ballard    Procedure Information       Date/Time: 06/12/24 1400    Scheduled providers: Jordi Vigil DO    Procedure: TRANSESOPHAGEAL ECHO (ELIEL)    Location: [facility]; [facility]            Relevant Problems   Cardiac   (+) Acute non Q wave myocardial infarction (Multi)   (+) Aortic stenosis   (+) Arteriosclerosis of coronary artery   (+) Atherosclerosis of native artery of right lower extremity with gangrene (Multi)   (+) Atherosclerosis of native coronary artery of native heart with angina pectoris (CMS-[facility])   (+) CAD (coronary artery disease), native coronary artery   (+) Critical lower limb ischemia (Multi)   (+) Hyperlipidemia   (+) Hyperlipidemia, unspecified   (+) PVD (peripheral vascular disease) (CMS-[facility])   (+) Systolic ejection murmur      Neuro   (+) Asymptomatic bilateral carotid artery stenosis   (+) Carpal tunnel syndrome   (+) Ulnar neuropathy      GI   (+) GERD without esophagitis   (+) Hiatal hernia      /Renal   (+) BPH (benign prostatic hyperplasia)   (+) Benign prostatic hyperplasia without lower urinary tract symptoms      Endocrine   (+) Type 2 diabetes mellitus with foot ulcer (CODE) (Multi)   (+) Type 2 diabetes mellitus with foot ulcer, with long-term current use of insulin (Multi)      Hematology   (+) Anemia   (+) Iron deficiency anemia      Musculoskeletal   (+) Carpal tunnel syndrome   (+) Lumbar spondylosis   (+) Osteoarthritis of both knees      HEENT   (+) Acute sinusitis   (+) Bilateral sensorineural hearing loss   (+) Glaucoma      ID   (+) Abrasion of hand with infection   (+) Acute osteomyelitis of metatarsal bone of right foot (Multi)   (+) Bacteremia   (+) Blister of toe of right foot with infection   (+) COVID-19   (+) Joint infection of right wrist (Multi)   (+) MRSA infection   (+) Osteomyelitis (Multi)   (+) Osteomyelitis of toe of right foot (Multi)   (+) Streptococcal arthritis of right wrist (Multi)       Clinical  information reviewed:                    Past Medical History:   Diagnosis Date    Bladder cancer (Multi)     BPH (benign prostatic hyperplasia)     CAD (coronary artery disease)     Chronic systolic heart failure (Multi)     Diabetes mellitus (Multi)     Essential hypertension     GERD (gastroesophageal reflux disease)     Hypertensive heart disease with heart failure (Multi)     Osteoarthritis     Osteomyelitis (Multi)     Peripheral vascular disease, unspecified (CMS-HCC)     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      Past Surgical History:   Procedure Laterality Date    CARDIAC SURGERY      IR  BLADDER ASPIRATION  06/23/2017    IR  BLADDER ASPIRATION [place] [facility]    OTHER SURGICAL HISTORY  06/19/2017    Surgery Penile Prosthetic Device    WRIST SURGERY Right      Social History     Tobacco Use    Smoking status: Never    Smokeless tobacco: Never   Substance Use Topics    Alcohol use: Not Currently    Drug use: Never      Current Outpatient Medications   Medication Instructions    alpha tocopherol (VITAMIN E) 400 Units, oral, Daily RT    alum-mag hydroxide-simeth (Mylanta) 200-200-20 mg/5 mL oral suspension 30 mL, oral, Every 4 hours PRN    ampicillin-sulbactam 3 gram injection     ascorbic acid (Vitamin C) 1,000 mg tablet 1 tablet, oral, Daily    blood sugar diagnostic (OneTouch Ultra Test) strip Check glucose 4 x per day    cholecalciferol (VITAMIN D-3) 5,000 Units, oral, Daily RT    clopidogrel (PLAVIX) 75 mg, oral, Daily    cyanocobalamin (Vitamin B-12) 500 mcg tablet 1 tablet DAILY (route: oral)    dapagliflozin propanediol (FARXIGA) 10 mg, oral, Daily    docusate sodium (COLACE) 100 mg, oral, 2 times daily PRN    dorzolamide-timoloL (Cosopt) 22.3-6.8 mg/mL ophthalmic solution 1 drop, Left Eye, 2 times daily    enalapril (VASOTEC) 2.5 mg, oral, Daily    insulin glargine (LANTUS) 25 Units, subcutaneous, Every 24 hours, Take as directed per insulin  "instructions.    latanoprost (Xalatan) 0.005 % ophthalmic solution instill 1 drop into each eye every evening    lidocaine 4 % patch 2 patches, transdermal, Daily, Remove & discard patch within 12 hours or as directed by MD.    metoprolol tartrate (LOPRESSOR) 12.5 mg, oral, 2 times daily    multivitamin tablet 1 tablet, oral, Daily    niacin 500 mg tablet 1 tablet, oral, Daily    omeprazole (PRILOSEC) 20 mg, oral, Daily    pen needle, diabetic (UltiCare Pen Needle) 31 gauge x 1/4\" needle USE 1 PEN NEEDLE SUBCUTANEOUSLY 3 TIMES DAILY BEFORE MEALS    polyethylene glycol (GLYCOLAX, MIRALAX) 17 g, oral, Daily PRN    tamsulosin (FLOMAX) 0.4 mg    Trulicity 0.75 mg, subcutaneous, Once Weekly      Allergies   Allergen Reactions    Glimepiride Unknown     Unknown    Lisinopril Unknown     Side effects, hot tingling in hands and feets, hand pain.    Metformin Unknown     Myalgia    Pioglitazone Unknown     Myalgia    Statins-Hmg-Coa Reductase Inhibitors Unknown        Chemistry    Lab Results   Component Value Date/Time     (L) 06/12/2024 0548    K 3.1 (L) 06/12/2024 0548     06/12/2024 0548    CO2 26 06/12/2024 0548    BUN 10 06/12/2024 0548    CREATININE 0.64 06/12/2024 0548    Lab Results   Component Value Date/Time    CALCIUM 7.9 (L) 06/12/2024 0548    ALKPHOS 204 (H) 06/05/2024 1136    AST 15 06/05/2024 1136    ALT 19 06/05/2024 1136    BILITOT 0.8 06/05/2024 1136          Lab Results   Component Value Date    HGBA1C 8.7 (H) 06/06/2024     Lab Results   Component Value Date/Time    WBC 9.2 06/12/2024 0548    HGB 13.3 (L) 06/12/2024 0548    HCT 40.0 (L) 06/12/2024 0548     06/12/2024 0548     Lab Results   Component Value Date/Time    PROTIME 13.8 (H) 06/05/2024 1136    INR 1.2 (H) 06/05/2024 1136     No results found for: \"ABORH\"    No results found for this or any previous visit from the past 1095 days.   Echo 6/10/2024:  Left Ventricle: The left ventricular systolic function is normal, with an " estimated ejection fraction of 70%. Estimated left ventricular mass is normal. There are no regional wall motion abnormalities. The left ventricular cavity size is normal. The left ventricular septal wall thickness is mildly increased. There is normal left ventricular posterior wall thickness. Left ventricular diastolic filling was indeterminate.  Left Atrium: The left atrium is mildly dilated.  Right Ventricle: The right ventricle was not assessed. There is reduced right ventricular systolic function.  Right Atrium: The right atrium is normal in size.  Aortic Valve: The aortic valve is trileaflet. The aortic valve appears tricuspid with restriction. There is moderate aortic valve cusp calcification. There is mild to moderate aortic valve thickening. There is trace to mild aortic valve regurgitation. There are two filamentous and mobile images, the longest measuring 6.9 mm, at the tips of the non-coronary cusp (NCC) and left coronary cusp (LCC). The differential diagnosis includes degenerative changes or vegetations.  Mitral Valve: The mitral valve is mildly thickened. There is moderate mitral annular calcification. There is trace to mild mitral valve regurgitation.  Tricuspid Valve: The tricuspid valve is structurally normal. No evidence of tricuspid regurgitation. The right ventricular systolic pressure is unable to be estimated.  Pulmonic Valve: The pulmonic valve is structurally normal. There is no indication of pulmonic valve regurgitation.  Pericardium: There is no pericardial effusion noted.  Aorta: The aortic root is normal.  In comparison to the previous echocardiogram(s): Compared with study from 3/21/2024,. In relation to the prior study, the images described in the aortic valve were not visualized.        CONCLUSIONS:   1. Limited study.   2. Left ventricular systolic function is normal with a 70% estimated ejection fraction.   3. There is reduced right ventricular systolic function.   4. Aortic stenosis  is present, but the gradients were not assessed.   5. There is moderate aortic valve cusp calcification.   6. There is moderate mitral annular calcification.   7. The aortic valve appears tricuspid with restriction.   8. There are two filamentous and mobile images, the longest measuring 6.9 mm, at the tips of the non-coronary cusp (NCC) and left coronary cusp (LCC). The differential diagnosis includes degenerative changes or vegetations.   9. In relation to the prior study, the images described in the aortic valve were not visualized.    Echo 3/21/2024:  Left Ventricle: The left ventricular systolic function is normal, with an estimated ejection fraction of 60-65%. Estimated left ventricular mass is normal. There are no regional wall motion abnormalities. The left ventricular cavity size is normal. The left ventricular septal wall thickness is normal. There is normal left ventricular posterior wall thickness. Spectral Doppler shows an impaired relaxation pattern of left ventricular diastolic filling.  Left Atrium: The left atrium is mildly dilated.  Right Ventricle: The right ventricle is normal in size. There is mildly reduced right ventricular systolic function. TAPSE: 13.0 mm.  Right Atrium: The right atrium is upper limits of normal in size.  Aortic Valve: The aortic valve appears structurally normal. The aortic valve appears tricuspid with restriction. There is moderate aortic valve cusp calcification. There is evidence of mild to moderate aortic valve stenosis.  The aortic valve dimensionless index is 0.37. There is no evidence of aortic valve regurgitation. The peak instantaneous gradient of the aortic valve is 24.6 mmHg. The mean gradient of the aortic valve is 14.6 mmHg.  Mitral Valve: The mitral valve is mildly thickened. There is mild to moderate mitral annular calcification. There is no evidence of mitral valve regurgitation.  Tricuspid Valve: The tricuspid valve is structurally normal. There is mild  tricuspid regurgitation.  Pulmonic Valve: The pulmonic valve is not well visualized. There is no indication of pulmonic valve regurgitation.  Pericardium: There is no pericardial effusion noted.  Aorta: The aortic root is normal.  Systemic Veins: The inferior vena cava appears to be of normal size. There is IVC inspiratory collapse greater than 50%.  In comparison to the previous echocardiogram(s): Compared with study from 6/13/2023, no significant change.        CONCLUSIONS:   1. Left ventricular systolic function is normal with a 60-65% estimated ejection fraction.   2. Spectral Doppler shows an impaired relaxation pattern of left ventricular diastolic filling.   3. There is mildly reduced right ventricular systolic function.   4. Mild to moderate aortic valve stenosis.   5. The aortic valve appears tricuspid with restriction.   6. There is moderate aortic valve cusp calcification.   7. No definite valvular vegetations were visualized.    Visit Vitals  Smoking Status Never     No data recorded     Physical Exam     Anesthesia Plan

## 2024-06-12 NOTE — PROGRESS NOTES
PALLIATIVE CARE SOCIAL WORK NOTE     Hospice order received this morning. Spoke with pt's 4 children. Initially, family was undecided if they wanted to pursue hospice or if they still wanted to pursue surgical intervention (ie. amputation) They said that they were waiting on a test to be done (ELIEL) which would tell them whether or not he was even a surgical candidate and that that would guide them towards a decision. Provided basic hospice information. They were somewhat familiar with hospice from another family member. We talked about where hospice care could be provided - at home vs in a nursing facility. They wanted to discuss this amongst themselves and also discuss with pt to see what he wanted to do. They were agreeable to an informational meeting with hospice. Offered choice of hospice agency. They did not have a preference in terms of agency. Discussed our joint venture with [facility], They were agreeable to referral being made to [facility] to at least get basic information while they waited to see if surgery was an option. Within a few minutes, daughter Blessing Ballard ([phone]) called me to say that pt didn't want surgery, that he just wanted to go home with hospice. Referral has been made to [facility] via [facility]. Have requested that hospice meet with them today to begin making homegoing plans. Family plans to hire 24/7 caregivers to be with him at home. Blessing indicated that she had some leads on this. Have also emailed her a list of non-medical home care providers should none of her leads pan out. Will continue to follow as appropriate. Thank you.    STEPHAN Ureña     Addendum 2:04pm R will be meeting with pt/family tomorrow morning between 9:30-10:00am.

## 2024-06-12 NOTE — PROGRESS NOTES
Physical Therapy    Physical Therapy Treatment    Patient Name: Rene Ballard  MRN: [id number]  Today's Date: 6/12/2024  Time Calculation  Start Time: 1118  Stop Time: 1133  Time Calculation (min): 15 min    Assessment/Plan   PT Assessment  PT Assessment Results: Decreased strength, Decreased endurance, Impaired balance, Decreased mobility, Orthopedic restrictions  Rehab Prognosis: Fair  Barriers to Discharge: None identified  Evaluation/Treatment Tolerance: Patient limited by fatigue, Patient limited by pain  Medical Staff Made Aware: Yes  Strengths: Ability to acquire knowledge, Support of extended family/friends  Barriers to Participation: Comorbidities  End of Session Communication: Bedside nurse  Assessment Comment: Pt presents today with decreased BLE strength, balance, and activity tolerance. Pt would benefit from continued PT to continue bed mobility, transfer, and progress gait training as medically appropriate.  End of Session Patient Position: Bed, 3 rail up, Alarm on  PT Plan  Inpatient/Swing Bed or Outpatient: Inpatient  PT Plan  Treatment/Interventions: Bed mobility, Transfer training, Gait training, Balance training, Strengthening, Endurance training, Range of motion, Therapeutic exercise, Therapeutic activity, Positioning  PT Plan: Ongoing PT  PT Frequency: 3 times per week  PT Discharge Recommendations: Moderate intensity level of continued care  PT Recommended Transfer Status: Assist x2, Assistive device (Max assist)  PT - OK to Discharge: Yes (Per PT POC)      General Visit Information:   PT  Visit  PT Received On: 06/12/24  Response to Previous Treatment: Patient reporting fatigue but able to participate.  General  Reason for Referral: 89 year old male admitted with weakness and fall.  Referred By: STEVO Arce  Past Medical History Relevant to Rehab:   Past Medical History:   Diagnosis Date    Aortic stenosis     Bladder cancer (Multi)     BPH (benign prostatic hyperplasia)     CAD (coronary artery  disease)     Chronic systolic heart failure (Multi)     Diabetes mellitus (Multi)     Essential hypertension     GERD (gastroesophageal reflux disease)     Hypertensive heart disease with heart failure (Multi)     Left carotid artery stenosis     Neurogenic bladder     Osteoarthritis     Osteomyelitis (Multi)     PAD (peripheral artery disease) (CMS-HCC)     S/P CABG x 3 03/02/2022    S/P insertion of penile implant 2017     Family/Caregiver Present: Yes  Caregiver Feedback: Children present  Prior to Session Communication: Bedside nurse  Patient Position Received: Up in chair, Alarm on  Preferred Learning Style: auditory, kinesthetic  General Comment: Pt sitting in bedside chair upon PT arrival. Cleared to participate with RN, and agreeable to assisted transfer back to bed.    Subjective   Precautions:  Precautions  LE Weight Bearing Status: Weight Bearing as Tolerated (RLE)  Medical Precautions: Fall precautions    Objective   Pain:  Pain Assessment  Pain Assessment: 0-10  Pain Score: 4  Pain Type: Other (Comment) (Generalized)  Pain Location:  (Pt reports everywhere. Pt also provides more scific location of posterior right side of head 10/10 pain)  Pain Interventions: Repositioned  Response to Interventions: Unchanged    Coordination:  Movements are Fluid and Coordinated: Yes  Postural Control:  Postural Control  Postural Control: Impaired  Head Control: Forward head posture in sitting  Trunk Control: Forward flexion in sitting and standing  Posture Comment: Rounded shoulders in sitting  Static Sitting Balance  Static Sitting-Balance Support: Feet supported, Bilateral upper extremity supported  Static Sitting-Level of Assistance: Contact guard  Static Standing Balance  Static Standing-Balance Support: Bilateral upper extremity supported  Static Standing-Level of Assistance: Maximum assistance (2-person)  Static Standing-Comment/Number of Minutes: Max assist x 2. Foot block implemented to prevent anterior sliding  of feet.  Dynamic Standing Balance  Dynamic Standing-Balance Support: Bilateral upper extremity supported  Dynamic Standing-Comments: Max assist x 2  Extremity/Trunk Assessments:  RLE   RLE : Exceptions to WFL  Strength RLE  RLE Overall Strength: Deficits  LLE   LLE : Exceptions to WFL  Strength LLE  LLE Overall Strength: Deficits  Activity Tolerance:  Activity Tolerance  Endurance: Tolerates 10 - 20 min exercise with multiple rests  Treatments:  Bed Mobility  Bed Mobility: Yes  Bed Mobility 1  Bed Mobility 1: Sitting to supine  Level of Assistance 1: Maximum assistance  Bed Mobility Comments 1: Decreased trunk control on descent to the bed with HOB slightly elevated. Assist provided with BLE lift into bed.  Bed Mobility 2  Bed Mobility  2: Scooting  Level of Assistance 2: Dependent, +2  Bed Mobility Comments 2: Dependent 2-person scoot toward the HOB via chux pad for better positioning in supine.    Ambulation/Gait Training  Ambulation/Gait Training Performed: Yes  Ambulation/Gait Training 1  Surface 1: Level tile  Device 1: Rolling walker  Assistance 1: Arm in arm assistance, Maximum assistance (2-person)  Comments/Distance (ft) 1: Pt able to take 1 step left from bed to chair. Max assist x 2 required to maintain standing balance and prevent forward translation of feet. Pt flexed at trunk and hips.  Transfers  Transfer: Yes  Transfer 1  Transfer From 1: Chair with arms to  Transfer to 1: Stand  Technique 1: Sit to stand  Transfer Level of Assistance 1: Arm in arm assistance, Maximum assistance, +2, Minimal verbal cues  Trials/Comments 1: VC for UE push from the armrests of the chair with return demonstration. Foot block required to prevent anterior translation. Pt flexed at hips and trunk requiring assist to maintain standing balance.  Transfers 2  Transfer From 2: Stand to  Transfer to 2: Bed  Technique 2: Stand to sit  Transfer Level of Assistance 2: Arm in arm assistance, Maximum assistance, +2  Trials/Comments  2: x2 Trials. Assist with BLE positioning near bed before sitting. Decreased eccentric control requiring assist for safe approximation of pt with the bed during descent. Pt unable to demonstrate UE reach for bed when sitting.  Transfers 3  Transfer From 3: Bed to  Transfer to 3: Stand  Technique 3: Sit to stand  Transfer Level of Assistance 3: Arm in arm assistance, Maximum assistance, +2  Trials/Comments 3: Pt able to demonstrate UE push from the bed to stand. Foot block implemented to prevent anterior translation of feet. Increased BEL fatigue evidenced by LE shaking.    Outcome Measures:  [facility] Basic Mobility  Turning from your back to your side while in a flat bed without using bedrails: A little  Moving from lying on your back to sitting on the side of a flat bed without using bedrails: A lot  Moving to and from bed to chair (including a wheelchair): A lot  Standing up from a chair using your arms (e.g. wheelchair or bedside chair): A lot  To walk in hospital room: A lot  Climbing 3-5 steps with railing: Total  Basic Mobility - Total Score: 12    Education Documentation  Body Mechanics, taught by Azael Jackson PT at 6/12/2024 11:59 AM.  Learner: Patient  Readiness: Acceptance  Method: Explanation, Demonstration  Response: Verbalizes Understanding, Demonstrated Understanding    Mobility Training, taught by Azael Jackson PT at 6/12/2024 11:59 AM.  Learner: Patient  Readiness: Acceptance  Method: Explanation, Demonstration  Response: Verbalizes Understanding, Demonstrated Understanding    Education Comments  No comments found.    OP EDUCATION:     Encounter Problems       Encounter Problems (Active)       Mobility       STG - Patient will ambulate 10 feet with mod assist x1 and FWW (Not Progressing)       Start:  06/10/24    Expected End:  06/24/24               PT Transfers       STG - Transfer from bed to chair with min assist x1 and FWW (Not Progressing)       Start:  06/10/24    Expected End:  06/24/24             STG - Patient to transfer to and from sit to supine with contact guard (Not Progressing)       Start:  06/10/24    Expected End:  06/24/24            STG - Patient will perform bed mobility with CG (Not Progressing)       Start:  06/10/24    Expected End:  06/24/24            STG - Patient will transfer sit to and from stand with min assist x1 and FWW (Not Progressing)       Start:  06/10/24    Expected End:  06/24/24

## 2024-06-12 NOTE — CARE PLAN
The patient's goals for the shift include      The clinical goals for the shift include Will ensure patient is comfortable throughout the shift.    Over the shift, the patient did not make progress toward the following goals. Barriers to progression include right foot osteomyelitis. Recommendations to address these barriers include ensure dressing is intact and pain management as needed.

## 2024-06-12 NOTE — PROGRESS NOTES
Occupational Therapy    Occupational Therapy Treatment    Name: Rene Ballard  MRN: [id number]  : 1934  Date: 24  Time Calculation  Start Time: 0950  Stop Time: 1015  Time Calculation (min): 25 min    Assessment:  Medical Staff Made Aware: Yes  End of Session Communication: Bedside nurse  End of Session Patient Position: Up in chair, Alarm on (Family at bedside)  Plan:  Treatment Interventions: ADL retraining, Functional transfer training, UE strengthening/ROM, Endurance training, Equipment evaluation/education, Patient/family training, Neuromuscular reeducation, Fine motor coordination activities  OT Frequency: 3 times per week  OT Discharge Recommendations: Moderate intensity level of continued care  Equipment Recommended upon Discharge:  (hip kit)  OT Recommended Transfer Status: Assist of 2, Maximum assist  OT - OK to Discharge: Yes (per POC)    Subjective   Previous Visit Info:  OT Last Visit  OT Received On: 24  General:  General  Reason for Referral: 89 year old male admitted with weakness and fall.  Family/Caregiver Present: Yes  Caregiver Feedback: Children present, supportive  Prior to Session Communication: Bedside nurse  Patient Position Received: Bed, 3 rail up, Alarm on  Preferred Learning Style: verbal, auditory, kinesthetic  General Comment: Pt lethargic, unable to keep eyes open, cooperative and compliant with session  Precautions:  Medical Precautions: Fall precautions  Vitals:  Vital Signs  Heart Rate: 77  Heart Rate Source: Monitor  SpO2: 96 %  BP: 109/55  MAP (mmHg): 68  BP Location: Right arm  BP Method: Automatic  Patient Position: Sitting  Pain Assessment:  Pain Assessment  Pain Assessment: 0-10  Pain Score: 4  Pain Type: Acute pain  Pain Location: Other (Comment) (everywhere)     Objective   Cognition:  Orientation Level: Disoriented to time, Disoriented to situation  Activities of Daily Living: Grooming  Grooming Level of Assistance: Close supervision, Setup  Grooming  Where Assessed: Chair  Grooming Comments: pt completed face washing (VC provided for task initiation)    UE Dressing  UE Dressing Level of Assistance: Moderate assistance, Moderate verbal cues  UE Dressing Where Assessed: Chair  UE Dressing Comments: assist to don/doff gown with VC for paritipation       Bed Mobility/Transfers: Bed Mobility  Bed Mobility: Yes  Bed Mobility 1  Bed Mobility 1: Supine to sitting  Level of Assistance 1: Moderate assistance, Moderate verbal cues  Bed Mobility Comments 1: HOB elevated, pt required assist for LE advancement and trunk control (with increased time and effort to complete)    Transfers  Transfer: Yes  Transfer 1  Transfer From 1: Bed to  Transfer to 1: Chair with drop arm  Technique 1: Squat pivot  Transfer Level of Assistance 1: Maximum assistance, +2  Trials/Comments 1: assist to stand pivot transfer to chair. Pt able to scoot hips back in chair.    Sitting Balance:  Static Sitting Balance  Static Sitting-Balance Support: Feet supported, Bilateral upper extremity supported  Static Sitting-Level of Assistance: Contact guard, Minimum assistance  Static Sitting-Comment/Number of Minutes: pt tolerated sitting EOB ~5 min at CGA/min assist to maintain midline with VC for encouagement and optimal posture.    Outcome Measures:  [facility] Daily Activity  Putting on and taking off regular lower body clothing: Total  Bathing (including washing, rinsing, drying): A lot  Putting on and taking off regular upper body clothing: A lot  Toileting, which includes using toilet, bedpan or urinal: Total  Taking care of personal grooming such as brushing teeth: A little  Eating Meals: A little  Daily Activity - Total Score: 12        Education Documentation  ADL Training, taught by LORRIE Zee at 6/12/2024 10:45 AM.  Learner: Family, Patient  Readiness: Nonacceptance  Method: Explanation, Demonstration  Response: Needs Reinforcement, No Evidence of Learning    Education Comments  No  comments found.      Goals:  Encounter Problems       Encounter Problems (Active)       ADLs       Patient will perform UB and LB bathing with minimal assist  level of assistance and raised toilet seat, shower chair, and long-handled sponge. (Not Progressing)       Start:  06/10/24    Expected End:  06/24/24            Patient with complete upper body dressing with minimal assist  level of assistance donning and doffing all UE clothes with PRN adaptive equipment while edge of bed  (Progressing)       Start:  06/10/24    Expected End:  06/24/24            Patient with complete lower body dressing with contact guard assist level of assistance donning and doffing all LE clothes  with reacher, shoe horn, sock-aid, and dressing stick  while edge of bed  (Not Progressing)       Start:  06/10/24    Expected End:  06/24/24            Patient will complete daily grooming tasks brushing teeth and washing face/hair with supervision level of assistance and PRN adaptive equipment while edge of bed . (Progressing)       Start:  06/10/24    Expected End:  06/24/24            Patient will complete toileting including hygiene clothing management/hygiene with minimal assist  level of assistance and raised toilet seat and grab bars. (Not Progressing)       Start:  06/10/24    Expected End:  06/24/24               BALANCE       Pt will maintain dynamic standing balance during ADL task with CGA in order to demonstrate decreased risk of falling and improved postural control. (Not Progressing)       Start:  06/10/24    Expected End:  06/24/24            Patient will tolerate standing for 5 minutes to contact guard assist level of assistance with front wheeled walker in order to improve functional activity tolerance for ADL tasks. (Not Progressing)       Start:  06/10/24    Expected End:  06/24/24               EXERCISE/STRENGTHENING       Patient to increase LUE to 4/5 strength from shoulders distally to digits.  (Not Progressing)        Start:  06/10/24    Expected End:  06/24/24               TRANSFERS       Patient will perform bed mobility contact guard assist level of assistance and bed rails in order to improve safety and independence with mobility (Progressing)       Start:  06/10/24    Expected End:  06/24/24

## 2024-06-12 NOTE — PROGRESS NOTES
Rene Ballard is a 89 y.o. male     Patient appears quite tired  Discussed with family  Requesting hospice consult  ELIEL pending    Review of Systems     Constitutional: Feeling poorly  Cardiovascular: no chest pain   Respiratory: no cough, wheezing or shortness of breath a  Gastrointestinal: no abdominal pain, no constipation, no melena, no nausea, no diarrhea, no vomiting and no blood in stools.   Neurological: no headache,   All other systems have been reviewed and are negative for complaint.       Vitals:    06/12/24 0950   BP: 109/55   Pulse: 77   Resp:    Temp:    SpO2: 96%        Scheduled medications  ceFAZolin, 2 g, intravenous, q8h  clopidogrel, 75 mg, oral, Daily  [Held by provider] cyclobenzaprine, 5 mg, oral, TID  docusate sodium, 100 mg, oral, BID  dorzolamide-timoloL, 1 drop, Left Eye, BID  enoxaparin, 40 mg, subcutaneous, q24h  insulin glargine, 20 Units, subcutaneous, q24h  insulin lispro, 0-10 Units, subcutaneous, TID  latanoprost, 1 drop, Both Eyes, Nightly  lidocaine, 2 patch, transdermal, Daily  metoprolol tartrate, 12.5 mg, oral, BID  pantoprazole, 40 mg, oral, Daily before breakfast  tamsulosin, 0.4 mg, oral, Daily      Continuous medications  [Held by provider] sodium chloride 0.9%, 75 mL/hr, Last Rate: 75 mL/hr (06/10/24 0514)      PRN medications  PRN medications: acetaminophen, alum-mag hydroxide-simeth, dextrose, dextrose, glucagon, glucagon, LORazepam, melatonin, [Held by provider] morphine, ondansetron, polyethylene glycol, [Held by provider] traMADol    Lab Review   Results from last 7 days   Lab Units 06/12/24  0548 06/11/24  0558 06/10/24  0557   WBC AUTO x10*3/uL 9.2 11.3 13.9*   HEMOGLOBIN g/dL 13.3* 13.5 14.8   HEMATOCRIT % 40.0* 40.6* 45.4   PLATELETS AUTO x10*3/uL 286 252 308     Results from last 7 days   Lab Units 06/12/24  0548 06/11/24  0558 06/10/24  0557 06/05/24  1305 06/05/24  1136   SODIUM mmol/L 134* 134* 132*   < > 131*   POTASSIUM mmol/L 3.1* 3.6 4.3   < > 4.7    CHLORIDE mmol/L 101 100 97*   < > 95*   CO2 mmol/L 26 27 27   < > 23   BUN mg/dL 10 10 11   < > 30*   CREATININE mg/dL 0.64 0.64 0.66   < > 1.32*   CALCIUM mg/dL 7.9* 8.5* 8.8   < > 9.1   PROTEIN TOTAL g/dL  --   --   --   --  7.0   BILIRUBIN TOTAL mg/dL  --   --   --   --  0.8   ALK PHOS U/L  --   --   --   --  204*   ALT U/L  --   --   --   --  19   AST U/L  --   --   --   --  15   GLUCOSE mg/dL 61* 66* 71*   < > 524*    < > = values in this interval not displayed.            Transthoracic Echo (TTE) Limited   Final Result      CT foot right wo IV contrast   Final Result   1. Findings compatible with osteomyelitis involving the remaining 4th   metatarsal. Other regions of osteomyelitis are not entirely excluded   on the basis of this exam and MRI may be helpful for further   assessment.   2. Wound/ulcer on the distal lateral side of the remaining foot.   3. Bradford increased density in the subcutaneous tissues of the   distal remaining foot greatest on the lateral side which may   represent confluence cellulitis. Underlying focal collection is not   excluded on the basis of this noncontrast examination. There are some   bubbles of gas in the soft tissues which may be due to open nature of   process although gas-forming infection is not entirely excluded.   4. Degree of more generalized reticular edema in the subcutaneous   tissues which may represent lymphedema and/or cellulitis.        MACRO:   none        Signed by: Noah Ladd 6/7/2024 12:54 PM   Dictation workstation:   [id number]      XR hand left 3+ views   Final Result   No acute fracture. Soft tissue swelling in the 2nd digit. Multifocal   productive arthritic changes noted.        MACRO:   None.        Signed by: Lara Severino 6/6/2024 1:50 PM   Dictation workstation:   [id number]      CT head wo IV contrast   Final Result   Findings of probable chronic small vessel ischemic changes and age   related diffuse cerebral volume loss. No acute  intracranial   abnormality or mass effect.        This study was interpreted at [facility].        MACRO:   None        Signed by: Win Peres 6/6/2024 12:39 PM   Dictation workstation:   [id number]      XR cervical spine complete 4-5 views   Final Result   Discogenic degenerative changes as described.             Signed by: Franck Pascal 6/6/2024 11:12 AM   Dictation workstation:   [id number]      Vascular US PVR Without Exercise   Final Result      XR foot right 3+ views   Final Result   1. Findings consistent with progressive osteomyelitis of the 4th   metatarsal bone with brown osseous erosion of the lateral and plantar   aspect of the shaft with overlying soft tissue swelling and   ulceration consistent with persistent overlying soft tissue   infection. If it would alter clinical management, an MRI may be   obtained.        2. 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        MACRO:   None        Signed by: Shalini Wright 6/5/2024 12:19 PM   Dictation workstation:   [id number]      XR chest 1 view   Final Result   1.  No evidence of acute cardiopulmonary process.                  MACRO:   None        Signed by: Shalini Wright 6/5/2024 12:09 PM   Dictation workstation:   [id number]      Transesophageal Echo (ELIEL)    (Results Pending)         Physical Exam     Constitutional   General appearance: Appears tired    Pulmonary   Respiratory assessment: No respiratory distress, normal respiratory rhythm and effort.    Auscultation of Lungs: Clear bilateral breath sounds.   Cardiovascular   Auscultation of heart: Apical pulse normal, heart rate and rhythm normal, normal S1 and S2, no murmurs and no pericardial rub.    Exam for edema: No peripheral edema.   Abdomen   Abdominal Exam: No bruits, normal bowel sounds, soft, non-tender, no abdominal mass palpated.    Liver and Spleen exam: No hepato-splenomegaly.   Musculoskeletal     Skin  inspection: Left hand redness    Neurologic   Cranial nerves: Nerves 2-12 were intact, no focal neuro defects.     Assessment/Plan      #Sepsis  #Osteomyelitis  On IV cefazolin  TTE concerning for endocarditis  ELIEL pending  Patient's family requests hospice consult  Consult placed    #Visual hallucinations  Hold all pain medications  Use Tylenol as needed    #Left hand cellulitis  Will monitor  If redness worsens or does not improve by tomorrow we will need to add coverage for MRSA     #Poorly controlled diabetes mellitus  Sliding scale insulin    #Peripheral artery disease       #Dyslipidemia  Continue statins and maintain LDL less than 70     #Hypertension  Continue home medications     #Fall with head and neck injury  Fall risk  PT OT

## 2024-06-12 NOTE — PROGRESS NOTES
Speech-Language Pathology    SLP Adult Inpatient  Speech-Language Pathology Treatment     Patient Name: Rene Ballard  MRN: [id number]  Today's Date: 6/12/2024  Time Calculation  Start Time: 1603  Stop Time: 1626  Time Calculation (min): 23 min         Current Problem:   1. Bacteremia  Transthoracic Echo (TTE) Limited    Transthoracic Echo (TTE) Limited    Transesophageal Echo (ELIEL)    Transesophageal Echo (ELIEL)      2. Osteomyelitis (Multi)        3. Pressure ulcer of other site, stage 2 (Multi)  Vascular US PVR Without Exercise    Vascular US PVR Without Exercise      4. PAD (peripheral artery disease) (CMS-HCC)  Vascular US PVR Without Exercise    Vascular US PVR Without Exercise      5. Stricture of artery (CMS-HCC)  Vascular US PVR Without Exercise      6. Oral phase dysphagia        7. Endocarditis, valve unspecified  Transthoracic Echo (TTE) Limited            SLP Assessment:  SLP TX Intervention Outcome: Making Progress Towards Goals  Treatment Provided: Yes  Treatment Tolerance: Patient tolerated treatment well  Medical Staff Made Aware: Yes  Education Provided: Yes       Plan:  Inpatient/Swing Bed or Outpatient: Inpatient  SLP TX Plan: Continue Plan of Care  SLP Plan: Skilled SLP  SLP Frequency: 2x per week  Duration: Current admission  Next Treatment Priority: tx with meal  SLP - OK to Discharge: Yes      Subjective   Current Problem:  Patient seen for dysphagia therapy this pm. Patient sleeping, awakened easily. Patient requesting ginger ale. SLP cleared with nursing first. Patient consumed thin liquids and a popsicle.      General Visit Information:   Prior to Session Communication: Bedside nurse      Pain Assessment:   Pain Assessment: 0-10  Pain Score: 0 - No pain      Objective   Patient accepted ice chips prior to PO intake. No difficulty with them. Patient coughed x 1 with large sip of ginger ale via straw.  SLP cued the patient to take small single sips. No further signs of penetration or  aspiration. Patient also consumed bites of a popsicle. SLP explained recommendations for single small bites of food as well. Patient with no difficulty.   Patient able to recall safer swallow strategies and reasoning for at this time. Family has determined to go with hospice so it is questionable if dysphagia therapy will continue.     Therapeutic Swallow:  Therapeutic Swallow Intervention : PO Trials, Caregiver Education  Solid Diet Recommendations: Regular (IDDSI Level 7)  Liquid Diet Recommendations: Thin (IDDSI Level 0)  Swallow Comments: single bites and sips, slow rate, up at 90 degrees

## 2024-06-12 NOTE — PROGRESS NOTES
"  INFECTIOUS DISEASE DAILY PROGRESS NOTE    SUBJECTIVE:    No overnight events. No new complaints. Afebrile. No rash/itching/diarrhea.    OBJECTIVE:  VITALS (Last 24 Hours)  /70   Pulse 67   Temp 37 °C (98.6 °F)   Resp 16   Ht 1.778 m (5' 10\")   Wt 67.8 kg (149 lb 7.6 oz)   SpO2 97%   BMI 21.45 kg/m²     PHYSICAL EXAM:  Gen - NAD  Heart - systolic murmur RUSB  Lungs - no wheezing  Abd - soft, BS present  Right Foot - dressing present  Skin - no rashes    ABX: IV Cefazolin    LABS:  Lab Results   Component Value Date    WBC 9.2 06/12/2024    HGB 13.3 (L) 06/12/2024    HCT 40.0 (L) 06/12/2024    MCV 92 06/12/2024     06/12/2024     Lab Results   Component Value Date    GLUCOSE 61 (L) 06/12/2024    CALCIUM 7.9 (L) 06/12/2024     (L) 06/12/2024    K 3.1 (L) 06/12/2024    CO2 26 06/12/2024     06/12/2024    BUN 10 06/12/2024    CREATININE 0.64 06/12/2024         Estimated Creatinine Clearance: 75 mL/min (by C-G formula based on SCr of 0.64 mg/dL).    C-Reactive Protein   Date/Time Value Ref Range Status   06/05/2024 01:05 PM 28.95 (H) <1.00 mg/dL Final       ASSESSMENT/PLAN:     Right TMA Site Infection due to MSSA  Right 4th Metatarsal OM due to MSSA  MSSA Bacteremia  Possible Aortic Valve Endocarditis - TTE 6/10 showing 2 filamentous and mobile lesions, degen changes vs vegetations  PAD  Sepsis - resolving     IV Cefazolin 2g Q8H. Repeat blood cx x2 6/8 NGTD.    ELIEL pending. BKA likely as well.     Monitoring for adverse effects of abx such as rash/itching/diarrhea - none.     Will follow. Thanks!    Berny Valenzuela MD  [facility]  Office #[phone]      "

## 2024-06-12 NOTE — PROGRESS NOTES
06/12/24 1128   Current Planned Discharge Disposition   Current Planned Discharge Disposition [place]     Met with patient's daughter and son at the bedside to discuss discharge plan.  Patient is waiting to have a ELIEL.  Family would like a hospice consult.  Secure chat to MD requesting referral be place and TCC notified Hospice SW.  Hospice to arrange a meeting with the family as patient does not want surgery.  ADOD: 1-2 days  DISP: home with hospice???  Maya Jose RN     6/12/24 @ 1348  Hospice meeting at the bedside for tomorrow 930am-10am  Maya Jose RN

## 2024-06-13 ENCOUNTER — APPOINTMENT (OUTPATIENT)
Dept: CARDIOLOGY | Facility: HOSPITAL | Age: 89
End: 2024-06-13
Payer: MEDICARE

## 2024-06-13 ENCOUNTER — APPOINTMENT (OUTPATIENT)
Dept: WOUND CARE | Facility: CLINIC | Age: 89
End: 2024-06-13
Payer: MEDICARE

## 2024-06-13 VITALS
TEMPERATURE: 98.4 F | HEART RATE: 66 BPM | SYSTOLIC BLOOD PRESSURE: 116 MMHG | BODY MASS INDEX: 21.4 KG/M2 | OXYGEN SATURATION: 93 % | WEIGHT: 149.47 LBS | HEIGHT: 70 IN | DIASTOLIC BLOOD PRESSURE: 57 MMHG | RESPIRATION RATE: 16 BRPM

## 2024-06-13 LAB
ANION GAP SERPL CALC-SCNC: 11 MMOL/L (ref 10–20)
BUN SERPL-MCNC: 12 MG/DL (ref 6–23)
CALCIUM SERPL-MCNC: 7.8 MG/DL (ref 8.6–10.3)
CHLORIDE SERPL-SCNC: 101 MMOL/L (ref 98–107)
CO2 SERPL-SCNC: 26 MMOL/L (ref 21–32)
CREAT SERPL-MCNC: 0.67 MG/DL (ref 0.5–1.3)
EGFRCR SERPLBLD CKD-EPI 2021: 89 ML/MIN/1.73M*2
ERYTHROCYTE [DISTWIDTH] IN BLOOD BY AUTOMATED COUNT: 13 % (ref 11.5–14.5)
GLUCOSE BLD MANUAL STRIP-MCNC: 111 MG/DL (ref 74–99)
GLUCOSE BLD MANUAL STRIP-MCNC: 234 MG/DL (ref 74–99)
GLUCOSE SERPL-MCNC: 108 MG/DL (ref 74–99)
HCT VFR BLD AUTO: 39.7 % (ref 41–52)
HGB BLD-MCNC: 12.8 G/DL (ref 13.5–17.5)
MCH RBC QN AUTO: 31 PG (ref 26–34)
MCHC RBC AUTO-ENTMCNC: 32.2 G/DL (ref 32–36)
MCV RBC AUTO: 96 FL (ref 80–100)
NRBC BLD-RTO: 0 /100 WBCS (ref 0–0)
PLATELET # BLD AUTO: 285 X10*3/UL (ref 150–450)
POTASSIUM SERPL-SCNC: 3.2 MMOL/L (ref 3.5–5.3)
RBC # BLD AUTO: 4.13 X10*6/UL (ref 4.5–5.9)
SODIUM SERPL-SCNC: 135 MMOL/L (ref 136–145)
WBC # BLD AUTO: 10.2 X10*3/UL (ref 4.4–11.3)

## 2024-06-13 PROCEDURE — 93005 ELECTROCARDIOGRAM TRACING: CPT

## 2024-06-13 PROCEDURE — 2500000004 HC RX 250 GENERAL PHARMACY W/ HCPCS (ALT 636 FOR OP/ED): Mod: JZ | Performed by: INTERNAL MEDICINE

## 2024-06-13 PROCEDURE — 2500000002 HC RX 250 W HCPCS SELF ADMINISTERED DRUGS (ALT 637 FOR MEDICARE OP, ALT 636 FOR OP/ED)

## 2024-06-13 PROCEDURE — 80048 BASIC METABOLIC PNL TOTAL CA: CPT

## 2024-06-13 PROCEDURE — 99239 HOSP IP/OBS DSCHRG MGMT >30: CPT | Performed by: INTERNAL MEDICINE

## 2024-06-13 PROCEDURE — 2500000001 HC RX 250 WO HCPCS SELF ADMINISTERED DRUGS (ALT 637 FOR MEDICARE OP)

## 2024-06-13 PROCEDURE — 82947 ASSAY GLUCOSE BLOOD QUANT: CPT | Mod: 91

## 2024-06-13 PROCEDURE — 85027 COMPLETE CBC AUTOMATED: CPT

## 2024-06-13 PROCEDURE — 36415 COLL VENOUS BLD VENIPUNCTURE: CPT

## 2024-06-13 RX ORDER — INSULIN GLARGINE 100 [IU]/ML
20 INJECTION, SOLUTION SUBCUTANEOUS EVERY 24 HOURS
COMMUNITY
Start: 2024-06-13

## 2024-06-13 ASSESSMENT — COGNITIVE AND FUNCTIONAL STATUS - GENERAL
PERSONAL GROOMING: A LITTLE
PERSONAL GROOMING: A LITTLE
TOILETING: TOTAL
MOVING TO AND FROM BED TO CHAIR: A LOT
CLIMB 3 TO 5 STEPS WITH RAILING: TOTAL
TURNING FROM BACK TO SIDE WHILE IN FLAT BAD: A LOT
EATING MEALS: A LITTLE
MOBILITY SCORE: 12
DAILY ACTIVITIY SCORE: 12
CLIMB 3 TO 5 STEPS WITH RAILING: TOTAL
STANDING UP FROM CHAIR USING ARMS: A LOT
DAILY ACTIVITIY SCORE: 12
TURNING FROM BACK TO SIDE WHILE IN FLAT BAD: A LOT
WALKING IN HOSPITAL ROOM: A LOT
TOILETING: TOTAL
DRESSING REGULAR LOWER BODY CLOTHING: TOTAL
DRESSING REGULAR UPPER BODY CLOTHING: A LOT
DAILY ACTIVITIY SCORE: 12
TOILETING: TOTAL
MOBILITY SCORE: 12
MOVING TO AND FROM BED TO CHAIR: A LOT
DAILY ACTIVITIY SCORE: 12
MOVING TO AND FROM BED TO CHAIR: A LOT
HELP NEEDED FOR BATHING: A LOT
TOILETING: TOTAL
DRESSING REGULAR UPPER BODY CLOTHING: A LOT
DRESSING REGULAR UPPER BODY CLOTHING: A LOT
EATING MEALS: A LITTLE
CLIMB 3 TO 5 STEPS WITH RAILING: TOTAL
MOBILITY SCORE: 12
MOVING TO AND FROM BED TO CHAIR: A LOT
DAILY ACTIVITIY SCORE: 12
TOILETING: TOTAL
WALKING IN HOSPITAL ROOM: A LOT
HELP NEEDED FOR BATHING: A LOT
STANDING UP FROM CHAIR USING ARMS: A LOT
TOILETING: TOTAL
PERSONAL GROOMING: A LITTLE
DRESSING REGULAR LOWER BODY CLOTHING: TOTAL
MOBILITY SCORE: 12
EATING MEALS: A LITTLE
WALKING IN HOSPITAL ROOM: A LOT
MOBILITY SCORE: 12
DRESSING REGULAR UPPER BODY CLOTHING: A LOT
CLIMB 3 TO 5 STEPS WITH RAILING: TOTAL
MOVING FROM LYING ON BACK TO SITTING ON SIDE OF FLAT BED WITH BEDRAILS: A LITTLE
TURNING FROM BACK TO SIDE WHILE IN FLAT BAD: A LOT
MOVING FROM LYING ON BACK TO SITTING ON SIDE OF FLAT BED WITH BEDRAILS: A LITTLE
PERSONAL GROOMING: A LITTLE
DRESSING REGULAR LOWER BODY CLOTHING: TOTAL
STANDING UP FROM CHAIR USING ARMS: A LOT
EATING MEALS: A LITTLE
DRESSING REGULAR UPPER BODY CLOTHING: A LOT
EATING MEALS: A LITTLE
WALKING IN HOSPITAL ROOM: A LOT
HELP NEEDED FOR BATHING: A LOT
CLIMB 3 TO 5 STEPS WITH RAILING: TOTAL
CLIMB 3 TO 5 STEPS WITH RAILING: TOTAL
MOBILITY SCORE: 12
PERSONAL GROOMING: A LITTLE
MOVING FROM LYING ON BACK TO SITTING ON SIDE OF FLAT BED WITH BEDRAILS: A LITTLE
MOVING FROM LYING ON BACK TO SITTING ON SIDE OF FLAT BED WITH BEDRAILS: A LITTLE
STANDING UP FROM CHAIR USING ARMS: A LOT
DAILY ACTIVITIY SCORE: 12
DRESSING REGULAR LOWER BODY CLOTHING: TOTAL
PERSONAL GROOMING: A LITTLE
EATING MEALS: A LITTLE
PERSONAL GROOMING: A LITTLE
CLIMB 3 TO 5 STEPS WITH RAILING: TOTAL
WALKING IN HOSPITAL ROOM: A LOT
DRESSING REGULAR UPPER BODY CLOTHING: A LOT
HELP NEEDED FOR BATHING: A LOT
TOILETING: TOTAL
EATING MEALS: A LITTLE
DRESSING REGULAR UPPER BODY CLOTHING: A LOT
STANDING UP FROM CHAIR USING ARMS: A LOT
TURNING FROM BACK TO SIDE WHILE IN FLAT BAD: A LOT
STANDING UP FROM CHAIR USING ARMS: A LOT
WALKING IN HOSPITAL ROOM: A LOT
HELP NEEDED FOR BATHING: A LOT
DRESSING REGULAR LOWER BODY CLOTHING: TOTAL
MOVING TO AND FROM BED TO CHAIR: A LOT
WALKING IN HOSPITAL ROOM: A LOT
MOVING TO AND FROM BED TO CHAIR: A LOT
TURNING FROM BACK TO SIDE WHILE IN FLAT BAD: A LOT
MOVING FROM LYING ON BACK TO SITTING ON SIDE OF FLAT BED WITH BEDRAILS: A LITTLE
HELP NEEDED FOR BATHING: A LOT
DRESSING REGULAR LOWER BODY CLOTHING: TOTAL
MOBILITY SCORE: 12
STANDING UP FROM CHAIR USING ARMS: A LOT
MOVING TO AND FROM BED TO CHAIR: A LOT
TURNING FROM BACK TO SIDE WHILE IN FLAT BAD: A LOT
TURNING FROM BACK TO SIDE WHILE IN FLAT BAD: A LOT
DAILY ACTIVITIY SCORE: 12
DRESSING REGULAR LOWER BODY CLOTHING: TOTAL
HELP NEEDED FOR BATHING: A LOT

## 2024-06-13 ASSESSMENT — PAIN - FUNCTIONAL ASSESSMENT
PAIN_FUNCTIONAL_ASSESSMENT: 0-10

## 2024-06-13 ASSESSMENT — PAIN SCALES - GENERAL
PAINLEVEL_OUTOF10: 2
PAINLEVEL_OUTOF10: 0 - NO PAIN
PAINLEVEL_OUTOF10: 3
PAINLEVEL_OUTOF10: 0 - NO PAIN
PAINLEVEL_OUTOF10: 5 - MODERATE PAIN
PAINLEVEL_OUTOF10: 0 - NO PAIN

## 2024-06-13 ASSESSMENT — PAIN SCALES - WONG BAKER
WONGBAKER_NUMERICALRESPONSE: NO HURT
WONGBAKER_NUMERICALRESPONSE: NO HURT

## 2024-06-13 NOTE — PROGRESS NOTES
Physical Therapy                 Therapy Communication Note    Patient Name: Rene Ballard  MRN: [id number]  Today's Date: 6/13/2024     Discipline: Physical Therapy    Missed Visit Reason: Missed Visit Reason: Other (Comment) (Pt being transferred to hospice care at this time. PT to discharge order.)    Missed Time: Attempt    Comment:

## 2024-06-13 NOTE — DISCHARGE SUMMARY
Discharge Diagnosis  Osteomyelitis (Multi)    Issues Requiring Follow-Up  Hospice    Test Results Pending At Discharge  Pending Labs       Order Current Status    Extra Urine Gray Tube Collected (06/07/24 0943)    Urinalysis with Reflex Culture and Microscopic In process            Hospital Course  Patient with a past medical history of peripheral artery disease type 2 diabetes mellitus with a diabetic foot ulcer diabetic neuropathy hypertension osteomyelitis of the toe of right foot benign prostatic hypertrophy is admitted with increasing fatigue fever chills and worsening osteomyelitis of foot   Infectious disease and podiatry were consulted  Imaging confirmed worsening osteomyelitis  With a positive blood cultures we got an echocardiogram which unfortunately showed endocarditis of the aortic valve  Meanwhile the patient's condition continued to decline and he became more confused with encephalopathy brought on by sepsis  The patient will need a below-knee amputation to help source control and will need 6 weeks of IV antibiotics  After long discussion with patient and his family family has decided to pursue hospice  As result of this we will stop all treatment and discharge patient under hospice care    Pertinent Physical Exam At Time of Discharge  Physical Exam    Constitutional   General appearance: Alert and in no acute distress.     Pulmonary   Respiratory assessment: No respiratory distress, normal respiratory rhythm and effort.    Auscultation of Lungs: Clear bilateral breath sounds.   Cardiovascular   Auscultation of heart: Apical pulse normal, heart rate and rhythm normal, normal S1 and S2, positive murmur  Exam for edema: No peripheral edema.   Abdomen   Abdominal Exam: No bruits, normal bowel sounds, soft, non-tender, no abdominal mass palpated.    Liver and Spleen exam: No hepato-splenomegaly.   Musculoskeletal   Examination of gait: Normal.    Inspection of digits and nails: No clubbing or cyanosis of  "the fingernails.    Inspection/palpation of joints, bones and muscles: No joint swelling.  Skin   Skin inspection: Right foot infection  Neurologic   Alert x 2      Home Medications     Medication List      CHANGE how you take these medications     insulin glargine 100 unit/mL injection; Commonly known as: Lantus; What   changed: how much to take     CONTINUE taking these medications     alpha tocopherol 268 mg (400 unit) capsule; Commonly known as: Vitamin E   alum-mag hydroxide-simeth 200-200-20 mg/5 mL oral suspension; Commonly   known as: Mylanta   ascorbic acid 1,000 mg tablet; Commonly known as: Vitamin C   cholecalciferol 125 MCG (5000 UT) capsule; Commonly known as: Vitamin   D-3   clopidogrel 75 mg tablet; Commonly known as: Plavix; TAKE ONE TABLET BY   MOUTH EVERY DAY   dapagliflozin propanediol 10 mg; Commonly known as: Farxiga; Take 1   tablet (10 mg) by mouth once daily.   docusate sodium 100 mg capsule; Commonly known as: Colace; Take 1   capsule (100 mg) by mouth 2 times a day as needed for constipation.   dorzolamide-timoloL 22.3-6.8 mg/mL ophthalmic solution; Commonly known   as: Cosopt   latanoprost 0.005 % ophthalmic solution; Commonly known as: Xalatan   lidocaine 4 % patch; Place 2 patches over 12 hours on the skin once   daily. Remove & discard patch within 12 hours or as directed by MD.   metoprolol tartrate 25 mg tablet; Commonly known as: Lopressor; Take 0.5   tablets (12.5 mg) by mouth 2 times a day.   multivitamin tablet   niacin 500 mg tablet   omeprazole 20 mg DR capsule; Commonly known as: PriLOSEC; Take 1 capsule   (20 mg) by mouth once daily.   OneTouch Ultra Test strip; Generic drug: blood sugar diagnostic; Check   glucose 4 x per day   pen needle, diabetic 31 gauge x 1/4\" needle; Commonly known as: UltiCare   Pen Needle; USE 1 PEN NEEDLE SUBCUTANEOUSLY 3 TIMES DAILY BEFORE MEALS   polyethylene glycol 17 gram packet; Commonly known as: Glycolax,   Miralax; Take 17 g by mouth once " daily as needed (constipation).   tamsulosin 0.4 mg 24 hr capsule; Commonly known as: Flomax   Vitamin B-12 500 mcg tablet; Generic drug: cyanocobalamin     STOP taking these medications     ampicillin-sulbactam 3 gram injection; Commonly known as: Unasyn   enalapril 2.5 mg tablet; Commonly known as: Vasotec   Trulicity 0.75 mg/0.5 mL pen injector; Generic drug: dulaglutide       Outpatient Follow-Up  Future Appointments   Date Time Provider Department Center   6/28/2024  3:00 PM PHARMACY [facility] RESOURCE [id number] [facility]   11/18/2024 10:00 AM Olesya Daniel MD [id number] [facility]   11/18/2024  2:00 PM Julian Blanton DO [id number] [facility]     Patient seen at bedside. Events from the last visit reviewed. Discussed with staff. Results of tests and investigations from last visit reviewed and discussed with patient/Family. Electronic chart on [facility] reviewed. Input / Recommendations  from consultants  appreciated and reviewed and agreed with.     discharge summary and profile completed. medications reviewed and discussed with patient and family.  scripts completed and signed.     total discharge time in excess of 30 minutes.    Franco Arce MD

## 2024-06-13 NOTE — CARE PLAN
The patient's goals for the shift include      The clinical goals for the shift include Patient will remain comfortable throughout the shift.    Over the shift, the patient did not make progress toward the following goals. Barriers to progression include pain. Recommendations to address these barriers include pain management.

## 2024-06-13 NOTE — CARE PLAN
The patient's goals for the shift include      The clinical goals for the shift include Patient will remain comfortable throughout the shift.    Over the shift, the patient did not make progress toward the following goals. Barriers to progression include osteomylelitis of right foot. Recommendations to address these barriers include pain meds as needed and dressing change as scheduled.

## 2024-06-13 NOTE — PROGRESS NOTES
Occupational Therapy                 Occupational Therapy Discharge    Patient Name: Rene Ballard  MRN: [id number]  Today's Date: 6/13/2024     Discipline: Occupational Therapy    Other (Comment) (Patient being transferred to hospice care, will discharge OT order.)

## 2024-06-13 NOTE — PROGRESS NOTES
"  INFECTIOUS DISEASE DAILY PROGRESS NOTE    SUBJECTIVE:    Patient and family have decided on going home with hospice and do not want to pursue [facility] or [facility].    OBJECTIVE:  VITALS (Last 24 Hours)  /66 (BP Location: Right arm, Patient Position: Lying)   Pulse 66   Temp 37.1 °C (98.8 °F) (Temporal)   Resp 16   Ht 1.778 m (5' 10\")   Wt 67.8 kg (149 lb 7.6 oz)   SpO2 96%   BMI 21.45 kg/m²     PHYSICAL EXAM:  Gen - NAD  Right Foot - dressing present  Skin - no rash    ABX: IV Cefazolin    LABS:  Lab Results   Component Value Date    WBC 10.2 06/13/2024    HGB 12.8 (L) 06/13/2024    HCT 39.7 (L) 06/13/2024    MCV 96 06/13/2024     06/13/2024     Lab Results   Component Value Date    GLUCOSE 108 (H) 06/13/2024    CALCIUM 7.8 (L) 06/13/2024     (L) 06/13/2024    K 3.2 (L) 06/13/2024    CO2 26 06/13/2024     06/13/2024    BUN 12 06/13/2024    CREATININE 0.67 06/13/2024         Estimated Creatinine Clearance: 71.7 mL/min (by C-G formula based on SCr of 0.67 mg/dL).    C-Reactive Protein   Date/Time Value Ref Range Status   06/05/2024 01:05 PM 28.95 (H) <1.00 mg/dL Final       ASSESSMENT/PLAN:     Right TMA Site Infection due to MSSA  Right 4th Metatarsal OM due to MSSA  MSSA Bacteremia  Possible Aortic Valve Endocarditis - TTE 6/10 showing 2 filamentous and mobile lesions, degen changes vs vegetations  PAD     Plans for transition to home hospice now. OK to stop abx.     Will sign off. Please call back with questions. Thanks!    Berny Valenzuela MD  [facility]  Office #[phone]      "

## 2024-06-13 NOTE — PROGRESS NOTES
PALLIATIVE CARE SOCIAL WORK NOTE     Pt and family met with [facility] this morning. Hospice consents have been signed. The plan will be for him to return home this afternoon with [facility]. DME has been ordered and will be delivered to the home prior to discharge. Thank you.     STEPHAN Ureña

## 2024-06-14 ENCOUNTER — APPOINTMENT (OUTPATIENT)
Dept: WOUND CARE | Facility: CLINIC | Age: 89
End: 2024-06-14
Payer: MEDICARE

## 2024-06-14 LAB
ATRIAL RATE: 64 BPM
P AXIS: 52 DEGREES
P OFFSET: 180 MS
P ONSET: 102 MS
PR INTERVAL: 220 MS
Q ONSET: 212 MS
QRS COUNT: 11 BEATS
QRS DURATION: 136 MS
QT INTERVAL: 452 MS
QTC CALCULATION(BAZETT): 466 MS
QTC FREDERICIA: 461 MS
R AXIS: -60 DEGREES
T AXIS: 11 DEGREES
T OFFSET: 438 MS
VENTRICULAR RATE: 64 BPM

## 2024-06-17 ENCOUNTER — APPOINTMENT (OUTPATIENT)
Dept: PHARMACY | Facility: HOSPITAL | Age: 89
End: 2024-06-17
Payer: MEDICARE

## 2024-06-17 ENCOUNTER — APPOINTMENT (OUTPATIENT)
Dept: WOUND CARE | Facility: CLINIC | Age: 89
End: 2024-06-17
Payer: MEDICARE

## 2024-06-18 ENCOUNTER — APPOINTMENT (OUTPATIENT)
Dept: WOUND CARE | Facility: CLINIC | Age: 89
End: 2024-06-18
Payer: MEDICARE

## 2024-06-19 ENCOUNTER — APPOINTMENT (OUTPATIENT)
Dept: WOUND CARE | Facility: CLINIC | Age: 89
End: 2024-06-19
Payer: MEDICARE

## 2024-06-20 ENCOUNTER — APPOINTMENT (OUTPATIENT)
Dept: WOUND CARE | Facility: CLINIC | Age: 89
End: 2024-06-20
Payer: MEDICARE

## 2024-06-21 ENCOUNTER — APPOINTMENT (OUTPATIENT)
Dept: WOUND CARE | Facility: CLINIC | Age: 89
End: 2024-06-21
Payer: MEDICARE

## 2024-06-24 ENCOUNTER — APPOINTMENT (OUTPATIENT)
Dept: WOUND CARE | Facility: CLINIC | Age: 89
End: 2024-06-24
Payer: MEDICARE

## 2024-06-25 ENCOUNTER — APPOINTMENT (OUTPATIENT)
Dept: WOUND CARE | Facility: CLINIC | Age: 89
End: 2024-06-25
Payer: MEDICARE

## 2024-06-26 ENCOUNTER — APPOINTMENT (OUTPATIENT)
Dept: WOUND CARE | Facility: CLINIC | Age: 89
End: 2024-06-26
Payer: MEDICARE

## 2024-06-27 ENCOUNTER — APPOINTMENT (OUTPATIENT)
Dept: WOUND CARE | Facility: CLINIC | Age: 89
End: 2024-06-27
Payer: MEDICARE

## 2024-06-28 ENCOUNTER — APPOINTMENT (OUTPATIENT)
Dept: PHARMACY | Facility: HOSPITAL | Age: 89
End: 2024-06-28
Payer: MEDICARE

## 2024-06-28 ENCOUNTER — APPOINTMENT (OUTPATIENT)
Dept: WOUND CARE | Facility: CLINIC | Age: 89
End: 2024-06-28
Payer: MEDICARE

## 2024-07-01 ENCOUNTER — APPOINTMENT (OUTPATIENT)
Dept: WOUND CARE | Facility: CLINIC | Age: 89
End: 2024-07-01
Payer: MEDICARE

## 2024-07-02 ENCOUNTER — APPOINTMENT (OUTPATIENT)
Dept: WOUND CARE | Facility: CLINIC | Age: 89
End: 2024-07-02
Payer: MEDICARE

## 2024-07-03 ENCOUNTER — APPOINTMENT (OUTPATIENT)
Dept: WOUND CARE | Facility: CLINIC | Age: 89
End: 2024-07-03
Payer: MEDICARE

## 2024-07-05 ENCOUNTER — APPOINTMENT (OUTPATIENT)
Dept: WOUND CARE | Facility: CLINIC | Age: 89
End: 2024-07-05
Payer: MEDICARE

## 2024-07-08 ENCOUNTER — APPOINTMENT (OUTPATIENT)
Dept: WOUND CARE | Facility: CLINIC | Age: 89
End: 2024-07-08
Payer: MEDICARE

## 2024-07-09 ENCOUNTER — APPOINTMENT (OUTPATIENT)
Dept: WOUND CARE | Facility: CLINIC | Age: 89
End: 2024-07-09
Payer: MEDICARE

## 2024-07-10 ENCOUNTER — APPOINTMENT (OUTPATIENT)
Dept: WOUND CARE | Facility: CLINIC | Age: 89
End: 2024-07-10
Payer: MEDICARE

## 2024-07-16 ENCOUNTER — APPOINTMENT (OUTPATIENT)
Dept: WOUND CARE | Facility: CLINIC | Age: 89
End: 2024-07-16
Payer: MEDICARE

## 2024-10-06 NOTE — CONSULTS
Nutrition Assessment Note  Nutrition Assessment      Reason for Assessment  Reason for Assessment: Admission nursing screening  Hospital day #2 for osteomyelitis.  Referred d/t wound on foot.    History:  Food and Nutrient History  Energy Intake: Fair 50-75 %  Food and Nutrient History: Reports decreased taste s/p Covid-19 has caused poor appetite.  Has banana & Ensure for breakfast. Skips lunch and has something his family brings him for dinner.  Vitamin/Herbal Supplement Use: MVI daily    Diagnosis   Acute osteomyelitis of metatarsal bone of right foot (CMS/HCC)   Aortic stenosis   Arteriosclerosis of coronary artery   Arthritis of shoulder region, left   Asymptomatic bilateral carotid artery stenosis   Atherosclerosis of native artery of right lower extremity with gangrene (CMS/HCC)   Hypertension, essential   Benign neoplasm of choroid   Bilateral sensorineural hearing loss   Bladder mass   Blister of toe of right foot with infection   BPH (benign prostatic hyperplasia)   Carpal tunnel syndrome   Combined forms of age-related cataract   Controlled diabetes mellitus with circulatory complication (CMS/HCC)   Pseudophakia   Osteoarthritis of both knees   Open wound of right foot   Neurogenic bladder   Lumbar spondylosis   Impingement syndrome of left shoulder   Hyperlipidemia   History of bladder cancer   GERD without esophagitis   Hiatal hernia   Foot drop   Fatigue   Failure of penile implant (CMS/HCC)   Esophageal ring   Erectile dysfunction associated with type 2 diabetes mellitus (CMS/HCC)   Diabetic ulcer of right foot associated with type 2 diabetes mellitus, with muscle involvement without evidence of necrosis (CMS/HCC)   Critical lower limb ischemia (CMS/HCC)   Type 2 DM w/mild nonproliferative diabetic retinop w/o macular edema (CMS/HCC)   PVD (peripheral vascular disease) (CMS/HCC)   Spondylolisthesis   Strain of right rotator cuff capsule   Systolic ejection murmur   Tinnitus, bilateral   Ulnar  "neuropathy   CAD (coronary artery disease), native coronary artery   Moderate protein-calorie malnutrition (CMS/HCC)   Other heart failure (CMS/HCC)   Type 2 diabetes mellitus with foot ulcer, with long-term current use of insulin (CMS/HCC)   Right hand pain   Gout   Ulcer of foot (CMS/HCC)   Streptococcal arthritis of right wrist (CMS/HCC)   Osteomyelitis (CMS/HCC)   Anemia   Hypertensive heart disease without heart failure   Type 2 diabetes mellitus with diabetic peripheral angiopathy and gangrene, with long-term current use of insulin (CMS/HCC)   Bilateral shoulder pain   Type 2 diabetes mellitus with diabetic neuropathy (CMS/HCC)   Presence of aortocoronary bypass graft   Malignant neoplasm of bladder (CMS/HCC)   COVID-19   Iron deficiency anemia   Abrasion of hand with infection   Athscl heart disease of native coronary artery w/o ang pctrs   Benign prostatic hyperplasia without lower urinary tract symptoms   Hyperlipidemia, unspecified   Non-prs chronic ulcer oth prt right foot w unsp severity (CMS/HCC)   Long term (current) use of opiate analgesic   MRSA infection   Sepsis, unspecified organism (CMS/HCC)   Suspected severe acute respiratory syndrome coronavirus 2 (SARS-CoV-2) infection   Stricture of artery (CMS/HCC)   Muscle weakness   Impingement syndrome of shoulder region   Acute non Q wave myocardial infarction (CMS/HCC)   Osteomyelitis of toe of right foot (CMS/HCC)     Anthropometrics:  Height: 177.8 cm (5' 10\")  Weight: 72.6 kg (160 lb)  BMI (Calculated): 22.96  Weight Change  Weight History / % Weight Change: 11/16/23: 72.6kg.  6/23: 70 kg.  Significant Weight Loss: No  IBW/kg (Dietitian Calculated): 75.5 kg     Energy Needs:  Calculated Energy Needs Using Equations  Height: 177.8 cm (5' 10\")  Temp: 36.9 °C (98.4 °F)    Estimated Energy Needs  Method for Estimating Needs: 0861-3423 @ 30-32 kcal/kg    Estimated Protein Needs  Method for Estimating Needs: 106-121 @ 1.4-1.6gr/kg    Estimated Fluid " Needs  Total Fluid Estimated Needs (mL): 1885 mL  Total Fluid Estimated Needs (mL/kg): 25 mL/kg     Dietary Orders   Adult diet Carb Controlled; 45 gram carb/meal, 15 gram Carb evening snack   Oral nutritional supplements       Select supplement: Glucerna Shake BID    Nutrition Focused Physical Findings:  Subcutaneous Fat Loss  Orbital Fat Pads: Mild-Moderate (slight dark circles and slight hollowing)  Buccal Fat Pads: Severe (hollow, sunken and narrow face)    Muscle Wasting  Temporalis: Severe (hollowed scooping depression)  Pectoralis (Clavicular Region): Severe (protruding prominent clavicle)  Deltoid/Trapezius: Severe (squared shoulders, acromion process prominent)  Interosseous: Severe (depressed area between thumb and forefinger)    Edema  Edema: +1 trace  Edema Location: BLE         Physical Findings (Nutrition Deficiency/Toxicity)  Skin: Positive (wounds chart on R heel, plantar)       Nutrition Diagnosis   Malnutrition Diagnosis  Patient has Malnutrition Diagnosis: Yes  Diagnosis Status: New  Malnutrition Diagnosis: Severe malnutrition related to chronic disease or condition  As Evidenced by: reported intake <75% estimated needs for > 1month, severe muscle & subcutaneous fat depletion.    Patient has Nutrition Diagnosis: Yes  Nutrition Diagnosis 1: Increased nutrient needs  Diagnosis Status (1): New  Related to (1): osteomyelitis  As Evidenced by (1): non-healing wounds on foot        Results   Comprehensive metabolic panel  Result Value Ref Range   Glucose 359 (H) 74 - 99 mg/dL   Sodium 130 (L) 136 - 145 mmol/L   Potassium 4.5 3.5 - 5.3 mmol/L   Chloride 101 98 - 107 mmol/L   Bicarbonate 20 (L) 21 - 32 mmol/L   Anion Gap 14 10 - 20 mmol/L   Urea Nitrogen 24 (H) 6 - 23 mg/dL   Creatinine 1.00 0.50 - 1.30 mg/dL   eGFR 72 >60 mL/min/1.73m*2   Calcium 8.6 8.6 - 10.3 mg/dL   Albumin 3.1 (L) 3.4 - 5.0 g/dL   Alkaline Phosphatase 278 (H) 33 - 136 U/L   Total Protein 6.6 6.4 - 8.2 g/dL   AST 27 9 - 39  U/L   Bilirubin, Total 0.7 0.0 - 1.2 mg/dL   ALT 26 10 - 52 U/L  Sedimentation rate, automated  Result Value Ref Range   Sedimentation Rate 47 (H) 0 - 20 mm/h  C-reactive protein  Result Value Ref Range   C-Reactive Protein 8.72 (H) <1.00 mg/dL  Magnesium  Result Value Ref Range   Magnesium 1.90 1.60 - 2.40 mg/dL  Hemoglobin A1c  Result Value Ref Range   Hemoglobin A1C 7.3 (H) see below %   Estimated Average Glucose 163 Not Established mg/dL  POCT GLUCOSE  Result Value Ref Range   POCT Glucose 184 (H) 74 - 99 mg/dL  CBC  Result Value Ref Range   WBC 7.3 4.4 - 11.3 x10*3/uL   nRBC 0.0 0.0 - 0.0 /100 WBCs   RBC 3.93 (L) 4.50 - 5.90 x10*6/uL   Hemoglobin 12.4 (L) 13.5 - 17.5 g/dL   Hematocrit 37.2 (L) 41.0 - 52.0 %   MCV 95 80 - 100 fL   MCH 31.6 26.0 - 34.0 pg   MCHC 33.3 32.0 - 36.0 g/dL   RDW 13.3 11.5 - 14.5 %   Platelets 182 150 - 450 x10*3/uL  Basic Metabolic Panel  Result Value Ref Range   Glucose 183 (H) 74 - 99 mg/dL   Sodium 134 (L) 136 - 145 mmol/L   Potassium 3.9 3.5 - 5.3 mmol/L   Chloride 102 98 - 107 mmol/L   Bicarbonate 24 21 - 32 mmol/L   Anion Gap 12 10 - 20 mmol/L   Urea Nitrogen 19 6 - 23 mg/dL   Creatinine 0.92 0.50 - 1.30 mg/dL   eGFR 80 >60 mL/min/1.73m*2   Calcium 8.1 (L) 8.6 - 10.3 mg/dL  POCT GLUCOSE  Result Value Ref Range   POCT Glucose 227 (H) 74 - 99 mg/dL  POCT GLUCOSE  Result Value Ref Range   POCT Glucose 205 (H) 74 - 99 mg/dL     Nutrition Interventions/Recommendations   Nutrition Prescription  Individualized Nutrition Prescription Provided for : Contniue carb controlled diet and start Glucerna shake BID.  Daily MVI.  Consider Omega 3 FA supplements.  If intake remains <75% of meals, consider appetite stimulant.    Food and/or Nutrient Delivery Interventions  Meals and Snacks: Carbohydrate-modified diet  Goal: intake meets >75% estimated nutrient needs.   Medical Food Supplement: Commercial beverage  Goal: meet >75% of increased protein needs    Nutrition Monitoring and Evaluation    Food and Nutrient Related History   Amount of Food: Estimated amout of food  Criteria: intake >75% of meals     Anthropometrics: Body Composition/Growth/Weight History  Weight: Measured weight  Criteria: daily  Weight Change: Weight change percentage  Criteria: weekly    Biochemical Data, Medical Tests and Procedures  Electrolyte and Renal Panel: BUN, Calcium, serum, Chloride, Creatinine, Magnesium, Phosphorus, Potassium, Sodium  Criteria: as indicated  Glucose/Endocrine Profile: Glucose, casual, Hemoglobin A1c (HgbA1c)  Criteria: as indicated    Nutrition Focused Physical Findings   Digestive System: Anorexia, Constipation, Diarrhea, Early satiety, Nausea, Vomiting  Criteria: daily  Skin: Impaired wound healing    Follow Up  Last Date of Nutrition Visit: 03/20/24  Nutrition Follow-Up Needed?: Dietitian to reassess per policy  Follow up Comment: [id number] [id number]        06-Oct-2024 17:28

## 2024-11-18 ENCOUNTER — APPOINTMENT (OUTPATIENT)
Dept: PRIMARY CARE | Facility: CLINIC | Age: 89
End: 2024-11-18
Payer: MEDICARE

## 2024-11-18 ENCOUNTER — APPOINTMENT (OUTPATIENT)
Dept: CARDIOLOGY | Facility: CLINIC | Age: 89
End: 2024-11-18
Payer: MEDICARE